# Patient Record
Sex: MALE | Race: WHITE | NOT HISPANIC OR LATINO | Employment: OTHER | URBAN - METROPOLITAN AREA
[De-identification: names, ages, dates, MRNs, and addresses within clinical notes are randomized per-mention and may not be internally consistent; named-entity substitution may affect disease eponyms.]

---

## 2017-03-28 ENCOUNTER — LAB VISIT (OUTPATIENT)
Dept: LAB | Facility: HOSPITAL | Age: 67
End: 2017-03-28
Attending: INTERNAL MEDICINE
Payer: MEDICARE

## 2017-03-28 DIAGNOSIS — Z94.4 LIVER TRANSPLANTED: ICD-10-CM

## 2017-03-28 DIAGNOSIS — E87.5 HYPERKALEMIA: Primary | ICD-10-CM

## 2017-03-28 LAB
ALBUMIN SERPL BCP-MCNC: 3.7 G/DL
ALP SERPL-CCNC: 86 U/L
ALT SERPL W/O P-5'-P-CCNC: 29 U/L
ANION GAP SERPL CALC-SCNC: 8 MMOL/L
AST SERPL-CCNC: 27 U/L
BASOPHILS # BLD AUTO: 0.03 K/UL
BASOPHILS NFR BLD: 0.5 %
BILIRUB SERPL-MCNC: 1 MG/DL
BUN SERPL-MCNC: 20 MG/DL
CALCIUM SERPL-MCNC: 9.4 MG/DL
CHLORIDE SERPL-SCNC: 106 MMOL/L
CO2 SERPL-SCNC: 27 MMOL/L
CREAT SERPL-MCNC: 1 MG/DL
DIFFERENTIAL METHOD: ABNORMAL
EOSINOPHIL # BLD AUTO: 0.1 K/UL
EOSINOPHIL NFR BLD: 2 %
ERYTHROCYTE [DISTWIDTH] IN BLOOD BY AUTOMATED COUNT: 13 %
EST. GFR  (AFRICAN AMERICAN): >60 ML/MIN/1.73 M^2
EST. GFR  (NON AFRICAN AMERICAN): >60 ML/MIN/1.73 M^2
GLUCOSE SERPL-MCNC: 113 MG/DL
HCT VFR BLD AUTO: 45.9 %
HGB BLD-MCNC: 15.9 G/DL
LYMPHOCYTES # BLD AUTO: 2.5 K/UL
LYMPHOCYTES NFR BLD: 44.4 %
MCH RBC QN AUTO: 33.3 PG
MCHC RBC AUTO-ENTMCNC: 34.6 %
MCV RBC AUTO: 96 FL
MONOCYTES # BLD AUTO: 0.6 K/UL
MONOCYTES NFR BLD: 11.2 %
NEUTROPHILS # BLD AUTO: 2.3 K/UL
NEUTROPHILS NFR BLD: 41.5 %
PLATELET # BLD AUTO: 168 K/UL
PMV BLD AUTO: 9.4 FL
POTASSIUM SERPL-SCNC: 5.7 MMOL/L
PROT SERPL-MCNC: 6.9 G/DL
RBC # BLD AUTO: 4.77 M/UL
SODIUM SERPL-SCNC: 141 MMOL/L
TACROLIMUS BLD-MCNC: 4.3 NG/ML
WBC # BLD AUTO: 5.61 K/UL

## 2017-03-28 PROCEDURE — 80053 COMPREHEN METABOLIC PANEL: CPT

## 2017-03-28 PROCEDURE — 80197 ASSAY OF TACROLIMUS: CPT

## 2017-03-28 PROCEDURE — 36415 COLL VENOUS BLD VENIPUNCTURE: CPT

## 2017-03-28 PROCEDURE — 85025 COMPLETE CBC W/AUTO DIFF WBC: CPT

## 2017-03-28 NOTE — TELEPHONE ENCOUNTER
Received an elevated K level of 5.6 dated 3/28. Per protocol Kayexalate 30gm ordered to [facility] on [address] in [facility] per pt req. Pt will repeat labs tomorrow 3/29.  Reviewed instructions for medication and foods to avoid that are high in k. She states she understands.

## 2017-03-29 ENCOUNTER — LAB VISIT (OUTPATIENT)
Dept: LAB | Facility: HOSPITAL | Age: 67
End: 2017-03-29
Attending: INTERNAL MEDICINE
Payer: MEDICARE

## 2017-03-29 DIAGNOSIS — Z94.4 LIVER TRANSPLANTED: ICD-10-CM

## 2017-03-29 DIAGNOSIS — E87.5 HYPERKALEMIA: ICD-10-CM

## 2017-03-29 LAB — POTASSIUM SERPL-SCNC: 5.1 MMOL/L

## 2017-03-29 PROCEDURE — 36415 COLL VENOUS BLD VENIPUNCTURE: CPT

## 2017-03-29 PROCEDURE — 84132 ASSAY OF SERUM POTASSIUM: CPT

## 2017-03-30 ENCOUNTER — TELEPHONE (OUTPATIENT)
Dept: TRANSPLANT | Facility: CLINIC | Age: 67
End: 2017-03-30

## 2017-03-30 NOTE — LETTER
March 30, 2017    Zander Kaur  [address]  [place] [address]          Dear Zander Kaur:  MRN: [id number]    Your lab results were stable.  There are no medicine changes.  Please have your labs drawn again on 6/26/17.      Please try to follow a low potassium diet.  Information enclosed.    If you cannot have your labs drawn on the scheduled date, it is your responsibility to call the transplant department to reschedule.  To reschedule or make an appointment, please as to speak to or leave a message for my assistant, Susana Llamas, at [phone].  When leaving a message for Muriel Meza, or myself, we ask that you leave a brief message regarding your request.    Sincerely,    Oksana Torres, RN, BSN  Liver Transplant Coordinator  [facility]  [address] [place] [address] [id number] [phone]

## 2017-03-30 NOTE — TELEPHONE ENCOUNTER
K protocol followed and repeat K 5.1.    Letter sent, labs stable and no medication changes are needed. Repeat labs due 6/26/17. Pt also advised of low K diet.

## 2017-03-30 NOTE — TELEPHONE ENCOUNTER
----- Message from Carson Oswald MD sent at 3/30/2017  2:45 PM CDT -----  Results reviewed  K protocol please

## 2017-06-26 ENCOUNTER — TELEPHONE (OUTPATIENT)
Dept: TRANSPLANT | Facility: CLINIC | Age: 67
End: 2017-06-26

## 2017-06-26 ENCOUNTER — OFFICE VISIT (OUTPATIENT)
Dept: TRANSPLANT | Facility: CLINIC | Age: 67
End: 2017-06-26
Payer: MEDICARE

## 2017-06-26 ENCOUNTER — PATIENT MESSAGE (OUTPATIENT)
Dept: INTERNAL MEDICINE | Facility: CLINIC | Age: 67
End: 2017-06-26

## 2017-06-26 VITALS
RESPIRATION RATE: 20 BRPM | HEART RATE: 66 BPM | HEIGHT: 68 IN | TEMPERATURE: 98 F | DIASTOLIC BLOOD PRESSURE: 95 MMHG | BODY MASS INDEX: 31.41 KG/M2 | SYSTOLIC BLOOD PRESSURE: 151 MMHG | OXYGEN SATURATION: 95 % | WEIGHT: 207.25 LBS

## 2017-06-26 DIAGNOSIS — Z79.899 H/O LONG-TERM TREATMENT WITH HIGH-RISK MEDICATION: Primary | ICD-10-CM

## 2017-06-26 DIAGNOSIS — Z79.52 LONG TERM CURRENT USE OF SYSTEMIC STEROIDS: ICD-10-CM

## 2017-06-26 DIAGNOSIS — M17.9 OSTEOARTHRITIS OF KNEE, UNSPECIFIED LATERALITY, UNSPECIFIED OSTEOARTHRITIS TYPE: Primary | ICD-10-CM

## 2017-06-26 DIAGNOSIS — E87.5 HYPERKALEMIA: Primary | ICD-10-CM

## 2017-06-26 DIAGNOSIS — Z94.4 S/P LIVER TRANSPLANT: Primary | ICD-10-CM

## 2017-06-26 PROCEDURE — 99213 OFFICE O/P EST LOW 20 MIN: CPT | Mod: PBBFAC | Performed by: INTERNAL MEDICINE

## 2017-06-26 PROCEDURE — 99999 PR PBB SHADOW E&M-EST. PATIENT-LVL III: CPT | Mod: PBBFAC,,, | Performed by: INTERNAL MEDICINE

## 2017-06-26 PROCEDURE — 99214 OFFICE O/P EST MOD 30 MIN: CPT | Mod: S$PBB,,, | Performed by: INTERNAL MEDICINE

## 2017-06-26 RX ORDER — CHOLECALCIFEROL (VITAMIN D3) 25 MCG
1000 TABLET ORAL DAILY
COMMUNITY

## 2017-06-26 NOTE — TELEPHONE ENCOUNTER
Spoke with pt, advised that he needs to take 30g/120ml of kayexalate today and repeat K tomorrow. Medication called in to [facility] per pt's request.

## 2017-06-26 NOTE — TELEPHONE ENCOUNTER
----- Message from No Villagran sent at 6/26/2017 12:58 PM CDT -----  Contact: Marbella/wife  Please call  about meds is not been sent yet and has some questions

## 2017-06-26 NOTE — PROGRESS NOTES
Transplant Hepatology  Liver Transplant Recipient Follow-up    Transplant Date: 2010  UNOS Native Liver Dx: Alcoholic Cirrhosis    Zander is here for follow up of his liver transplant. He had a liver transplant in  (7 years ago) for alcohol-related cirrhosis.    ORGAN: LIVER  Whole or Partial: whole liver  Donor Type:  - brain death  [facility] High Risk: no  Donor CMV Status: positive  Donor HCV Status: negative  Donor HBcAb: negative  Biliary Anastomosis:   Arterial Anatomy:   IVC reconstruction:   Portal vein status:     He has had the following complications since transplant: biliary stricture. The noted complications are well controlled.      Subjective:     Interval History: .  At this point today he does not have any complaints.   He has normal liver and kidney function and is on tac monotherapy.      His only liver issue post-OLT was an anastomotic biliary stricture treated with ERCP and stenting but his biliary stents were removed in Dec 2011 and his LFTs have since remained normal.    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 Sees dermatology routinely. He works and enjoys fixing cars and racing. He has been followed by orthopedics for multiple joint and bone fractures.     He has considered a left knee replacement but is reluctant to do it because he is worried about the success rates.    Normal liver and kidney function in 2017.    Review of Systems   Constitutional: Negative for fatigue, fever and unexpected weight change.   Eyes: Negative.    Respiratory: Negative.  Negative for chest tightness and shortness of breath.    Cardiovascular: Negative for chest pain and leg swelling.   Gastrointestinal: Negative for abdominal pain, blood in stool, constipation, nausea and vomiting.   Genitourinary: Negative.    Musculoskeletal: Positive for arthralgias (from previous injuries) and joint swelling.   Skin: Negative.  Negative for color change and rash.    Neurological: Negative.  Negative for dizziness and light-headedness.   Psychiatric/Behavioral: Negative.  Negative for confusion and suicidal ideas. The patient is not nervous/anxious.        Objective:     Physical Exam   Constitutional: He is oriented to person, place, and time. He appears well-developed and well-nourished.   HENT:   Head: Normocephalic and atraumatic.   Eyes: Conjunctivae are normal. Pupils are equal, round, and reactive to light.   Neck: Normal range of motion. Neck supple. No thyromegaly present.   Cardiovascular: Normal rate, regular rhythm and normal heart sounds.    Pulmonary/Chest: Effort normal and breath sounds normal.   Abdominal: Soft. Bowel sounds are normal. He exhibits no distension. There is no tenderness.       Musculoskeletal: He exhibits edema (joints) and tenderness (over the knees and shoulders).   Neurological: He is alert and oriented to person, place, and time.   Skin: Skin is warm and dry.   Psychiatric: He has a normal mood and affect. His behavior is normal. Judgment and thought content normal.   Nursing note and vitals reviewed.    Lab Results   Component Value Date    BILITOT 1.0 03/28/2017    AST 27 03/28/2017    ALT 29 03/28/2017    ALKPHOS 86 03/28/2017    CREATININE 1.0 03/28/2017    ALBUMIN 3.7 03/28/2017     Lab Results   Component Value Date    WBC 5.61 03/28/2017    HGB 15.9 03/28/2017    HCT 45.9 03/28/2017     03/28/2017     Lab Results   Component Value Date    TACROLIMUS 4.3 (L) 03/28/2017       Assessment/Plan:     No diagnosis found.    1. S/p liver transplant 6/15/2010 for Alcoholic cirrhosis. Normal LFTs. Chronic immunosuppressive medications for rejection prophylaxis at target. On tacrolimus.  Plan: no adjustment needed.Continue monitoring symptoms, labs and drug levels for drug-related toxicity and side effects  2. Squamous cell skin cancer. Routine dermatology follow up.   3. Hypertension- controlled with medication. I reviewed with him that  the target goal for his hypertension management should be less than 125/75.  4. Osteoarthritis and multiple joint injuries. Continue follow up with orthopedics. On pain meds.     5. Post-transplant health maintenance: Refer to PCP: Bone density. The patient should undergo a bone density to rule out osteoporosis post transplant. Cancer surveillance.  The patient has been instructed to use sunscreen and wear a hat while in the sun. He should see a dermatologist annually post liver transplant. He should see his local gastroenterologist to stay up to date with colorectal cancer screening.     Clinic in 1 year.    MD PURVI Irving Patient Status  Functional Status: 100% - Normal, no complaints, no evidence of disease  Physical Capacity: No Limitations

## 2017-06-26 NOTE — TELEPHONE ENCOUNTER
Called pt/pt's wife to speak to them regarding pt's potassium level and changes to diet recently.  Pt's spouse states she has a list of potassium content in foods and is trying to follow this.   She has added home made bone broth, pt is taking in about 2 c per day.     Intervention:  Emailed pt's spouse a detailed list of potassium content in foods. Advised aim for less than 2,000mg per day.  Advised to reduce intake of bone broth to 1 c per day or less.     Provided RD contact info for follow up if needed.

## 2017-06-26 NOTE — TELEPHONE ENCOUNTER
Returned call, spoke with pt's wife. Wife concerned because pt drinking bone broth daily and she thinks it may be high in potassium.  Will check with dietician and call her back.

## 2017-06-26 NOTE — LETTER
June 26, 2017        Shai Davison  [address]  [facility] [address]  Phone: [phone]  Fax: [phone]             [facility] Trans[facility]  [address]  [facility] [address]  Phone: [phone]   Patient: Zander Kaur Sr.   MR Number: [id number]   YOB: 1950   Date of Visit: 6/26/2017       Dear Dr. Shai Davison    Thank you for referring Zander Kaur to me for evaluation. Attached you will find relevant portions of my assessment and plan of care.    If you have questions, please do not hesitate to call me. I look forward to following Zander Kaur along with you.    Sincerely,    Carson Oswald MD    Enclosure    If you would like to receive this communication electronically, please contact [email or web address] or [phone] to request [employer] access.    [employer] is a tool which provides read-only access to select patient information with whom you have a relationship. Its easy to use and provides real time access to review your patients record including encounter summaries, notes, results, and demographic information.    If you feel you have received this communication in error or would no longer like to receive these types of communications, please e-mail [email or web address]

## 2017-06-27 ENCOUNTER — TELEPHONE (OUTPATIENT)
Dept: TRANSPLANT | Facility: CLINIC | Age: 67
End: 2017-06-27

## 2017-06-27 ENCOUNTER — LAB VISIT (OUTPATIENT)
Dept: LAB | Facility: HOSPITAL | Age: 67
End: 2017-06-27
Attending: INTERNAL MEDICINE
Payer: MEDICARE

## 2017-06-27 DIAGNOSIS — E87.5 HYPERKALEMIA: ICD-10-CM

## 2017-06-27 LAB — POTASSIUM SERPL-SCNC: 4.4 MMOL/L

## 2017-06-27 PROCEDURE — 36415 COLL VENOUS BLD VENIPUNCTURE: CPT

## 2017-06-27 PROCEDURE — 84132 ASSAY OF SERUM POTASSIUM: CPT

## 2017-06-27 NOTE — TELEPHONE ENCOUNTER
Repeat K pending.  Low K diet reviewed with pt and wife per RONALD Fermin, transplant dietician.      Letter sent, labs stable and no medication changes are needed. Repeat labs due 9/25/17.

## 2017-06-27 NOTE — LETTER
June 27, 2017    Zander Kaur  [address] [place] [address]          Dear Zander Kaur:  MRN: [id number]    Your liver lab results were stable.  There are no medicine changes.  Please continue to follow a low potassium diet. Please have your labs drawn again on 9/25/17.      If you cannot have your labs drawn on the scheduled date, it is your responsibility to call the transplant department to reschedule.  To reschedule or make an appointment, please as to speak to or leave a message for my assistant, Susana Llamas, at [phone].  When leaving a message for Muriel Meza, or myself, we ask that you leave a brief message regarding your request.    Sincerely,    Oksana Torres, RN, BSN  Liver Transplant Coordinator  [facility]  [address] [place] [address] [phone]

## 2017-07-13 ENCOUNTER — HOSPITAL ENCOUNTER (OUTPATIENT)
Dept: RADIOLOGY | Facility: CLINIC | Age: 67
Discharge: HOME | End: 2017-07-13
Attending: INTERNAL MEDICINE
Payer: MEDICARE

## 2017-07-13 DIAGNOSIS — Z79.899 H/O LONG-TERM TREATMENT WITH HIGH-RISK MEDICATION: ICD-10-CM

## 2017-07-13 DIAGNOSIS — Z79.52 LONG TERM CURRENT USE OF SYSTEMIC STEROIDS: ICD-10-CM

## 2017-07-13 PROCEDURE — 77080 DXA BONE DENSITY AXIAL: CPT | Mod: TC

## 2017-07-13 PROCEDURE — 77080 DXA BONE DENSITY AXIAL: CPT | Mod: 26,,, | Performed by: INTERNAL MEDICINE

## 2017-07-17 ENCOUNTER — HOSPITAL ENCOUNTER (OUTPATIENT)
Dept: RADIOLOGY | Facility: HOSPITAL | Age: 67
Discharge: HOME | End: 2017-07-17
Attending: ORTHOPAEDIC SURGERY
Payer: MEDICARE

## 2017-07-17 ENCOUNTER — OFFICE VISIT (OUTPATIENT)
Dept: ORTHOPEDICS | Facility: CLINIC | Age: 67
End: 2017-07-17
Payer: MEDICARE

## 2017-07-17 VITALS — BODY MASS INDEX: 32.72 KG/M2 | HEIGHT: 67 IN | WEIGHT: 208.44 LBS

## 2017-07-17 DIAGNOSIS — M17.12 OSTEOARTHRITIS OF LEFT KNEE, UNSPECIFIED OSTEOARTHRITIS TYPE: Primary | ICD-10-CM

## 2017-07-17 DIAGNOSIS — M25.571 RIGHT ANKLE PAIN, UNSPECIFIED CHRONICITY: ICD-10-CM

## 2017-07-17 DIAGNOSIS — M19.071 OSTEOARTHRITIS OF RIGHT ANKLE, UNSPECIFIED OSTEOARTHRITIS TYPE: ICD-10-CM

## 2017-07-17 PROCEDURE — 73610 X-RAY EXAM OF ANKLE: CPT | Mod: 26,RT,, | Performed by: RADIOLOGY

## 2017-07-17 PROCEDURE — 99999 PR PBB SHADOW E&M-EST. PATIENT-LVL III: CPT | Mod: PBBFAC,,, | Performed by: PHYSICIAN ASSISTANT

## 2017-07-17 PROCEDURE — 99214 OFFICE O/P EST MOD 30 MIN: CPT | Mod: S$PBB,,, | Performed by: PHYSICIAN ASSISTANT

## 2017-07-17 PROCEDURE — 73610 X-RAY EXAM OF ANKLE: CPT | Mod: TC,RT

## 2017-07-17 NOTE — LETTER
July 18, 2017      Carson Oswald MD  [address]  [facility] [address]           [facility]  [address]  [facility] [address]  Phone: [phone]          Patient: Zander Kaur Sr.   MR Number: [id number]   YOB: 1950   Date of Visit: 7/17/2017       Dear Dr. Carson Oswald:    Thank you for referring Zander Kaur to me for evaluation. Attached you will find relevant portions of my assessment and plan of care.    If you have questions, please do not hesitate to call me. I look forward to following Zander Kaur along with you.    Sincerely,    Marbella Rea PA-C    Enclosure  CC:  No Recipients    If you would like to receive this communication electronically, please contact [email or web address] or [phone] to request more information on [employer] Link access.    For providers and/or their staff who would like to refer a patient to [facility], please contact us through our one-stop-shop provider referral line, [facility], at [phone].    If you feel you have received this communication in error or would no longer like to receive these types of communications, please e-mail [email or web address]

## 2017-07-18 NOTE — PROGRESS NOTES
Subjective:      Patient ID: Zander Kaur Sr. is a 66 y.o. male.    Chief Complaint: No chief complaint on file.    HPI  66 year old male presents with chief complaint of left knee pain x many years. He reports having about 2 surgeries on the left knee in the past. Pain is worse with use and at night. He takes soma and percocet for this. He is s/p liver transplant. He use to receive cortisone injections but he says they stopped working. He wears knee braces which help. He alternates cane, walker, and segway.   Patient reports right ankle pain x 20 years. He reports h/o multiple fractures and surgeries on the ankle. He also has h/o infection. Pain is worse at night. He has pain with walking and he limps. He reports limited ROM. He denies fever, chills, and sweats.   Review of Systems   Constitution: Negative for chills, fever and night sweats.   Cardiovascular: Negative for chest pain.   Respiratory: Negative for cough and shortness of breath.    Hematologic/Lymphatic: Does not bruise/bleed easily.   Skin: Negative for color change.   Gastrointestinal: Negative for heartburn.   Genitourinary: Negative for dysuria.   Neurological: Negative for numbness and paresthesias.   Psychiatric/Behavioral: Negative for altered mental status.   Allergic/Immunologic: Negative for persistent infections.         Objective:            General    Vitals reviewed.  Constitutional: He is oriented to person, place, and time. He appears well-developed and well-nourished.   Cardiovascular: Normal rate.    Neurological: He is alert and oriented to person, place, and time.         Right Ankle/Foot Exam     Inspection   Erythema: absent    Range of Motion   Ankle Joint   Dorsiflexion: abnormal   Plantar flexion: abnormal   Subtalar Joint   Inversion: abnormal   Eversion: abnormal     Other   Sensation: normal    Comments:  TTP lateral and medial aspect of ankle.         Vascular Exam     Right Pulses  Dorsalis Pedis:      2+            Left  Knee Exam:  ROM 0-125 degrees  +crepitus  No effusion  TTP medial joint line        X-ray ankle: ordered and reviewed by myself. Moderate OA present.  X-ray knee: reviewed by myself. There is severe medial compartment narrowing on the left.      Assessment:       Encounter Diagnoses   Name Primary?    Osteoarthritis of right ankle, unspecified osteoarthritis type     Osteoarthritis of left knee, unspecified osteoarthritis type Yes          Plan:       Discussed treatment options with patient. He is not interested in surgical intervention. If he changes his mind, we will set him up with a surgeon. Explained our dept pain medicine policy. Recommend he either f/u with his PCP or PMR regarding chronic pain mgmt. He voiced understanding.

## 2017-07-21 ENCOUNTER — TELEPHONE (OUTPATIENT)
Dept: TRANSPLANT | Facility: CLINIC | Age: 67
End: 2017-07-21

## 2017-07-21 NOTE — LETTER
July 21, 2017    Zander Kaur Sr.  [address] [place] [address]             [facility] - Liver Transplant  [address]  [place] [address]  Phone: [phone] Mr. Kaur,    Please see attached DEXA report.  Dr. Oswald would like you to follow the recommendations and follow-up with your PCP for continued monitoring.    Please let us know if you have any questions or concerns.    Sincerely,    Oksana Torres RN, BSN  Liver Transplant Coordinator

## 2017-08-03 ENCOUNTER — OFFICE VISIT (OUTPATIENT)
Dept: INTERNAL MEDICINE | Facility: CLINIC | Age: 67
End: 2017-08-03
Payer: MEDICARE

## 2017-08-03 VITALS
HEART RATE: 69 BPM | SYSTOLIC BLOOD PRESSURE: 124 MMHG | DIASTOLIC BLOOD PRESSURE: 76 MMHG | HEIGHT: 66 IN | BODY MASS INDEX: 32.92 KG/M2 | WEIGHT: 204.81 LBS

## 2017-08-03 DIAGNOSIS — I10 ESSENTIAL HYPERTENSION: Primary | ICD-10-CM

## 2017-08-03 DIAGNOSIS — M19.90 ARTHRITIS: ICD-10-CM

## 2017-08-03 DIAGNOSIS — E87.5 HYPERKALEMIA: ICD-10-CM

## 2017-08-03 DIAGNOSIS — Z12.5 SCREENING FOR PROSTATE CANCER: ICD-10-CM

## 2017-08-03 DIAGNOSIS — Z94.4 S/P LIVER TRANSPLANT: ICD-10-CM

## 2017-08-03 PROCEDURE — 99214 OFFICE O/P EST MOD 30 MIN: CPT | Mod: S$PBB,,, | Performed by: INTERNAL MEDICINE

## 2017-08-03 PROCEDURE — 99213 OFFICE O/P EST LOW 20 MIN: CPT | Mod: PBBFAC | Performed by: INTERNAL MEDICINE

## 2017-08-03 PROCEDURE — 99999 PR PBB SHADOW E&M-EST. PATIENT-LVL III: CPT | Mod: PBBFAC,,, | Performed by: INTERNAL MEDICINE

## 2017-08-03 PROCEDURE — 3008F BODY MASS INDEX DOCD: CPT | Mod: ,,, | Performed by: INTERNAL MEDICINE

## 2017-08-03 RX ORDER — DOXAZOSIN 8 MG/1
8 TABLET ORAL DAILY
Qty: 90 TABLET | Refills: 11 | Status: SHIPPED | OUTPATIENT
Start: 2017-08-03 | End: 2018-10-02 | Stop reason: SDUPTHER

## 2017-08-03 NOTE — PROGRESS NOTES
Subjective:       Patient ID: Zander Kaur Sr. is a 66 y.o. male.    Chief Complaint: Follow-up (yearly follow up)    History of present illness: Patient comes in for annual follow-up.  He is attended by his wife.  They state that the transplant department asked if we change his Norvasc to another medication as they are concerned it may be causing hyperkalemia.  Apparently Prograf can cause this but he is on the lowest dose possible and they are  looking for other medications to adjust.  He had to receive Kayexalate in the past. I didn't want to use a diuretic so we'll try Adoxa doxazosin monitoring his potassium and blood pressure response.  He sees an outside orthopedic for pain  medication for his knee.       Review of Systems   Constitutional: Negative for activity change and unexpected weight change.   HENT: Negative for hearing loss, rhinorrhea and trouble swallowing.    Eyes: Negative for discharge and visual disturbance.   Respiratory: Negative for chest tightness and wheezing.    Cardiovascular: Positive for leg swelling. Negative for chest pain and palpitations.   Gastrointestinal: Negative for blood in stool, constipation, diarrhea and vomiting.   Endocrine: Negative for polydipsia and polyuria.   Genitourinary: Negative for difficulty urinating, hematuria and urgency.   Musculoskeletal: Positive for arthralgias, gait problem and joint swelling. Negative for neck pain.   Neurological: Negative for weakness and headaches.   Psychiatric/Behavioral: Negative for confusion and dysphoric mood.       Objective:      Physical Exam   Constitutional: He is oriented to person, place, and time. He appears well-developed and well-nourished. No distress.   HENT:   Head: Normocephalic and atraumatic.   Right Ear: External ear normal.   Left Ear: External ear normal.   Mouth/Throat: Oropharynx is clear and moist. No oropharyngeal exudate.   TM's clear, pharynx clear   Eyes: Conjunctivae and EOM are normal. Pupils are  equal, round, and reactive to light. No scleral icterus.   Neck: Normal range of motion. Neck supple. No thyromegaly present.   No supraclavicular nodes palpated   Cardiovascular: Normal rate, regular rhythm and normal heart sounds.    No murmur heard.  Pulmonary/Chest: Effort normal and breath sounds normal. He has no wheezes.   Abdominal: Soft. Bowel sounds are normal. He exhibits no mass. There is no tenderness.   Musculoskeletal: He exhibits edema (pretibial and pedal) and tenderness (left knee).   Lymphadenopathy:     He has no cervical adenopathy.   Neurological: He is alert and oriented to person, place, and time.   Skin: No pallor.   Psychiatric: He has a normal mood and affect.       Assessment:       1. Essential hypertension    2. Screening for prostate cancer    3. S/P liver transplant    4. Arthritis    5. Hyperkalemia        Plan:       Zander was seen today for follow-up.    Diagnoses and all orders for this visit:    Essential hypertension  -     Lipid panel; Future    Screening for prostate cancer  -     PSA, Screening; Future    S/P liver transplant    Arthritis    Hyperkalemia    Other orders  -     doxazosin (CARDURA) 8 MG Tab; Take 1 tablet (8 mg total) by mouth once daily.        follow-up in a few weeks with pressure and lab

## 2017-08-15 ENCOUNTER — TELEPHONE (OUTPATIENT)
Dept: INTERNAL MEDICINE | Facility: CLINIC | Age: 67
End: 2017-08-15

## 2017-09-18 RX ORDER — AMLODIPINE BESYLATE 10 MG/1
TABLET ORAL
Qty: 30 TABLET | Refills: 11 | Status: ON HOLD | OUTPATIENT
Start: 2017-09-18 | End: 2023-12-18 | Stop reason: HOSPADM

## 2017-09-18 RX ORDER — METOPROLOL TARTRATE 100 MG/1
TABLET ORAL
Qty: 60 TABLET | Refills: 11 | Status: SHIPPED | OUTPATIENT
Start: 2017-09-18 | End: 2018-08-14 | Stop reason: SDUPTHER

## 2017-09-19 RX ORDER — TACROLIMUS 1 MG/1
CAPSULE, GELATIN COATED ORAL
Qty: 60 CAPSULE | Refills: 11 | Status: SHIPPED | OUTPATIENT
Start: 2017-09-19 | End: 2018-08-14 | Stop reason: SDUPTHER

## 2017-09-26 ENCOUNTER — LAB VISIT (OUTPATIENT)
Dept: LAB | Facility: HOSPITAL | Age: 67
End: 2017-09-26
Attending: INTERNAL MEDICINE
Payer: MEDICARE

## 2017-09-26 DIAGNOSIS — I10 ESSENTIAL HYPERTENSION: ICD-10-CM

## 2017-09-26 DIAGNOSIS — Z94.4 LIVER TRANSPLANTED: ICD-10-CM

## 2017-09-26 DIAGNOSIS — Z12.5 SCREENING FOR PROSTATE CANCER: ICD-10-CM

## 2017-09-26 LAB
ALBUMIN SERPL BCP-MCNC: 3.5 G/DL
ALP SERPL-CCNC: 90 U/L
ALT SERPL W/O P-5'-P-CCNC: 33 U/L
ANION GAP SERPL CALC-SCNC: 6 MMOL/L
AST SERPL-CCNC: 32 U/L
BASOPHILS # BLD AUTO: 0.05 K/UL
BASOPHILS NFR BLD: 1 %
BILIRUB SERPL-MCNC: 0.6 MG/DL
BUN SERPL-MCNC: 20 MG/DL
CALCIUM SERPL-MCNC: 9.7 MG/DL
CHLORIDE SERPL-SCNC: 109 MMOL/L
CHOLEST SERPL-MCNC: 190 MG/DL
CHOLEST/HDLC SERPL: 3.3 {RATIO}
CO2 SERPL-SCNC: 30 MMOL/L
COMPLEXED PSA SERPL-MCNC: 3.1 NG/ML
CREAT SERPL-MCNC: 0.9 MG/DL
DIFFERENTIAL METHOD: ABNORMAL
EOSINOPHIL # BLD AUTO: 0.2 K/UL
EOSINOPHIL NFR BLD: 2.9 %
ERYTHROCYTE [DISTWIDTH] IN BLOOD BY AUTOMATED COUNT: 13.5 %
EST. GFR  (AFRICAN AMERICAN): >60 ML/MIN/1.73 M^2
EST. GFR  (NON AFRICAN AMERICAN): >60 ML/MIN/1.73 M^2
GLUCOSE SERPL-MCNC: 103 MG/DL
HCT VFR BLD AUTO: 48.5 %
HDLC SERPL-MCNC: 58 MG/DL
HDLC SERPL: 30.5 %
HGB BLD-MCNC: 16.5 G/DL
LDLC SERPL CALC-MCNC: 113.4 MG/DL
LYMPHOCYTES # BLD AUTO: 2.3 K/UL
LYMPHOCYTES NFR BLD: 44.7 %
MCH RBC QN AUTO: 33.5 PG
MCHC RBC AUTO-ENTMCNC: 34 G/DL
MCV RBC AUTO: 98 FL
MONOCYTES # BLD AUTO: 0.5 K/UL
MONOCYTES NFR BLD: 9.6 %
NEUTROPHILS # BLD AUTO: 2.2 K/UL
NEUTROPHILS NFR BLD: 41.6 %
NONHDLC SERPL-MCNC: 132 MG/DL
PLATELET # BLD AUTO: 148 K/UL
PMV BLD AUTO: 9.8 FL
POTASSIUM SERPL-SCNC: 4.9 MMOL/L
PROT SERPL-MCNC: 7 G/DL
RBC # BLD AUTO: 4.93 M/UL
SODIUM SERPL-SCNC: 145 MMOL/L
TACROLIMUS BLD-MCNC: 2.4 NG/ML
TRIGL SERPL-MCNC: 93 MG/DL
WBC # BLD AUTO: 5.19 K/UL

## 2017-09-26 PROCEDURE — 84153 ASSAY OF PSA TOTAL: CPT

## 2017-09-26 PROCEDURE — 36415 COLL VENOUS BLD VENIPUNCTURE: CPT

## 2017-09-26 PROCEDURE — 80053 COMPREHEN METABOLIC PANEL: CPT

## 2017-09-26 PROCEDURE — 85025 COMPLETE CBC W/AUTO DIFF WBC: CPT

## 2017-09-26 PROCEDURE — 80061 LIPID PANEL: CPT

## 2017-09-26 PROCEDURE — 80197 ASSAY OF TACROLIMUS: CPT

## 2017-09-28 ENCOUNTER — TELEPHONE (OUTPATIENT)
Dept: TRANSPLANT | Facility: CLINIC | Age: 67
End: 2017-09-28

## 2017-09-28 NOTE — TELEPHONE ENCOUNTER
Letter sent, labs stable and no medication changes are needed. Repeat labs due 1/2/18 per protocol.

## 2017-09-28 NOTE — LETTER
September 28, 2017    Zander Kaur  [address]  [facility] [address]          Dear Zander Kaur:  MRN: [id number]    Your lab results were stable.  There are no medicine changes.  Please have your labs drawn again on 1/2/18.      If you cannot have your labs drawn on the scheduled date, it is your responsibility to call the transplant department to reschedule.  To reschedule or make an appointment, please as to speak to or leave a message for my assistant, Susana Llamas, at [phone].  When leaving a message for Muriel Meza, or myself, we ask that you leave a brief message regarding your request.    Sincerely,    Oksana Torres, RN, BSN  Liver Transplant Coordinator  [facility]  [address] [place] [address] [phone]

## 2018-01-02 ENCOUNTER — TELEPHONE (OUTPATIENT)
Dept: TRANSPLANT | Facility: CLINIC | Age: 68
End: 2018-01-02

## 2018-01-02 ENCOUNTER — LAB VISIT (OUTPATIENT)
Dept: LAB | Facility: HOSPITAL | Age: 68
End: 2018-01-02
Attending: INTERNAL MEDICINE
Payer: MEDICARE

## 2018-01-02 DIAGNOSIS — Z94.4 LIVER TRANSPLANTED: ICD-10-CM

## 2018-01-02 LAB
ALBUMIN SERPL BCP-MCNC: 3.6 G/DL
ALP SERPL-CCNC: 91 U/L
ALT SERPL W/O P-5'-P-CCNC: 33 U/L
ANION GAP SERPL CALC-SCNC: 11 MMOL/L
AST SERPL-CCNC: 31 U/L
BASOPHILS # BLD AUTO: 0.05 K/UL
BASOPHILS NFR BLD: 0.8 %
BILIRUB SERPL-MCNC: 1.2 MG/DL
BUN SERPL-MCNC: 13 MG/DL
CALCIUM SERPL-MCNC: 9.5 MG/DL
CHLORIDE SERPL-SCNC: 108 MMOL/L
CO2 SERPL-SCNC: 28 MMOL/L
CREAT SERPL-MCNC: 1 MG/DL
DIFFERENTIAL METHOD: ABNORMAL
EOSINOPHIL # BLD AUTO: 0.3 K/UL
EOSINOPHIL NFR BLD: 4.7 %
ERYTHROCYTE [DISTWIDTH] IN BLOOD BY AUTOMATED COUNT: 13.1 %
EST. GFR  (AFRICAN AMERICAN): >60 ML/MIN/1.73 M^2
EST. GFR  (NON AFRICAN AMERICAN): >60 ML/MIN/1.73 M^2
GLUCOSE SERPL-MCNC: 115 MG/DL
HCT VFR BLD AUTO: 48.9 %
HGB BLD-MCNC: 16.3 G/DL
IMM GRANULOCYTES # BLD AUTO: 0.03 K/UL
IMM GRANULOCYTES NFR BLD AUTO: 0.5 %
LYMPHOCYTES # BLD AUTO: 2.3 K/UL
LYMPHOCYTES NFR BLD: 38.3 %
MCH RBC QN AUTO: 33.5 PG
MCHC RBC AUTO-ENTMCNC: 33.3 G/DL
MCV RBC AUTO: 101 FL
MONOCYTES # BLD AUTO: 0.6 K/UL
MONOCYTES NFR BLD: 9.4 %
NEUTROPHILS # BLD AUTO: 2.8 K/UL
NEUTROPHILS NFR BLD: 46.3 %
NRBC BLD-RTO: 0 /100 WBC
PLATELET # BLD AUTO: 153 K/UL
PMV BLD AUTO: 9.8 FL
POTASSIUM SERPL-SCNC: 5 MMOL/L
PROT SERPL-MCNC: 7.2 G/DL
RBC # BLD AUTO: 4.86 M/UL
SODIUM SERPL-SCNC: 147 MMOL/L
TACROLIMUS BLD-MCNC: 4.3 NG/ML
WBC # BLD AUTO: 5.95 K/UL

## 2018-01-02 PROCEDURE — 80053 COMPREHEN METABOLIC PANEL: CPT

## 2018-01-02 PROCEDURE — 85025 COMPLETE CBC W/AUTO DIFF WBC: CPT

## 2018-01-02 PROCEDURE — 36415 COLL VENOUS BLD VENIPUNCTURE: CPT

## 2018-01-02 PROCEDURE — 80197 ASSAY OF TACROLIMUS: CPT

## 2018-01-02 NOTE — LETTER
January 2, 2018    Zander Kaur  [address]  [facility] [address]          Dear Zander Kaur:  MRN: [id number]    Your lab results were stable.  There are no medicine changes.  Please have your labs drawn again on 4/2/18.      If you cannot have your labs drawn on the scheduled date, it is your responsibility to call the transplant department to reschedule.  To reschedule or make an appointment, please as to speak to or leave a message for my assistant, Susana Llamas, at [phone].  When leaving a message for Muriel Meza, or myself, we ask that you leave a brief message regarding your request.    Sincerely,    Oksana Torres, RN, BSN  Liver Transplant Coordinator  [facility]  [address] [place] [address] [phone]

## 2018-01-02 NOTE — TELEPHONE ENCOUNTER
Letter sent, labs stable and no medication changes are needed. Repeat labs due 4/2/18 per protocol.

## 2018-02-01 ENCOUNTER — OFFICE VISIT (OUTPATIENT)
Dept: OPTOMETRY | Facility: CLINIC | Age: 68
End: 2018-02-01
Payer: MEDICARE

## 2018-02-01 DIAGNOSIS — H25.13 NUCLEAR SCLEROSIS OF BOTH EYES: Primary | ICD-10-CM

## 2018-02-01 DIAGNOSIS — H52.203 MYOPIA WITH ASTIGMATISM AND PRESBYOPIA, BILATERAL: ICD-10-CM

## 2018-02-01 DIAGNOSIS — H52.4 MYOPIA WITH ASTIGMATISM AND PRESBYOPIA, BILATERAL: ICD-10-CM

## 2018-02-01 DIAGNOSIS — H52.13 MYOPIA WITH ASTIGMATISM AND PRESBYOPIA, BILATERAL: ICD-10-CM

## 2018-02-01 PROCEDURE — 99212 OFFICE O/P EST SF 10 MIN: CPT | Mod: PBBFAC | Performed by: OPTOMETRIST

## 2018-02-01 PROCEDURE — 99999 PR PBB SHADOW E&M-EST. PATIENT-LVL II: CPT | Mod: PBBFAC,,, | Performed by: OPTOMETRIST

## 2018-02-01 PROCEDURE — 92015 DETERMINE REFRACTIVE STATE: CPT | Mod: ,,, | Performed by: OPTOMETRIST

## 2018-02-01 PROCEDURE — 92004 COMPRE OPH EXAM NEW PT 1/>: CPT | Mod: S$PBB,,, | Performed by: OPTOMETRIST

## 2018-02-01 NOTE — PROGRESS NOTES
HPI     Patient's last dilated exam was: 2010 W/Dr. Thomas    Pt states he feels his vision has decreased for distance, wears otc   reading glasses only. Would like a glasses rx for distance only.  Patient   denies flashes, floaters, pain and double vision. Not using any gtts.  No   other ocular complaints         Last edited by SUSI Pedro on 2/1/2018  8:33 AM.   (History)            Assessment /Plan     For exam results, see Encounter Report.    Nuclear sclerosis of both eyes    Myopia with astigmatism and presbyopia, bilateral            1.  Educated on cataracts and affects on vision.  Early-monitor.  Eye health normal OU.  2.  Distance rx given.  Continue with OTC readers.          RTC 1 year for routine exam.

## 2018-04-03 ENCOUNTER — TELEPHONE (OUTPATIENT)
Dept: TRANSPLANT | Facility: CLINIC | Age: 68
End: 2018-04-03

## 2018-04-03 ENCOUNTER — LAB VISIT (OUTPATIENT)
Dept: LAB | Facility: HOSPITAL | Age: 68
End: 2018-04-03
Attending: INTERNAL MEDICINE
Payer: MEDICARE

## 2018-04-03 DIAGNOSIS — Z94.4 LIVER TRANSPLANTED: ICD-10-CM

## 2018-04-03 LAB
ALBUMIN SERPL BCP-MCNC: 3.7 G/DL
ALP SERPL-CCNC: 82 U/L
ALT SERPL W/O P-5'-P-CCNC: 35 U/L
ANION GAP SERPL CALC-SCNC: 8 MMOL/L
AST SERPL-CCNC: 31 U/L
BASOPHILS # BLD AUTO: 0.05 K/UL
BASOPHILS NFR BLD: 0.8 %
BILIRUB SERPL-MCNC: 0.8 MG/DL
BUN SERPL-MCNC: 17 MG/DL
CALCIUM SERPL-MCNC: 9.3 MG/DL
CHLORIDE SERPL-SCNC: 107 MMOL/L
CO2 SERPL-SCNC: 28 MMOL/L
CREAT SERPL-MCNC: 1 MG/DL
DIFFERENTIAL METHOD: ABNORMAL
EOSINOPHIL # BLD AUTO: 0.4 K/UL
EOSINOPHIL NFR BLD: 6.2 %
ERYTHROCYTE [DISTWIDTH] IN BLOOD BY AUTOMATED COUNT: 13.3 %
EST. GFR  (AFRICAN AMERICAN): >60 ML/MIN/1.73 M^2
EST. GFR  (NON AFRICAN AMERICAN): >60 ML/MIN/1.73 M^2
GLUCOSE SERPL-MCNC: 119 MG/DL
HCT VFR BLD AUTO: 50.1 %
HGB BLD-MCNC: 16.6 G/DL
IMM GRANULOCYTES # BLD AUTO: 0.02 K/UL
IMM GRANULOCYTES NFR BLD AUTO: 0.3 %
LYMPHOCYTES # BLD AUTO: 2.8 K/UL
LYMPHOCYTES NFR BLD: 43.6 %
MCH RBC QN AUTO: 33.3 PG
MCHC RBC AUTO-ENTMCNC: 33.1 G/DL
MCV RBC AUTO: 101 FL
MONOCYTES # BLD AUTO: 0.7 K/UL
MONOCYTES NFR BLD: 10 %
NEUTROPHILS # BLD AUTO: 2.5 K/UL
NEUTROPHILS NFR BLD: 39.1 %
NRBC BLD-RTO: 0 /100 WBC
PLATELET # BLD AUTO: 165 K/UL
PMV BLD AUTO: 9.5 FL
POTASSIUM SERPL-SCNC: 5.1 MMOL/L
PROT SERPL-MCNC: 7.1 G/DL
RBC # BLD AUTO: 4.98 M/UL
SODIUM SERPL-SCNC: 143 MMOL/L
TACROLIMUS BLD-MCNC: 4.6 NG/ML
WBC # BLD AUTO: 6.47 K/UL

## 2018-04-03 PROCEDURE — 80053 COMPREHEN METABOLIC PANEL: CPT

## 2018-04-03 PROCEDURE — 36415 COLL VENOUS BLD VENIPUNCTURE: CPT

## 2018-04-03 PROCEDURE — 80197 ASSAY OF TACROLIMUS: CPT

## 2018-04-03 PROCEDURE — 85025 COMPLETE CBC W/AUTO DIFF WBC: CPT

## 2018-04-03 NOTE — LETTER
April 3, 2018    Zander Kaur  [address]  [facility] [address]          Dear Zander Kaur:  MRN: [id number]    Your lab results were stable.  There are no medicine changes.  Please have your labs drawn again on 7/9/18.      If you cannot have your labs drawn on the scheduled date, it is your responsibility to call the transplant department to reschedule.  To reschedule or make an appointment, please as to speak to or leave a message for my assistant, Susana Llamas, at [phone].  When leaving a message for Muriel Meza, or myself, we ask that you leave a brief message regarding your request.    Sincerely,    kOsana Torres, RN, BSN  Liver Transplant Coordinator  [facility]  [address] [place] [address] [phone]

## 2018-04-03 NOTE — TELEPHONE ENCOUNTER
Letter sent, labs stable and no medication changes are needed. Repeat labs due 7/9/18 per protocol.

## 2018-05-15 ENCOUNTER — PATIENT MESSAGE (OUTPATIENT)
Dept: INTERNAL MEDICINE | Facility: CLINIC | Age: 68
End: 2018-05-15

## 2018-06-30 ENCOUNTER — EXTERNAL CHRONIC CARE MANAGEMENT (OUTPATIENT)
Dept: PRIMARY CARE CLINIC | Facility: CLINIC | Age: 68
End: 2018-06-30
Payer: MEDICARE

## 2018-06-30 PROCEDURE — 99490 CHRNC CARE MGMT STAFF 1ST 20: CPT | Mod: PBBFAC | Performed by: INTERNAL MEDICINE

## 2018-06-30 PROCEDURE — 99490 CHRNC CARE MGMT STAFF 1ST 20: CPT | Mod: S$PBB,,, | Performed by: INTERNAL MEDICINE

## 2018-07-09 ENCOUNTER — OFFICE VISIT (OUTPATIENT)
Dept: TRANSPLANT | Facility: CLINIC | Age: 68
End: 2018-07-09
Payer: MEDICARE

## 2018-07-09 ENCOUNTER — TELEPHONE (OUTPATIENT)
Dept: TRANSPLANT | Facility: CLINIC | Age: 68
End: 2018-07-09

## 2018-07-09 ENCOUNTER — TELEPHONE (OUTPATIENT)
Dept: INTERNAL MEDICINE | Facility: CLINIC | Age: 68
End: 2018-07-09

## 2018-07-09 ENCOUNTER — LAB VISIT (OUTPATIENT)
Dept: LAB | Facility: HOSPITAL | Age: 68
End: 2018-07-09
Attending: INTERNAL MEDICINE
Payer: MEDICARE

## 2018-07-09 VITALS
DIASTOLIC BLOOD PRESSURE: 92 MMHG | TEMPERATURE: 99 F | HEART RATE: 66 BPM | SYSTOLIC BLOOD PRESSURE: 132 MMHG | OXYGEN SATURATION: 96 % | WEIGHT: 209 LBS | BODY MASS INDEX: 31.67 KG/M2 | RESPIRATION RATE: 16 BRPM | HEIGHT: 68 IN

## 2018-07-09 DIAGNOSIS — D84.9 IMMUNOSUPPRESSION: ICD-10-CM

## 2018-07-09 DIAGNOSIS — Z94.4 LIVER TRANSPLANTED: ICD-10-CM

## 2018-07-09 DIAGNOSIS — Z94.4 LIVER TRANSPLANTED: Primary | ICD-10-CM

## 2018-07-09 DIAGNOSIS — Z94.4 S/P LIVER TRANSPLANT: Primary | ICD-10-CM

## 2018-07-09 LAB
ALBUMIN SERPL BCP-MCNC: 3.8 G/DL
ALP SERPL-CCNC: 85 U/L
ALT SERPL W/O P-5'-P-CCNC: 38 U/L
ANION GAP SERPL CALC-SCNC: 8 MMOL/L
AST SERPL-CCNC: 36 U/L
BASOPHILS # BLD AUTO: 0.05 K/UL
BASOPHILS NFR BLD: 0.9 %
BILIRUB SERPL-MCNC: 0.8 MG/DL
BUN SERPL-MCNC: 14 MG/DL
CALCIUM SERPL-MCNC: 10.3 MG/DL
CHLORIDE SERPL-SCNC: 105 MMOL/L
CO2 SERPL-SCNC: 31 MMOL/L
CREAT SERPL-MCNC: 0.9 MG/DL
DIFFERENTIAL METHOD: ABNORMAL
EOSINOPHIL # BLD AUTO: 0.3 K/UL
EOSINOPHIL NFR BLD: 5 %
ERYTHROCYTE [DISTWIDTH] IN BLOOD BY AUTOMATED COUNT: 13.2 %
EST. GFR  (AFRICAN AMERICAN): >60 ML/MIN/1.73 M^2
EST. GFR  (NON AFRICAN AMERICAN): >60 ML/MIN/1.73 M^2
GLUCOSE SERPL-MCNC: 114 MG/DL
HCT VFR BLD AUTO: 48.3 %
HGB BLD-MCNC: 16 G/DL
IMM GRANULOCYTES # BLD AUTO: 0.02 K/UL
IMM GRANULOCYTES NFR BLD AUTO: 0.4 %
LYMPHOCYTES # BLD AUTO: 2.3 K/UL
LYMPHOCYTES NFR BLD: 41.1 %
MCH RBC QN AUTO: 33.4 PG
MCHC RBC AUTO-ENTMCNC: 33.1 G/DL
MCV RBC AUTO: 101 FL
MONOCYTES # BLD AUTO: 0.6 K/UL
MONOCYTES NFR BLD: 9.8 %
NEUTROPHILS # BLD AUTO: 2.4 K/UL
NEUTROPHILS NFR BLD: 42.8 %
NRBC BLD-RTO: 0 /100 WBC
PLATELET # BLD AUTO: 157 K/UL
PMV BLD AUTO: 9.5 FL
POTASSIUM SERPL-SCNC: 5.3 MMOL/L
PROT SERPL-MCNC: 7.2 G/DL
RBC # BLD AUTO: 4.79 M/UL
SODIUM SERPL-SCNC: 144 MMOL/L
TACROLIMUS BLD-MCNC: 2.8 NG/ML
WBC # BLD AUTO: 5.6 K/UL

## 2018-07-09 PROCEDURE — 80197 ASSAY OF TACROLIMUS: CPT

## 2018-07-09 PROCEDURE — 99214 OFFICE O/P EST MOD 30 MIN: CPT | Mod: S$PBB,,, | Performed by: INTERNAL MEDICINE

## 2018-07-09 PROCEDURE — 99213 OFFICE O/P EST LOW 20 MIN: CPT | Mod: PBBFAC | Performed by: INTERNAL MEDICINE

## 2018-07-09 PROCEDURE — 36415 COLL VENOUS BLD VENIPUNCTURE: CPT

## 2018-07-09 PROCEDURE — 99999 PR PBB SHADOW E&M-EST. PATIENT-LVL III: CPT | Mod: PBBFAC,,, | Performed by: INTERNAL MEDICINE

## 2018-07-09 PROCEDURE — 80053 COMPREHEN METABOLIC PANEL: CPT

## 2018-07-09 PROCEDURE — 85025 COMPLETE CBC W/AUTO DIFF WBC: CPT

## 2018-07-09 NOTE — PROGRESS NOTES
Transplant Hepatology  Liver Transplant Recipient Follow-up    Transplant Date: 2010  UNOS Native Liver Dx: Alcoholic Cirrhosis    Zander is here for follow up of his liver transplant. He had a liver transplant in  (8 years ago) for alcohol-related cirrhosis.  He remains well and his main issue is his left knee pain for which he may need a knee replacement although he is reluctant to consider it.      Body mass index is 31.78 kg/m².    ORGAN: LIVER  Whole or Partial: whole liver  Donor Type:  - brain death  CDC High Risk: no  Donor CMV Status: positive  Donor HCV Status: negative  Donor HBcAb: negative  Biliary Anastomosis:   Arterial Anatomy:   IVC reconstruction:   Portal vein status:     He has had the following complications since transplant: biliary stricture. The noted complications are well controlled.      Subjective:     Interval History: .  He has normal liver and kidney function and is on tac monotherapy.    His only liver issue post-OLT was an anastomotic biliary stricture treated with ERCP and stenting but his biliary stents were removed in Dec 2011 and his LFTs have since remained normal.    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 Sees dermatology routinely. He works and enjoys fixing cars and racing. He has been followed by orthopedics for multiple joint and bone fractures.     Normal liver and kidney function in 2018.    Review of Systems   Constitutional: Negative for fatigue, fever and unexpected weight change.   Eyes: Negative.    Respiratory: Negative.  Negative for chest tightness and shortness of breath.    Cardiovascular: Negative for chest pain and leg swelling.   Gastrointestinal: Negative for abdominal pain, blood in stool, constipation, nausea and vomiting.   Genitourinary: Negative.    Musculoskeletal: Positive for arthralgias (from previous injuries) and joint swelling.   Skin: Negative.  Negative for color change and rash.    Neurological: Negative.  Negative for dizziness and light-headedness.   Psychiatric/Behavioral: Negative.  Negative for confusion and suicidal ideas. The patient is not nervous/anxious.        Objective:     Physical Exam   Constitutional: He is oriented to person, place, and time. He appears well-developed and well-nourished.   HENT:   Head: Normocephalic and atraumatic.   Eyes: Conjunctivae are normal. Pupils are equal, round, and reactive to light.   Neck: Normal range of motion. Neck supple. No thyromegaly present.   Cardiovascular: Normal rate, regular rhythm and normal heart sounds.    Pulmonary/Chest: Effort normal and breath sounds normal.   Abdominal: Soft. Bowel sounds are normal. He exhibits no distension. There is no tenderness.       Musculoskeletal: He exhibits no edema or tenderness.   Neurological: He is alert and oriented to person, place, and time.   Skin: Skin is warm and dry.   Psychiatric: He has a normal mood and affect. His behavior is normal. Judgment and thought content normal.   Nursing note and vitals reviewed.    Lab Results   Component Value Date    BILITOT 0.8 04/03/2018    AST 31 04/03/2018    ALT 35 04/03/2018    ALKPHOS 82 04/03/2018    CREATININE 1.0 04/03/2018    ALBUMIN 3.7 04/03/2018     Lab Results   Component Value Date    WBC 6.47 04/03/2018    HGB 16.6 04/03/2018    HCT 50.1 04/03/2018     04/03/2018     Lab Results   Component Value Date    TACROLIMUS 4.6 (L) 04/03/2018       Assessment/Plan:     1. S/P liver transplant    2. Immunosuppression        1. S/p liver transplant 6/15/2010 for Alcoholic cirrhosis. Normal LFTs. Chronic immunosuppressive medications for rejection prophylaxis at target. On tacrolimus.  Plan: no adjustment needed.Continue monitoring symptoms, labs and drug levels for drug-related toxicity and side effects  2. Squamous cell skin cancer. Routine dermatology follow up.   3. Hypertension- controlled with medication.  4. Osteoarthritis and  multiple joint injuries. Continue follow up with orthopedics. On pain meds.     5. Post-transplant health maintenance: Refer to PCP: Bone density. The patient should undergo a bone density to rule out osteoporosis post transplant. Cancer surveillance.  The patient has been instructed to use sunscreen and wear a hat while in the sun. He should see a dermatologist annually post liver transplant. He should see his local gastroenterologist to stay up to date with colorectal cancer screening.     Clinic in 1 year.    MD PURVI Irving Patient Status  Functional Status: 100% - Normal, no complaints, no evidence of disease  Physical Capacity: No Limitations

## 2018-07-09 NOTE — LETTER
July 9, 2018    Zander Kaur  [address]  [facility] [address]          Dear Zander Kaur:  MRN: [id number]    Your lab results were stable.  There are no medicine changes.  Please have your labs drawn again on 10/9/18.      If you cannot have your labs drawn on the scheduled date, it is your responsibility to call the transplant department to reschedule.  To reschedule or make an appointment, please as to speak to or leave a message for my assistant, Susana Llamas, at [phone].  When leaving a message for Muriel Meza, or myself, we ask that you leave a brief message regarding your request.    Sincerely,    Oksana Torres, RN, BSN  Liver Transplant Coordinator    [facility] [place]  [address] [place] [address] [phone]

## 2018-07-09 NOTE — LETTER
July 9, 2018        Shai Davison  [address]  [facility] [address]  Phone: [phone]  Fax: [phone]             Demetri Ramsey - Liver Transplant  [address]  [facility] [address]  Phone: [phone]   Patient: Zander Kaur Sr.   MR Number: [id number]   YOB: 1950   Date of Visit: 7/9/2018       Dear Dr. Shai Davison    Thank you for referring Zander Kaur to me for evaluation. Attached you will find relevant portions of my assessment and plan of care.    If you have questions, please do not hesitate to call me. I look forward to following Zander Kaur along with you.    Sincerely,    Carson Oswald MD    Enclosure    If you would like to receive this communication electronically, please contact [email or web address] or [phone] to request [employer] Link access.    [employer] Link is a tool which provides read-only access to select patient information with whom you have a relationship. Its easy to use and provides real time access to review your patients record including encounter summaries, notes, results, and demographic information.    If you feel you have received this communication in error or would no longer like to receive these types of communications, please e-mail [email or web address]

## 2018-07-31 ENCOUNTER — EXTERNAL CHRONIC CARE MANAGEMENT (OUTPATIENT)
Dept: PRIMARY CARE CLINIC | Facility: CLINIC | Age: 68
End: 2018-07-31
Payer: MEDICARE

## 2018-07-31 PROCEDURE — 99490 CHRNC CARE MGMT STAFF 1ST 20: CPT | Mod: S$PBB,,, | Performed by: INTERNAL MEDICINE

## 2018-07-31 PROCEDURE — 99490 CHRNC CARE MGMT STAFF 1ST 20: CPT | Mod: PBBFAC | Performed by: INTERNAL MEDICINE

## 2018-08-14 RX ORDER — METOPROLOL TARTRATE 100 MG/1
TABLET ORAL
Qty: 60 TABLET | Refills: 11 | Status: SHIPPED | OUTPATIENT
Start: 2018-08-14 | End: 2019-04-10 | Stop reason: SDUPTHER

## 2018-08-16 RX ORDER — TACROLIMUS 1 MG/1
CAPSULE, GELATIN COATED ORAL
Qty: 60 CAPSULE | Refills: 11 | Status: SHIPPED | OUTPATIENT
Start: 2018-08-16 | End: 2019-07-18 | Stop reason: SDUPTHER

## 2018-08-31 ENCOUNTER — EXTERNAL CHRONIC CARE MANAGEMENT (OUTPATIENT)
Dept: PRIMARY CARE CLINIC | Facility: CLINIC | Age: 68
End: 2018-08-31
Payer: MEDICARE

## 2018-08-31 ENCOUNTER — TELEPHONE (OUTPATIENT)
Dept: TRANSPLANT | Facility: CLINIC | Age: 68
End: 2018-08-31

## 2018-08-31 PROCEDURE — 99490 CHRNC CARE MGMT STAFF 1ST 20: CPT | Mod: S$PBB,,, | Performed by: INTERNAL MEDICINE

## 2018-08-31 PROCEDURE — 99490 CHRNC CARE MGMT STAFF 1ST 20: CPT | Mod: PBBFAC | Performed by: INTERNAL MEDICINE

## 2018-08-31 NOTE — TELEPHONE ENCOUNTER
----- Message from Anna Thurston, Jordyn sent at 8/31/2018  1:42 PM CDT -----  Yes that is okay to take.  ----- Message -----  From: Oksana Torres RN  Sent: 8/31/2018   9:30 AM  To: Abdominal Transplant Pharmacists    His PCP wants to switch him from Soma to Robaxcin. Are we ok with Robaxcin?    Thanks,    Oksana Torres RN, BSN  Liver Transplant Coordinator

## 2018-09-30 ENCOUNTER — EXTERNAL CHRONIC CARE MANAGEMENT (OUTPATIENT)
Dept: PRIMARY CARE CLINIC | Facility: CLINIC | Age: 68
End: 2018-09-30
Payer: MEDICARE

## 2018-09-30 PROCEDURE — 99490 CHRNC CARE MGMT STAFF 1ST 20: CPT | Mod: PBBFAC | Performed by: INTERNAL MEDICINE

## 2018-09-30 PROCEDURE — 99490 CHRNC CARE MGMT STAFF 1ST 20: CPT | Mod: S$PBB,,, | Performed by: INTERNAL MEDICINE

## 2018-10-02 RX ORDER — DOXAZOSIN 8 MG/1
TABLET ORAL
Qty: 90 TABLET | Refills: 3 | Status: SHIPPED | OUTPATIENT
Start: 2018-10-02 | End: 2019-04-10 | Stop reason: SDUPTHER

## 2018-10-04 ENCOUNTER — TELEPHONE (OUTPATIENT)
Dept: INTERNAL MEDICINE | Facility: CLINIC | Age: 68
End: 2018-10-04

## 2018-10-09 ENCOUNTER — LAB VISIT (OUTPATIENT)
Dept: LAB | Facility: HOSPITAL | Age: 68
End: 2018-10-09
Attending: INTERNAL MEDICINE
Payer: MEDICARE

## 2018-10-09 DIAGNOSIS — Z94.4 LIVER TRANSPLANTED: ICD-10-CM

## 2018-10-09 LAB
ALBUMIN SERPL BCP-MCNC: 3.9 G/DL
ALP SERPL-CCNC: 81 U/L
ALT SERPL W/O P-5'-P-CCNC: 41 U/L
ANION GAP SERPL CALC-SCNC: 9 MMOL/L
AST SERPL-CCNC: 35 U/L
BASOPHILS # BLD AUTO: 0.05 K/UL
BASOPHILS NFR BLD: 0.7 %
BILIRUB SERPL-MCNC: 1.2 MG/DL
BUN SERPL-MCNC: 20 MG/DL
CALCIUM SERPL-MCNC: 10.1 MG/DL
CHLORIDE SERPL-SCNC: 105 MMOL/L
CO2 SERPL-SCNC: 27 MMOL/L
CREAT SERPL-MCNC: 0.9 MG/DL
DIFFERENTIAL METHOD: ABNORMAL
EOSINOPHIL # BLD AUTO: 0.3 K/UL
EOSINOPHIL NFR BLD: 4.6 %
ERYTHROCYTE [DISTWIDTH] IN BLOOD BY AUTOMATED COUNT: 12.6 %
EST. GFR  (AFRICAN AMERICAN): >60 ML/MIN/1.73 M^2
EST. GFR  (NON AFRICAN AMERICAN): >60 ML/MIN/1.73 M^2
GLUCOSE SERPL-MCNC: 119 MG/DL
HCT VFR BLD AUTO: 52.2 %
HGB BLD-MCNC: 17.3 G/DL
IMM GRANULOCYTES # BLD AUTO: 0.03 K/UL
IMM GRANULOCYTES NFR BLD AUTO: 0.4 %
LYMPHOCYTES # BLD AUTO: 2.5 K/UL
LYMPHOCYTES NFR BLD: 37.4 %
MCH RBC QN AUTO: 33.4 PG
MCHC RBC AUTO-ENTMCNC: 33.1 G/DL
MCV RBC AUTO: 101 FL
MONOCYTES # BLD AUTO: 0.6 K/UL
MONOCYTES NFR BLD: 9.1 %
NEUTROPHILS # BLD AUTO: 3.2 K/UL
NEUTROPHILS NFR BLD: 47.8 %
NRBC BLD-RTO: 0 /100 WBC
PLATELET # BLD AUTO: 166 K/UL
PMV BLD AUTO: 9.6 FL
POTASSIUM SERPL-SCNC: 4.8 MMOL/L
PROT SERPL-MCNC: 7.4 G/DL
RBC # BLD AUTO: 5.18 M/UL
SODIUM SERPL-SCNC: 141 MMOL/L
TACROLIMUS BLD-MCNC: 4.9 NG/ML
WBC # BLD AUTO: 6.68 K/UL

## 2018-10-09 PROCEDURE — 80197 ASSAY OF TACROLIMUS: CPT

## 2018-10-09 PROCEDURE — 85025 COMPLETE CBC W/AUTO DIFF WBC: CPT

## 2018-10-09 PROCEDURE — 36415 COLL VENOUS BLD VENIPUNCTURE: CPT

## 2018-10-09 PROCEDURE — 80053 COMPREHEN METABOLIC PANEL: CPT

## 2018-10-10 ENCOUNTER — TELEPHONE (OUTPATIENT)
Dept: TRANSPLANT | Facility: CLINIC | Age: 68
End: 2018-10-10

## 2018-10-10 NOTE — TELEPHONE ENCOUNTER
Letter sent, labs stable and no medication changes are needed. Repeat labs due 1/8/19 per protocol.

## 2018-10-10 NOTE — LETTER
October 10, 2018    Zander Kaur  [address]  [facility] [address]          Dear Zander Kaur:  MRN: [id number]    Your lab results were stable.  There are no medicine changes.  Please have your labs drawn again on 1/8/19.      If you cannot have your labs drawn on the scheduled date, it is your responsibility to call the transplant department to reschedule.  To reschedule or make an appointment, please as to speak to or leave a message for my assistant, Susana Llamas, at [phone].  When leaving a message for Muriel Meza, or myself, we ask that you leave a brief message regarding your request.    Sincerely,    Oksana Torres, RN, BSN  Liver Transplant Coordinator  [facility] [place]  [address] [place] [address] [phone]

## 2018-10-17 ENCOUNTER — OFFICE VISIT (OUTPATIENT)
Dept: INTERNAL MEDICINE | Facility: CLINIC | Age: 68
End: 2018-10-17
Payer: MEDICARE

## 2018-10-17 VITALS
HEIGHT: 68 IN | SYSTOLIC BLOOD PRESSURE: 130 MMHG | WEIGHT: 211.63 LBS | HEART RATE: 72 BPM | BODY MASS INDEX: 32.07 KG/M2 | DIASTOLIC BLOOD PRESSURE: 82 MMHG

## 2018-10-17 DIAGNOSIS — D84.9 IMMUNOSUPPRESSION: ICD-10-CM

## 2018-10-17 DIAGNOSIS — I10 ESSENTIAL HYPERTENSION: Primary | ICD-10-CM

## 2018-10-17 DIAGNOSIS — M19.90 ARTHRITIS: ICD-10-CM

## 2018-10-17 DIAGNOSIS — R26.9 ABNORMALITY OF GAIT AND MOBILITY: ICD-10-CM

## 2018-10-17 DIAGNOSIS — L57.0 AK (ACTINIC KERATOSIS): ICD-10-CM

## 2018-10-17 DIAGNOSIS — Z94.4 S/P LIVER TRANSPLANT: ICD-10-CM

## 2018-10-17 DIAGNOSIS — C44.90 SKIN CANCER: ICD-10-CM

## 2018-10-17 DIAGNOSIS — Z12.5 SCREENING FOR PROSTATE CANCER: ICD-10-CM

## 2018-10-17 DIAGNOSIS — K74.60 HEPATIC CIRRHOSIS, UNSPECIFIED HEPATIC CIRRHOSIS TYPE, UNSPECIFIED WHETHER ASCITES PRESENT: ICD-10-CM

## 2018-10-17 PROCEDURE — 99214 OFFICE O/P EST MOD 30 MIN: CPT | Mod: S$PBB,,, | Performed by: INTERNAL MEDICINE

## 2018-10-17 PROCEDURE — 99999 PR PBB SHADOW E&M-EST. PATIENT-LVL III: CPT | Mod: PBBFAC,,, | Performed by: INTERNAL MEDICINE

## 2018-10-17 PROCEDURE — 99213 OFFICE O/P EST LOW 20 MIN: CPT | Mod: PBBFAC | Performed by: INTERNAL MEDICINE

## 2018-10-17 NOTE — PROGRESS NOTES
Subjective:       Patient ID: Zander Kaur Sr. is a 67 y.o. male.    Chief Complaint: Follow-up    Patient here with his wife for yearly follow-up of hypertension.  He sees an outside orthopedist who wants to do right ankle surgery and left knee surgery but the patient is hesitant.  He is using a Segway type scooter but an exchange she is not as physically active and believes he is gaining weight.  Blood pressure was fair but he has a lot of pain and difficulty sleeping.  He is also status post liver transplant and follows with them regularly including labs.      Review of Systems   Constitutional: Negative for activity change, chills, fatigue, fever and unexpected weight change.   HENT: Negative for hearing loss, nosebleeds, rhinorrhea and trouble swallowing.    Eyes: Negative for pain, discharge and visual disturbance.   Respiratory: Negative for cough, chest tightness, shortness of breath and wheezing.    Cardiovascular: Negative for chest pain and palpitations.   Gastrointestinal: Negative for abdominal pain, blood in stool, constipation, diarrhea, nausea and vomiting.   Endocrine: Negative for polydipsia and polyuria.   Genitourinary: Negative for difficulty urinating, hematuria and urgency.   Musculoskeletal: Positive for arthralgias, back pain, gait problem and myalgias. Negative for joint swelling and neck pain.   Skin: Negative for rash.   Neurological: Negative for dizziness, weakness and headaches.   Hematological: Does not bruise/bleed easily.   Psychiatric/Behavioral: Positive for sleep disturbance. Negative for confusion, dysphoric mood and suicidal ideas. The patient is nervous/anxious.        Objective:      Physical Exam   Constitutional: He is oriented to person, place, and time. He appears well-developed and well-nourished. No distress.   HENT:   Head: Normocephalic and atraumatic.   Right Ear: External ear normal.   Left Ear: External ear normal.   Mouth/Throat: Oropharynx is clear and moist.  No oropharyngeal exudate.   TM's clear, pharynx clear   Eyes: Conjunctivae and EOM are normal. Pupils are equal, round, and reactive to light. No scleral icterus.   Neck: Normal range of motion. Neck supple. No thyromegaly present.   No supraclavicular nodes palpated   Cardiovascular: Normal rate, regular rhythm and normal heart sounds.   No murmur heard.  Pulmonary/Chest: Effort normal and breath sounds normal. He has no wheezes.   Abdominal: Soft. Bowel sounds are normal. He exhibits no mass. There is no tenderness.   Musculoskeletal: He exhibits tenderness and deformity (Knees ankles and right shoulder). He exhibits no edema.   Lymphadenopathy:     He has no cervical adenopathy.   Neurological: He is alert and oriented to person, place, and time.   Skin: No rash noted. No erythema. No pallor.   Psychiatric: He has a normal mood and affect. His behavior is normal.       Assessment:       1. Essential hypertension    2. Hepatic cirrhosis, unspecified hepatic cirrhosis type, unspecified whether ascites present    3. S/P liver transplant    4. Arthritis    5. AK (actinic keratosis)    6. Immunosuppression    7. Skin cancer    8. Screening for prostate cancer    9. Abnormality of gait and mobility         Plan:       Zander was seen today for follow-up.    Diagnoses and all orders for this visit:    Essential hypertension  -     Folate; Future  -     TSH; Future  -     Vitamin B12; Future  -     Lipid panel; Future  -     PSA, Screening; Future    Hepatic cirrhosis, unspecified hepatic cirrhosis type, unspecified whether ascites present  -     Folate; Future  -     TSH; Future  -     Vitamin B12; Future  -     Lipid panel; Future  -     PSA, Screening; Future    S/P liver transplant  -     Folate; Future  -     TSH; Future  -     Vitamin B12; Future  -     Lipid panel; Future  -     PSA, Screening; Future    Arthritis  -     Folate; Future  -     TSH; Future  -     Vitamin B12; Future  -     Lipid panel; Future  -      PSA, Screening; Future    AK (actinic keratosis)  -     Folate; Future  -     TSH; Future  -     Vitamin B12; Future  -     Lipid panel; Future  -     PSA, Screening; Future    Immunosuppression  -     Folate; Future  -     TSH; Future  -     Vitamin B12; Future  -     Lipid panel; Future  -     PSA, Screening; Future    Skin cancer  -     Folate; Future  -     TSH; Future  -     Vitamin B12; Future  -     Lipid panel; Future  -     PSA, Screening; Future    Screening for prostate cancer  -     Folate; Future  -     TSH; Future  -     Vitamin B12; Future  -     Lipid panel; Future  -     PSA, Screening; Future    Abnormality of gait and mobility   -     Folate; Future  -     Vitamin B12; Future          Patient declines flu shot at this time

## 2018-10-18 ENCOUNTER — LAB VISIT (OUTPATIENT)
Dept: LAB | Facility: HOSPITAL | Age: 68
End: 2018-10-18
Attending: INTERNAL MEDICINE
Payer: MEDICARE

## 2018-10-18 DIAGNOSIS — Z94.4 S/P LIVER TRANSPLANT: ICD-10-CM

## 2018-10-18 DIAGNOSIS — C44.90 SKIN CANCER: ICD-10-CM

## 2018-10-18 DIAGNOSIS — R26.9 ABNORMALITY OF GAIT AND MOBILITY: ICD-10-CM

## 2018-10-18 DIAGNOSIS — K74.60 HEPATIC CIRRHOSIS, UNSPECIFIED HEPATIC CIRRHOSIS TYPE, UNSPECIFIED WHETHER ASCITES PRESENT: ICD-10-CM

## 2018-10-18 DIAGNOSIS — L57.0 AK (ACTINIC KERATOSIS): ICD-10-CM

## 2018-10-18 DIAGNOSIS — I10 ESSENTIAL HYPERTENSION: ICD-10-CM

## 2018-10-18 DIAGNOSIS — Z12.5 SCREENING FOR PROSTATE CANCER: ICD-10-CM

## 2018-10-18 DIAGNOSIS — M19.90 ARTHRITIS: ICD-10-CM

## 2018-10-18 DIAGNOSIS — D84.9 IMMUNOSUPPRESSION: ICD-10-CM

## 2018-10-18 LAB
CHOLEST SERPL-MCNC: 192 MG/DL
CHOLEST/HDLC SERPL: 3.3 {RATIO}
COMPLEXED PSA SERPL-MCNC: 3.3 NG/ML
FOLATE SERPL-MCNC: 14.2 NG/ML
HDLC SERPL-MCNC: 58 MG/DL
HDLC SERPL: 30.2 %
LDLC SERPL CALC-MCNC: 115.6 MG/DL
NONHDLC SERPL-MCNC: 134 MG/DL
TRIGL SERPL-MCNC: 92 MG/DL
TSH SERPL DL<=0.005 MIU/L-ACNC: 2.96 UIU/ML
VIT B12 SERPL-MCNC: 1129 PG/ML

## 2018-10-18 PROCEDURE — 36415 COLL VENOUS BLD VENIPUNCTURE: CPT

## 2018-10-18 PROCEDURE — 82607 VITAMIN B-12: CPT

## 2018-10-18 PROCEDURE — 82746 ASSAY OF FOLIC ACID SERUM: CPT

## 2018-10-18 PROCEDURE — 84153 ASSAY OF PSA TOTAL: CPT

## 2018-10-18 PROCEDURE — 80061 LIPID PANEL: CPT

## 2018-10-18 PROCEDURE — 84443 ASSAY THYROID STIM HORMONE: CPT

## 2018-10-31 ENCOUNTER — EXTERNAL CHRONIC CARE MANAGEMENT (OUTPATIENT)
Dept: PRIMARY CARE CLINIC | Facility: CLINIC | Age: 68
End: 2018-10-31
Payer: MEDICARE

## 2018-10-31 PROCEDURE — 99490 CHRNC CARE MGMT STAFF 1ST 20: CPT | Mod: S$PBB,,, | Performed by: INTERNAL MEDICINE

## 2018-10-31 PROCEDURE — 99490 CHRNC CARE MGMT STAFF 1ST 20: CPT | Mod: PBBFAC | Performed by: INTERNAL MEDICINE

## 2018-11-05 ENCOUNTER — TELEPHONE (OUTPATIENT)
Dept: INTERNAL MEDICINE | Facility: CLINIC | Age: 68
End: 2018-11-05

## 2018-11-30 ENCOUNTER — EXTERNAL CHRONIC CARE MANAGEMENT (OUTPATIENT)
Dept: PRIMARY CARE CLINIC | Facility: CLINIC | Age: 68
End: 2018-11-30
Payer: MEDICARE

## 2018-11-30 PROCEDURE — 99490 CHRNC CARE MGMT STAFF 1ST 20: CPT | Mod: PBBFAC | Performed by: INTERNAL MEDICINE

## 2018-11-30 PROCEDURE — 99490 CHRNC CARE MGMT STAFF 1ST 20: CPT | Mod: S$PBB,,, | Performed by: INTERNAL MEDICINE

## 2018-12-27 ENCOUNTER — PES CALL (OUTPATIENT)
Dept: ADMINISTRATIVE | Facility: CLINIC | Age: 68
End: 2018-12-27

## 2018-12-31 ENCOUNTER — EXTERNAL CHRONIC CARE MANAGEMENT (OUTPATIENT)
Dept: PRIMARY CARE CLINIC | Facility: CLINIC | Age: 68
End: 2018-12-31
Payer: MEDICARE

## 2018-12-31 PROCEDURE — 99490 PR CHRONIC CARE MGMT, 1ST 20 MIN: ICD-10-PCS | Mod: S$PBB,,, | Performed by: INTERNAL MEDICINE

## 2018-12-31 PROCEDURE — 99490 CHRNC CARE MGMT STAFF 1ST 20: CPT | Mod: PBBFAC | Performed by: INTERNAL MEDICINE

## 2018-12-31 PROCEDURE — 99490 CHRNC CARE MGMT STAFF 1ST 20: CPT | Mod: S$PBB,,, | Performed by: INTERNAL MEDICINE

## 2019-01-03 ENCOUNTER — TELEPHONE (OUTPATIENT)
Dept: INTERNAL MEDICINE | Facility: CLINIC | Age: 69
End: 2019-01-03

## 2019-01-08 ENCOUNTER — LAB VISIT (OUTPATIENT)
Dept: LAB | Facility: HOSPITAL | Age: 69
End: 2019-01-08
Attending: INTERNAL MEDICINE
Payer: MEDICARE

## 2019-01-08 ENCOUNTER — TELEPHONE (OUTPATIENT)
Dept: TRANSPLANT | Facility: CLINIC | Age: 69
End: 2019-01-08

## 2019-01-08 DIAGNOSIS — Z94.4 LIVER TRANSPLANTED: ICD-10-CM

## 2019-01-08 LAB
ALBUMIN SERPL BCP-MCNC: 3.6 G/DL
ALP SERPL-CCNC: 73 U/L
ALT SERPL W/O P-5'-P-CCNC: 36 U/L
ANION GAP SERPL CALC-SCNC: 6 MMOL/L
AST SERPL-CCNC: 34 U/L
BASOPHILS # BLD AUTO: 0.06 K/UL
BASOPHILS NFR BLD: 1.1 %
BILIRUB SERPL-MCNC: 1.4 MG/DL
BUN SERPL-MCNC: 18 MG/DL
CALCIUM SERPL-MCNC: 9 MG/DL
CHLORIDE SERPL-SCNC: 106 MMOL/L
CO2 SERPL-SCNC: 28 MMOL/L
CREAT SERPL-MCNC: 0.9 MG/DL
DIFFERENTIAL METHOD: ABNORMAL
EOSINOPHIL # BLD AUTO: 0.3 K/UL
EOSINOPHIL NFR BLD: 5.6 %
ERYTHROCYTE [DISTWIDTH] IN BLOOD BY AUTOMATED COUNT: 13.1 %
EST. GFR  (AFRICAN AMERICAN): >60 ML/MIN/1.73 M^2
EST. GFR  (NON AFRICAN AMERICAN): >60 ML/MIN/1.73 M^2
GLUCOSE SERPL-MCNC: 118 MG/DL
HCT VFR BLD AUTO: 50.6 %
HGB BLD-MCNC: 16.6 G/DL
IMM GRANULOCYTES # BLD AUTO: 0.03 K/UL
IMM GRANULOCYTES NFR BLD AUTO: 0.5 %
LYMPHOCYTES # BLD AUTO: 1.5 K/UL
LYMPHOCYTES NFR BLD: 26.6 %
MCH RBC QN AUTO: 33.3 PG
MCHC RBC AUTO-ENTMCNC: 32.8 G/DL
MCV RBC AUTO: 101 FL
MONOCYTES # BLD AUTO: 0.5 K/UL
MONOCYTES NFR BLD: 8.5 %
NEUTROPHILS # BLD AUTO: 3.3 K/UL
NEUTROPHILS NFR BLD: 57.7 %
NRBC BLD-RTO: 0 /100 WBC
PLATELET # BLD AUTO: 155 K/UL
PMV BLD AUTO: 9.6 FL
POTASSIUM SERPL-SCNC: 4.8 MMOL/L
PROT SERPL-MCNC: 6.8 G/DL
RBC # BLD AUTO: 4.99 M/UL
SODIUM SERPL-SCNC: 140 MMOL/L
TACROLIMUS BLD-MCNC: 3.6 NG/ML
WBC # BLD AUTO: 5.68 K/UL

## 2019-01-08 PROCEDURE — 80197 ASSAY OF TACROLIMUS: CPT

## 2019-01-08 PROCEDURE — 80053 COMPREHEN METABOLIC PANEL: CPT

## 2019-01-08 PROCEDURE — 36415 COLL VENOUS BLD VENIPUNCTURE: CPT

## 2019-01-08 PROCEDURE — 85025 COMPLETE CBC W/AUTO DIFF WBC: CPT

## 2019-01-08 NOTE — TELEPHONE ENCOUNTER
Letter sent, labs stable and no medication changes are needed. Repeat labs due 3/12/19 per protocol.

## 2019-01-08 NOTE — LETTER
January 8, 2019    Zander Kaur  [address] [place] [place] [address]          Dear Zander Kaur:  MRN: [id number]    This is a follow up to your recent labs, your lab results were stable.  There are no medicine changes.  Please have your labs drawn again on 3/12/19.      If you cannot have your labs drawn on the scheduled date, it is your responsibility to call the transplant department to reschedule.  To reschedule or make an appointment, please as to speak to or leave a message for my assistant, Susana Llamas, at [phone].  When leaving a message for Muriel Meza, or myself, we ask that you leave a brief message regarding your request.    Sincerely,    Oksana Torres, RN, BSN, [facility]  Liver Transplant Coordinator  [facility] [place]  [address] [place] [phone]

## 2019-01-31 ENCOUNTER — EXTERNAL CHRONIC CARE MANAGEMENT (OUTPATIENT)
Dept: PRIMARY CARE CLINIC | Facility: CLINIC | Age: 69
End: 2019-01-31
Payer: MEDICARE

## 2019-01-31 PROCEDURE — 99490 CHRNC CARE MGMT STAFF 1ST 20: CPT | Mod: PBBFAC | Performed by: INTERNAL MEDICINE

## 2019-01-31 PROCEDURE — 99490 CHRNC CARE MGMT STAFF 1ST 20: CPT | Mod: S$PBB,,, | Performed by: INTERNAL MEDICINE

## 2019-01-31 PROCEDURE — 99490 PR CHRONIC CARE MGMT, 1ST 20 MIN: ICD-10-PCS | Mod: S$PBB,,, | Performed by: INTERNAL MEDICINE

## 2019-02-28 ENCOUNTER — EXTERNAL CHRONIC CARE MANAGEMENT (OUTPATIENT)
Dept: PRIMARY CARE CLINIC | Facility: CLINIC | Age: 69
End: 2019-02-28
Payer: MEDICARE

## 2019-02-28 PROCEDURE — 99490 CHRNC CARE MGMT STAFF 1ST 20: CPT | Mod: PBBFAC | Performed by: INTERNAL MEDICINE

## 2019-02-28 PROCEDURE — 99490 PR CHRONIC CARE MGMT, 1ST 20 MIN: ICD-10-PCS | Mod: S$PBB,,, | Performed by: INTERNAL MEDICINE

## 2019-02-28 PROCEDURE — 99490 CHRNC CARE MGMT STAFF 1ST 20: CPT | Mod: S$PBB,,, | Performed by: INTERNAL MEDICINE

## 2019-03-12 ENCOUNTER — TELEPHONE (OUTPATIENT)
Dept: TRANSPLANT | Facility: CLINIC | Age: 69
End: 2019-03-12

## 2019-03-12 ENCOUNTER — LAB VISIT (OUTPATIENT)
Dept: LAB | Facility: HOSPITAL | Age: 69
End: 2019-03-12
Attending: INTERNAL MEDICINE
Payer: MEDICARE

## 2019-03-12 DIAGNOSIS — Z94.4 LIVER TRANSPLANTED: ICD-10-CM

## 2019-03-12 LAB
ALBUMIN SERPL BCP-MCNC: 3.7 G/DL
ALP SERPL-CCNC: 88 U/L
ALT SERPL W/O P-5'-P-CCNC: 38 U/L
ANION GAP SERPL CALC-SCNC: 7 MMOL/L
AST SERPL-CCNC: 35 U/L
BASOPHILS # BLD AUTO: 0.07 K/UL
BASOPHILS NFR BLD: 1.2 %
BILIRUB SERPL-MCNC: 0.4 MG/DL
BUN SERPL-MCNC: 19 MG/DL
CALCIUM SERPL-MCNC: 10 MG/DL
CHLORIDE SERPL-SCNC: 110 MMOL/L
CO2 SERPL-SCNC: 29 MMOL/L
CREAT SERPL-MCNC: 0.9 MG/DL
DIFFERENTIAL METHOD: ABNORMAL
EOSINOPHIL # BLD AUTO: 0.4 K/UL
EOSINOPHIL NFR BLD: 7.3 %
ERYTHROCYTE [DISTWIDTH] IN BLOOD BY AUTOMATED COUNT: 13.2 %
EST. GFR  (AFRICAN AMERICAN): >60 ML/MIN/1.73 M^2
EST. GFR  (NON AFRICAN AMERICAN): >60 ML/MIN/1.73 M^2
GLUCOSE SERPL-MCNC: 107 MG/DL
HCT VFR BLD AUTO: 50.1 %
HGB BLD-MCNC: 16.1 G/DL
IMM GRANULOCYTES # BLD AUTO: 0.01 K/UL
IMM GRANULOCYTES NFR BLD AUTO: 0.2 %
LYMPHOCYTES # BLD AUTO: 2.3 K/UL
LYMPHOCYTES NFR BLD: 41.5 %
MCH RBC QN AUTO: 32.6 PG
MCHC RBC AUTO-ENTMCNC: 32.1 G/DL
MCV RBC AUTO: 101 FL
MONOCYTES # BLD AUTO: 0.6 K/UL
MONOCYTES NFR BLD: 10.9 %
NEUTROPHILS # BLD AUTO: 2.2 K/UL
NEUTROPHILS NFR BLD: 38.9 %
NRBC BLD-RTO: 0 /100 WBC
PLATELET # BLD AUTO: 162 K/UL
PMV BLD AUTO: 9.5 FL
POTASSIUM SERPL-SCNC: 5.1 MMOL/L
PROT SERPL-MCNC: 7.1 G/DL
RBC # BLD AUTO: 4.94 M/UL
SODIUM SERPL-SCNC: 146 MMOL/L
TACROLIMUS BLD-MCNC: 3.2 NG/ML
WBC # BLD AUTO: 5.61 K/UL

## 2019-03-12 PROCEDURE — 36415 COLL VENOUS BLD VENIPUNCTURE: CPT

## 2019-03-12 PROCEDURE — 85025 COMPLETE CBC W/AUTO DIFF WBC: CPT

## 2019-03-12 PROCEDURE — 80053 COMPREHEN METABOLIC PANEL: CPT

## 2019-03-12 PROCEDURE — 80197 ASSAY OF TACROLIMUS: CPT

## 2019-03-12 NOTE — TELEPHONE ENCOUNTER
----- Message from Carson Oswald MD sent at 3/12/2019  4:35 PM CDT -----  Results reviewed    
Letter sent, labs stable and no medication changes are needed. Repeat labs due 6/18/19 per protocol.    
Discharged

## 2019-03-12 NOTE — LETTER
March 12, 2019    Zander Kaur  [address] [address]          Dear Zander Kaur:  MRN: [id number]    This is a follow up to your recent labs, your lab results were stable.  There are no medicine changes.  Please have your labs drawn again on Tuesday, 6/18/19.      If you cannot have your labs drawn on the scheduled date, it is your responsibility to call the transplant department to reschedule.  To reschedule or make an appointment, please as to speak to or leave a message for my assistant, Katt Meza or Marissa, at [phone].  When leaving a message for Katt Meza Angela or myself, we ask that you leave a brief message regarding your request.    Sincerely,    Oksana Torres, RN, BSN, [facility]  Liver Transplant Coordinator  [facility] [place]  [address] [place] [address], [place] [phone]

## 2019-03-31 ENCOUNTER — EXTERNAL CHRONIC CARE MANAGEMENT (OUTPATIENT)
Dept: PRIMARY CARE CLINIC | Facility: CLINIC | Age: 69
End: 2019-03-31
Payer: MEDICARE

## 2019-03-31 PROCEDURE — 99490 CHRNC CARE MGMT STAFF 1ST 20: CPT | Mod: S$PBB,,, | Performed by: INTERNAL MEDICINE

## 2019-03-31 PROCEDURE — 99490 PR CHRONIC CARE MGMT, 1ST 20 MIN: ICD-10-PCS | Mod: S$PBB,,, | Performed by: INTERNAL MEDICINE

## 2019-03-31 PROCEDURE — 99490 CHRNC CARE MGMT STAFF 1ST 20: CPT | Mod: PBBFAC | Performed by: INTERNAL MEDICINE

## 2019-04-01 ENCOUNTER — TELEPHONE (OUTPATIENT)
Dept: INTERNAL MEDICINE | Facility: CLINIC | Age: 69
End: 2019-04-01

## 2019-04-10 RX ORDER — METOPROLOL TARTRATE 100 MG/1
100 TABLET ORAL 2 TIMES DAILY
Qty: 180 TABLET | Refills: 3 | Status: SHIPPED | OUTPATIENT
Start: 2019-04-10 | End: 2020-03-15 | Stop reason: SDUPTHER

## 2019-04-10 RX ORDER — DOXAZOSIN 8 MG/1
8 TABLET ORAL DAILY
Qty: 90 TABLET | Refills: 3 | Status: SHIPPED | OUTPATIENT
Start: 2019-04-10 | End: 2020-03-15 | Stop reason: SDUPTHER

## 2019-04-30 ENCOUNTER — EXTERNAL CHRONIC CARE MANAGEMENT (OUTPATIENT)
Dept: PRIMARY CARE CLINIC | Facility: CLINIC | Age: 69
End: 2019-04-30
Payer: MEDICARE

## 2019-04-30 PROCEDURE — 99490 CHRNC CARE MGMT STAFF 1ST 20: CPT | Mod: PBBFAC | Performed by: INTERNAL MEDICINE

## 2019-04-30 PROCEDURE — 99490 PR CHRONIC CARE MGMT, 1ST 20 MIN: ICD-10-PCS | Mod: S$PBB,,, | Performed by: INTERNAL MEDICINE

## 2019-04-30 PROCEDURE — 99490 CHRNC CARE MGMT STAFF 1ST 20: CPT | Mod: S$PBB,,, | Performed by: INTERNAL MEDICINE

## 2019-05-31 ENCOUNTER — EXTERNAL CHRONIC CARE MANAGEMENT (OUTPATIENT)
Dept: PRIMARY CARE CLINIC | Facility: CLINIC | Age: 69
End: 2019-05-31
Payer: MEDICARE

## 2019-05-31 PROCEDURE — 99490 CHRNC CARE MGMT STAFF 1ST 20: CPT | Mod: PBBFAC | Performed by: INTERNAL MEDICINE

## 2019-05-31 PROCEDURE — 99490 PR CHRONIC CARE MGMT, 1ST 20 MIN: ICD-10-PCS | Mod: S$PBB,,, | Performed by: INTERNAL MEDICINE

## 2019-05-31 PROCEDURE — 99490 CHRNC CARE MGMT STAFF 1ST 20: CPT | Mod: S$PBB,,, | Performed by: INTERNAL MEDICINE

## 2019-06-18 ENCOUNTER — TELEPHONE (OUTPATIENT)
Dept: TRANSPLANT | Facility: CLINIC | Age: 69
End: 2019-06-18

## 2019-06-18 ENCOUNTER — LAB VISIT (OUTPATIENT)
Dept: LAB | Facility: HOSPITAL | Age: 69
End: 2019-06-18
Attending: INTERNAL MEDICINE
Payer: MEDICARE

## 2019-06-18 DIAGNOSIS — Z94.4 LIVER TRANSPLANTED: ICD-10-CM

## 2019-06-18 LAB
ALBUMIN SERPL BCP-MCNC: 3.7 G/DL (ref 3.5–5.2)
ALP SERPL-CCNC: 81 U/L (ref 55–135)
ALT SERPL W/O P-5'-P-CCNC: 42 U/L (ref 10–44)
ANION GAP SERPL CALC-SCNC: 10 MMOL/L (ref 8–16)
AST SERPL-CCNC: 39 U/L (ref 10–40)
BASOPHILS # BLD AUTO: 0.07 K/UL (ref 0–0.2)
BASOPHILS NFR BLD: 1.3 % (ref 0–1.9)
BILIRUB SERPL-MCNC: 1.1 MG/DL (ref 0.1–1)
BUN SERPL-MCNC: 19 MG/DL (ref 8–23)
CALCIUM SERPL-MCNC: 9.6 MG/DL (ref 8.7–10.5)
CHLORIDE SERPL-SCNC: 107 MMOL/L (ref 95–110)
CO2 SERPL-SCNC: 25 MMOL/L (ref 23–29)
CREAT SERPL-MCNC: 0.9 MG/DL (ref 0.5–1.4)
DIFFERENTIAL METHOD: ABNORMAL
EOSINOPHIL # BLD AUTO: 0.3 K/UL (ref 0–0.5)
EOSINOPHIL NFR BLD: 5.7 % (ref 0–8)
ERYTHROCYTE [DISTWIDTH] IN BLOOD BY AUTOMATED COUNT: 13.5 % (ref 11.5–14.5)
EST. GFR  (AFRICAN AMERICAN): >60 ML/MIN/1.73 M^2
EST. GFR  (NON AFRICAN AMERICAN): >60 ML/MIN/1.73 M^2
GLUCOSE SERPL-MCNC: 136 MG/DL (ref 70–110)
HCT VFR BLD AUTO: 50.8 % (ref 40–54)
HGB BLD-MCNC: 16.6 G/DL (ref 14–18)
IMM GRANULOCYTES # BLD AUTO: 0.02 K/UL (ref 0–0.04)
IMM GRANULOCYTES NFR BLD AUTO: 0.4 % (ref 0–0.5)
LYMPHOCYTES # BLD AUTO: 2.1 K/UL (ref 1–4.8)
LYMPHOCYTES NFR BLD: 36.9 % (ref 18–48)
MCH RBC QN AUTO: 33.2 PG (ref 27–31)
MCHC RBC AUTO-ENTMCNC: 32.7 G/DL (ref 32–36)
MCV RBC AUTO: 102 FL (ref 82–98)
MONOCYTES # BLD AUTO: 0.5 K/UL (ref 0.3–1)
MONOCYTES NFR BLD: 9.7 % (ref 4–15)
NEUTROPHILS # BLD AUTO: 2.6 K/UL (ref 1.8–7.7)
NEUTROPHILS NFR BLD: 46 % (ref 38–73)
NRBC BLD-RTO: 0 /100 WBC
PLATELET # BLD AUTO: 162 K/UL (ref 150–350)
PMV BLD AUTO: 9.5 FL (ref 9.2–12.9)
POTASSIUM SERPL-SCNC: 4.8 MMOL/L (ref 3.5–5.1)
PROT SERPL-MCNC: 7.2 G/DL (ref 6–8.4)
RBC # BLD AUTO: 5 M/UL (ref 4.6–6.2)
SODIUM SERPL-SCNC: 142 MMOL/L (ref 136–145)
TACROLIMUS BLD-MCNC: 9 NG/ML (ref 5–15)
WBC # BLD AUTO: 5.58 K/UL (ref 3.9–12.7)

## 2019-06-18 PROCEDURE — 80197 ASSAY OF TACROLIMUS: CPT

## 2019-06-18 PROCEDURE — 80053 COMPREHEN METABOLIC PANEL: CPT

## 2019-06-18 PROCEDURE — 36415 COLL VENOUS BLD VENIPUNCTURE: CPT

## 2019-06-18 PROCEDURE — 85025 COMPLETE CBC W/AUTO DIFF WBC: CPT

## 2019-06-18 NOTE — LETTER
June 18, 2019    Zander Kaur  [address] [address]          Dear Zander Kaur:  MRN: [id number]    This is a follow up to your recent labs, your lab results were stable.  There are no medicine changes.  Please have your labs drawn again on 9/17/19.      If you cannot have your labs drawn on the scheduled date, it is your responsibility to call the transplant department to reschedule.  To reschedule or make an appointment, please as to speak to or leave a message for my assistant, Katt Meza or Marissa, at [phone].  When leaving a message for Katt Meza Angela or myself, we ask that you leave a brief message regarding your request.    Sincerely,      Oksana Torres, RN, BSN, [facility]  Liver Transplant Coordinator  [facility] [place]  [address] [place] [address]  [phone]

## 2019-06-18 NOTE — TELEPHONE ENCOUNTER
Letter sent, labs stable and no medication changes are needed. Repeat labs due 9/17/19 per protocol.

## 2019-06-30 ENCOUNTER — EXTERNAL CHRONIC CARE MANAGEMENT (OUTPATIENT)
Dept: PRIMARY CARE CLINIC | Facility: CLINIC | Age: 69
End: 2019-06-30
Payer: MEDICARE

## 2019-06-30 PROCEDURE — 99490 CHRNC CARE MGMT STAFF 1ST 20: CPT | Mod: PBBFAC | Performed by: INTERNAL MEDICINE

## 2019-06-30 PROCEDURE — 99490 PR CHRONIC CARE MGMT, 1ST 20 MIN: ICD-10-PCS | Mod: S$PBB,,, | Performed by: INTERNAL MEDICINE

## 2019-06-30 PROCEDURE — 99490 CHRNC CARE MGMT STAFF 1ST 20: CPT | Mod: S$PBB,,, | Performed by: INTERNAL MEDICINE

## 2019-07-05 ENCOUNTER — TELEPHONE (OUTPATIENT)
Dept: INTERNAL MEDICINE | Facility: CLINIC | Age: 69
End: 2019-07-05

## 2019-07-08 ENCOUNTER — OFFICE VISIT (OUTPATIENT)
Dept: TRANSPLANT | Facility: CLINIC | Age: 69
End: 2019-07-08
Payer: MEDICARE

## 2019-07-08 VITALS
HEART RATE: 55 BPM | SYSTOLIC BLOOD PRESSURE: 154 MMHG | WEIGHT: 213.19 LBS | RESPIRATION RATE: 18 BRPM | TEMPERATURE: 98 F | OXYGEN SATURATION: 98 % | DIASTOLIC BLOOD PRESSURE: 95 MMHG | BODY MASS INDEX: 32.31 KG/M2 | HEIGHT: 68 IN

## 2019-07-08 DIAGNOSIS — I10 ESSENTIAL HYPERTENSION: ICD-10-CM

## 2019-07-08 DIAGNOSIS — Z94.9 HYPERTENSION ASSOCIATED WITH TRANSPLANTATION: Primary | ICD-10-CM

## 2019-07-08 DIAGNOSIS — Z94.4 STATUS POST LIVER TRANSPLANTATION: ICD-10-CM

## 2019-07-08 DIAGNOSIS — I15.8 HYPERTENSION ASSOCIATED WITH TRANSPLANTATION: Primary | ICD-10-CM

## 2019-07-08 PROCEDURE — 99999 PR PBB SHADOW E&M-EST. PATIENT-LVL IV: ICD-10-PCS | Mod: PBBFAC,,, | Performed by: INTERNAL MEDICINE

## 2019-07-08 PROCEDURE — 99214 OFFICE O/P EST MOD 30 MIN: CPT | Mod: PBBFAC | Performed by: INTERNAL MEDICINE

## 2019-07-08 PROCEDURE — 99214 OFFICE O/P EST MOD 30 MIN: CPT | Mod: S$PBB,,, | Performed by: INTERNAL MEDICINE

## 2019-07-08 PROCEDURE — 99999 PR PBB SHADOW E&M-EST. PATIENT-LVL IV: CPT | Mod: PBBFAC,,, | Performed by: INTERNAL MEDICINE

## 2019-07-08 PROCEDURE — 99214 PR OFFICE/OUTPT VISIT, EST, LEVL IV, 30-39 MIN: ICD-10-PCS | Mod: S$PBB,,, | Performed by: INTERNAL MEDICINE

## 2019-07-08 RX ORDER — GABAPENTIN 300 MG/1
300 CAPSULE ORAL 3 TIMES DAILY
COMMUNITY

## 2019-07-08 RX ORDER — CELECOXIB 200 MG/1
200 CAPSULE ORAL 2 TIMES DAILY
COMMUNITY

## 2019-07-08 NOTE — LETTER
July 8, 2019        Shai Davison  [address]  [facility] [address]  Phone: [phone]  Fax: [phone]             Demetri Ramsey - Liver Transplant  [address]  [facility] [address]  Phone: [phone]   Patient: Zander Kaur Sr.   MR Number: [id number]   YOB: 1950   Date of Visit: 7/8/2019       Dear Dr. Shai Davison    Thank you for referring Zander Kaur to me for evaluation. Attached you will find relevant portions of my assessment and plan of care.    If you have questions, please do not hesitate to call me. I look forward to following Zander Kaur along with you.    Sincerely,    Carson Oswald MD    Enclosure    If you would like to receive this communication electronically, please contact [email or web address] or [phone] to request [employer] Link access.    [employer] Link is a tool which provides read-only access to select patient information with whom you have a relationship. Its easy to use and provides real time access to review your patients record including encounter summaries, notes, results, and demographic information.    If you feel you have received this communication in error or would no longer like to receive these types of communications, please e-mail [email or web address]

## 2019-07-08 NOTE — PROGRESS NOTES
Transplant Hepatology  Liver Transplant Recipient Follow-up    Transplant Date: 2010  UNOS Native Liver Dx: Alcoholic Cirrhosis    Zander is here for follow up of his liver transplant. He had a liver transplant in 2010 (9 years ago) for alcohol-related cirrhosis.  He remains well and his main issue is his left knee pain for which he may need a knee replacement although he is reluctant to consider it.    Body mass index is 32.31 kg/m².    ORGAN: LIVER  Whole or Partial: whole liver  Donor Type:  - brain death  CDC High Risk: no  Donor CMV Status: positive  Donor HCV Status: negative  Donor HBcAb: negative  Biliary Anastomosis:   Arterial Anatomy:   IVC reconstruction:   Portal vein status:     He has had the following complications since transplant: biliary stricture. The noted complications are well controlled.      Subjective:     Interval History: .  He has normal liver and kidney function and is on tac monotherapy.    His only liver issue post-OLT was an anastomotic biliary stricture treated with ERCP and stenting but his biliary stents were removed in Dec 2011 and his LFTs have since remained normal.    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 Sees dermatology routinely. He works and enjoys fixing cars and racing. He has been followed by orthopedics for multiple joint and bone fractures.     Normal liver and kidney function in 2019.  His last Tac level was high because it is not a trough level.    Review of Systems   Constitutional: Negative for fatigue, fever and unexpected weight change.   Eyes: Negative.    Respiratory: Negative.  Negative for chest tightness and shortness of breath.    Cardiovascular: Negative for chest pain and leg swelling.   Gastrointestinal: Negative for abdominal pain, blood in stool, constipation, nausea and vomiting.   Genitourinary: Negative.    Musculoskeletal: Positive for arthralgias (from previous injuries) and joint  swelling.   Skin: Negative.  Negative for color change and rash.   Neurological: Negative.  Negative for dizziness and light-headedness.   Psychiatric/Behavioral: Negative.  Negative for confusion and suicidal ideas. The patient is not nervous/anxious.        Objective:     Physical Exam   Constitutional: He is oriented to person, place, and time. He appears well-developed and well-nourished.   HENT:   Head: Normocephalic and atraumatic.   Eyes: Pupils are equal, round, and reactive to light. Conjunctivae are normal.   Neck: Normal range of motion. Neck supple. No thyromegaly present.   Cardiovascular: Normal rate, regular rhythm and normal heart sounds.   Pulmonary/Chest: Effort normal and breath sounds normal.   Abdominal: Soft. Bowel sounds are normal. He exhibits no distension. There is no tenderness.       Musculoskeletal: He exhibits no edema or tenderness.   Neurological: He is alert and oriented to person, place, and time.   Skin: Skin is warm and dry.   Psychiatric: He has a normal mood and affect. His behavior is normal. Judgment and thought content normal.   Nursing note and vitals reviewed.    Lab Results   Component Value Date    BILITOT 1.1 (H) 06/18/2019    AST 39 06/18/2019    ALT 42 06/18/2019    ALKPHOS 81 06/18/2019    CREATININE 0.9 06/18/2019    ALBUMIN 3.7 06/18/2019     Lab Results   Component Value Date    WBC 5.58 06/18/2019    HGB 16.6 06/18/2019    HCT 50.8 06/18/2019     06/18/2019     Lab Results   Component Value Date    TACROLIMUS 9.0 06/18/2019       Assessment/Plan:     No diagnosis found.    1. S/p liver transplant 6/15/2010 for Alcoholic cirrhosis. Normal LFTs. Chronic immunosuppressive medications for rejection prophylaxis at target. On tacrolimus (lasty level not a trough level so no adjustment made).  Plan: no adjustment needed.Continue monitoring symptoms, labs and drug levels for drug-related toxicity and side effects  2. Squamous cell skin cancer. Routine dermatology  follow up.   3. Hypertension- controlled with medication- agrees to enrol in digital medicine program here  4. Osteoarthritis and multiple joint injuries. Continue follow up with orthopedics. On pain meds.     Clinic in 1 year.    MD PURVI Irving Patient Status  Functional Status: 100% - Normal, no complaints, no evidence of disease  Physical Capacity: No Limitations

## 2019-07-18 RX ORDER — TACROLIMUS 1 MG/1
CAPSULE, GELATIN COATED ORAL
Qty: 60 CAPSULE | Refills: 2 | Status: SHIPPED | OUTPATIENT
Start: 2019-07-18 | End: 2019-10-18 | Stop reason: SDUPTHER

## 2019-07-31 ENCOUNTER — EXTERNAL CHRONIC CARE MANAGEMENT (OUTPATIENT)
Dept: PRIMARY CARE CLINIC | Facility: CLINIC | Age: 69
End: 2019-07-31
Payer: MEDICARE

## 2019-07-31 PROCEDURE — 99490 CHRNC CARE MGMT STAFF 1ST 20: CPT | Mod: PBBFAC | Performed by: INTERNAL MEDICINE

## 2019-07-31 PROCEDURE — 99490 CHRNC CARE MGMT STAFF 1ST 20: CPT | Mod: S$PBB,,, | Performed by: INTERNAL MEDICINE

## 2019-07-31 PROCEDURE — 99490 PR CHRONIC CARE MGMT, 1ST 20 MIN: ICD-10-PCS | Mod: S$PBB,,, | Performed by: INTERNAL MEDICINE

## 2019-08-31 ENCOUNTER — EXTERNAL CHRONIC CARE MANAGEMENT (OUTPATIENT)
Dept: PRIMARY CARE CLINIC | Facility: CLINIC | Age: 69
End: 2019-08-31
Payer: MEDICARE

## 2019-08-31 PROCEDURE — 99490 CHRNC CARE MGMT STAFF 1ST 20: CPT | Mod: PBBFAC | Performed by: INTERNAL MEDICINE

## 2019-08-31 PROCEDURE — 99490 CHRNC CARE MGMT STAFF 1ST 20: CPT | Mod: S$PBB,,, | Performed by: INTERNAL MEDICINE

## 2019-08-31 PROCEDURE — 99490 PR CHRONIC CARE MGMT, 1ST 20 MIN: ICD-10-PCS | Mod: S$PBB,,, | Performed by: INTERNAL MEDICINE

## 2019-09-17 ENCOUNTER — LAB VISIT (OUTPATIENT)
Dept: LAB | Facility: HOSPITAL | Age: 69
End: 2019-09-17
Attending: INTERNAL MEDICINE
Payer: MEDICARE

## 2019-09-17 DIAGNOSIS — Z94.4 LIVER TRANSPLANTED: ICD-10-CM

## 2019-09-17 LAB
ALBUMIN SERPL BCP-MCNC: 4.1 G/DL (ref 3.5–5.2)
ALP SERPL-CCNC: 78 U/L (ref 55–135)
ALT SERPL W/O P-5'-P-CCNC: 58 U/L (ref 10–44)
ANION GAP SERPL CALC-SCNC: 9 MMOL/L (ref 8–16)
AST SERPL-CCNC: 58 U/L (ref 10–40)
BASOPHILS # BLD AUTO: 0.06 K/UL (ref 0–0.2)
BASOPHILS NFR BLD: 1.2 % (ref 0–1.9)
BILIRUB SERPL-MCNC: 1 MG/DL (ref 0.1–1)
BUN SERPL-MCNC: 21 MG/DL (ref 8–23)
CALCIUM SERPL-MCNC: 9.5 MG/DL (ref 8.7–10.5)
CHLORIDE SERPL-SCNC: 107 MMOL/L (ref 95–110)
CO2 SERPL-SCNC: 27 MMOL/L (ref 23–29)
CREAT SERPL-MCNC: 0.8 MG/DL (ref 0.5–1.4)
DIFFERENTIAL METHOD: ABNORMAL
EOSINOPHIL # BLD AUTO: 0.2 K/UL (ref 0–0.5)
EOSINOPHIL NFR BLD: 4.2 % (ref 0–8)
ERYTHROCYTE [DISTWIDTH] IN BLOOD BY AUTOMATED COUNT: 13.9 % (ref 11.5–14.5)
EST. GFR  (AFRICAN AMERICAN): >60 ML/MIN/1.73 M^2
EST. GFR  (NON AFRICAN AMERICAN): >60 ML/MIN/1.73 M^2
GLUCOSE SERPL-MCNC: 108 MG/DL (ref 70–110)
HCT VFR BLD AUTO: 49.8 % (ref 40–54)
HGB BLD-MCNC: 15.9 G/DL (ref 14–18)
IMM GRANULOCYTES # BLD AUTO: 0.02 K/UL (ref 0–0.04)
IMM GRANULOCYTES NFR BLD AUTO: 0.4 % (ref 0–0.5)
LYMPHOCYTES # BLD AUTO: 2.4 K/UL (ref 1–4.8)
LYMPHOCYTES NFR BLD: 46.8 % (ref 18–48)
MCH RBC QN AUTO: 33.1 PG (ref 27–31)
MCHC RBC AUTO-ENTMCNC: 31.9 G/DL (ref 32–36)
MCV RBC AUTO: 104 FL (ref 82–98)
MONOCYTES # BLD AUTO: 0.5 K/UL (ref 0.3–1)
MONOCYTES NFR BLD: 10.4 % (ref 4–15)
NEUTROPHILS # BLD AUTO: 1.9 K/UL (ref 1.8–7.7)
NEUTROPHILS NFR BLD: 37 % (ref 38–73)
NRBC BLD-RTO: 0 /100 WBC
PLATELET # BLD AUTO: 152 K/UL (ref 150–350)
PMV BLD AUTO: 9.7 FL (ref 9.2–12.9)
POTASSIUM SERPL-SCNC: 4.9 MMOL/L (ref 3.5–5.1)
PROT SERPL-MCNC: 7.1 G/DL (ref 6–8.4)
RBC # BLD AUTO: 4.8 M/UL (ref 4.6–6.2)
SODIUM SERPL-SCNC: 143 MMOL/L (ref 136–145)
TACROLIMUS BLD-MCNC: 3.2 NG/ML (ref 5–15)
WBC # BLD AUTO: 5.21 K/UL (ref 3.9–12.7)

## 2019-09-17 PROCEDURE — 80197 ASSAY OF TACROLIMUS: CPT

## 2019-09-17 PROCEDURE — 80053 COMPREHEN METABOLIC PANEL: CPT

## 2019-09-17 PROCEDURE — 36415 COLL VENOUS BLD VENIPUNCTURE: CPT

## 2019-09-17 PROCEDURE — 85025 COMPLETE CBC W/AUTO DIFF WBC: CPT

## 2019-09-18 ENCOUNTER — PES CALL (OUTPATIENT)
Dept: ADMINISTRATIVE | Facility: CLINIC | Age: 69
End: 2019-09-18

## 2019-09-20 ENCOUNTER — TELEPHONE (OUTPATIENT)
Dept: TRANSPLANT | Facility: CLINIC | Age: 69
End: 2019-09-20

## 2019-09-20 NOTE — TELEPHONE ENCOUNTER
----- Message from Carson Oswald MD sent at 9/17/2019 10:56 AM CDT -----  Results reviewed  Repeat in 2 weeks

## 2019-09-24 ENCOUNTER — PATIENT MESSAGE (OUTPATIENT)
Dept: ADMINISTRATIVE | Facility: OTHER | Age: 69
End: 2019-09-24

## 2019-09-30 ENCOUNTER — EXTERNAL CHRONIC CARE MANAGEMENT (OUTPATIENT)
Dept: PRIMARY CARE CLINIC | Facility: CLINIC | Age: 69
End: 2019-09-30
Payer: MEDICARE

## 2019-09-30 PROCEDURE — 99490 CHRNC CARE MGMT STAFF 1ST 20: CPT | Mod: PBBFAC | Performed by: INTERNAL MEDICINE

## 2019-09-30 PROCEDURE — 99490 PR CHRONIC CARE MGMT, 1ST 20 MIN: ICD-10-PCS | Mod: S$PBB,,, | Performed by: INTERNAL MEDICINE

## 2019-09-30 PROCEDURE — 99490 CHRNC CARE MGMT STAFF 1ST 20: CPT | Mod: S$PBB,,, | Performed by: INTERNAL MEDICINE

## 2019-10-01 ENCOUNTER — LAB VISIT (OUTPATIENT)
Dept: LAB | Facility: HOSPITAL | Age: 69
End: 2019-10-01
Attending: INTERNAL MEDICINE
Payer: MEDICARE

## 2019-10-01 DIAGNOSIS — Z94.4 LIVER TRANSPLANTED: ICD-10-CM

## 2019-10-01 LAB
ALBUMIN SERPL BCP-MCNC: 4 G/DL (ref 3.5–5.2)
ALP SERPL-CCNC: 76 U/L (ref 55–135)
ALT SERPL W/O P-5'-P-CCNC: 55 U/L (ref 10–44)
ANION GAP SERPL CALC-SCNC: 9 MMOL/L (ref 8–16)
AST SERPL-CCNC: 53 U/L (ref 10–40)
BASOPHILS # BLD AUTO: 0.06 K/UL (ref 0–0.2)
BASOPHILS NFR BLD: 1.1 % (ref 0–1.9)
BILIRUB SERPL-MCNC: 0.7 MG/DL (ref 0.1–1)
BUN SERPL-MCNC: 17 MG/DL (ref 8–23)
CALCIUM SERPL-MCNC: 9.4 MG/DL (ref 8.7–10.5)
CHLORIDE SERPL-SCNC: 109 MMOL/L (ref 95–110)
CO2 SERPL-SCNC: 27 MMOL/L (ref 23–29)
CREAT SERPL-MCNC: 0.8 MG/DL (ref 0.5–1.4)
DIFFERENTIAL METHOD: ABNORMAL
EOSINOPHIL # BLD AUTO: 0.2 K/UL (ref 0–0.5)
EOSINOPHIL NFR BLD: 4.4 % (ref 0–8)
ERYTHROCYTE [DISTWIDTH] IN BLOOD BY AUTOMATED COUNT: 13.6 % (ref 11.5–14.5)
EST. GFR  (AFRICAN AMERICAN): >60 ML/MIN/1.73 M^2
EST. GFR  (NON AFRICAN AMERICAN): >60 ML/MIN/1.73 M^2
GLUCOSE SERPL-MCNC: 112 MG/DL (ref 70–110)
HCT VFR BLD AUTO: 50 % (ref 40–54)
HGB BLD-MCNC: 15.9 G/DL (ref 14–18)
IMM GRANULOCYTES # BLD AUTO: 0.02 K/UL (ref 0–0.04)
IMM GRANULOCYTES NFR BLD AUTO: 0.4 % (ref 0–0.5)
LYMPHOCYTES # BLD AUTO: 2.3 K/UL (ref 1–4.8)
LYMPHOCYTES NFR BLD: 42.4 % (ref 18–48)
MCH RBC QN AUTO: 33.3 PG (ref 27–31)
MCHC RBC AUTO-ENTMCNC: 31.8 G/DL (ref 32–36)
MCV RBC AUTO: 105 FL (ref 82–98)
MONOCYTES # BLD AUTO: 0.5 K/UL (ref 0.3–1)
MONOCYTES NFR BLD: 9.3 % (ref 4–15)
NEUTROPHILS # BLD AUTO: 2.3 K/UL (ref 1.8–7.7)
NEUTROPHILS NFR BLD: 42.4 % (ref 38–73)
NRBC BLD-RTO: 0 /100 WBC
PLATELET # BLD AUTO: 164 K/UL (ref 150–350)
PMV BLD AUTO: 9.6 FL (ref 9.2–12.9)
POTASSIUM SERPL-SCNC: 5.3 MMOL/L (ref 3.5–5.1)
PROT SERPL-MCNC: 7.2 G/DL (ref 6–8.4)
RBC # BLD AUTO: 4.78 M/UL (ref 4.6–6.2)
SODIUM SERPL-SCNC: 145 MMOL/L (ref 136–145)
TACROLIMUS BLD-MCNC: 3.7 NG/ML (ref 5–15)
WBC # BLD AUTO: 5.49 K/UL (ref 3.9–12.7)

## 2019-10-01 PROCEDURE — 36415 COLL VENOUS BLD VENIPUNCTURE: CPT

## 2019-10-01 PROCEDURE — 85025 COMPLETE CBC W/AUTO DIFF WBC: CPT

## 2019-10-01 PROCEDURE — 80053 COMPREHEN METABOLIC PANEL: CPT

## 2019-10-01 PROCEDURE — 80197 ASSAY OF TACROLIMUS: CPT

## 2019-10-08 ENCOUNTER — TELEPHONE (OUTPATIENT)
Dept: TRANSPLANT | Facility: CLINIC | Age: 69
End: 2019-10-08

## 2019-10-08 DIAGNOSIS — Z94.4 LIVER TRANSPLANTED: Primary | ICD-10-CM

## 2019-10-17 RX ORDER — TACROLIMUS 1 MG/1
CAPSULE, GELATIN COATED ORAL
Qty: 60 CAPSULE | Refills: 1 | OUTPATIENT
Start: 2019-10-17

## 2019-10-18 DIAGNOSIS — Z94.4 LIVER REPLACED BY TRANSPLANT: Primary | ICD-10-CM

## 2019-10-18 RX ORDER — TACROLIMUS 1 MG/1
1 CAPSULE, GELATIN COATED ORAL EVERY 12 HOURS
Qty: 60 CAPSULE | Refills: 2 | OUTPATIENT
Start: 2019-10-18

## 2019-10-18 RX ORDER — TACROLIMUS 1 MG/1
1 CAPSULE, GELATIN COATED ORAL EVERY 12 HOURS
Qty: 60 CAPSULE | Refills: 2 | Status: SHIPPED | OUTPATIENT
Start: 2019-10-18 | End: 2020-01-15

## 2019-10-22 ENCOUNTER — PES CALL (OUTPATIENT)
Dept: ADMINISTRATIVE | Facility: CLINIC | Age: 69
End: 2019-10-22

## 2019-10-31 ENCOUNTER — EXTERNAL CHRONIC CARE MANAGEMENT (OUTPATIENT)
Dept: PRIMARY CARE CLINIC | Facility: CLINIC | Age: 69
End: 2019-10-31
Payer: MEDICARE

## 2019-10-31 PROCEDURE — 99490 CHRNC CARE MGMT STAFF 1ST 20: CPT | Mod: PBBFAC | Performed by: INTERNAL MEDICINE

## 2019-10-31 PROCEDURE — 99490 CHRNC CARE MGMT STAFF 1ST 20: CPT | Mod: S$PBB,,, | Performed by: INTERNAL MEDICINE

## 2019-10-31 PROCEDURE — 99490 PR CHRONIC CARE MGMT, 1ST 20 MIN: ICD-10-PCS | Mod: S$PBB,,, | Performed by: INTERNAL MEDICINE

## 2019-11-30 ENCOUNTER — EXTERNAL CHRONIC CARE MANAGEMENT (OUTPATIENT)
Dept: PRIMARY CARE CLINIC | Facility: CLINIC | Age: 69
End: 2019-11-30

## 2019-12-31 ENCOUNTER — EXTERNAL CHRONIC CARE MANAGEMENT (OUTPATIENT)
Dept: PRIMARY CARE CLINIC | Facility: CLINIC | Age: 69
End: 2019-12-31
Payer: MEDICARE

## 2019-12-31 PROCEDURE — 99490 CHRNC CARE MGMT STAFF 1ST 20: CPT | Mod: S$PBB,,, | Performed by: INTERNAL MEDICINE

## 2019-12-31 PROCEDURE — 99490 CHRNC CARE MGMT STAFF 1ST 20: CPT | Mod: PBBFAC | Performed by: INTERNAL MEDICINE

## 2019-12-31 PROCEDURE — 99490 PR CHRONIC CARE MGMT, 1ST 20 MIN: ICD-10-PCS | Mod: S$PBB,,, | Performed by: INTERNAL MEDICINE

## 2020-01-07 ENCOUNTER — LAB VISIT (OUTPATIENT)
Dept: LAB | Facility: HOSPITAL | Age: 70
End: 2020-01-07
Attending: INTERNAL MEDICINE
Payer: MEDICARE

## 2020-01-07 DIAGNOSIS — Z94.4 LIVER TRANSPLANTED: ICD-10-CM

## 2020-01-07 LAB
ALBUMIN SERPL BCP-MCNC: 4.2 G/DL (ref 3.5–5.2)
ALP SERPL-CCNC: 69 U/L (ref 55–135)
ALT SERPL W/O P-5'-P-CCNC: 55 U/L (ref 10–44)
ANION GAP SERPL CALC-SCNC: 11 MMOL/L (ref 8–16)
AST SERPL-CCNC: 50 U/L (ref 10–40)
BASOPHILS # BLD AUTO: 0.08 K/UL (ref 0–0.2)
BASOPHILS NFR BLD: 1.3 % (ref 0–1.9)
BILIRUB SERPL-MCNC: 1 MG/DL (ref 0.1–1)
BUN SERPL-MCNC: 21 MG/DL (ref 8–23)
CALCIUM SERPL-MCNC: 10.1 MG/DL (ref 8.7–10.5)
CHLORIDE SERPL-SCNC: 108 MMOL/L (ref 95–110)
CO2 SERPL-SCNC: 28 MMOL/L (ref 23–29)
CREAT SERPL-MCNC: 1 MG/DL (ref 0.5–1.4)
DIFFERENTIAL METHOD: ABNORMAL
EOSINOPHIL # BLD AUTO: 0.3 K/UL (ref 0–0.5)
EOSINOPHIL NFR BLD: 4.6 % (ref 0–8)
ERYTHROCYTE [DISTWIDTH] IN BLOOD BY AUTOMATED COUNT: 13.2 % (ref 11.5–14.5)
EST. GFR  (AFRICAN AMERICAN): >60 ML/MIN/1.73 M^2
EST. GFR  (NON AFRICAN AMERICAN): >60 ML/MIN/1.73 M^2
GLUCOSE SERPL-MCNC: 123 MG/DL (ref 70–110)
HCT VFR BLD AUTO: 51.7 % (ref 40–54)
HGB BLD-MCNC: 16.5 G/DL (ref 14–18)
IMM GRANULOCYTES # BLD AUTO: 0.02 K/UL (ref 0–0.04)
IMM GRANULOCYTES NFR BLD AUTO: 0.3 % (ref 0–0.5)
LYMPHOCYTES # BLD AUTO: 2.5 K/UL (ref 1–4.8)
LYMPHOCYTES NFR BLD: 38.9 % (ref 18–48)
MCH RBC QN AUTO: 33.4 PG (ref 27–31)
MCHC RBC AUTO-ENTMCNC: 31.9 G/DL (ref 32–36)
MCV RBC AUTO: 105 FL (ref 82–98)
MONOCYTES # BLD AUTO: 0.6 K/UL (ref 0.3–1)
MONOCYTES NFR BLD: 10 % (ref 4–15)
NEUTROPHILS # BLD AUTO: 2.9 K/UL (ref 1.8–7.7)
NEUTROPHILS NFR BLD: 44.9 % (ref 38–73)
NRBC BLD-RTO: 0 /100 WBC
PLATELET # BLD AUTO: 168 K/UL (ref 150–350)
PMV BLD AUTO: 9.6 FL (ref 9.2–12.9)
POTASSIUM SERPL-SCNC: 5.7 MMOL/L (ref 3.5–5.1)
PROT SERPL-MCNC: 7.7 G/DL (ref 6–8.4)
RBC # BLD AUTO: 4.94 M/UL (ref 4.6–6.2)
SODIUM SERPL-SCNC: 147 MMOL/L (ref 136–145)
TACROLIMUS BLD-MCNC: 3.7 NG/ML (ref 5–15)
WBC # BLD AUTO: 6.37 K/UL (ref 3.9–12.7)

## 2020-01-07 PROCEDURE — 85025 COMPLETE CBC W/AUTO DIFF WBC: CPT

## 2020-01-07 PROCEDURE — 80197 ASSAY OF TACROLIMUS: CPT

## 2020-01-07 PROCEDURE — 80053 COMPREHEN METABOLIC PANEL: CPT

## 2020-01-07 PROCEDURE — 36415 COLL VENOUS BLD VENIPUNCTURE: CPT

## 2020-01-08 DIAGNOSIS — E87.5 HYPERKALEMIA: Primary | ICD-10-CM

## 2020-01-08 RX ORDER — SODIUM POLYSTYRENE SULFONATE 4.1 MEQ/G
15 POWDER, FOR SUSPENSION ORAL; RECTAL ONCE
Qty: 15 G | Refills: 0 | Status: SHIPPED | OUTPATIENT
Start: 2020-01-08 | End: 2020-01-09

## 2020-01-08 NOTE — PROGRESS NOTES
Hpi Title: Evaluation of Skin Lesions
Result(s) reviewed and within acceptable range. Portal message sent to patient.
How Severe Are Your Spot(S)?: mild
Have Your Spot(S) Been Treated In The Past?: has not been treated

## 2020-01-08 NOTE — TELEPHONE ENCOUNTER
----- Message from Carson Oswald MD sent at 1/7/2020  4:37 PM CST -----  Results reviewed  K protocol please

## 2020-01-09 ENCOUNTER — TELEPHONE (OUTPATIENT)
Dept: TRANSPLANT | Facility: CLINIC | Age: 70
End: 2020-01-09

## 2020-01-09 ENCOUNTER — LAB VISIT (OUTPATIENT)
Dept: LAB | Facility: HOSPITAL | Age: 70
End: 2020-01-09
Attending: INTERNAL MEDICINE
Payer: MEDICARE

## 2020-01-09 DIAGNOSIS — E87.5 HYPERKALEMIA: ICD-10-CM

## 2020-01-09 LAB — POTASSIUM SERPL-SCNC: 4.8 MMOL/L (ref 3.5–5.1)

## 2020-01-09 PROCEDURE — 36415 COLL VENOUS BLD VENIPUNCTURE: CPT

## 2020-01-09 PROCEDURE — 84132 ASSAY OF SERUM POTASSIUM: CPT

## 2020-01-15 DIAGNOSIS — Z94.4 LIVER REPLACED BY TRANSPLANT: ICD-10-CM

## 2020-01-15 RX ORDER — TACROLIMUS 1 MG/1
1 CAPSULE, GELATIN COATED ORAL EVERY 12 HOURS
Qty: 60 CAPSULE | Refills: 1 | Status: SHIPPED | OUTPATIENT
Start: 2020-01-15 | End: 2020-03-16

## 2020-01-31 ENCOUNTER — EXTERNAL CHRONIC CARE MANAGEMENT (OUTPATIENT)
Dept: PRIMARY CARE CLINIC | Facility: CLINIC | Age: 70
End: 2020-01-31
Payer: MEDICARE

## 2020-01-31 PROCEDURE — 99490 CHRNC CARE MGMT STAFF 1ST 20: CPT | Mod: PBBFAC | Performed by: INTERNAL MEDICINE

## 2020-01-31 PROCEDURE — 99490 PR CHRONIC CARE MGMT, 1ST 20 MIN: ICD-10-PCS | Mod: S$PBB,,, | Performed by: INTERNAL MEDICINE

## 2020-01-31 PROCEDURE — 99490 CHRNC CARE MGMT STAFF 1ST 20: CPT | Mod: S$PBB,,, | Performed by: INTERNAL MEDICINE

## 2020-02-29 ENCOUNTER — EXTERNAL CHRONIC CARE MANAGEMENT (OUTPATIENT)
Dept: PRIMARY CARE CLINIC | Facility: CLINIC | Age: 70
End: 2020-02-29
Payer: MEDICARE

## 2020-02-29 PROCEDURE — 99490 CHRNC CARE MGMT STAFF 1ST 20: CPT | Mod: S$PBB,,, | Performed by: INTERNAL MEDICINE

## 2020-02-29 PROCEDURE — 99490 CHRNC CARE MGMT STAFF 1ST 20: CPT | Mod: PBBFAC | Performed by: INTERNAL MEDICINE

## 2020-02-29 PROCEDURE — 99490 PR CHRONIC CARE MGMT, 1ST 20 MIN: ICD-10-PCS | Mod: S$PBB,,, | Performed by: INTERNAL MEDICINE

## 2020-03-04 ENCOUNTER — TELEPHONE (OUTPATIENT)
Dept: TRANSPLANT | Facility: CLINIC | Age: 70
End: 2020-03-04

## 2020-03-04 NOTE — TELEPHONE ENCOUNTER
Received letter from [employer] that pt's Prograf is not covered by insurance.    Called and spoke with pt and spouse. Both unwilling for pt to switch to generic tacrolimus, which would be covered by insurance, and they decided they will pay out of pocket for medication for now.  Discussed with both that the majority of transplant pts take the generic tacrolimus and do very well. They stated they would discuss this and call me back if pt decides he wants Dr. Oswald to send the tacrolimus prescription.

## 2020-03-15 DIAGNOSIS — Z94.4 LIVER REPLACED BY TRANSPLANT: ICD-10-CM

## 2020-03-15 RX ORDER — DOXAZOSIN 8 MG/1
8 TABLET ORAL DAILY
Qty: 30 TABLET | Refills: 2 | Status: SHIPPED | OUTPATIENT
Start: 2020-03-15 | End: 2020-06-17 | Stop reason: SDUPTHER

## 2020-03-15 RX ORDER — METOPROLOL TARTRATE 100 MG/1
100 TABLET ORAL 2 TIMES DAILY
Qty: 180 TABLET | Refills: 2 | Status: SHIPPED | OUTPATIENT
Start: 2020-03-15 | End: 2020-12-10

## 2020-03-16 RX ORDER — TACROLIMUS 1 MG/1
1 CAPSULE, GELATIN COATED ORAL EVERY 12 HOURS
Qty: 60 CAPSULE | Refills: 0 | Status: SHIPPED | OUTPATIENT
Start: 2020-03-16 | End: 2020-03-18 | Stop reason: SDUPTHER

## 2020-03-18 DIAGNOSIS — Z94.4 LIVER REPLACED BY TRANSPLANT: ICD-10-CM

## 2020-03-18 RX ORDER — TACROLIMUS 1 MG/1
1 CAPSULE, GELATIN COATED ORAL EVERY 12 HOURS
Qty: 60 CAPSULE | Refills: 11 | Status: SHIPPED | OUTPATIENT
Start: 2020-03-18 | End: 2021-02-08

## 2020-03-31 ENCOUNTER — TELEPHONE (OUTPATIENT)
Dept: TRANSPLANT | Facility: CLINIC | Age: 70
End: 2020-03-31

## 2020-03-31 ENCOUNTER — EXTERNAL CHRONIC CARE MANAGEMENT (OUTPATIENT)
Dept: PRIMARY CARE CLINIC | Facility: CLINIC | Age: 70
End: 2020-03-31
Payer: MEDICARE

## 2020-03-31 PROCEDURE — 99490 CHRNC CARE MGMT STAFF 1ST 20: CPT | Mod: PBBFAC | Performed by: INTERNAL MEDICINE

## 2020-03-31 PROCEDURE — 99490 CHRNC CARE MGMT STAFF 1ST 20: CPT | Mod: S$PBB,,, | Performed by: INTERNAL MEDICINE

## 2020-03-31 PROCEDURE — 99490 PR CHRONIC CARE MGMT, 1ST 20 MIN: ICD-10-PCS | Mod: S$PBB,,, | Performed by: INTERNAL MEDICINE

## 2020-03-31 NOTE — TELEPHONE ENCOUNTER
----- Message from Rose Andrade sent at 3/31/2020  2:22 PM CDT -----  Contact: PT  PT wants to speak with coordinator Evelin Gandhi - regarding blood work - pt wants to make sure it's ok to cancel and come later once virus clears?    Callback: [phone]

## 2020-04-30 ENCOUNTER — EXTERNAL CHRONIC CARE MANAGEMENT (OUTPATIENT)
Dept: PRIMARY CARE CLINIC | Facility: CLINIC | Age: 70
End: 2020-04-30
Payer: MEDICARE

## 2020-04-30 PROCEDURE — 99490 PR CHRONIC CARE MGMT, 1ST 20 MIN: ICD-10-PCS | Mod: S$PBB,,, | Performed by: INTERNAL MEDICINE

## 2020-04-30 PROCEDURE — 99490 CHRNC CARE MGMT STAFF 1ST 20: CPT | Mod: S$PBB,,, | Performed by: INTERNAL MEDICINE

## 2020-04-30 PROCEDURE — 99490 CHRNC CARE MGMT STAFF 1ST 20: CPT | Mod: PBBFAC | Performed by: INTERNAL MEDICINE

## 2020-05-12 ENCOUNTER — TELEPHONE (OUTPATIENT)
Dept: TRANSPLANT | Facility: CLINIC | Age: 70
End: 2020-05-12

## 2020-05-14 ENCOUNTER — TELEPHONE (OUTPATIENT)
Dept: TRANSPLANT | Facility: CLINIC | Age: 70
End: 2020-05-14

## 2020-05-14 ENCOUNTER — LAB VISIT (OUTPATIENT)
Dept: LAB | Facility: HOSPITAL | Age: 70
End: 2020-05-14
Attending: INTERNAL MEDICINE
Payer: MEDICARE

## 2020-05-14 DIAGNOSIS — Z94.4 LIVER TRANSPLANTED: ICD-10-CM

## 2020-05-14 LAB
ALBUMIN SERPL BCP-MCNC: 3.7 G/DL (ref 3.5–5.2)
ALP SERPL-CCNC: 81 U/L (ref 55–135)
ALT SERPL W/O P-5'-P-CCNC: 54 U/L (ref 10–44)
ANION GAP SERPL CALC-SCNC: 10 MMOL/L (ref 8–16)
AST SERPL-CCNC: 56 U/L (ref 10–40)
BASOPHILS # BLD AUTO: 0.05 K/UL (ref 0–0.2)
BASOPHILS NFR BLD: 1 % (ref 0–1.9)
BILIRUB SERPL-MCNC: 0.8 MG/DL (ref 0.1–1)
BUN SERPL-MCNC: 23 MG/DL (ref 8–23)
CALCIUM SERPL-MCNC: 9.3 MG/DL (ref 8.7–10.5)
CHLORIDE SERPL-SCNC: 105 MMOL/L (ref 95–110)
CO2 SERPL-SCNC: 25 MMOL/L (ref 23–29)
CREAT SERPL-MCNC: 0.9 MG/DL (ref 0.5–1.4)
DIFFERENTIAL METHOD: ABNORMAL
EOSINOPHIL # BLD AUTO: 0.3 K/UL (ref 0–0.5)
EOSINOPHIL NFR BLD: 5.1 % (ref 0–8)
ERYTHROCYTE [DISTWIDTH] IN BLOOD BY AUTOMATED COUNT: 13.1 % (ref 11.5–14.5)
EST. GFR  (AFRICAN AMERICAN): >60 ML/MIN/1.73 M^2
EST. GFR  (NON AFRICAN AMERICAN): >60 ML/MIN/1.73 M^2
GLUCOSE SERPL-MCNC: 110 MG/DL (ref 70–110)
HCT VFR BLD AUTO: 50 % (ref 40–54)
HGB BLD-MCNC: 16 G/DL (ref 14–18)
IMM GRANULOCYTES # BLD AUTO: 0.01 K/UL (ref 0–0.04)
IMM GRANULOCYTES NFR BLD AUTO: 0.2 % (ref 0–0.5)
LYMPHOCYTES # BLD AUTO: 2 K/UL (ref 1–4.8)
LYMPHOCYTES NFR BLD: 40.1 % (ref 18–48)
MCH RBC QN AUTO: 33.1 PG (ref 27–31)
MCHC RBC AUTO-ENTMCNC: 32 G/DL (ref 32–36)
MCV RBC AUTO: 104 FL (ref 82–98)
MONOCYTES # BLD AUTO: 0.7 K/UL (ref 0.3–1)
MONOCYTES NFR BLD: 14.2 % (ref 4–15)
NEUTROPHILS # BLD AUTO: 2 K/UL (ref 1.8–7.7)
NEUTROPHILS NFR BLD: 39.4 % (ref 38–73)
NRBC BLD-RTO: 0 /100 WBC
PLATELET # BLD AUTO: 159 K/UL (ref 150–350)
PMV BLD AUTO: 9.8 FL (ref 9.2–12.9)
POTASSIUM SERPL-SCNC: 4.7 MMOL/L (ref 3.5–5.1)
PROT SERPL-MCNC: 6.9 G/DL (ref 6–8.4)
RBC # BLD AUTO: 4.83 M/UL (ref 4.6–6.2)
SODIUM SERPL-SCNC: 140 MMOL/L (ref 136–145)
TACROLIMUS BLD-MCNC: 4.5 NG/ML (ref 5–15)
WBC # BLD AUTO: 5.06 K/UL (ref 3.9–12.7)

## 2020-05-14 PROCEDURE — 36415 COLL VENOUS BLD VENIPUNCTURE: CPT

## 2020-05-14 PROCEDURE — 80197 ASSAY OF TACROLIMUS: CPT

## 2020-05-14 PROCEDURE — 85025 COMPLETE CBC W/AUTO DIFF WBC: CPT

## 2020-05-14 PROCEDURE — 80053 COMPREHEN METABOLIC PANEL: CPT

## 2020-05-14 NOTE — TELEPHONE ENCOUNTER
----- Message from Divina Walsh RN sent at 5/14/2020  1:13 PM CDT -----  Contact: Lyn Zapata      ----- Message -----  From: Amor Mendoza  Sent: 5/14/2020  12:08 PM CDT  To: Darek Byrd Staff    Lyn Zapata is requesting pt to be called at [phone] concerning labs scheduled for 06/08 he wanted to know if he has have labs drawn monthly

## 2020-05-14 NOTE — TELEPHONE ENCOUNTER
Called pt to discuss.  Pt reports he had labs drawn today and is not in need of future appointment.  He spoke to Camri earlier today, but does not have a question about monthly labs.  Pt denies needs or questions at this time.

## 2020-05-15 ENCOUNTER — TELEPHONE (OUTPATIENT)
Dept: TRANSPLANT | Facility: CLINIC | Age: 70
End: 2020-05-15

## 2020-05-15 NOTE — LETTER
May 15, 2020    Zander Kaur  [place] [address]          Dear Zander Kaur:  MRN: [id number]    This is a follow up to your recent labs, your lab results were stable.  There are no medicine changes.  Please have your labs drawn again on 8/11/20.      If you cannot have your labs drawn on the scheduled date, it is your responsibility to call the transplant department to reschedule.  To reschedule or make an appointment, please as to speak to or leave a message for my assistant, Katt Meza or Marissa, at [phone].  When leaving a message for Katt Meza Angela or myself, we ask that you leave a brief message regarding your request.    Sincerely,    Oksana Torres, RN, BSN, [facility]  Liver Transplant Coordinator  [facility]  [address] [place] [id number]  [phone]

## 2020-05-15 NOTE — TELEPHONE ENCOUNTER
Letter sent, labs stable and no medication changes are needed. Repeat labs due 8/11/20 per protocol.

## 2020-05-31 ENCOUNTER — EXTERNAL CHRONIC CARE MANAGEMENT (OUTPATIENT)
Dept: PRIMARY CARE CLINIC | Facility: CLINIC | Age: 70
End: 2020-05-31
Payer: MEDICARE

## 2020-05-31 PROCEDURE — 99490 CHRNC CARE MGMT STAFF 1ST 20: CPT | Mod: S$PBB,,, | Performed by: INTERNAL MEDICINE

## 2020-05-31 PROCEDURE — 99490 CHRNC CARE MGMT STAFF 1ST 20: CPT | Mod: PBBFAC | Performed by: INTERNAL MEDICINE

## 2020-05-31 PROCEDURE — 99490 PR CHRONIC CARE MGMT, 1ST 20 MIN: ICD-10-PCS | Mod: S$PBB,,, | Performed by: INTERNAL MEDICINE

## 2020-06-17 RX ORDER — DOXAZOSIN 8 MG/1
8 TABLET ORAL DAILY
Qty: 30 TABLET | Refills: 2 | Status: SHIPPED | OUTPATIENT
Start: 2020-06-17 | End: 2020-09-07

## 2020-06-30 ENCOUNTER — EXTERNAL CHRONIC CARE MANAGEMENT (OUTPATIENT)
Dept: PRIMARY CARE CLINIC | Facility: CLINIC | Age: 70
End: 2020-06-30
Payer: MEDICARE

## 2020-06-30 PROCEDURE — 99490 CHRNC CARE MGMT STAFF 1ST 20: CPT | Mod: PBBFAC | Performed by: INTERNAL MEDICINE

## 2020-06-30 PROCEDURE — 99490 CHRNC CARE MGMT STAFF 1ST 20: CPT | Mod: S$PBB,,, | Performed by: INTERNAL MEDICINE

## 2020-06-30 PROCEDURE — 99490 PR CHRONIC CARE MGMT, 1ST 20 MIN: ICD-10-PCS | Mod: S$PBB,,, | Performed by: INTERNAL MEDICINE

## 2020-07-15 DIAGNOSIS — Z71.89 COMPLEX CARE COORDINATION: ICD-10-CM

## 2020-07-31 ENCOUNTER — EXTERNAL CHRONIC CARE MANAGEMENT (OUTPATIENT)
Dept: PRIMARY CARE CLINIC | Facility: CLINIC | Age: 70
End: 2020-07-31
Payer: MEDICARE

## 2020-07-31 PROCEDURE — 99490 CHRNC CARE MGMT STAFF 1ST 20: CPT | Mod: PBBFAC | Performed by: INTERNAL MEDICINE

## 2020-07-31 PROCEDURE — 99490 PR CHRONIC CARE MGMT, 1ST 20 MIN: ICD-10-PCS | Mod: S$PBB,,, | Performed by: INTERNAL MEDICINE

## 2020-07-31 PROCEDURE — 99490 CHRNC CARE MGMT STAFF 1ST 20: CPT | Mod: S$PBB,,, | Performed by: INTERNAL MEDICINE

## 2020-08-11 ENCOUNTER — LAB VISIT (OUTPATIENT)
Dept: LAB | Facility: HOSPITAL | Age: 70
End: 2020-08-11
Attending: INTERNAL MEDICINE
Payer: MEDICARE

## 2020-08-11 DIAGNOSIS — Z94.4 LIVER TRANSPLANTED: ICD-10-CM

## 2020-08-11 LAB
ALBUMIN SERPL BCP-MCNC: 3.9 G/DL (ref 3.5–5.2)
ALP SERPL-CCNC: 66 U/L (ref 55–135)
ALT SERPL W/O P-5'-P-CCNC: 41 U/L (ref 10–44)
ANION GAP SERPL CALC-SCNC: 7 MMOL/L (ref 8–16)
AST SERPL-CCNC: 34 U/L (ref 10–40)
BASOPHILS # BLD AUTO: 0.06 K/UL (ref 0–0.2)
BASOPHILS NFR BLD: 1 % (ref 0–1.9)
BILIRUB SERPL-MCNC: 1.1 MG/DL (ref 0.1–1)
BUN SERPL-MCNC: 22 MG/DL (ref 8–23)
CALCIUM SERPL-MCNC: 9.3 MG/DL (ref 8.7–10.5)
CHLORIDE SERPL-SCNC: 105 MMOL/L (ref 95–110)
CO2 SERPL-SCNC: 29 MMOL/L (ref 23–29)
CREAT SERPL-MCNC: 0.9 MG/DL (ref 0.5–1.4)
DIFFERENTIAL METHOD: ABNORMAL
EOSINOPHIL # BLD AUTO: 0.3 K/UL (ref 0–0.5)
EOSINOPHIL NFR BLD: 4.6 % (ref 0–8)
ERYTHROCYTE [DISTWIDTH] IN BLOOD BY AUTOMATED COUNT: 13.1 % (ref 11.5–14.5)
EST. GFR  (AFRICAN AMERICAN): >60 ML/MIN/1.73 M^2
EST. GFR  (NON AFRICAN AMERICAN): >60 ML/MIN/1.73 M^2
GLUCOSE SERPL-MCNC: 135 MG/DL (ref 70–110)
HCT VFR BLD AUTO: 49.6 % (ref 40–54)
HGB BLD-MCNC: 16.5 G/DL (ref 14–18)
IMM GRANULOCYTES # BLD AUTO: 0.01 K/UL (ref 0–0.04)
IMM GRANULOCYTES NFR BLD AUTO: 0.2 % (ref 0–0.5)
LYMPHOCYTES # BLD AUTO: 2.4 K/UL (ref 1–4.8)
LYMPHOCYTES NFR BLD: 40.9 % (ref 18–48)
MCH RBC QN AUTO: 33.5 PG (ref 27–31)
MCHC RBC AUTO-ENTMCNC: 33.3 G/DL (ref 32–36)
MCV RBC AUTO: 101 FL (ref 82–98)
MONOCYTES # BLD AUTO: 0.6 K/UL (ref 0.3–1)
MONOCYTES NFR BLD: 10.1 % (ref 4–15)
NEUTROPHILS # BLD AUTO: 2.5 K/UL (ref 1.8–7.7)
NEUTROPHILS NFR BLD: 43.2 % (ref 38–73)
NRBC BLD-RTO: 0 /100 WBC
PLATELET # BLD AUTO: 157 K/UL (ref 150–350)
PMV BLD AUTO: 9.2 FL (ref 9.2–12.9)
POTASSIUM SERPL-SCNC: 4.8 MMOL/L (ref 3.5–5.1)
PROT SERPL-MCNC: 7.2 G/DL (ref 6–8.4)
RBC # BLD AUTO: 4.93 M/UL (ref 4.6–6.2)
SODIUM SERPL-SCNC: 141 MMOL/L (ref 136–145)
TACROLIMUS BLD-MCNC: 4.1 NG/ML (ref 5–15)
WBC # BLD AUTO: 5.85 K/UL (ref 3.9–12.7)

## 2020-08-11 PROCEDURE — 85025 COMPLETE CBC W/AUTO DIFF WBC: CPT

## 2020-08-11 PROCEDURE — 36415 COLL VENOUS BLD VENIPUNCTURE: CPT

## 2020-08-11 PROCEDURE — 80197 ASSAY OF TACROLIMUS: CPT

## 2020-08-11 PROCEDURE — 80053 COMPREHEN METABOLIC PANEL: CPT

## 2020-08-13 ENCOUNTER — TELEPHONE (OUTPATIENT)
Dept: TRANSPLANT | Facility: CLINIC | Age: 70
End: 2020-08-13

## 2020-08-13 NOTE — TELEPHONE ENCOUNTER
Letter sent, labs stable and no medication changes are needed. Repeat labs due 11/17/20 per protocol.    ----- Message from Carson Oswald MD sent at 8/13/2020  2:10 PM CDT -----  Results reviewed

## 2020-08-13 NOTE — LETTER
August 13, 2020    Zander Kaur  [place] [address]          Dear Zander Kaur:  MRN: [id number]    This is a follow up to your recent labs, your lab results were stable.  There are no medicine changes.  Please have your labs drawn again on 11/17/20.      If you cannot have your labs drawn on the scheduled date, it is your responsibility to call the transplant department to reschedule.  Please call [phone] and ask to speak to [facility] -  for all scheduling requests.     Sincerely,    Oksana Torres, RN, BSN, [facility]  Sr Liver Transplant Coordinator  [facility]  [address] [place] [id number] [phone]

## 2020-08-19 ENCOUNTER — OFFICE VISIT (OUTPATIENT)
Dept: TRANSPLANT | Facility: CLINIC | Age: 70
End: 2020-08-19
Payer: MEDICARE

## 2020-08-19 VITALS
HEIGHT: 68 IN | HEART RATE: 64 BPM | RESPIRATION RATE: 16 BRPM | SYSTOLIC BLOOD PRESSURE: 157 MMHG | TEMPERATURE: 98 F | DIASTOLIC BLOOD PRESSURE: 108 MMHG | WEIGHT: 213.19 LBS | OXYGEN SATURATION: 98 % | BODY MASS INDEX: 32.31 KG/M2

## 2020-08-19 DIAGNOSIS — Z94.4 S/P LIVER TRANSPLANT: Primary | ICD-10-CM

## 2020-08-19 DIAGNOSIS — Z94.9 HYPERTENSION ASSOCIATED WITH TRANSPLANTATION: ICD-10-CM

## 2020-08-19 DIAGNOSIS — I15.8 HYPERTENSION ASSOCIATED WITH TRANSPLANTATION: ICD-10-CM

## 2020-08-19 DIAGNOSIS — C44.90 SKIN CANCER: ICD-10-CM

## 2020-08-19 PROCEDURE — 99213 OFFICE O/P EST LOW 20 MIN: CPT | Mod: PBBFAC | Performed by: INTERNAL MEDICINE

## 2020-08-19 PROCEDURE — 99999 PR PBB SHADOW E&M-EST. PATIENT-LVL III: CPT | Mod: PBBFAC,,, | Performed by: INTERNAL MEDICINE

## 2020-08-19 PROCEDURE — 99214 OFFICE O/P EST MOD 30 MIN: CPT | Mod: S$PBB,,, | Performed by: INTERNAL MEDICINE

## 2020-08-19 PROCEDURE — 99999 PR PBB SHADOW E&M-EST. PATIENT-LVL III: ICD-10-PCS | Mod: PBBFAC,,, | Performed by: INTERNAL MEDICINE

## 2020-08-19 PROCEDURE — 99214 PR OFFICE/OUTPT VISIT, EST, LEVL IV, 30-39 MIN: ICD-10-PCS | Mod: S$PBB,,, | Performed by: INTERNAL MEDICINE

## 2020-08-19 NOTE — LETTER
August 19, 2020        Shai Davison  [address]  [facility] [address]  Phone: [phone]  Fax: [phone]             [facility]  [address]  [facility] [address]  Phone: [phone]   Patient: Zander Kaur Sr.   MR Number: [id number]   YOB: 1950   Date of Visit: 8/19/2020       Dear Dr. Shai Davison    Thank you for referring Zander Kaur to me for evaluation. Attached you will find relevant portions of my assessment and plan of care.    If you have questions, please do not hesitate to call me. I look forward to following Zander Kaur along with you.    Sincerely,    Carson Oswald MD    Enclosure    If you would like to receive this communication electronically, please contact [email or web address] or [phone] to request [employer] access.    [employer] Link is a tool which provides read-only access to select patient information with whom you have a relationship. Its easy to use and provides real time access to review your patients record including encounter summaries, notes, results, and demographic information.    If you feel you have received this communication in error or would no longer like to receive these types of communications, please e-mail [email or web address]

## 2020-08-19 NOTE — PROGRESS NOTES
Transplant Hepatology  Liver Transplant Recipient Follow-up    Transplant Date: 2010  UNOS Native Liver Dx: Alcoholic Cirrhosis    Zander is here for follow up of his liver transplant. 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  He remains well and his main issue is his left knee pain for which he may need a knee replacement although he is reluctant to consider it.    Body mass index is 32.41 kg/m².    ORGAN: LIVER  Whole or Partial: whole liver  Donor Type:  - brain death  CDC High Risk: no  Donor CMV Status: positive  Donor HCV Status: negative  Donor HBcAb: negative  Biliary Anastomosis:   Arterial Anatomy:   IVC reconstruction:   Portal vein status:     He has had the following complications since transplant: biliary stricture. The noted complications are well controlled.      Subjective:     Interval History: .  He has normal liver and kidney function and is on tac monotherapy.    His only liver issue post-OLT was an anastomotic biliary stricture treated with ERCP and stenting but his biliary stents were removed in Dec 2011 and his LFTs have since remained normal.    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 Sees dermatology routinely. He works and enjoys fixing cars and racing. He has been followed by orthopedics for multiple joint and bone fractures.     Normal liver and kidney function in 2020.  On Tac monotherapy at 1 mg BID.    Review of Systems   Constitutional: Negative for fatigue, fever and unexpected weight change.   Eyes: Negative.    Respiratory: Negative.  Negative for chest tightness and shortness of breath.    Cardiovascular: Negative for chest pain and leg swelling.   Gastrointestinal: Negative for abdominal pain, blood in stool, constipation, nausea and vomiting.   Genitourinary: Negative.    Musculoskeletal: Positive for arthralgias (from previous injuries) and joint swelling.   Skin: Negative.   Negative for color change and rash.   Neurological: Negative.  Negative for dizziness and light-headedness.   Psychiatric/Behavioral: Negative.  Negative for confusion and suicidal ideas. The patient is not nervous/anxious.        Objective:     Physical Exam   Constitutional: He is oriented to person, place, and time. He appears well-developed and well-nourished.   HENT:   Head: Normocephalic and atraumatic.   Eyes: Pupils are equal, round, and reactive to light. Conjunctivae are normal.   Neck: Normal range of motion. Neck supple. No thyromegaly present.   Cardiovascular: Normal rate, regular rhythm and normal heart sounds.   Pulmonary/Chest: Effort normal and breath sounds normal.   Abdominal: Soft. Bowel sounds are normal. He exhibits no distension. There is no abdominal tenderness.       Musculoskeletal:         General: No tenderness or edema.   Neurological: He is alert and oriented to person, place, and time.   Skin: Skin is warm and dry.   Psychiatric: He has a normal mood and affect. His behavior is normal. Judgment and thought content normal.   Nursing note and vitals reviewed.    Lab Results   Component Value Date    BILITOT 1.1 (H) 08/11/2020    AST 34 08/11/2020    ALT 41 08/11/2020    ALKPHOS 66 08/11/2020    CREATININE 0.9 08/11/2020    ALBUMIN 3.9 08/11/2020     Lab Results   Component Value Date    WBC 5.85 08/11/2020    HGB 16.5 08/11/2020    HCT 49.6 08/11/2020     08/11/2020     Lab Results   Component Value Date    TACROLIMUS 4.1 (L) 08/11/2020       Assessment/Plan:     No diagnosis found.    1. S/p liver transplant 6/15/2010 for Alcoholic cirrhosis. Normal LFTs. Chronic immunosuppressive medications for rejection prophylaxis at target. On tacrolimus (lasty level not a trough level so no adjustment made).  Plan: no adjustment needed.Continue monitoring symptoms, labs and drug levels for drug-related toxicity and side effects  2. Squamous cell skin cancer. Routine dermatology follow up.    3. Hypertension- controlled with medication- has previously enrolled in digital medicine program here  4. Osteoarthritis and multiple joint injuries. Continue follow up with orthopedics. On pain meds.   5. Uses alcohol infrequently- asked to minimize this if not able to stop completely.  6. Weight loss recommended.    Clinic in 1 year.    MD PURVI Irving Patient Status  Functional Status: 100% - Normal, no complaints, no evidence of disease  Physical Capacity: No Limitations

## 2020-08-31 ENCOUNTER — EXTERNAL CHRONIC CARE MANAGEMENT (OUTPATIENT)
Dept: PRIMARY CARE CLINIC | Facility: CLINIC | Age: 70
End: 2020-08-31
Payer: MEDICARE

## 2020-08-31 PROCEDURE — 99490 CHRNC CARE MGMT STAFF 1ST 20: CPT | Mod: PBBFAC | Performed by: INTERNAL MEDICINE

## 2020-08-31 PROCEDURE — 99490 PR CHRONIC CARE MGMT, 1ST 20 MIN: ICD-10-PCS | Mod: S$PBB,,, | Performed by: INTERNAL MEDICINE

## 2020-08-31 PROCEDURE — 99490 CHRNC CARE MGMT STAFF 1ST 20: CPT | Mod: S$PBB,,, | Performed by: INTERNAL MEDICINE

## 2020-09-29 ENCOUNTER — PATIENT MESSAGE (OUTPATIENT)
Dept: OTHER | Facility: OTHER | Age: 70
End: 2020-09-29

## 2020-09-30 ENCOUNTER — EXTERNAL CHRONIC CARE MANAGEMENT (OUTPATIENT)
Dept: PRIMARY CARE CLINIC | Facility: CLINIC | Age: 70
End: 2020-09-30
Payer: MEDICARE

## 2020-09-30 PROCEDURE — 99490 PR CHRONIC CARE MGMT, 1ST 20 MIN: ICD-10-PCS | Mod: S$PBB,,, | Performed by: INTERNAL MEDICINE

## 2020-09-30 PROCEDURE — 99490 CHRNC CARE MGMT STAFF 1ST 20: CPT | Mod: PBBFAC | Performed by: INTERNAL MEDICINE

## 2020-09-30 PROCEDURE — 99490 CHRNC CARE MGMT STAFF 1ST 20: CPT | Mod: S$PBB,,, | Performed by: INTERNAL MEDICINE

## 2020-10-05 ENCOUNTER — PATIENT MESSAGE (OUTPATIENT)
Dept: ADMINISTRATIVE | Facility: HOSPITAL | Age: 70
End: 2020-10-05

## 2020-10-31 ENCOUNTER — EXTERNAL CHRONIC CARE MANAGEMENT (OUTPATIENT)
Dept: PRIMARY CARE CLINIC | Facility: CLINIC | Age: 70
End: 2020-10-31
Payer: MEDICARE

## 2020-10-31 PROCEDURE — 99490 CHRNC CARE MGMT STAFF 1ST 20: CPT | Mod: PBBFAC | Performed by: INTERNAL MEDICINE

## 2020-10-31 PROCEDURE — 99490 PR CHRONIC CARE MGMT, 1ST 20 MIN: ICD-10-PCS | Mod: S$PBB,,, | Performed by: INTERNAL MEDICINE

## 2020-10-31 PROCEDURE — 99490 CHRNC CARE MGMT STAFF 1ST 20: CPT | Mod: S$PBB,,, | Performed by: INTERNAL MEDICINE

## 2020-11-06 ENCOUNTER — PES CALL (OUTPATIENT)
Dept: ADMINISTRATIVE | Facility: CLINIC | Age: 70
End: 2020-11-06

## 2020-11-11 ENCOUNTER — PATIENT OUTREACH (OUTPATIENT)
Dept: ADMINISTRATIVE | Facility: HOSPITAL | Age: 70
End: 2020-11-11

## 2020-11-11 ENCOUNTER — OFFICE VISIT (OUTPATIENT)
Dept: INTERNAL MEDICINE | Facility: CLINIC | Age: 70
End: 2020-11-11
Payer: MEDICARE

## 2020-11-11 VITALS
HEART RATE: 72 BPM | BODY MASS INDEX: 32.34 KG/M2 | DIASTOLIC BLOOD PRESSURE: 88 MMHG | WEIGHT: 213.38 LBS | HEIGHT: 68 IN | SYSTOLIC BLOOD PRESSURE: 136 MMHG

## 2020-11-11 DIAGNOSIS — M19.90 ARTHRITIS: ICD-10-CM

## 2020-11-11 DIAGNOSIS — I15.8 HYPERTENSION ASSOCIATED WITH TRANSPLANTATION: ICD-10-CM

## 2020-11-11 DIAGNOSIS — E66.9 OBESITY (BMI 30.0-34.9): ICD-10-CM

## 2020-11-11 DIAGNOSIS — Z94.4 S/P LIVER TRANSPLANT: ICD-10-CM

## 2020-11-11 DIAGNOSIS — Z85.828 HISTORY OF SKIN CANCER: ICD-10-CM

## 2020-11-11 DIAGNOSIS — R26.9 ABNORMALITY OF GAIT AND MOBILITY: ICD-10-CM

## 2020-11-11 DIAGNOSIS — K74.60 HEPATIC CIRRHOSIS, UNSPECIFIED HEPATIC CIRRHOSIS TYPE, UNSPECIFIED WHETHER ASCITES PRESENT: ICD-10-CM

## 2020-11-11 DIAGNOSIS — Z94.9 HYPERTENSION ASSOCIATED WITH TRANSPLANTATION: ICD-10-CM

## 2020-11-11 DIAGNOSIS — I10 ESSENTIAL HYPERTENSION: ICD-10-CM

## 2020-11-11 DIAGNOSIS — I70.0 AORTIC ATHEROSCLEROSIS: ICD-10-CM

## 2020-11-11 DIAGNOSIS — Z00.00 ENCOUNTER FOR PREVENTIVE HEALTH EXAMINATION: Primary | ICD-10-CM

## 2020-11-11 DIAGNOSIS — Z12.11 SCREENING FOR COLON CANCER: ICD-10-CM

## 2020-11-11 DIAGNOSIS — Z99.89 DEPENDENCE ON OTHER ENABLING MACHINES AND DEVICES: ICD-10-CM

## 2020-11-11 DIAGNOSIS — D84.9 IMMUNOSUPPRESSION: ICD-10-CM

## 2020-11-11 PROBLEM — E66.811 OBESITY (BMI 30.0-34.9): Status: ACTIVE | Noted: 2020-11-11

## 2020-11-11 PROCEDURE — G0439 PR MEDICARE ANNUAL WELLNESS SUBSEQUENT VISIT: ICD-10-PCS | Mod: S$GLB,,, | Performed by: NURSE PRACTITIONER

## 2020-11-11 PROCEDURE — 99999 PR PBB SHADOW E&M-EST. PATIENT-LVL IV: CPT | Mod: PBBFAC,,, | Performed by: NURSE PRACTITIONER

## 2020-11-11 PROCEDURE — G0439 PPPS, SUBSEQ VISIT: HCPCS | Mod: S$GLB,,, | Performed by: NURSE PRACTITIONER

## 2020-11-11 PROCEDURE — 99999 PR PBB SHADOW E&M-EST. PATIENT-LVL IV: ICD-10-PCS | Mod: PBBFAC,,, | Performed by: NURSE PRACTITIONER

## 2020-11-11 PROCEDURE — 99214 OFFICE O/P EST MOD 30 MIN: CPT | Mod: PBBFAC | Performed by: NURSE PRACTITIONER

## 2020-11-11 NOTE — PROGRESS NOTES
"  Zander Kaur presented for a  Medicare AWV and comprehensive Health Risk Assessment today. The following components were reviewed and updated:    · Medical history  · Family History  · Social history  · Allergies and Current Medications  · Health Risk Assessment  · Health Maintenance  · Care Team     ** See Completed Assessments for Annual Wellness Visit within the encounter summary.**         The following assessments were completed:  · Living Situation  · CAGE  · Depression Screening  · Timed Get Up and Go  · Whisper Test  · Cognitive Function Screening  · Nutrition Screening  · ADL Screening  · PAQ Screening        Vitals:    11/11/20 0745   BP: 136/88   BP Location: Left arm   Patient Position: Sitting   Pulse: 72   Weight: 96.8 kg (213 lb 6.5 oz)   Height: 5' 8" (1.727 m)     Body mass index is 32.45 kg/m².     Physical Exam  Constitutional:       General: He is not in acute distress.     Appearance: He is well-developed. He is obese. He is not ill-appearing or diaphoretic.      Comments: Ambulating with a cane   HENT:      Head: Normocephalic and atraumatic.   Eyes:      General: No scleral icterus.     Conjunctiva/sclera: Conjunctivae normal.   Neck:      Musculoskeletal: Normal range of motion and neck supple.   Cardiovascular:      Rate and Rhythm: Normal rate and regular rhythm.      Pulses: Normal pulses.      Heart sounds: Normal heart sounds.   Pulmonary:      Effort: Pulmonary effort is normal. No respiratory distress.      Breath sounds: Normal breath sounds.   Abdominal:      General: There is no distension.   Musculoskeletal: Normal range of motion.   Skin:     General: Skin is warm and dry.   Neurological:      Mental Status: He is alert and oriented to person, place, and time.   Psychiatric:         Behavior: Behavior normal.           Diagnoses and health risks identified today and associated recommendations/orders:    1. Encounter for preventive health examination  Assessment " completed  Preventive health recommendations reviewed    2. Hepatic cirrhosis, unspecified hepatic cirrhosis type, unspecified whether ascites present  Stable.   History of liver transplant in 2010  Followed by transplant team    3. S/P liver transplant  Stable.   Followed by transplant team      4. Immunosuppression  Stable.   Patient on Prograf  Followed by transplant team.     5. Essential hypertension  Stable.   Controlled with current medical therapy  Followed by PCP.     6. Hypertension associated with transplantation  Stable.   Controlled with current medical therapy  Followed by PCP and transplant team.     7. Aortic atherosclerosis  Stable.  Seen on CT a from 10/19/2010  Followed by PCP.     8. Obesity (BMI 30.0-34.9)  Stable.   Exercise/becoming more active encouraged  Followed by PCP.     9. History of skin cancer  Stable.   Controlled with current medical therapy  Followed by dermatology      10. Arthritis  Stable.   Controlled with current medical therapy  Followed by orthopedic.     11. Abnormality of gait and mobility  Stable.   Ambulating with a cane  Safety maintained  Followed by PCP.     12. Dependence on other enabling machines and devices  Stable.   Ambulating with a cane  Safety maintained  Followed by PCP.     13. Screening for colon cancer  Found 1 colon polyps on last colonoscopy.  Due for repeat    - Case request GI: COLONOSCOPY      Provided Zander with a 5-10 year written screening schedule and personal prevention plan. Recommendations were developed using the USPSTF age appropriate recommendations. Education, counseling, and referrals were provided as needed. After Visit Summary printed and given to patient which includes a list of additional screenings\tests needed.    Follow up in 8 days (on 11/19/2020) for a routine visit with your primary care provider or sooner if problems arise.    Maribell Rahman NP

## 2020-11-11 NOTE — PATIENT INSTRUCTIONS
Counseling and Referral of Other Preventative  (Italic type indicates deductible and co-insurance are waived)    Patient Name: Zander Kaur  Today's Date: 11/11/2020    Health Maintenance       Date Due Completion Date    Shingles Vaccine (1 of 2) 12/07/2001 ---    Pneumococcal Vaccine (65+ High/Highest Risk) (1 of 2 - PCV13) 12/07/2016 5/12/2010    Influenza Vaccine (1) 08/01/2020 1/7/2019    Colorectal Cancer Screening 08/05/2020 8/5/2015    Override on 8/5/2015: Done    Lipid Panel 10/18/2023 10/18/2018    TETANUS VACCINE 03/19/2025 3/19/2015        No orders of the defined types were placed in this encounter.    The following information is provided to all patients.  This information is to help you find resources for any of the problems found today that may be affecting your health:                Living healthy guide: www.Community Health.[place].gov      Understanding Diabetes: www.diabetes.org      Eating healthy: www.cdc.gov/healthyweight      [facility] home safety checklist: www.cdc.gov/steadi/patient.html      Agency on Aging: www.goea.[place].[facility]      Alcoholics anonymous (AA): www.aa.org      Physical Activity: www.[place].nih.gov/sc5bwir      Tobacco use: www.quitwithusla.org

## 2020-11-17 ENCOUNTER — TELEPHONE (OUTPATIENT)
Dept: TRANSPLANT | Facility: CLINIC | Age: 70
End: 2020-11-17

## 2020-11-17 ENCOUNTER — LAB VISIT (OUTPATIENT)
Dept: LAB | Facility: HOSPITAL | Age: 70
End: 2020-11-17
Attending: INTERNAL MEDICINE
Payer: MEDICARE

## 2020-11-17 DIAGNOSIS — Z94.4 LIVER TRANSPLANTED: ICD-10-CM

## 2020-11-17 LAB
ALBUMIN SERPL BCP-MCNC: 4.2 G/DL (ref 3.5–5.2)
ALP SERPL-CCNC: 75 U/L (ref 55–135)
ALT SERPL W/O P-5'-P-CCNC: 38 U/L (ref 10–44)
ANION GAP SERPL CALC-SCNC: 11 MMOL/L (ref 8–16)
AST SERPL-CCNC: 36 U/L (ref 10–40)
BASOPHILS # BLD AUTO: 0.05 K/UL (ref 0–0.2)
BASOPHILS NFR BLD: 0.9 % (ref 0–1.9)
BILIRUB SERPL-MCNC: 1.4 MG/DL (ref 0.1–1)
BUN SERPL-MCNC: 18 MG/DL (ref 8–23)
CALCIUM SERPL-MCNC: 9.6 MG/DL (ref 8.7–10.5)
CHLORIDE SERPL-SCNC: 103 MMOL/L (ref 95–110)
CO2 SERPL-SCNC: 25 MMOL/L (ref 23–29)
CREAT SERPL-MCNC: 1 MG/DL (ref 0.5–1.4)
DIFFERENTIAL METHOD: ABNORMAL
EOSINOPHIL # BLD AUTO: 0.2 K/UL (ref 0–0.5)
EOSINOPHIL NFR BLD: 3.5 % (ref 0–8)
ERYTHROCYTE [DISTWIDTH] IN BLOOD BY AUTOMATED COUNT: 13.4 % (ref 11.5–14.5)
EST. GFR  (AFRICAN AMERICAN): >60 ML/MIN/1.73 M^2
EST. GFR  (NON AFRICAN AMERICAN): >60 ML/MIN/1.73 M^2
GLUCOSE SERPL-MCNC: 125 MG/DL (ref 70–110)
HCT VFR BLD AUTO: 51.7 % (ref 40–54)
HGB BLD-MCNC: 16.8 G/DL (ref 14–18)
IMM GRANULOCYTES # BLD AUTO: 0.01 K/UL (ref 0–0.04)
IMM GRANULOCYTES NFR BLD AUTO: 0.2 % (ref 0–0.5)
LYMPHOCYTES # BLD AUTO: 1.9 K/UL (ref 1–4.8)
LYMPHOCYTES NFR BLD: 32.5 % (ref 18–48)
MCH RBC QN AUTO: 33.2 PG (ref 27–31)
MCHC RBC AUTO-ENTMCNC: 32.5 G/DL (ref 32–36)
MCV RBC AUTO: 102 FL (ref 82–98)
MONOCYTES # BLD AUTO: 0.6 K/UL (ref 0.3–1)
MONOCYTES NFR BLD: 10.4 % (ref 4–15)
NEUTROPHILS # BLD AUTO: 3 K/UL (ref 1.8–7.7)
NEUTROPHILS NFR BLD: 52.5 % (ref 38–73)
NRBC BLD-RTO: 0 /100 WBC
PLATELET # BLD AUTO: 159 K/UL (ref 150–350)
PMV BLD AUTO: 9.2 FL (ref 9.2–12.9)
POTASSIUM SERPL-SCNC: 4.9 MMOL/L (ref 3.5–5.1)
PROT SERPL-MCNC: 7.5 G/DL (ref 6–8.4)
RBC # BLD AUTO: 5.06 M/UL (ref 4.6–6.2)
SODIUM SERPL-SCNC: 139 MMOL/L (ref 136–145)
TACROLIMUS BLD-MCNC: 3.6 NG/ML (ref 5–15)
WBC # BLD AUTO: 5.69 K/UL (ref 3.9–12.7)

## 2020-11-17 PROCEDURE — 80053 COMPREHEN METABOLIC PANEL: CPT

## 2020-11-17 PROCEDURE — 85025 COMPLETE CBC W/AUTO DIFF WBC: CPT

## 2020-11-17 PROCEDURE — 36415 COLL VENOUS BLD VENIPUNCTURE: CPT

## 2020-11-17 PROCEDURE — 80197 ASSAY OF TACROLIMUS: CPT

## 2020-11-17 NOTE — LETTER
November 17, 2020    Zander Kaur  [place] [address]          Dear Zander Kaur:  MRN: [id number]    This is a follow up to your recent labs, your lab results were stable.  There are no medicine changes.  Please have your labs drawn again on 2/23/21.      If you cannot have your labs drawn on the scheduled date, it is your responsibility to call the transplant department to reschedule.  Please call [phone] and ask to speak to [facility] -  for all scheduling requests.     Sincerely,    Oksana Torres, RN, BSN, [facility]  Sr Liver Transplant Coordinator  [facility]  [address] [place] [address] [phone]

## 2020-11-17 NOTE — TELEPHONE ENCOUNTER
Letter sent, labs stable and no medication changes are needed. Repeat labs due 2/23/21 per protocol.    ----- Message from Carson Oswald MD sent at 11/17/2020  3:48 PM CST -----  Results reviewed

## 2020-11-30 ENCOUNTER — EXTERNAL CHRONIC CARE MANAGEMENT (OUTPATIENT)
Dept: PRIMARY CARE CLINIC | Facility: CLINIC | Age: 70
End: 2020-11-30
Payer: MEDICARE

## 2020-11-30 PROCEDURE — 99490 CHRNC CARE MGMT STAFF 1ST 20: CPT | Mod: PBBFAC | Performed by: INTERNAL MEDICINE

## 2020-11-30 PROCEDURE — 99490 CHRNC CARE MGMT STAFF 1ST 20: CPT | Mod: S$PBB,,, | Performed by: INTERNAL MEDICINE

## 2020-11-30 PROCEDURE — 99490 PR CHRONIC CARE MGMT, 1ST 20 MIN: ICD-10-PCS | Mod: S$PBB,,, | Performed by: INTERNAL MEDICINE

## 2020-12-01 ENCOUNTER — PATIENT MESSAGE (OUTPATIENT)
Dept: ADMINISTRATIVE | Facility: HOSPITAL | Age: 70
End: 2020-12-01

## 2020-12-01 ENCOUNTER — PATIENT OUTREACH (OUTPATIENT)
Dept: ADMINISTRATIVE | Facility: HOSPITAL | Age: 70
End: 2020-12-01

## 2020-12-10 RX ORDER — METOPROLOL TARTRATE 100 MG/1
TABLET ORAL
Qty: 180 TABLET | Refills: 0 | Status: SHIPPED | OUTPATIENT
Start: 2020-12-10 | End: 2021-03-08

## 2020-12-10 RX ORDER — DOXAZOSIN 8 MG/1
TABLET ORAL
Qty: 90 TABLET | Refills: 0 | Status: SHIPPED | OUTPATIENT
Start: 2020-12-10 | End: 2021-03-08

## 2020-12-10 NOTE — TELEPHONE ENCOUNTER
No new care gaps identified.  Powered by [employer]. Reference number: [id number]. 12/10/2020 4:39:33 AM   CST

## 2020-12-10 NOTE — PROGRESS NOTES
Refill Authorization Note   Zander Kaur  is requesting a refill authorization.  Brief Assessment and Rationale for Refill:  Approve     Medication Therapy Plan:  [facility]    Medication Reconciliation Completed: No   Comments:   Orders Placed This Encounter    metoprolol tartrate (LOPRESSOR) 100 MG tablet    doxazosin (CARDURA) 8 MG Tab      Requested Prescriptions   Signed Prescriptions Disp Refills    metoprolol tartrate (LOPRESSOR) 100 MG tablet 180 tablet 0     Sig: Take 1 tablet by mouth twice daily.       Cardiovascular:  Beta Blockers Passed - 12/10/2020  4:40 AM        Passed - Patient is at least 18 years old        Passed - Last BP in normal range within 360 days.     BP Readings from Last 3 Encounters:   11/11/20 136/88   08/19/20 (!) 157/108   07/08/19 (!) 154/95              Passed - Last Heart Rate in normal range within 360 days.     Pulse Readings from Last 3 Encounters:   11/11/20 72   08/19/20 64   07/08/19 55             Passed - Office visit in past 12 months or future 90 days     Recent Outpatient Visits            4 weeks ago Encounter for preventive health examination    [facility] Maribell Rahman NP    3 months ago S/P liver transplant    [facility] 1st Fl Carson Oswald MD    1 year ago Hypertension associated with transplantation    [facility] 1st Fl Carson Oswald MD    2 years ago Essential hypertension    [facility] Shai Davison MD    2 years ago S/P liver transplant    [facility] Trans[facility] 1st Fl Carson Oswald MD          Future Appointments              In 4 days MD [facility]ri Oliver [facility], Demetri janet PCW    In 2 months LAB, APPOINTMENT [place] [facility]                  doxazosin (CARDURA) 8 MG Tab 90 tablet 0     Sig: Take 1 tablet by mouth daily.       Cardiovascular:  Alpha Blockers Passed - 12/10/2020  4:40 AM        Passed -  Patient is at least 18 years old        Passed - Last BP in normal range within 360 days.     BP Readings from Last 3 Encounters:   11/11/20 136/88   08/19/20 (!) 157/108   07/08/19 (!) 154/95              Passed - Office visit in past 12 months or future 90 days     Recent Outpatient Visits            4 weeks ago Encounter for preventive health examination    [facility] Maribell Rahman NP    3 months ago S/P liver transplant    [facility] Trans[facility] 1st Fl Carson Oswald MD    1 year ago Hypertension associated with transplantation    [facility] 1st Fl Carson Oswald MD    2 years ago Essential hypertension    [facility] Shai Davison MD    2 years ago S/P liver transplant    [facility] 1st Fl Carson Oswald MD          Future Appointments              In 4 days MD Demetri Oliver [facility], Demetri Ramsey PCW    In 2 months LAB, APPOINTMENT [place] [facility]                    Appointments  past 12m or future 3m with PCP    Date Provider   Last Visit   10/17/2018 Shai Davison MD   Next Visit   12/14/2020 Shai Davison MD   ED visits in past 90 days: 0     Note composed:4:31 PM 12/10/2020

## 2020-12-11 ENCOUNTER — PATIENT MESSAGE (OUTPATIENT)
Dept: OTHER | Facility: OTHER | Age: 70
End: 2020-12-11

## 2020-12-14 ENCOUNTER — OFFICE VISIT (OUTPATIENT)
Dept: INTERNAL MEDICINE | Facility: CLINIC | Age: 70
End: 2020-12-14
Payer: MEDICARE

## 2020-12-14 VITALS
HEART RATE: 80 BPM | SYSTOLIC BLOOD PRESSURE: 134 MMHG | DIASTOLIC BLOOD PRESSURE: 82 MMHG | BODY MASS INDEX: 32.58 KG/M2 | WEIGHT: 215 LBS | HEIGHT: 68 IN

## 2020-12-14 DIAGNOSIS — R26.9 ABNORMALITY OF GAIT AND MOBILITY: ICD-10-CM

## 2020-12-14 DIAGNOSIS — Z12.11 COLON CANCER SCREENING: ICD-10-CM

## 2020-12-14 DIAGNOSIS — Z20.822 EXPOSURE TO COVID-19 VIRUS: ICD-10-CM

## 2020-12-14 DIAGNOSIS — L98.9 SKIN LESION: ICD-10-CM

## 2020-12-14 DIAGNOSIS — D84.9 IMMUNOSUPPRESSION: ICD-10-CM

## 2020-12-14 DIAGNOSIS — I15.8 HYPERTENSION ASSOCIATED WITH TRANSPLANTATION: ICD-10-CM

## 2020-12-14 DIAGNOSIS — I10 ESSENTIAL HYPERTENSION: Primary | ICD-10-CM

## 2020-12-14 DIAGNOSIS — Z94.9 HYPERTENSION ASSOCIATED WITH TRANSPLANTATION: ICD-10-CM

## 2020-12-14 DIAGNOSIS — Z94.4 S/P LIVER TRANSPLANT: ICD-10-CM

## 2020-12-14 DIAGNOSIS — M19.90 ARTHRITIS: ICD-10-CM

## 2020-12-14 DIAGNOSIS — Z12.5 SCREENING FOR PROSTATE CANCER: ICD-10-CM

## 2020-12-14 PROCEDURE — 99213 OFFICE O/P EST LOW 20 MIN: CPT | Mod: PBBFAC | Performed by: INTERNAL MEDICINE

## 2020-12-14 PROCEDURE — 99999 PR PBB SHADOW E&M-EST. PATIENT-LVL III: ICD-10-PCS | Mod: PBBFAC,,, | Performed by: INTERNAL MEDICINE

## 2020-12-14 PROCEDURE — 99214 OFFICE O/P EST MOD 30 MIN: CPT | Mod: S$PBB,,, | Performed by: INTERNAL MEDICINE

## 2020-12-14 PROCEDURE — 99214 PR OFFICE/OUTPT VISIT, EST, LEVL IV, 30-39 MIN: ICD-10-PCS | Mod: S$PBB,,, | Performed by: INTERNAL MEDICINE

## 2020-12-14 PROCEDURE — 99999 PR PBB SHADOW E&M-EST. PATIENT-LVL III: CPT | Mod: PBBFAC,,, | Performed by: INTERNAL MEDICINE

## 2020-12-14 NOTE — PROGRESS NOTES
Subjective:       Patient ID: Zander Kaur Sr. is a 70 y.o. male.    Chief Complaint: Follow-up    Patient has not seen me in over 2 years comes in for follow-up of lower extremity arthritis, follow-up of prostate screening, lipids.  He has a history of liver transplant,  severe lower extremity arthritis.  He was offered surgery but wants to postpone.  He bruises easily  He does have a history of some skin changes to the scalp.  He sees an outside dermatologist and we will assist with a new referral.     He is seen with his wife in attendance    Incidentally they state they were informed in March that a customer they have been exposed to was hospitalized for coronavirus.  They were sick for a week or 2 afterwards and wonder if they had the virus.  They would like antibody testing.    Review of Systems   Constitutional: Negative for chills, fatigue and fever.   HENT: Negative for nosebleeds and trouble swallowing.    Eyes: Negative for pain and visual disturbance.   Respiratory: Negative for cough, shortness of breath and wheezing.    Cardiovascular: Negative for chest pain and palpitations.   Gastrointestinal: Negative for abdominal pain, constipation, diarrhea, nausea and vomiting.   Genitourinary: Negative for difficulty urinating and hematuria.   Musculoskeletal: Positive for arthralgias, gait problem and joint deformity. Negative for back pain and neck pain.   Integumentary:  Negative for rash.   Neurological: Negative for dizziness and headaches.   Hematological: Does not bruise/bleed easily.   Psychiatric/Behavioral: Negative for dysphoric mood and sleep disturbance.         Objective:      Physical Exam  Constitutional:       General: He is not in acute distress.     Appearance: He is well-developed.   HENT:      Head: Normocephalic and atraumatic.      Right Ear: Tympanic membrane, ear canal and external ear normal.      Left Ear: Tympanic membrane, ear canal and external ear normal.      Mouth/Throat:       Pharynx: No oropharyngeal exudate or posterior oropharyngeal erythema.   Eyes:      General: No scleral icterus.     Conjunctiva/sclera: Conjunctivae normal.      Pupils: Pupils are equal, round, and reactive to light.   Neck:      Musculoskeletal: Normal range of motion and neck supple.      Thyroid: No thyromegaly.      Comments: No supraclavicular nodes palpated  Cardiovascular:      Rate and Rhythm: Normal rate and regular rhythm.      Pulses: Normal pulses.      Heart sounds: Normal heart sounds. No murmur.   Pulmonary:      Effort: Pulmonary effort is normal.      Breath sounds: Normal breath sounds. No wheezing.   Abdominal:      General: Bowel sounds are normal.      Palpations: Abdomen is soft. There is no mass.      Tenderness: There is no abdominal tenderness.   Musculoskeletal:         General: Deformity (lower extrem knees, ) present. No tenderness.      Right lower leg: No edema.      Left lower leg: No edema.   Lymphadenopathy:      Cervical: No cervical adenopathy.   Skin:     Coloration: Skin is not jaundiced or pale.   Neurological:      General: No focal deficit present.      Mental Status: He is alert and oriented to person, place, and time.   Psychiatric:         Mood and Affect: Mood normal.         Behavior: Behavior normal.         Thought Content: Thought content normal.         Assessment:       1. Screening for prostate cancer    2. S/P liver transplant    3. Essential hypertension    4. Immunosuppression    5. Exposure to COVID-19 virus    6. Hypertension associated with transplantation    7. Abnormality of gait and mobility    8. Arthritis    9. Colon cancer screening    10. Skin lesion        Plan:       Zander was seen today for follow-up.    Diagnoses and all orders for this visit:    Essential hypertension  -     Lipid Panel; Future    Screening for prostate cancer  -     PSA, Screening; Future    S/P liver transplant    Immunosuppression    Exposure to COVID-19 virus  -      COVID-19 (SARS CoV-2) IgG Antibody; Future    Hypertension associated with transplantation  -     Lipid Panel; Future    Abnormality of gait and mobility    Arthritis    Colon cancer screening  -     Case Request Endoscopy: COLONOSCOPY    Skin lesion  -     Ambulatory referral/consult to Dermatology; Future

## 2020-12-31 ENCOUNTER — EXTERNAL CHRONIC CARE MANAGEMENT (OUTPATIENT)
Dept: PRIMARY CARE CLINIC | Facility: CLINIC | Age: 70
End: 2020-12-31
Payer: MEDICARE

## 2020-12-31 PROCEDURE — 99490 CHRNC CARE MGMT STAFF 1ST 20: CPT | Mod: PBBFAC | Performed by: INTERNAL MEDICINE

## 2020-12-31 PROCEDURE — 99490 CHRNC CARE MGMT STAFF 1ST 20: CPT | Mod: S$PBB,,, | Performed by: INTERNAL MEDICINE

## 2020-12-31 PROCEDURE — 99490 PR CHRONIC CARE MGMT, 1ST 20 MIN: ICD-10-PCS | Mod: S$PBB,,, | Performed by: INTERNAL MEDICINE

## 2021-01-08 ENCOUNTER — PATIENT MESSAGE (OUTPATIENT)
Dept: TRANSPLANT | Facility: CLINIC | Age: 71
End: 2021-01-08

## 2021-01-31 ENCOUNTER — EXTERNAL CHRONIC CARE MANAGEMENT (OUTPATIENT)
Dept: PRIMARY CARE CLINIC | Facility: CLINIC | Age: 71
End: 2021-01-31
Payer: MEDICARE

## 2021-01-31 PROCEDURE — 99490 CHRNC CARE MGMT STAFF 1ST 20: CPT | Mod: S$PBB,,, | Performed by: INTERNAL MEDICINE

## 2021-01-31 PROCEDURE — 99490 CHRNC CARE MGMT STAFF 1ST 20: CPT | Mod: PBBFAC | Performed by: INTERNAL MEDICINE

## 2021-01-31 PROCEDURE — 99490 PR CHRONIC CARE MGMT, 1ST 20 MIN: ICD-10-PCS | Mod: S$PBB,,, | Performed by: INTERNAL MEDICINE

## 2021-02-23 ENCOUNTER — TELEPHONE (OUTPATIENT)
Dept: TRANSPLANT | Facility: CLINIC | Age: 71
End: 2021-02-23

## 2021-02-23 ENCOUNTER — LAB VISIT (OUTPATIENT)
Dept: LAB | Facility: HOSPITAL | Age: 71
End: 2021-02-23
Attending: INTERNAL MEDICINE
Payer: MEDICARE

## 2021-02-23 DIAGNOSIS — Z94.4 LIVER TRANSPLANTED: ICD-10-CM

## 2021-02-23 LAB
BASOPHILS # BLD AUTO: 0.06 K/UL (ref 0–0.2)
BASOPHILS NFR BLD: 1.1 % (ref 0–1.9)
DIFFERENTIAL METHOD: ABNORMAL
EOSINOPHIL # BLD AUTO: 0.1 K/UL (ref 0–0.5)
EOSINOPHIL NFR BLD: 2 % (ref 0–8)
ERYTHROCYTE [DISTWIDTH] IN BLOOD BY AUTOMATED COUNT: 13 % (ref 11.5–14.5)
HCT VFR BLD AUTO: 50.8 % (ref 40–54)
HGB BLD-MCNC: 16.6 G/DL (ref 14–18)
IMM GRANULOCYTES # BLD AUTO: 0.02 K/UL (ref 0–0.04)
IMM GRANULOCYTES NFR BLD AUTO: 0.4 % (ref 0–0.5)
LYMPHOCYTES # BLD AUTO: 2.4 K/UL (ref 1–4.8)
LYMPHOCYTES NFR BLD: 44.2 % (ref 18–48)
MCH RBC QN AUTO: 33.1 PG (ref 27–31)
MCHC RBC AUTO-ENTMCNC: 32.7 G/DL (ref 32–36)
MCV RBC AUTO: 101 FL (ref 82–98)
MONOCYTES # BLD AUTO: 0.5 K/UL (ref 0.3–1)
MONOCYTES NFR BLD: 9.6 % (ref 4–15)
NEUTROPHILS # BLD AUTO: 2.3 K/UL (ref 1.8–7.7)
NEUTROPHILS NFR BLD: 42.7 % (ref 38–73)
NRBC BLD-RTO: 0 /100 WBC
PLATELET # BLD AUTO: 166 K/UL (ref 150–350)
PMV BLD AUTO: 9.9 FL (ref 9.2–12.9)
RBC # BLD AUTO: 5.01 M/UL (ref 4.6–6.2)
TACROLIMUS BLD-MCNC: 4.8 NG/ML (ref 5–15)
WBC # BLD AUTO: 5.39 K/UL (ref 3.9–12.7)

## 2021-02-23 PROCEDURE — 85025 COMPLETE CBC W/AUTO DIFF WBC: CPT

## 2021-02-23 PROCEDURE — 80053 COMPREHEN METABOLIC PANEL: CPT

## 2021-02-23 PROCEDURE — 36415 COLL VENOUS BLD VENIPUNCTURE: CPT

## 2021-02-23 PROCEDURE — 80197 ASSAY OF TACROLIMUS: CPT

## 2021-02-24 LAB
ALBUMIN SERPL BCP-MCNC: 4.1 G/DL (ref 3.5–5.2)
ALP SERPL-CCNC: 73 U/L (ref 55–135)
ALT SERPL W/O P-5'-P-CCNC: 40 U/L (ref 10–44)
ANION GAP SERPL CALC-SCNC: 8 MMOL/L (ref 8–16)
AST SERPL-CCNC: 32 U/L (ref 10–40)
BILIRUB SERPL-MCNC: 1.5 MG/DL (ref 0.1–1)
BUN SERPL-MCNC: 24 MG/DL (ref 8–23)
CALCIUM SERPL-MCNC: 9.6 MG/DL (ref 8.7–10.5)
CHLORIDE SERPL-SCNC: 103 MMOL/L (ref 95–110)
CO2 SERPL-SCNC: 28 MMOL/L (ref 23–29)
CREAT SERPL-MCNC: 0.9 MG/DL (ref 0.5–1.4)
EST. GFR  (AFRICAN AMERICAN): >60 ML/MIN/1.73 M^2
EST. GFR  (NON AFRICAN AMERICAN): >60 ML/MIN/1.73 M^2
GLUCOSE SERPL-MCNC: 129 MG/DL (ref 70–110)
POTASSIUM SERPL-SCNC: 4.9 MMOL/L (ref 3.5–5.1)
PROT SERPL-MCNC: 7.3 G/DL (ref 6–8.4)
SODIUM SERPL-SCNC: 139 MMOL/L (ref 136–145)

## 2021-02-28 ENCOUNTER — EXTERNAL CHRONIC CARE MANAGEMENT (OUTPATIENT)
Dept: PRIMARY CARE CLINIC | Facility: CLINIC | Age: 71
End: 2021-02-28
Payer: MEDICARE

## 2021-02-28 PROCEDURE — 99490 CHRNC CARE MGMT STAFF 1ST 20: CPT | Mod: S$PBB,,, | Performed by: INTERNAL MEDICINE

## 2021-02-28 PROCEDURE — 99490 PR CHRONIC CARE MGMT, 1ST 20 MIN: ICD-10-PCS | Mod: S$PBB,,, | Performed by: INTERNAL MEDICINE

## 2021-02-28 PROCEDURE — 99490 CHRNC CARE MGMT STAFF 1ST 20: CPT | Mod: PBBFAC | Performed by: INTERNAL MEDICINE

## 2021-03-01 ENCOUNTER — TELEPHONE (OUTPATIENT)
Dept: TRANSPLANT | Facility: CLINIC | Age: 71
End: 2021-03-01

## 2021-03-04 ENCOUNTER — OFFICE VISIT (OUTPATIENT)
Dept: INTERNAL MEDICINE | Facility: CLINIC | Age: 71
End: 2021-03-04
Payer: MEDICARE

## 2021-03-04 ENCOUNTER — TELEPHONE (OUTPATIENT)
Dept: INTERNAL MEDICINE | Facility: CLINIC | Age: 71
End: 2021-03-04

## 2021-03-04 ENCOUNTER — HOSPITAL ENCOUNTER (OUTPATIENT)
Dept: RADIOLOGY | Facility: HOSPITAL | Age: 71
Discharge: HOME | End: 2021-03-04
Attending: NURSE PRACTITIONER
Payer: MEDICARE

## 2021-03-04 VITALS
DIASTOLIC BLOOD PRESSURE: 80 MMHG | SYSTOLIC BLOOD PRESSURE: 130 MMHG | BODY MASS INDEX: 32.94 KG/M2 | OXYGEN SATURATION: 98 % | WEIGHT: 217.38 LBS | HEIGHT: 68 IN | HEART RATE: 65 BPM

## 2021-03-04 DIAGNOSIS — Z94.4 S/P LIVER TRANSPLANT: ICD-10-CM

## 2021-03-04 DIAGNOSIS — R04.2 HEMOPTYSIS: Primary | ICD-10-CM

## 2021-03-04 DIAGNOSIS — I10 ESSENTIAL HYPERTENSION: ICD-10-CM

## 2021-03-04 DIAGNOSIS — L98.9 SKIN LESION: ICD-10-CM

## 2021-03-04 DIAGNOSIS — R04.2 HEMOPTYSIS: ICD-10-CM

## 2021-03-04 DIAGNOSIS — K74.60 HEPATIC CIRRHOSIS, UNSPECIFIED HEPATIC CIRRHOSIS TYPE, UNSPECIFIED WHETHER ASCITES PRESENT: ICD-10-CM

## 2021-03-04 DIAGNOSIS — R05.9 COUGH: ICD-10-CM

## 2021-03-04 PROCEDURE — 99999 PR PBB SHADOW E&M-EST. PATIENT-LVL IV: CPT | Mod: PBBFAC,,, | Performed by: NURSE PRACTITIONER

## 2021-03-04 PROCEDURE — 99499 UNLISTED E&M SERVICE: CPT | Mod: 95,,, | Performed by: NURSE PRACTITIONER

## 2021-03-04 PROCEDURE — 71046 X-RAY EXAM CHEST 2 VIEWS: CPT | Mod: 26,,, | Performed by: RADIOLOGY

## 2021-03-04 PROCEDURE — 99214 PR OFFICE/OUTPT VISIT, EST, LEVL IV, 30-39 MIN: ICD-10-PCS | Mod: S$PBB,,, | Performed by: NURSE PRACTITIONER

## 2021-03-04 PROCEDURE — 71046 XR CHEST PA AND LATERAL: ICD-10-PCS | Mod: 26,,, | Performed by: RADIOLOGY

## 2021-03-04 PROCEDURE — 99499 NO LOS: ICD-10-PCS | Mod: 95,,, | Performed by: NURSE PRACTITIONER

## 2021-03-04 PROCEDURE — 99999 PR PBB SHADOW E&M-EST. PATIENT-LVL IV: ICD-10-PCS | Mod: PBBFAC,,, | Performed by: NURSE PRACTITIONER

## 2021-03-04 PROCEDURE — 99214 OFFICE O/P EST MOD 30 MIN: CPT | Mod: PBBFAC,25 | Performed by: NURSE PRACTITIONER

## 2021-03-04 PROCEDURE — 71046 X-RAY EXAM CHEST 2 VIEWS: CPT | Mod: TC

## 2021-03-04 PROCEDURE — 99214 OFFICE O/P EST MOD 30 MIN: CPT | Mod: S$PBB,,, | Performed by: NURSE PRACTITIONER

## 2021-03-04 RX ORDER — CARISOPRODOL 350 MG/1
TABLET ORAL
COMMUNITY
End: 2021-03-04 | Stop reason: SDUPTHER

## 2021-03-04 RX ORDER — ZOSTER VACCINE RECOMBINANT, ADJUVANTED 50 MCG/0.5
KIT INTRAMUSCULAR
COMMUNITY
End: 2021-12-13 | Stop reason: ALTCHOICE

## 2021-03-08 RX ORDER — DOXAZOSIN 8 MG/1
TABLET ORAL
Qty: 90 TABLET | Refills: 3 | Status: SHIPPED | OUTPATIENT
Start: 2021-03-08 | End: 2022-01-30 | Stop reason: SDUPTHER

## 2021-03-08 RX ORDER — METOPROLOL TARTRATE 100 MG/1
TABLET ORAL
Qty: 180 TABLET | Refills: 3 | Status: SHIPPED | OUTPATIENT
Start: 2021-03-08 | End: 2022-01-30 | Stop reason: SDUPTHER

## 2021-03-31 ENCOUNTER — EXTERNAL CHRONIC CARE MANAGEMENT (OUTPATIENT)
Dept: PRIMARY CARE CLINIC | Facility: CLINIC | Age: 71
End: 2021-03-31
Payer: MEDICARE

## 2021-03-31 PROCEDURE — 99490 CHRNC CARE MGMT STAFF 1ST 20: CPT | Mod: S$PBB,,, | Performed by: INTERNAL MEDICINE

## 2021-03-31 PROCEDURE — 99490 CHRNC CARE MGMT STAFF 1ST 20: CPT | Mod: PBBFAC | Performed by: INTERNAL MEDICINE

## 2021-03-31 PROCEDURE — 99490 PR CHRONIC CARE MGMT, 1ST 20 MIN: ICD-10-PCS | Mod: S$PBB,,, | Performed by: INTERNAL MEDICINE

## 2021-04-30 ENCOUNTER — EXTERNAL CHRONIC CARE MANAGEMENT (OUTPATIENT)
Dept: PRIMARY CARE CLINIC | Facility: CLINIC | Age: 71
End: 2021-04-30
Payer: MEDICARE

## 2021-04-30 PROCEDURE — 99490 CHRNC CARE MGMT STAFF 1ST 20: CPT | Mod: PBBFAC | Performed by: INTERNAL MEDICINE

## 2021-04-30 PROCEDURE — 99490 PR CHRONIC CARE MGMT, 1ST 20 MIN: ICD-10-PCS | Mod: S$PBB,,, | Performed by: INTERNAL MEDICINE

## 2021-04-30 PROCEDURE — 99490 CHRNC CARE MGMT STAFF 1ST 20: CPT | Mod: S$PBB,,, | Performed by: INTERNAL MEDICINE

## 2021-05-14 ENCOUNTER — TELEPHONE (OUTPATIENT)
Dept: INTERNAL MEDICINE | Facility: CLINIC | Age: 71
End: 2021-05-14

## 2021-05-14 ENCOUNTER — DOCUMENTATION ONLY (OUTPATIENT)
Dept: INTERNAL MEDICINE | Facility: CLINIC | Age: 71
End: 2021-05-14

## 2021-05-14 DIAGNOSIS — I10 ESSENTIAL HYPERTENSION: ICD-10-CM

## 2021-05-14 DIAGNOSIS — Z94.4 S/P LIVER TRANSPLANT: Primary | ICD-10-CM

## 2021-05-25 ENCOUNTER — LAB VISIT (OUTPATIENT)
Dept: LAB | Facility: HOSPITAL | Age: 71
End: 2021-05-25
Attending: INTERNAL MEDICINE
Payer: MEDICARE

## 2021-05-25 DIAGNOSIS — Z94.4 S/P LIVER TRANSPLANT: ICD-10-CM

## 2021-05-25 DIAGNOSIS — I10 ESSENTIAL HYPERTENSION: ICD-10-CM

## 2021-05-25 DIAGNOSIS — Z94.4 LIVER TRANSPLANTED: ICD-10-CM

## 2021-05-25 LAB
ALBUMIN SERPL BCP-MCNC: 4.1 G/DL (ref 3.5–5.2)
ALP SERPL-CCNC: 75 U/L (ref 55–135)
ALT SERPL W/O P-5'-P-CCNC: 30 U/L (ref 10–44)
ANION GAP SERPL CALC-SCNC: 10 MMOL/L (ref 8–16)
AST SERPL-CCNC: 30 U/L (ref 10–40)
BASOPHILS # BLD AUTO: 0.05 K/UL (ref 0–0.2)
BASOPHILS NFR BLD: 0.9 % (ref 0–1.9)
BILIRUB SERPL-MCNC: 1.7 MG/DL (ref 0.1–1)
BUN SERPL-MCNC: 21 MG/DL (ref 8–23)
CALCIUM SERPL-MCNC: 10.2 MG/DL (ref 8.7–10.5)
CHLORIDE SERPL-SCNC: 103 MMOL/L (ref 95–110)
CHOLEST SERPL-MCNC: 184 MG/DL (ref 120–199)
CHOLEST/HDLC SERPL: 2.8 {RATIO} (ref 2–5)
CO2 SERPL-SCNC: 27 MMOL/L (ref 23–29)
CREAT SERPL-MCNC: 1 MG/DL (ref 0.5–1.4)
DIFFERENTIAL METHOD: ABNORMAL
EOSINOPHIL # BLD AUTO: 0.2 K/UL (ref 0–0.5)
EOSINOPHIL NFR BLD: 2.9 % (ref 0–8)
ERYTHROCYTE [DISTWIDTH] IN BLOOD BY AUTOMATED COUNT: 13.2 % (ref 11.5–14.5)
EST. GFR  (AFRICAN AMERICAN): >60 ML/MIN/1.73 M^2
EST. GFR  (NON AFRICAN AMERICAN): >60 ML/MIN/1.73 M^2
GLUCOSE SERPL-MCNC: 125 MG/DL (ref 70–110)
HCT VFR BLD AUTO: 49.2 % (ref 40–54)
HDLC SERPL-MCNC: 66 MG/DL (ref 40–75)
HDLC SERPL: 35.9 % (ref 20–50)
HGB BLD-MCNC: 16.3 G/DL (ref 14–18)
IMM GRANULOCYTES # BLD AUTO: 0.02 K/UL (ref 0–0.04)
IMM GRANULOCYTES NFR BLD AUTO: 0.3 % (ref 0–0.5)
LDLC SERPL CALC-MCNC: 101.8 MG/DL (ref 63–159)
LYMPHOCYTES # BLD AUTO: 1.9 K/UL (ref 1–4.8)
LYMPHOCYTES NFR BLD: 33.4 % (ref 18–48)
MCH RBC QN AUTO: 33.3 PG (ref 27–31)
MCHC RBC AUTO-ENTMCNC: 33.1 G/DL (ref 32–36)
MCV RBC AUTO: 101 FL (ref 82–98)
MONOCYTES # BLD AUTO: 0.6 K/UL (ref 0.3–1)
MONOCYTES NFR BLD: 10.2 % (ref 4–15)
NEUTROPHILS # BLD AUTO: 3 K/UL (ref 1.8–7.7)
NEUTROPHILS NFR BLD: 52.3 % (ref 38–73)
NONHDLC SERPL-MCNC: 118 MG/DL
NRBC BLD-RTO: 0 /100 WBC
PLATELET # BLD AUTO: 169 K/UL (ref 150–450)
PMV BLD AUTO: 9.8 FL (ref 9.2–12.9)
POTASSIUM SERPL-SCNC: 4.5 MMOL/L (ref 3.5–5.1)
PROT SERPL-MCNC: 7.5 G/DL (ref 6–8.4)
RBC # BLD AUTO: 4.89 M/UL (ref 4.6–6.2)
SODIUM SERPL-SCNC: 140 MMOL/L (ref 136–145)
TACROLIMUS BLD-MCNC: 5.4 NG/ML (ref 5–15)
TACROLIMUS, NORMALIZED: 4.9 NG/ML (ref 5–15)
TRIGL SERPL-MCNC: 81 MG/DL (ref 30–150)
WBC # BLD AUTO: 5.77 K/UL (ref 3.9–12.7)

## 2021-05-25 PROCEDURE — 80053 COMPREHEN METABOLIC PANEL: CPT | Performed by: INTERNAL MEDICINE

## 2021-05-25 PROCEDURE — 85025 COMPLETE CBC W/AUTO DIFF WBC: CPT | Performed by: INTERNAL MEDICINE

## 2021-05-25 PROCEDURE — 80061 LIPID PANEL: CPT | Performed by: INTERNAL MEDICINE

## 2021-05-25 PROCEDURE — 80197 ASSAY OF TACROLIMUS: CPT | Performed by: INTERNAL MEDICINE

## 2021-05-25 PROCEDURE — 36415 COLL VENOUS BLD VENIPUNCTURE: CPT | Performed by: INTERNAL MEDICINE

## 2021-05-28 ENCOUNTER — TELEPHONE (OUTPATIENT)
Dept: TRANSPLANT | Facility: CLINIC | Age: 71
End: 2021-05-28

## 2021-05-31 ENCOUNTER — EXTERNAL CHRONIC CARE MANAGEMENT (OUTPATIENT)
Dept: PRIMARY CARE CLINIC | Facility: CLINIC | Age: 71
End: 2021-05-31
Payer: MEDICARE

## 2021-05-31 PROCEDURE — 99439 PR CHRONIC CARE MGMT, EA ADDTL 20 MIN: ICD-10-PCS | Mod: S$PBB,,, | Performed by: INTERNAL MEDICINE

## 2021-05-31 PROCEDURE — 99490 CHRNC CARE MGMT STAFF 1ST 20: CPT | Mod: S$PBB,,, | Performed by: INTERNAL MEDICINE

## 2021-05-31 PROCEDURE — 99439 CHRNC CARE MGMT STAF EA ADDL: CPT | Mod: S$PBB,,, | Performed by: INTERNAL MEDICINE

## 2021-05-31 PROCEDURE — 99490 CHRNC CARE MGMT STAFF 1ST 20: CPT | Mod: PBBFAC | Performed by: INTERNAL MEDICINE

## 2021-05-31 PROCEDURE — 99490 PR CHRONIC CARE MGMT, 1ST 20 MIN: ICD-10-PCS | Mod: S$PBB,,, | Performed by: INTERNAL MEDICINE

## 2021-05-31 PROCEDURE — 99439 CHRNC CARE MGMT STAF EA ADDL: CPT | Mod: PBBFAC | Performed by: INTERNAL MEDICINE

## 2021-06-30 ENCOUNTER — EXTERNAL CHRONIC CARE MANAGEMENT (OUTPATIENT)
Dept: PRIMARY CARE CLINIC | Facility: CLINIC | Age: 71
End: 2021-06-30
Payer: MEDICARE

## 2021-06-30 PROCEDURE — 99490 CHRNC CARE MGMT STAFF 1ST 20: CPT | Mod: PBBFAC | Performed by: INTERNAL MEDICINE

## 2021-06-30 PROCEDURE — 99490 CHRNC CARE MGMT STAFF 1ST 20: CPT | Mod: S$PBB,,, | Performed by: INTERNAL MEDICINE

## 2021-06-30 PROCEDURE — 99490 PR CHRONIC CARE MGMT, 1ST 20 MIN: ICD-10-PCS | Mod: S$PBB,,, | Performed by: INTERNAL MEDICINE

## 2021-07-31 ENCOUNTER — EXTERNAL CHRONIC CARE MANAGEMENT (OUTPATIENT)
Dept: PRIMARY CARE CLINIC | Facility: CLINIC | Age: 71
End: 2021-07-31
Payer: MEDICARE

## 2021-07-31 PROCEDURE — 99490 CHRNC CARE MGMT STAFF 1ST 20: CPT | Mod: PBBFAC | Performed by: INTERNAL MEDICINE

## 2021-07-31 PROCEDURE — 99490 CHRNC CARE MGMT STAFF 1ST 20: CPT | Mod: S$PBB,,, | Performed by: INTERNAL MEDICINE

## 2021-07-31 PROCEDURE — 99490 PR CHRONIC CARE MGMT, 1ST 20 MIN: ICD-10-PCS | Mod: S$PBB,,, | Performed by: INTERNAL MEDICINE

## 2021-08-25 ENCOUNTER — TELEPHONE (OUTPATIENT)
Dept: TRANSPLANT | Facility: CLINIC | Age: 71
End: 2021-08-25

## 2021-08-31 ENCOUNTER — EXTERNAL CHRONIC CARE MANAGEMENT (OUTPATIENT)
Dept: PRIMARY CARE CLINIC | Facility: CLINIC | Age: 71
End: 2021-08-31
Payer: MEDICARE

## 2021-08-31 PROCEDURE — 99490 CHRNC CARE MGMT STAFF 1ST 20: CPT | Mod: S$PBB,,, | Performed by: INTERNAL MEDICINE

## 2021-08-31 PROCEDURE — 99490 CHRNC CARE MGMT STAFF 1ST 20: CPT | Mod: PBBFAC | Performed by: INTERNAL MEDICINE

## 2021-08-31 PROCEDURE — 99490 PR CHRONIC CARE MGMT, 1ST 20 MIN: ICD-10-PCS | Mod: S$PBB,,, | Performed by: INTERNAL MEDICINE

## 2021-09-22 ENCOUNTER — PES CALL (OUTPATIENT)
Dept: ADMINISTRATIVE | Facility: CLINIC | Age: 71
End: 2021-09-22

## 2021-09-30 ENCOUNTER — PES CALL (OUTPATIENT)
Dept: ADMINISTRATIVE | Facility: CLINIC | Age: 71
End: 2021-09-30

## 2021-09-30 ENCOUNTER — EXTERNAL CHRONIC CARE MANAGEMENT (OUTPATIENT)
Dept: PRIMARY CARE CLINIC | Facility: CLINIC | Age: 71
End: 2021-09-30
Payer: MEDICARE

## 2021-09-30 PROCEDURE — 99490 PR CHRONIC CARE MGMT, 1ST 20 MIN: ICD-10-PCS | Mod: S$PBB,,, | Performed by: INTERNAL MEDICINE

## 2021-09-30 PROCEDURE — 99490 CHRNC CARE MGMT STAFF 1ST 20: CPT | Mod: S$PBB,,, | Performed by: INTERNAL MEDICINE

## 2021-09-30 PROCEDURE — 99490 CHRNC CARE MGMT STAFF 1ST 20: CPT | Mod: PBBFAC | Performed by: INTERNAL MEDICINE

## 2021-10-31 ENCOUNTER — EXTERNAL CHRONIC CARE MANAGEMENT (OUTPATIENT)
Dept: PRIMARY CARE CLINIC | Facility: CLINIC | Age: 71
End: 2021-10-31
Payer: MEDICARE

## 2021-10-31 PROCEDURE — 99490 CHRNC CARE MGMT STAFF 1ST 20: CPT | Mod: S$PBB,,, | Performed by: INTERNAL MEDICINE

## 2021-10-31 PROCEDURE — 99490 CHRNC CARE MGMT STAFF 1ST 20: CPT | Mod: PBBFAC | Performed by: INTERNAL MEDICINE

## 2021-10-31 PROCEDURE — 99490 PR CHRONIC CARE MGMT, 1ST 20 MIN: ICD-10-PCS | Mod: S$PBB,,, | Performed by: INTERNAL MEDICINE

## 2021-11-12 DIAGNOSIS — Z94.4 LIVER REPLACED BY TRANSPLANT: Primary | ICD-10-CM

## 2021-11-15 ENCOUNTER — LAB VISIT (OUTPATIENT)
Dept: LAB | Facility: HOSPITAL | Age: 71
End: 2021-11-15
Attending: INTERNAL MEDICINE
Payer: MEDICARE

## 2021-11-15 ENCOUNTER — TELEPHONE (OUTPATIENT)
Dept: TRANSPLANT | Facility: CLINIC | Age: 71
End: 2021-11-15
Payer: MEDICARE

## 2021-11-15 DIAGNOSIS — Z94.4 LIVER REPLACED BY TRANSPLANT: ICD-10-CM

## 2021-11-15 LAB
ALBUMIN SERPL BCP-MCNC: 3.7 G/DL (ref 3.5–5.2)
ALP SERPL-CCNC: 58 U/L (ref 55–135)
ALT SERPL W/O P-5'-P-CCNC: 24 U/L (ref 10–44)
ANION GAP SERPL CALC-SCNC: 9 MMOL/L (ref 8–16)
AST SERPL-CCNC: 29 U/L (ref 10–40)
BASOPHILS # BLD AUTO: 0.05 K/UL (ref 0–0.2)
BASOPHILS NFR BLD: 0.9 % (ref 0–1.9)
BILIRUB SERPL-MCNC: 1 MG/DL (ref 0.1–1)
BUN SERPL-MCNC: 18 MG/DL (ref 8–23)
CALCIUM SERPL-MCNC: 9.4 MG/DL (ref 8.7–10.5)
CHLORIDE SERPL-SCNC: 106 MMOL/L (ref 95–110)
CO2 SERPL-SCNC: 26 MMOL/L (ref 23–29)
CREAT SERPL-MCNC: 0.8 MG/DL (ref 0.5–1.4)
DIFFERENTIAL METHOD: ABNORMAL
EOSINOPHIL # BLD AUTO: 0.2 K/UL (ref 0–0.5)
EOSINOPHIL NFR BLD: 2.8 % (ref 0–8)
ERYTHROCYTE [DISTWIDTH] IN BLOOD BY AUTOMATED COUNT: 12.9 % (ref 11.5–14.5)
EST. GFR  (AFRICAN AMERICAN): >60 ML/MIN/1.73 M^2
EST. GFR  (NON AFRICAN AMERICAN): >60 ML/MIN/1.73 M^2
GLUCOSE SERPL-MCNC: 125 MG/DL (ref 70–110)
HCT VFR BLD AUTO: 50.3 % (ref 40–54)
HGB BLD-MCNC: 16.5 G/DL (ref 14–18)
IMM GRANULOCYTES # BLD AUTO: 0.02 K/UL (ref 0–0.04)
IMM GRANULOCYTES NFR BLD AUTO: 0.4 % (ref 0–0.5)
LYMPHOCYTES # BLD AUTO: 2 K/UL (ref 1–4.8)
LYMPHOCYTES NFR BLD: 38.2 % (ref 18–48)
MCH RBC QN AUTO: 33.9 PG (ref 27–31)
MCHC RBC AUTO-ENTMCNC: 32.8 G/DL (ref 32–36)
MCV RBC AUTO: 103 FL (ref 82–98)
MONOCYTES # BLD AUTO: 0.6 K/UL (ref 0.3–1)
MONOCYTES NFR BLD: 10.3 % (ref 4–15)
NEUTROPHILS # BLD AUTO: 2.5 K/UL (ref 1.8–7.7)
NEUTROPHILS NFR BLD: 47.4 % (ref 38–73)
NRBC BLD-RTO: 0 /100 WBC
PLATELET # BLD AUTO: 137 K/UL (ref 150–450)
PMV BLD AUTO: 9.6 FL (ref 9.2–12.9)
POTASSIUM SERPL-SCNC: 4.9 MMOL/L (ref 3.5–5.1)
PROT SERPL-MCNC: 6.6 G/DL (ref 6–8.4)
RBC # BLD AUTO: 4.87 M/UL (ref 4.6–6.2)
SODIUM SERPL-SCNC: 141 MMOL/L (ref 136–145)
TACROLIMUS BLD-MCNC: 5.3 NG/ML (ref 5–15)
WBC # BLD AUTO: 5.34 K/UL (ref 3.9–12.7)

## 2021-11-15 PROCEDURE — 85025 COMPLETE CBC W/AUTO DIFF WBC: CPT | Performed by: INTERNAL MEDICINE

## 2021-11-15 PROCEDURE — 80053 COMPREHEN METABOLIC PANEL: CPT | Performed by: INTERNAL MEDICINE

## 2021-11-15 PROCEDURE — 80197 ASSAY OF TACROLIMUS: CPT | Performed by: INTERNAL MEDICINE

## 2021-11-15 PROCEDURE — 36415 COLL VENOUS BLD VENIPUNCTURE: CPT | Performed by: INTERNAL MEDICINE

## 2021-11-30 ENCOUNTER — EXTERNAL CHRONIC CARE MANAGEMENT (OUTPATIENT)
Dept: PRIMARY CARE CLINIC | Facility: CLINIC | Age: 71
End: 2021-11-30
Payer: MEDICARE

## 2021-11-30 PROCEDURE — 99490 CHRNC CARE MGMT STAFF 1ST 20: CPT | Mod: PBBFAC | Performed by: INTERNAL MEDICINE

## 2021-11-30 PROCEDURE — 99490 CHRNC CARE MGMT STAFF 1ST 20: CPT | Mod: S$PBB,,, | Performed by: INTERNAL MEDICINE

## 2021-11-30 PROCEDURE — 99490 PR CHRONIC CARE MGMT, 1ST 20 MIN: ICD-10-PCS | Mod: S$PBB,,, | Performed by: INTERNAL MEDICINE

## 2021-12-10 ENCOUNTER — TELEPHONE (OUTPATIENT)
Dept: INTERNAL MEDICINE | Facility: CLINIC | Age: 71
End: 2021-12-10
Payer: MEDICARE

## 2021-12-13 ENCOUNTER — IMMUNIZATION (OUTPATIENT)
Dept: INTERNAL MEDICINE | Facility: CLINIC | Age: 71
End: 2021-12-13
Payer: MEDICARE

## 2021-12-13 ENCOUNTER — OFFICE VISIT (OUTPATIENT)
Dept: INTERNAL MEDICINE | Facility: CLINIC | Age: 71
End: 2021-12-13
Payer: MEDICARE

## 2021-12-13 VITALS
DIASTOLIC BLOOD PRESSURE: 84 MMHG | HEIGHT: 68 IN | BODY MASS INDEX: 32.01 KG/M2 | SYSTOLIC BLOOD PRESSURE: 126 MMHG | WEIGHT: 211.19 LBS | OXYGEN SATURATION: 97 % | HEART RATE: 71 BPM

## 2021-12-13 DIAGNOSIS — Z13.820 SPECIAL SCREENING FOR OSTEOPOROSIS: ICD-10-CM

## 2021-12-13 DIAGNOSIS — Z87.891 HISTORY OF SMOKING: ICD-10-CM

## 2021-12-13 DIAGNOSIS — R26.9 ABNORMALITY OF GAIT AND MOBILITY: ICD-10-CM

## 2021-12-13 DIAGNOSIS — M19.90 ARTHRITIS: ICD-10-CM

## 2021-12-13 DIAGNOSIS — D69.6 THROMBOCYTOPENIA: ICD-10-CM

## 2021-12-13 DIAGNOSIS — Z00.00 ENCOUNTER FOR PREVENTIVE HEALTH EXAMINATION: ICD-10-CM

## 2021-12-13 DIAGNOSIS — Z12.11 SCREEN FOR COLON CANCER: ICD-10-CM

## 2021-12-13 DIAGNOSIS — Z94.9 HYPERTENSION ASSOCIATED WITH TRANSPLANTATION: ICD-10-CM

## 2021-12-13 DIAGNOSIS — M85.89 OTHER SPECIFIED DISORDERS OF BONE DENSITY AND STRUCTURE, MULTIPLE SITES: ICD-10-CM

## 2021-12-13 DIAGNOSIS — D84.9 IMMUNOSUPPRESSION: ICD-10-CM

## 2021-12-13 DIAGNOSIS — Z94.4 S/P LIVER TRANSPLANT: Primary | ICD-10-CM

## 2021-12-13 DIAGNOSIS — I70.0 AORTIC ATHEROSCLEROSIS: ICD-10-CM

## 2021-12-13 DIAGNOSIS — Z85.828 HISTORY OF SKIN CANCER: ICD-10-CM

## 2021-12-13 DIAGNOSIS — E66.9 OBESITY (BMI 30.0-34.9): ICD-10-CM

## 2021-12-13 DIAGNOSIS — I15.8 HYPERTENSION ASSOCIATED WITH TRANSPLANTATION: ICD-10-CM

## 2021-12-13 DIAGNOSIS — K74.60 HEPATIC CIRRHOSIS, UNSPECIFIED HEPATIC CIRRHOSIS TYPE, UNSPECIFIED WHETHER ASCITES PRESENT: ICD-10-CM

## 2021-12-13 PROCEDURE — G0439 PPPS, SUBSEQ VISIT: HCPCS | Mod: ,,, | Performed by: NURSE PRACTITIONER

## 2021-12-13 PROCEDURE — G0008 ADMIN INFLUENZA VIRUS VAC: HCPCS | Mod: PBBFAC

## 2021-12-13 PROCEDURE — G0439 PR MEDICARE ANNUAL WELLNESS SUBSEQUENT VISIT: ICD-10-PCS | Mod: ,,, | Performed by: NURSE PRACTITIONER

## 2021-12-13 PROCEDURE — 99215 OFFICE O/P EST HI 40 MIN: CPT | Mod: PBBFAC,25 | Performed by: NURSE PRACTITIONER

## 2021-12-13 PROCEDURE — 99999 PR PBB SHADOW E&M-EST. PATIENT-LVL V: ICD-10-PCS | Mod: PBBFAC,,, | Performed by: NURSE PRACTITIONER

## 2021-12-13 PROCEDURE — 90694 VACC AIIV4 NO PRSRV 0.5ML IM: CPT | Mod: PBBFAC

## 2021-12-13 PROCEDURE — 99999 PR PBB SHADOW E&M-EST. PATIENT-LVL V: CPT | Mod: PBBFAC,,, | Performed by: NURSE PRACTITIONER

## 2021-12-13 RX ORDER — METHOCARBAMOL 750 MG/1
TABLET, FILM COATED ORAL
COMMUNITY
Start: 2021-10-30

## 2021-12-20 ENCOUNTER — OFFICE VISIT (OUTPATIENT)
Dept: TRANSPLANT | Facility: CLINIC | Age: 71
End: 2021-12-20
Payer: MEDICARE

## 2021-12-20 VITALS
BODY MASS INDEX: 32.43 KG/M2 | DIASTOLIC BLOOD PRESSURE: 91 MMHG | OXYGEN SATURATION: 95 % | HEART RATE: 56 BPM | WEIGHT: 218.94 LBS | TEMPERATURE: 97 F | RESPIRATION RATE: 16 BRPM | SYSTOLIC BLOOD PRESSURE: 140 MMHG | HEIGHT: 69 IN

## 2021-12-20 DIAGNOSIS — D84.9 IMMUNOSUPPRESSION: ICD-10-CM

## 2021-12-20 DIAGNOSIS — Z94.4 S/P LIVER TRANSPLANT: Primary | ICD-10-CM

## 2021-12-20 PROCEDURE — 99214 OFFICE O/P EST MOD 30 MIN: CPT | Mod: PBBFAC | Performed by: INTERNAL MEDICINE

## 2021-12-20 PROCEDURE — 99214 OFFICE O/P EST MOD 30 MIN: CPT | Mod: S$PBB,,, | Performed by: INTERNAL MEDICINE

## 2021-12-20 PROCEDURE — 99999 PR PBB SHADOW E&M-EST. PATIENT-LVL IV: ICD-10-PCS | Mod: PBBFAC,,, | Performed by: INTERNAL MEDICINE

## 2021-12-20 PROCEDURE — 99214 PR OFFICE/OUTPT VISIT, EST, LEVL IV, 30-39 MIN: ICD-10-PCS | Mod: S$PBB,,, | Performed by: INTERNAL MEDICINE

## 2021-12-20 PROCEDURE — 99999 PR PBB SHADOW E&M-EST. PATIENT-LVL IV: CPT | Mod: PBBFAC,,, | Performed by: INTERNAL MEDICINE

## 2021-12-31 ENCOUNTER — EXTERNAL CHRONIC CARE MANAGEMENT (OUTPATIENT)
Dept: PRIMARY CARE CLINIC | Facility: CLINIC | Age: 71
End: 2021-12-31
Payer: MEDICARE

## 2021-12-31 PROCEDURE — 99490 PR CHRONIC CARE MGMT, 1ST 20 MIN: ICD-10-PCS | Mod: S$PBB,,, | Performed by: INTERNAL MEDICINE

## 2021-12-31 PROCEDURE — 99490 CHRNC CARE MGMT STAFF 1ST 20: CPT | Mod: PBBFAC | Performed by: INTERNAL MEDICINE

## 2021-12-31 PROCEDURE — 99490 CHRNC CARE MGMT STAFF 1ST 20: CPT | Mod: S$PBB,,, | Performed by: INTERNAL MEDICINE

## 2022-01-29 NOTE — TELEPHONE ENCOUNTER
Care Due:                  Date            Visit Type   Department     Provider  --------------------------------------------------------------------------------                                             [facility]  Last Visit: 12-      None         TRISTAN Davison  Next Visit: None Scheduled  None         None Found                                                            Last  Test          Frequency    Reason                     Performed    Due Date  --------------------------------------------------------------------------------    Office Visit  12 months..  doxazosin................  12- 12-    Powered by [employer]. Reference number: [id number].   1/29/2022 7:16:47 AM CST

## 2022-01-30 RX ORDER — METOPROLOL TARTRATE 100 MG/1
TABLET ORAL
Qty: 180 TABLET | Refills: 2 | Status: SHIPPED | OUTPATIENT
Start: 2022-01-30 | End: 2022-02-14 | Stop reason: SDUPTHER

## 2022-01-30 RX ORDER — DOXAZOSIN 8 MG/1
TABLET ORAL
Qty: 90 TABLET | Refills: 2 | Status: SHIPPED | OUTPATIENT
Start: 2022-01-30 | End: 2022-02-14 | Stop reason: SDUPTHER

## 2022-01-31 ENCOUNTER — TELEPHONE (OUTPATIENT)
Dept: INTERNAL MEDICINE | Facility: CLINIC | Age: 72
End: 2022-01-31
Payer: MEDICARE

## 2022-01-31 NOTE — TELEPHONE ENCOUNTER
----- Message from Shai Davison MD sent at 1/30/2022  4:34 PM CST -----  Pt is due for a visit with me or Inez for physical, BP check. I will refill meds. It has been over 1 year.

## 2022-02-14 ENCOUNTER — OFFICE VISIT (OUTPATIENT)
Dept: INTERNAL MEDICINE | Facility: CLINIC | Age: 72
End: 2022-02-14
Payer: MEDICARE

## 2022-02-14 VITALS
HEIGHT: 69 IN | SYSTOLIC BLOOD PRESSURE: 120 MMHG | WEIGHT: 211.88 LBS | DIASTOLIC BLOOD PRESSURE: 82 MMHG | HEART RATE: 61 BPM | BODY MASS INDEX: 31.38 KG/M2 | OXYGEN SATURATION: 97 %

## 2022-02-14 DIAGNOSIS — I10 ESSENTIAL HYPERTENSION: Primary | ICD-10-CM

## 2022-02-14 DIAGNOSIS — Z12.5 SCREENING FOR PROSTATE CANCER: ICD-10-CM

## 2022-02-14 DIAGNOSIS — D84.9 IMMUNOSUPPRESSION: ICD-10-CM

## 2022-02-14 DIAGNOSIS — D69.6 THROMBOCYTOPENIA: ICD-10-CM

## 2022-02-14 DIAGNOSIS — Z94.4 S/P LIVER TRANSPLANT: ICD-10-CM

## 2022-02-14 DIAGNOSIS — B35.1 ONYCHOMYCOSIS: ICD-10-CM

## 2022-02-14 DIAGNOSIS — K74.60 HEPATIC CIRRHOSIS, UNSPECIFIED HEPATIC CIRRHOSIS TYPE, UNSPECIFIED WHETHER ASCITES PRESENT: ICD-10-CM

## 2022-02-14 DIAGNOSIS — I70.0 AORTIC ATHEROSCLEROSIS: ICD-10-CM

## 2022-02-14 DIAGNOSIS — M19.90 ARTHRITIS: ICD-10-CM

## 2022-02-14 PROCEDURE — 99214 OFFICE O/P EST MOD 30 MIN: CPT | Mod: S$PBB,,, | Performed by: INTERNAL MEDICINE

## 2022-02-14 PROCEDURE — 99214 PR OFFICE/OUTPT VISIT, EST, LEVL IV, 30-39 MIN: ICD-10-PCS | Mod: S$PBB,,, | Performed by: INTERNAL MEDICINE

## 2022-02-14 PROCEDURE — 99999 PR PBB SHADOW E&M-EST. PATIENT-LVL IV: CPT | Mod: PBBFAC,,, | Performed by: INTERNAL MEDICINE

## 2022-02-14 PROCEDURE — 99999 PR PBB SHADOW E&M-EST. PATIENT-LVL IV: ICD-10-PCS | Mod: PBBFAC,,, | Performed by: INTERNAL MEDICINE

## 2022-02-14 PROCEDURE — 99214 OFFICE O/P EST MOD 30 MIN: CPT | Mod: PBBFAC | Performed by: INTERNAL MEDICINE

## 2022-02-14 RX ORDER — METOPROLOL TARTRATE 100 MG/1
100 TABLET ORAL 2 TIMES DAILY
Qty: 180 TABLET | Refills: 3 | Status: SHIPPED | OUTPATIENT
Start: 2022-02-14 | End: 2023-01-26

## 2022-02-14 RX ORDER — CICLOPIROX 80 MG/ML
SOLUTION TOPICAL NIGHTLY
Qty: 6.6 ML | Refills: 2 | Status: SHIPPED | OUTPATIENT
Start: 2022-02-14

## 2022-02-14 RX ORDER — DOXAZOSIN 8 MG/1
8 TABLET ORAL DAILY
Qty: 90 TABLET | Refills: 3 | Status: SHIPPED | OUTPATIENT
Start: 2022-02-14 | End: 2023-01-26

## 2022-02-14 NOTE — PROGRESS NOTES
Subjective:       Patient ID: Zander Kaur Sr. is a 71 y.o. male.    Chief Complaint: Annual Exam     Patient in for annual follow-up of medical problems.  He has a history of liver transplant, hypertension, hyperlipidemia, severe arthralgias from numerous injuries over the years.  His knees and ankles hurt primarily and he gets injections and fluid withdrawn regularly.    He sees Dermatology regularly  He is due for a colon screening  He is due for updated lipids and PSA which I will assist with scheduling.    He has fungal nail changes on the right great toe. Tried OTC treatment. Would like to try Rx. I explained I would not suggest oral given liver transplant.     Review of Systems   Constitutional: Negative for chills, fatigue and fever.   HENT: Negative for nosebleeds and trouble swallowing.    Eyes: Negative for pain and visual disturbance.   Respiratory: Negative for cough, shortness of breath and wheezing.    Cardiovascular: Negative for chest pain and palpitations.   Gastrointestinal: Negative for abdominal pain, constipation, diarrhea, nausea and vomiting.   Genitourinary: Negative for difficulty urinating and hematuria.   Musculoskeletal: Positive for arthralgias and gait problem. Negative for back pain and neck pain.   Integumentary:  Negative for rash.        Onychomycosis   Neurological: Negative for dizziness and headaches.   Hematological: Does not bruise/bleed easily.   Psychiatric/Behavioral: Negative for dysphoric mood and sleep disturbance.           Past Medical History:   Diagnosis Date    Actinic keratosis     Allergy     seasonal    Arthritis     Hypertension     Joint pain     Organ transplant     liver 2010    Squamous Cell Carcinoma 8/28/13    right temple    Squamous Cell Carcinoma 9/4/13    left forehead    Stroke      Past Surgical History:   Procedure Laterality Date    ANKLE SURGERY      multipole surgeries    FACIAL RECONSTRUCTION SURGERY      HERNIA REPAIR      knee  scope      bob, multiple    LIVER TRANSPLANT      2010    SHOULDER OPEN ROTATOR CUFF REPAIR      left      Patient Active Problem List   Diagnosis    AK (actinic keratosis)    Essential hypertension    S/P liver transplant    Hepatic cirrhosis    History of skin cancer    Joint injury    Screen for colon cancer    Arthritis    Immunosuppression    Hypertension associated with transplantation    Aortic atherosclerosis    Obesity (BMI 30.0-34.9)        Objective:      Physical Exam  Constitutional:       General: He is not in acute distress.     Appearance: He is well-developed.   HENT:      Head: Normocephalic and atraumatic.      Right Ear: Tympanic membrane, ear canal and external ear normal.      Left Ear: Tympanic membrane, ear canal and external ear normal.      Mouth/Throat:      Pharynx: No oropharyngeal exudate or posterior oropharyngeal erythema.   Eyes:      General: No scleral icterus.     Conjunctiva/sclera: Conjunctivae normal.      Pupils: Pupils are equal, round, and reactive to light.   Neck:      Thyroid: No thyromegaly.      Comments: No supraclavicular nodes palpated  Cardiovascular:      Rate and Rhythm: Normal rate and regular rhythm.      Pulses: Normal pulses.      Heart sounds: Normal heart sounds. No murmur heard.      Pulmonary:      Effort: Pulmonary effort is normal.      Breath sounds: Normal breath sounds. No wheezing.   Abdominal:      General: Bowel sounds are normal.      Palpations: Abdomen is soft. There is no mass.      Tenderness: There is no abdominal tenderness.      Comments: S/P Liver transplant   Musculoskeletal:         General: Tenderness present.      Cervical back: Normal range of motion and neck supple.      Right lower leg: No edema.      Left lower leg: No edema.   Lymphadenopathy:      Cervical: No cervical adenopathy.   Skin:     Coloration: Skin is not jaundiced or pale.   Neurological:      General: No focal deficit present.      Mental Status: He is  alert and oriented to person, place, and time.      Gait: Gait abnormal.   Psychiatric:         Mood and Affect: Mood normal.         Behavior: Behavior normal.         Assessment:       Problem List Items Addressed This Visit        Cardiac/Vascular    Essential hypertension - Primary    Relevant Orders    Lipid Panel    Aortic atherosclerosis       Immunology/Multi System    Immunosuppression       GI    S/P liver transplant    Hepatic cirrhosis       Orthopedic    Arthritis      Other Visit Diagnoses     Screening for prostate cancer        Relevant Orders    PSA, Screening    Onychomycosis              Plan:         Zander was seen today for annual exam.    Diagnoses and all orders for this visit:    Essential hypertension  Comments:  Continue to monitor and evaluate BP. Continue meds.   Orders:  -     Lipid Panel; Future    S/P liver transplant    Hepatic cirrhosis, unspecified hepatic cirrhosis type, unspecified whether ascites present  Comments:  Had transplant. Continue to see Hepatology.     Aortic atherosclerosis    Arthritis    Immunosuppression  Comments:  Continue transplant meds.       Screening for prostate cancer  -     PSA, Screening; Future    Onychomycosis    Other orders  -     ciclopirox (PENLAC) 8 % Soln; Apply topically nightly. Paint on and under nail nightly and remove every 7-10 days and start over with application. May use for 3 months.  -     doxazosin (CARDURA) 8 MG Tab; Take 1 tablet (8 mg total) by mouth once daily.  -     metoprolol tartrate (LOPRESSOR) 100 MG tablet; Take 1 tablet (100 mg total) by mouth 2 (two) times daily.           Review studies. F/u in 6 months, sooner prn The current medical regimen is effective;  continue present plan and medications. To see Hepatology and Ortho .

## 2022-02-28 ENCOUNTER — EXTERNAL CHRONIC CARE MANAGEMENT (OUTPATIENT)
Dept: PRIMARY CARE CLINIC | Facility: CLINIC | Age: 72
End: 2022-02-28
Payer: MEDICARE

## 2022-02-28 PROCEDURE — 99490 CHRNC CARE MGMT STAFF 1ST 20: CPT | Mod: S$PBB,,, | Performed by: INTERNAL MEDICINE

## 2022-02-28 PROCEDURE — 99490 CHRNC CARE MGMT STAFF 1ST 20: CPT | Mod: PBBFAC | Performed by: INTERNAL MEDICINE

## 2022-02-28 PROCEDURE — 99490 PR CHRONIC CARE MGMT, 1ST 20 MIN: ICD-10-PCS | Mod: S$PBB,,, | Performed by: INTERNAL MEDICINE

## 2022-03-16 ENCOUNTER — TELEPHONE (OUTPATIENT)
Dept: TRANSPLANT | Facility: CLINIC | Age: 72
End: 2022-03-16
Payer: MEDICARE

## 2022-03-16 NOTE — LETTER
March 16, 2022    Zander Kaur  [place] [address]          Dear Zander aKur:  MRN: [id number]    Your lab work was due to be drawn on 3/15/22. Our records indicate you missed this appointment.    Please call us at [phone] as soon as possible to let us know when you plan to have labs drawn.    Sincerely,    [facility] [place]  [facility] [place] [address]  [phone]

## 2022-03-17 ENCOUNTER — PATIENT MESSAGE (OUTPATIENT)
Dept: INTERNAL MEDICINE | Facility: CLINIC | Age: 72
End: 2022-03-17
Payer: MEDICARE

## 2022-03-17 ENCOUNTER — LAB VISIT (OUTPATIENT)
Dept: LAB | Facility: HOSPITAL | Age: 72
End: 2022-03-17
Payer: MEDICARE

## 2022-03-17 DIAGNOSIS — Z94.4 LIVER REPLACED BY TRANSPLANT: ICD-10-CM

## 2022-03-17 DIAGNOSIS — R97.20 ELEVATED PSA: Primary | ICD-10-CM

## 2022-03-17 DIAGNOSIS — I10 ESSENTIAL HYPERTENSION: ICD-10-CM

## 2022-03-17 DIAGNOSIS — Z12.5 SCREENING FOR PROSTATE CANCER: ICD-10-CM

## 2022-03-17 LAB
ALBUMIN SERPL BCP-MCNC: 4.1 G/DL (ref 3.5–5.2)
ALP SERPL-CCNC: 67 U/L (ref 55–135)
ALT SERPL W/O P-5'-P-CCNC: 26 U/L (ref 10–44)
ANION GAP SERPL CALC-SCNC: 13 MMOL/L (ref 8–16)
AST SERPL-CCNC: 26 U/L (ref 10–40)
BASOPHILS # BLD AUTO: 0.04 K/UL (ref 0–0.2)
BASOPHILS NFR BLD: 0.8 % (ref 0–1.9)
BILIRUB SERPL-MCNC: 1.4 MG/DL (ref 0.1–1)
BUN SERPL-MCNC: 19 MG/DL (ref 8–23)
CALCIUM SERPL-MCNC: 10.1 MG/DL (ref 8.7–10.5)
CHLORIDE SERPL-SCNC: 102 MMOL/L (ref 95–110)
CHOLEST SERPL-MCNC: 191 MG/DL (ref 120–199)
CHOLEST/HDLC SERPL: 3.5 {RATIO} (ref 2–5)
CO2 SERPL-SCNC: 24 MMOL/L (ref 23–29)
COMPLEXED PSA SERPL-MCNC: 5.1 NG/ML (ref 0–4)
CREAT SERPL-MCNC: 0.8 MG/DL (ref 0.5–1.4)
DIFFERENTIAL METHOD: ABNORMAL
EOSINOPHIL # BLD AUTO: 0.1 K/UL (ref 0–0.5)
EOSINOPHIL NFR BLD: 2.1 % (ref 0–8)
ERYTHROCYTE [DISTWIDTH] IN BLOOD BY AUTOMATED COUNT: 12.5 % (ref 11.5–14.5)
EST. GFR  (AFRICAN AMERICAN): >60 ML/MIN/1.73 M^2
EST. GFR  (NON AFRICAN AMERICAN): >60 ML/MIN/1.73 M^2
GLUCOSE SERPL-MCNC: 118 MG/DL (ref 70–110)
HCT VFR BLD AUTO: 48.5 % (ref 40–54)
HDLC SERPL-MCNC: 55 MG/DL (ref 40–75)
HDLC SERPL: 28.8 % (ref 20–50)
HGB BLD-MCNC: 16 G/DL (ref 14–18)
IMM GRANULOCYTES # BLD AUTO: 0.01 K/UL (ref 0–0.04)
IMM GRANULOCYTES NFR BLD AUTO: 0.2 % (ref 0–0.5)
LDLC SERPL CALC-MCNC: 119.6 MG/DL (ref 63–159)
LYMPHOCYTES # BLD AUTO: 1.7 K/UL (ref 1–4.8)
LYMPHOCYTES NFR BLD: 34.7 % (ref 18–48)
MCH RBC QN AUTO: 33.3 PG (ref 27–31)
MCHC RBC AUTO-ENTMCNC: 33 G/DL (ref 32–36)
MCV RBC AUTO: 101 FL (ref 82–98)
MONOCYTES # BLD AUTO: 0.5 K/UL (ref 0.3–1)
MONOCYTES NFR BLD: 9.7 % (ref 4–15)
NEUTROPHILS # BLD AUTO: 2.5 K/UL (ref 1.8–7.7)
NEUTROPHILS NFR BLD: 52.5 % (ref 38–73)
NONHDLC SERPL-MCNC: 136 MG/DL
NRBC BLD-RTO: 0 /100 WBC
PLATELET # BLD AUTO: 188 K/UL (ref 150–450)
PMV BLD AUTO: 9.7 FL (ref 9.2–12.9)
POTASSIUM SERPL-SCNC: 3.7 MMOL/L (ref 3.5–5.1)
PROT SERPL-MCNC: 7.7 G/DL (ref 6–8.4)
RBC # BLD AUTO: 4.8 M/UL (ref 4.6–6.2)
SODIUM SERPL-SCNC: 139 MMOL/L (ref 136–145)
TACROLIMUS BLD-MCNC: 4.8 NG/ML (ref 5–15)
TRIGL SERPL-MCNC: 82 MG/DL (ref 30–150)
WBC # BLD AUTO: 4.84 K/UL (ref 3.9–12.7)

## 2022-03-17 PROCEDURE — 80061 LIPID PANEL: CPT | Performed by: INTERNAL MEDICINE

## 2022-03-17 PROCEDURE — 80197 ASSAY OF TACROLIMUS: CPT | Performed by: INTERNAL MEDICINE

## 2022-03-17 PROCEDURE — 84153 ASSAY OF PSA TOTAL: CPT | Performed by: INTERNAL MEDICINE

## 2022-03-17 PROCEDURE — 85025 COMPLETE CBC W/AUTO DIFF WBC: CPT | Performed by: INTERNAL MEDICINE

## 2022-03-17 PROCEDURE — 80053 COMPREHEN METABOLIC PANEL: CPT | Performed by: INTERNAL MEDICINE

## 2022-03-17 PROCEDURE — 36415 COLL VENOUS BLD VENIPUNCTURE: CPT | Performed by: INTERNAL MEDICINE

## 2022-03-17 NOTE — TELEPHONE ENCOUNTER
Pt or wife may have seen my Portal Message. I asked them to touch base for an elevated PSA prostate test. Normal is below 4 and this is above 5. While this could be just growth with aging, I think it is best to get a consult with Urology. I placed to consult order and please assist them with making an appointment. Let me know of any questions.

## 2022-03-18 ENCOUNTER — TELEPHONE (OUTPATIENT)
Dept: TRANSPLANT | Facility: CLINIC | Age: 72
End: 2022-03-18
Payer: MEDICARE

## 2022-03-18 NOTE — LETTER
March 18, 2022    Zander Kaur  [place] [address]          Dear Zander Kaur:  MRN: [id number]    This is a follow up to your recent labs, your lab results were stable.  There are no medicine changes.  Please have your labs drawn again in 6 months - 9/13/22.      If you cannot have your labs drawn on the scheduled date, it is your responsibility to call the transplant department to reschedule.  Please call [phone] and ask to speak to [facility] -  for all scheduling requests.     Sincerely,    [facility] [place]  [facility] [place], [place] [address] [phone]

## 2022-03-18 NOTE — TELEPHONE ENCOUNTER
Letter sent, labs stable and no medication changes are needed. Repeat labs due 9/13/22 per protocol.  ----- Message from Carson Oswald MD sent at 3/17/2022  2:55 PM CDT -----  Results reviewed

## 2022-03-20 ENCOUNTER — PATIENT MESSAGE (OUTPATIENT)
Dept: INTERNAL MEDICINE | Facility: CLINIC | Age: 72
End: 2022-03-20
Payer: MEDICARE

## 2022-03-24 ENCOUNTER — OFFICE VISIT (OUTPATIENT)
Dept: UROLOGY | Facility: CLINIC | Age: 72
End: 2022-03-24
Payer: MEDICARE

## 2022-03-24 VITALS
HEIGHT: 68 IN | HEART RATE: 64 BPM | DIASTOLIC BLOOD PRESSURE: 101 MMHG | SYSTOLIC BLOOD PRESSURE: 161 MMHG | BODY MASS INDEX: 25.09 KG/M2 | WEIGHT: 165.56 LBS

## 2022-03-24 DIAGNOSIS — R97.20 ELEVATED PSA: ICD-10-CM

## 2022-03-24 PROCEDURE — 99999 PR PBB SHADOW E&M-EST. PATIENT-LVL IV: CPT | Mod: PBBFAC,,, | Performed by: UROLOGY

## 2022-03-24 PROCEDURE — 99999 PR PBB SHADOW E&M-EST. PATIENT-LVL IV: ICD-10-PCS | Mod: PBBFAC,,, | Performed by: UROLOGY

## 2022-03-24 PROCEDURE — 99203 PR OFFICE/OUTPT VISIT, NEW, LEVL III, 30-44 MIN: ICD-10-PCS | Mod: S$PBB,,, | Performed by: UROLOGY

## 2022-03-24 PROCEDURE — 99214 OFFICE O/P EST MOD 30 MIN: CPT | Mod: PBBFAC | Performed by: UROLOGY

## 2022-03-24 PROCEDURE — 99203 OFFICE O/P NEW LOW 30 MIN: CPT | Mod: S$PBB,,, | Performed by: UROLOGY

## 2022-03-24 NOTE — PROGRESS NOTES
Subjective:       Patient ID: Zander Kaur Sr. is a 71 y.o. male.    Chief Complaint: Elevated PSA    HPI patient is no lower tract irritative symptoms here with a PSA that went from 3.3-5.1 over a 3 year..  He is voiding fairly well.  We discussed the pros and cons of PSAs in men in their 70s and 80s.  He probably has a normal age adjusted PSA.    Past Medical History:   Diagnosis Date    Actinic keratosis     Allergy     seasonal    Arthritis     Hypertension     Joint pain     Organ transplant     liver 2010    Squamous Cell Carcinoma 8/28/13    right temple    Squamous Cell Carcinoma 9/4/13    left forehead    Stroke        Past Surgical History:   Procedure Laterality Date    ANKLE SURGERY      multipole surgeries    FACIAL RECONSTRUCTION SURGERY      HERNIA REPAIR      knee scope      bob, multiple    LIVER TRANSPLANT      2010    SHOULDER OPEN ROTATOR CUFF REPAIR      left       Family History   Problem Relation Age of Onset    No Known Problems Mother     Thyroid disease Sister     No Known Problems Son     No Known Problems Son     Melanoma Neg Hx     Psoriasis Neg Hx     Eczema Neg Hx     Lupus Neg Hx        Social History     Socioeconomic History    Marital status:    Occupational History    Occupation: Retired   Tobacco Use    Smoking status: Former Smoker     Types: Cigarettes    Smokeless tobacco: Never Used   Substance and Sexual Activity    Alcohol use: Yes     Alcohol/week: 2.0 standard drinks     Types: 2 Glasses of wine per week     Comment: per week    Drug use: Never    Sexual activity: Not Currently     Partners: Female     Social Determinants of Health     Financial Resource Strain: Low Risk     Difficulty of Paying Living Expenses: Not hard at all   Food Insecurity: No Food Insecurity    Worried About Running Out of Food in the Last Year: Never true    Ran Out of Food in the Last Year: Never true   Transportation Needs: No Transportation Needs     Lack of Transportation (Medical): No    Lack of Transportation (Non-Medical): No   Physical Activity: Inactive    Days of Exercise per Week: 0 days    Minutes of Exercise per Session: 0 min   Stress: No Stress Concern Present    Feeling of Stress : Not at all   Social Connections: Moderately Isolated    Frequency of Communication with Friends and Family: More than three times a week    Frequency of Social Gatherings with Friends and Family: More than three times a week    Attends Spiritism Services: Never    Active Member of Clubs or Organizations: No    Attends Club or Organization Meetings: Never    Marital Status:    Housing Stability: Low Risk     Unable to Pay for Housing in the Last Year: No    Number of Places Lived in the Last Year: 1    Unstable Housing in the Last Year: No       Allergies:  Diphenhydramine hcl, Penicillins, and Povidone-iodine    Medications:    Current Outpatient Medications:     amlodipine (NORVASC) 10 MG tablet, TAKE 1 TABLET BY MOUTH DAILY (Patient not taking: No sig reported), Disp: 30 tablet, Rfl: 11    carisoprodoL (SOMA) 350 MG tablet, Take 1 tablet by mouth 2 (two) times daily. , Disp: , Rfl:     celecoxib (CELEBREX) 200 MG capsule, Take 200 mg by mouth 2 (two) times daily., Disp: , Rfl:     ciclopirox (PENLAC) 8 % Soln, Apply topically nightly. Paint on and under nail nightly and remove every 7-10 days and start over with application. May use for 3 months., Disp: 6.6 mL, Rfl: 2    doxazosin (CARDURA) 8 MG Tab, Take 1 tablet (8 mg total) by mouth once daily., Disp: 90 tablet, Rfl: 3    gabapentin (NEURONTIN) 300 MG capsule, Take 300 mg by mouth 3 (three) times daily., Disp: , Rfl:     methocarbamoL (ROBAXIN) 750 MG Tab, , Disp: , Rfl:     metoprolol tartrate (LOPRESSOR) 100 MG tablet, Take 1 tablet (100 mg total) by mouth 2 (two) times daily., Disp: 180 tablet, Rfl: 3    multivitamin (THERAGRAN) per tablet, Take 1 tablet by mouth., Disp: , Rfl:      oxyCODONE-acetaminophen (PERCOCET) 7.5-325 mg per tablet, , Disp: , Rfl:     PROGRAF 1 mg Cap, Take 1 capsule by mouth every 12 hours, Disp: 60 capsule, Rfl: 3    ropinirole (REQUIP) 0.5 MG tablet, Take 0.5 mg by mouth as needed. , Disp: , Rfl:     vitamin D 1000 units Tab, Take 1,000 Units by mouth once daily., Disp: , Rfl:     Review of Systems   Constitutional: Negative for activity change, appetite change, chills, diaphoresis, fatigue, fever and unexpected weight change.   HENT: Negative for congestion, dental problem, hearing loss, mouth sores, postnasal drip, rhinorrhea, sinus pressure and trouble swallowing.    Eyes: Negative for pain, discharge and itching.   Respiratory: Negative for apnea, cough, choking, chest tightness, shortness of breath and wheezing.    Cardiovascular: Negative for chest pain, palpitations and leg swelling.   Gastrointestinal: Negative for abdominal distention, abdominal pain, anal bleeding, blood in stool, constipation, diarrhea, nausea, rectal pain and vomiting.   Endocrine: Negative for polydipsia and polyuria.   Genitourinary: Negative for decreased urine volume, difficulty urinating, dysuria, enuresis, flank pain, frequency, genital sores, hematuria, penile discharge, penile pain, penile swelling, scrotal swelling, testicular pain and urgency.   Musculoskeletal: Negative for arthralgias, back pain and myalgias.   Skin: Negative for color change, rash and wound.   Neurological: Negative for dizziness, syncope, speech difficulty, light-headedness and headaches.   Hematological: Negative for adenopathy. Does not bruise/bleed easily.   Psychiatric/Behavioral: Negative for behavioral problems, confusion, hallucinations and sleep disturbance.       Objective:      Physical Exam  Constitutional:       Appearance: He is well-developed.   HENT:      Head: Normocephalic.   Cardiovascular:      Rate and Rhythm: Normal rate.   Pulmonary:      Effort: Pulmonary effort is normal.    Abdominal:      Palpations: Abdomen is soft.   Genitourinary:     Prostate: Normal.      Comments: Prostate is smooth 25 g. Patient is status post liver transplant in 2000  Skin:     General: Skin is warm.   Neurological:      Mental Status: He is alert.         Assessment:       1. Elevated PSA        Plan:       Zander was seen today for elevated psa.    Diagnoses and all orders for this visit:    Elevated PSA  -     Ambulatory referral/consult to Urology  -     Prostate Specific Antigen, Diagnostic; Future    Of truss with biopsy.  This is not without its significant complications.  He is comfortable following this.  I will see him back in 4 months with a PSA    we discussed the pros and cons

## 2022-03-31 ENCOUNTER — EXTERNAL CHRONIC CARE MANAGEMENT (OUTPATIENT)
Dept: PRIMARY CARE CLINIC | Facility: CLINIC | Age: 72
End: 2022-03-31
Payer: MEDICARE

## 2022-03-31 PROCEDURE — 99490 CHRNC CARE MGMT STAFF 1ST 20: CPT | Mod: PBBFAC | Performed by: INTERNAL MEDICINE

## 2022-03-31 PROCEDURE — 99490 PR CHRONIC CARE MGMT, 1ST 20 MIN: ICD-10-PCS | Mod: S$PBB,,, | Performed by: INTERNAL MEDICINE

## 2022-03-31 PROCEDURE — 99490 CHRNC CARE MGMT STAFF 1ST 20: CPT | Mod: S$PBB,,, | Performed by: INTERNAL MEDICINE

## 2022-04-30 ENCOUNTER — EXTERNAL CHRONIC CARE MANAGEMENT (OUTPATIENT)
Dept: PRIMARY CARE CLINIC | Facility: CLINIC | Age: 72
End: 2022-04-30
Payer: MEDICARE

## 2022-04-30 PROCEDURE — 99490 CHRNC CARE MGMT STAFF 1ST 20: CPT | Mod: PBBFAC | Performed by: INTERNAL MEDICINE

## 2022-04-30 PROCEDURE — 99490 PR CHRONIC CARE MGMT, 1ST 20 MIN: ICD-10-PCS | Mod: S$PBB,,, | Performed by: INTERNAL MEDICINE

## 2022-04-30 PROCEDURE — 99490 CHRNC CARE MGMT STAFF 1ST 20: CPT | Mod: S$PBB,,, | Performed by: INTERNAL MEDICINE

## 2022-05-09 ENCOUNTER — OFFICE VISIT (OUTPATIENT)
Dept: DERMATOLOGY | Facility: CLINIC | Age: 72
End: 2022-05-09
Payer: MEDICARE

## 2022-05-09 DIAGNOSIS — Z85.828 HISTORY OF SKIN CANCER: ICD-10-CM

## 2022-05-09 DIAGNOSIS — L57.0 AK (ACTINIC KERATOSIS): ICD-10-CM

## 2022-05-09 PROCEDURE — 99204 OFFICE O/P NEW MOD 45 MIN: CPT | Mod: 25,S$PBB,, | Performed by: DERMATOLOGY

## 2022-05-09 PROCEDURE — 17003 DESTRUCT PREMALG LES 2-14: CPT | Mod: PBBFAC | Performed by: DERMATOLOGY

## 2022-05-09 PROCEDURE — 17003 DESTRUCTION, PREMALIGNANT LESIONS; SECOND THROUGH 14 LESIONS: ICD-10-PCS | Mod: S$PBB,,, | Performed by: DERMATOLOGY

## 2022-05-09 PROCEDURE — 99213 OFFICE O/P EST LOW 20 MIN: CPT | Mod: PBBFAC | Performed by: DERMATOLOGY

## 2022-05-09 PROCEDURE — 99999 PR PBB SHADOW E&M-EST. PATIENT-LVL III: CPT | Mod: PBBFAC,,, | Performed by: DERMATOLOGY

## 2022-05-09 PROCEDURE — 99999 PR PBB SHADOW E&M-EST. PATIENT-LVL III: ICD-10-PCS | Mod: PBBFAC,,, | Performed by: DERMATOLOGY

## 2022-05-09 PROCEDURE — 17000 PR DESTRUCTION(LASER SURGERY,CRYOSURGERY,CHEMOSURGERY),PREMALIGNANT LESIONS,FIRST LESION: ICD-10-PCS | Mod: S$PBB,,, | Performed by: DERMATOLOGY

## 2022-05-09 PROCEDURE — 99204 PR OFFICE/OUTPT VISIT, NEW, LEVL IV, 45-59 MIN: ICD-10-PCS | Mod: 25,S$PBB,, | Performed by: DERMATOLOGY

## 2022-05-09 PROCEDURE — 17000 DESTRUCT PREMALG LESION: CPT | Mod: S$PBB,,, | Performed by: DERMATOLOGY

## 2022-05-09 PROCEDURE — 17003 DESTRUCT PREMALG LES 2-14: CPT | Mod: S$PBB,,, | Performed by: DERMATOLOGY

## 2022-05-09 PROCEDURE — 17000 DESTRUCT PREMALG LESION: CPT | Mod: PBBFAC | Performed by: DERMATOLOGY

## 2022-05-09 RX ORDER — FLUOROURACIL 50 MG/G
CREAM TOPICAL
Qty: 60 G | Refills: 1 | Status: SHIPPED | OUTPATIENT
Start: 2022-05-09 | End: 2023-11-13

## 2022-05-09 NOTE — PATIENT INSTRUCTIONS
Field Treatment for Actinic Keratoses (precancerous lesions)    5-Fluoruracil + Dovonex (calcipotriene) - This is a topical chemotherapy for your skin. This cream should never be applied without discussing with you dermatologist.    This treatment gets your immune system involved in fighting precancers (actinic keratoses), even those we can't yet see.     HOW TO APPLY: Apply the combination cream to the affected area face and scalp 2x/day x 5 - 7 days. Apply a fingertip length amount to the entire area Wash your hands carefully after applying the creams and wipe glasses, BIPAP/CPAP machines, or anything else that comes in frequent contact with the creams.    WHAT TO EXPECT: Your skin will likely become red, crusted, sore, and tender during the treatment usually starting around day 3 and continuing for about 1 week after last application.     HOW TO HEAL FAST: To speed healing, wash with a gentle wash and apply Vaseline jelly especially to crusted open areas.    WE CAN HELP: Please contact us for any questions or problem shooting the application of these creams: [phone] or [email or web address]    GREAT NEWS: Newer studies suggest that the combination of these creams not only decrease the sun damage spots and precancers (actinic keratoses) but also decrease your risk of getting skin cancer the year following application.     IT WILL BE WORTH IT!!!       CRYOSURGERY      Your doctor has used a method called cryosurgery to treat your skin condition. Cryosurgery refers to the use of very cold substances to treat a variety of skin conditions such as warts, pre-skin cancers, molluscum contagiosum, sun spots, and several benign growths. The substance we use in cryosurgery is liquid nitrogen and is so cold (-195 degrees Celsius) that is burns when administered.     Following treatment in the office, the skin may immediately burn and become red. You may find the area around the lesion is affected as well. It is sometimes  necessary to treat not only the lesion, but a small area of the surrounding normal skin to achieve a good response.     A blister, and even a blood filled blister, may form after treatment.   This is a normal response. If the blister is painful, it is acceptable to sterilize a needle and with rubbing alcohol and gently pop the blister. It is important that you gently wash the area with soap and warm water as the blister fluid may contain wart virus if a wart was treated. Do no remove the roof of the blister.     The area treated can take anywhere from 1-3 weeks to heal. Healing time depends on the kind skin lesion treated, the location, and how aggressively the lesion was treated. It is recommended that the areas treated are covered with Vaseline or bacitracin ointment and a band-aid. If a band-aid is not practical, just ointment applied several times per day will do. Keeping these areas moist will speed the healing time.    Treatment with liquid nitrogen can leave a scar. In dark skin, it may be a light or dark scar, in light skin it may be a white or pink scar. These will generally fade with time, but may never go away completely.     If you have any concerns after your treatment, please feel free to call the office.       [address] [facility] [place] [address]/ [phone] FAX/ [email or web address]

## 2022-05-09 NOTE — PROGRESS NOTES
Subjective:       Patient ID:  Zander Kaur Sr. is a 71 y.o. male who presents for   Chief Complaint   Patient presents with    Skin Check     tbse     History of Present Illness: The patient presents for follow up of skin check.    The patient was last seen on: 10/17/2013 for cryosurgery to actinic keratoses which have resolved also PDT to scalp 90mins was not completed.  This is a high risk patient here to check for the development of new lesions. H/o liver t/p on prograf  Had pdt to face and scalp early 2013 - last seen by Bulmaro a few years ago  left forehead and right temple - SCC's - mohs by dr. Loza.  H/o NMSC and AK's    Other skin complaints: possibly same due to not doing PDT:  Patient with new complaint of lesion(s)  Location: scalp  Duration: long time-unsure   Symptoms: none  Relieving factors/Previous treatments: none          Review of Systems   Skin: Positive for activity-related sunscreen use and wears hat (90%). Negative for daily sunscreen use and recent sunburn.   Hematologic/Lymphatic: Bruises/bleeds easily.        Objective:    Physical Exam   Constitutional: He appears well-developed and well-nourished. No distress.   Neurological: He is alert and oriented to person, place, and time. He is not disoriented.   Psychiatric: He has a normal mood and affect.   Skin:   Areas Examined (abnormalities noted in diagram):   Scalp / Hair Palpated and Inspected  Head / Face Inspection Performed  Neck Inspection Performed  Chest / Axilla Inspection Performed  Back Inspection Performed  RUE Inspected  LUE Inspection Performed  Nails and Digits Inspection Performed                       Diagram Legend     Erythematous scaling macule/papule c/w actinic keratosis       Vascular papule c/w angioma      Pigmented verrucoid papule/plaque c/w seborrheic keratosis      Yellow umbilicated papule c/w sebaceous hyperplasia      Irregularly shaped tan macule c/w lentigo     1-2 mm smooth white papules  consistent with Milia      Movable subcutaneous cyst with punctum c/w epidermal inclusion cyst      Subcutaneous movable cyst c/w pilar cyst      Firm pink to brown papule c/w dermatofibroma      Pedunculated fleshy papule(s) c/w skin tag(s)      Evenly pigmented macule c/w junctional nevus     Mildly variegated pigmented, slightly irregular-bordered macule c/w mildly atypical nevus      Flesh colored to evenly pigmented papule c/w intradermal nevus       Pink pearly papule/plaque c/w basal cell carcinoma      Erythematous hyperkeratotic cursted plaque c/w SCC      Surgical scar with no sign of skin cancer recurrence      Open and closed comedones      Inflammatory papules and pustules      Verrucoid papule consistent consistent with wart     Erythematous eczematous patches and plaques     Dystrophic onycholytic nail with subungual debris c/w onychomycosis     Umbilicated papule    Erythematous-base heme-crusted tan verrucoid plaque consistent with inflamed seborrheic keratosis     Erythematous Silvery Scaling Plaque c/w Psoriasis     See annotation      Assessment / Plan:        History of skin cancer  -     Ambulatory referral/consult to Dermatology  Area(s) of previous NMSC evaluated with no signs of recurrence.    Upper body skin examination performed today including at least 6 points as noted in physical examination. No lesions suspicious for malignancy noted.    Recommend daily sun protection/avoidance and use of at least SPF 30, broad spectrum sunscreen (OTC drug).       AK (actinic keratosis)  -     fluorouraciL (EFUDEX) 5 % cream; Compound fluorouracil 5% + calcipotriene 0.005% cream. Apply a pea-sized amount to entire face and scalp BID x 5 - 7 days.  Dispense: 60 g; Refill: 1      AK (actinic keratosis)  Today's Plan:      Sent Rx to [facility] for efudex/dovonex bid to AA face and scalp (systematically) x 5 - 7 days (60g $75)    And    Cryosurgery Procedure Note    Verbal consent from the patient is  obtained including, but not limited to, risk of hypopigmentation/hyperpigmentation, scar, recurrence of lesion. The patient is aware of the precancerous quality and need for treatment of these lesions. Liquid nitrogen cryosurgery is applied to the 3 actinic keratoses, as detailed in the physical exam, to produce a freeze injury. The patient is aware that blisters may form and is instructed on wound care with gentle cleansing and use of vaseline ointment to keep moist until healed. The patient is supplied a handout on cryosurgery and is instructed to call if lesions do not completely resolve.    Wear hat always          Follow up in about 3 months (around 8/9/2022).

## 2022-05-09 NOTE — ASSESSMENT & PLAN NOTE
Today's Plan:      Sent Rx to [facility] for efudex/dovonex bid to AA face and scalp (systematically) x 5 - 7 days (60g $75)    And    Cryosurgery Procedure Note    Verbal consent from the patient is obtained including, but not limited to, risk of hypopigmentation/hyperpigmentation, scar, recurrence of lesion. The patient is aware of the precancerous quality and need for treatment of these lesions. Liquid nitrogen cryosurgery is applied to the 3 actinic keratoses, as detailed in the physical exam, to produce a freeze injury. The patient is aware that blisters may form and is instructed on wound care with gentle cleansing and use of vaseline ointment to keep moist until healed. The patient is supplied a handout on cryosurgery and is instructed to call if lesions do not completely resolve.    Wear hat always

## 2022-05-11 ENCOUNTER — PATIENT MESSAGE (OUTPATIENT)
Dept: RESEARCH | Facility: CLINIC | Age: 72
End: 2022-05-11
Payer: MEDICARE

## 2022-05-31 ENCOUNTER — EXTERNAL CHRONIC CARE MANAGEMENT (OUTPATIENT)
Dept: PRIMARY CARE CLINIC | Facility: CLINIC | Age: 72
End: 2022-05-31
Payer: MEDICARE

## 2022-05-31 PROCEDURE — 99490 CHRNC CARE MGMT STAFF 1ST 20: CPT | Mod: PBBFAC | Performed by: INTERNAL MEDICINE

## 2022-05-31 PROCEDURE — 99490 CHRNC CARE MGMT STAFF 1ST 20: CPT | Mod: S$PBB,,, | Performed by: INTERNAL MEDICINE

## 2022-05-31 PROCEDURE — 99490 PR CHRONIC CARE MGMT, 1ST 20 MIN: ICD-10-PCS | Mod: S$PBB,,, | Performed by: INTERNAL MEDICINE

## 2022-06-30 ENCOUNTER — EXTERNAL CHRONIC CARE MANAGEMENT (OUTPATIENT)
Dept: PRIMARY CARE CLINIC | Facility: CLINIC | Age: 72
End: 2022-06-30
Payer: MEDICARE

## 2022-06-30 PROCEDURE — 99490 PR CHRONIC CARE MGMT, 1ST 20 MIN: ICD-10-PCS | Mod: S$PBB,,, | Performed by: INTERNAL MEDICINE

## 2022-06-30 PROCEDURE — 99490 CHRNC CARE MGMT STAFF 1ST 20: CPT | Mod: PBBFAC | Performed by: INTERNAL MEDICINE

## 2022-06-30 PROCEDURE — 99490 CHRNC CARE MGMT STAFF 1ST 20: CPT | Mod: S$PBB,,, | Performed by: INTERNAL MEDICINE

## 2022-07-31 ENCOUNTER — EXTERNAL CHRONIC CARE MANAGEMENT (OUTPATIENT)
Dept: PRIMARY CARE CLINIC | Facility: CLINIC | Age: 72
End: 2022-07-31
Payer: MEDICARE

## 2022-07-31 PROCEDURE — 99490 PR CHRONIC CARE MGMT, 1ST 20 MIN: ICD-10-PCS | Mod: S$PBB,,, | Performed by: INTERNAL MEDICINE

## 2022-07-31 PROCEDURE — 99490 CHRNC CARE MGMT STAFF 1ST 20: CPT | Mod: PBBFAC | Performed by: INTERNAL MEDICINE

## 2022-07-31 PROCEDURE — 99490 CHRNC CARE MGMT STAFF 1ST 20: CPT | Mod: S$PBB,,, | Performed by: INTERNAL MEDICINE

## 2022-08-15 ENCOUNTER — OFFICE VISIT (OUTPATIENT)
Dept: DERMATOLOGY | Facility: CLINIC | Age: 72
End: 2022-08-15
Payer: MEDICARE

## 2022-08-15 DIAGNOSIS — D48.5 NEOPLASM OF UNCERTAIN BEHAVIOR OF SKIN: Primary | ICD-10-CM

## 2022-08-15 DIAGNOSIS — L57.0 AK (ACTINIC KERATOSIS): ICD-10-CM

## 2022-08-15 PROCEDURE — 88305 TISSUE EXAM BY PATHOLOGIST: CPT | Mod: 26,,, | Performed by: PATHOLOGY

## 2022-08-15 PROCEDURE — 88342 IMHCHEM/IMCYTCHM 1ST ANTB: CPT | Mod: 26,,, | Performed by: PATHOLOGY

## 2022-08-15 PROCEDURE — 17003 DESTRUCTION, PREMALIGNANT LESIONS; SECOND THROUGH 14 LESIONS: ICD-10-PCS | Mod: 59,S$PBB,, | Performed by: DERMATOLOGY

## 2022-08-15 PROCEDURE — 17000 DESTRUCT PREMALG LESION: CPT | Mod: 59,PBBFAC | Performed by: DERMATOLOGY

## 2022-08-15 PROCEDURE — 17000 DESTRUCT PREMALG LESION: CPT | Mod: 59,S$PBB,, | Performed by: DERMATOLOGY

## 2022-08-15 PROCEDURE — 99999 PR PBB SHADOW E&M-EST. PATIENT-LVL III: ICD-10-PCS | Mod: PBBFAC,,, | Performed by: DERMATOLOGY

## 2022-08-15 PROCEDURE — 99999 PR PBB SHADOW E&M-EST. PATIENT-LVL III: CPT | Mod: PBBFAC,,, | Performed by: DERMATOLOGY

## 2022-08-15 PROCEDURE — 88341 PR IHC OR ICC EACH ADD'L SINGLE ANTIBODY  STAINPR: ICD-10-PCS | Mod: 26,,, | Performed by: PATHOLOGY

## 2022-08-15 PROCEDURE — 88305 TISSUE EXAM BY PATHOLOGIST: ICD-10-PCS | Mod: 26,,, | Performed by: PATHOLOGY

## 2022-08-15 PROCEDURE — 11102 TANGNTL BX SKIN SINGLE LES: CPT | Mod: S$PBB,,, | Performed by: DERMATOLOGY

## 2022-08-15 PROCEDURE — 11102 TANGNTL BX SKIN SINGLE LES: CPT | Mod: PBBFAC | Performed by: DERMATOLOGY

## 2022-08-15 PROCEDURE — 99214 PR OFFICE/OUTPT VISIT, EST, LEVL IV, 30-39 MIN: ICD-10-PCS | Mod: 25,S$PBB,, | Performed by: DERMATOLOGY

## 2022-08-15 PROCEDURE — 17000 PR DESTRUCTION(LASER SURGERY,CRYOSURGERY,CHEMOSURGERY),PREMALIGNANT LESIONS,FIRST LESION: ICD-10-PCS | Mod: 59,S$PBB,, | Performed by: DERMATOLOGY

## 2022-08-15 PROCEDURE — 17003 DESTRUCT PREMALG LES 2-14: CPT | Mod: 59,PBBFAC | Performed by: DERMATOLOGY

## 2022-08-15 PROCEDURE — 99214 OFFICE O/P EST MOD 30 MIN: CPT | Mod: 25,S$PBB,, | Performed by: DERMATOLOGY

## 2022-08-15 PROCEDURE — 88341 IMHCHEM/IMCYTCHM EA ADD ANTB: CPT | Mod: 26,,, | Performed by: PATHOLOGY

## 2022-08-15 PROCEDURE — 11102 PR TANGENTIAL BIOPSY, SKIN, SINGLE LESION: ICD-10-PCS | Mod: S$PBB,,, | Performed by: DERMATOLOGY

## 2022-08-15 PROCEDURE — 88342 CHG IMMUNOCYTOCHEMISTRY: ICD-10-PCS | Mod: 26,,, | Performed by: PATHOLOGY

## 2022-08-15 PROCEDURE — 88341 IMHCHEM/IMCYTCHM EA ADD ANTB: CPT | Performed by: PATHOLOGY

## 2022-08-15 PROCEDURE — 88305 TISSUE EXAM BY PATHOLOGIST: CPT | Performed by: PATHOLOGY

## 2022-08-15 PROCEDURE — 99213 OFFICE O/P EST LOW 20 MIN: CPT | Mod: PBBFAC | Performed by: DERMATOLOGY

## 2022-08-15 PROCEDURE — 17003 DESTRUCT PREMALG LES 2-14: CPT | Mod: 59,S$PBB,, | Performed by: DERMATOLOGY

## 2022-08-15 PROCEDURE — 88342 IMHCHEM/IMCYTCHM 1ST ANTB: CPT | Performed by: PATHOLOGY

## 2022-08-15 NOTE — PROGRESS NOTES
Subjective:       Patient ID:  Zander Kaur Sr. is a 71 y.o. male who presents for   Chief Complaint   Patient presents with    Follow-up     History of Present Illness: The patient presents for follow up of skin check.    The patient was last seen on: 5/9/2022 for cryosurgery to actinic keratoses which have resolved. Pt used efudex/dovonex qday  for 1-2 weeks.    This is a high risk patient here to check for the development of new lesions. H/o liver t/p on prograf  Had pdt to face and scalp early 2013 - last seen by Bulmaro a few years ago  left forehead and right temple - SCC's - mohs by dr. Loza.  H/o NMSC and AK's    No specific concerns today.            Review of Systems   Skin: Positive for activity-related sunscreen use and wears hat (90%). Negative for daily sunscreen use and recent sunburn.   Hematologic/Lymphatic: Bruises/bleeds easily.        Objective:    Physical Exam   Constitutional: He appears well-developed and well-nourished. No distress.   Neurological: He is alert and oriented to person, place, and time. He is not disoriented.   Psychiatric: He has a normal mood and affect.   Skin:   Areas Examined (abnormalities noted in diagram):   Scalp / Hair Palpated and Inspected  Head / Face Inspection Performed              Diagram Legend     Erythematous scaling macule/papule c/w actinic keratosis       Vascular papule c/w angioma      Pigmented verrucoid papule/plaque c/w seborrheic keratosis      Yellow umbilicated papule c/w sebaceous hyperplasia      Irregularly shaped tan macule c/w lentigo     1-2 mm smooth white papules consistent with Milia      Movable subcutaneous cyst with punctum c/w epidermal inclusion cyst      Subcutaneous movable cyst c/w pilar cyst      Firm pink to brown papule c/w dermatofibroma      Pedunculated fleshy papule(s) c/w skin tag(s)      Evenly pigmented macule c/w junctional nevus     Mildly variegated pigmented, slightly irregular-bordered macule c/w mildly  atypical nevus      Flesh colored to evenly pigmented papule c/w intradermal nevus       Pink pearly papule/plaque c/w basal cell carcinoma      Erythematous hyperkeratotic cursted plaque c/w SCC      Surgical scar with no sign of skin cancer recurrence      Open and closed comedones      Inflammatory papules and pustules      Verrucoid papule consistent consistent with wart     Erythematous eczematous patches and plaques     Dystrophic onycholytic nail with subungual debris c/w onychomycosis     Umbilicated papule    Erythematous-base heme-crusted tan verrucoid plaque consistent with inflamed seborrheic keratosis     Erythematous Silvery Scaling Plaque c/w Psoriasis     See annotation              Assessment / Plan:      Pathology Orders:     Normal Orders This Visit    Specimen to Pathology, Dermatology     Questions:    Procedure Type: Dermatology and skin neoplasms    Number of Specimens: 1    ------------------------: -------------------------    Spec 1 Procedure: Biopsy    Spec 1 Clinical Impression: r/o scc vs ak vs other    Spec 1 Source: right cheek    Release to patient: Immediate        Neoplasm of uncertain behavior of skin  -     Specimen to Pathology, Dermatology  Shave biopsy procedure note:    Shave biopsy performed after verbal consent including risk of infection, scar, recurrence, need for additional treatment of site. Area prepped with alcohol, anesthetized with approximately 1.0cc of 1% lidocaine with epinephrine. Lesional tissue shaved with razor blade. Hemostasis achieved with application of aluminum chloride followed by hyfrecation. No complications. Dressing applied. Wound care explained.    If biopsy positive for malignancy, refer to Dr. Gil for Mohs surgery consultation.        AK (actinic keratosis)  Today's Plan:      Cryosurgery Procedure Note    Verbal consent from the patient is obtained including, but not limited to, risk of hypopigmentation/hyperpigmentation, scar, recurrence of  lesion. The patient is aware of the precancerous quality and need for treatment of these lesions. Liquid nitrogen cryosurgery is applied to the 2 actinic keratoses, as detailed in the physical exam, to produce a freeze injury. The patient is aware that blisters may form and is instructed on wound care with gentle cleansing and use of vaseline ointment to keep moist until healed. The patient is supplied a handout on cryosurgery and is instructed to call if lesions do not completely resolve.    And    Efudex/dovonex bid x 1 week        Follow up in about 3 months (around 11/15/2022).

## 2022-08-15 NOTE — PATIENT INSTRUCTIONS
Shave Biopsy Wound Care    Your doctor has performed a shave biopsy today.  A band aid and vaseline ointment has been placed over the site.  This should remain in place for NO LONGER THAN 48 hours.  It is fine to remove the bandaid after 24 hours, if the area is no longer bleeding. It is recommended that you keep the area dry (do not wet)) for the first 24 hours.  After 24 hours, wash the area with warm soap and water and apply Vaseline jelly.  Many patients prefer to use Neosporin or Bacitracin ointment.  This is acceptable; however, know that you can develop an allergy to this medication even if you have used it safely for years.  It is important to keep the area moist.  Letting it dry out and get air slows healing time, and will worsen the scar.        If you notice increasing redness, tenderness, pain, or yellow drainage at the biopsy site, please notify your doctor.  These are signs of an infection.    If your biopsy site is bleeding, apply firm pressure for 15 minutes straight.  Repeat for another 15 minutes, if it is still bleeding.   If the surgical site continues to bleed, then please contact your doctor.      For [facility] users:   You will receive your biopsy results in [facility] as soon as they are available. Please be assured that your physician/provider will review your results and will then determine what further treatment, evaluation, or planning is required. You should be contacted by your physician's/provider's office within 5 business days of receiving your results; If not, please reach out to directly. This is one more way [facility] [facility] is putting you first.     [facility] [place] [address]/ [phone] FAX/ [email or web address]     CRYOSURGERY      Your doctor has used a method called cryosurgery to treat your skin condition. Cryosurgery refers to the use of very cold substances to treat a variety of skin conditions such as warts, pre-skin cancers, molluscum contagiosum,  sun spots, and several benign growths. The substance we use in cryosurgery is liquid nitrogen and is so cold (-195 degrees Celsius) that is burns when administered.     Following treatment in the office, the skin may immediately burn and become red. You may find the area around the lesion is affected as well. It is sometimes necessary to treat not only the lesion, but a small area of the surrounding normal skin to achieve a good response.     A blister, and even a blood filled blister, may form after treatment.   This is a normal response. If the blister is painful, it is acceptable to sterilize a needle and with rubbing alcohol and gently pop the blister. It is important that you gently wash the area with soap and warm water as the blister fluid may contain wart virus if a wart was treated. Do no remove the roof of the blister.     The area treated can take anywhere from 1-3 weeks to heal. Healing time depends on the kind skin lesion treated, the location, and how aggressively the lesion was treated. It is recommended that the areas treated are covered with Vaseline or bacitracin ointment and a band-aid. If a band-aid is not practical, just ointment applied several times per day will do. Keeping these areas moist will speed the healing time.    Treatment with liquid nitrogen can leave a scar. In dark skin, it may be a light or dark scar, in light skin it may be a white or pink scar. These will generally fade with time, but may never go away completely.     If you have any concerns after your treatment, please feel free to call the office.       [facility] [place] [address]/ [phone] FAX/ [email or web address]         Field Treatment for Actinic Keratoses (precancerous lesions)    5-Fluoruracil + Dovonex (calcipotriene) - This is a topical chemotherapy for your skin. This cream should never be applied without discussing with you dermatologist.    This treatment gets your immune system  involved in fighting precancers (actinic keratoses), even those we can't yet see.     HOW TO APPLY: Apply the combination cream to the affected area left forehead 2x/day x 7 days. Apply a fingertip length amount to the entire area. Wash your hands carefully after applying the creams and wipe glasses, BIPAP/CPAP machines, or anything else that comes in frequent contact with the creams.    WHAT TO EXPECT: Your skin will likely become red, crusted, sore, and tender during the treatment usually starting around day 3 and continuing for about 1 week after last application.     HOW TO HEAL FAST: To speed healing, wash with a gentle wash and apply Vaseline jelly especially to crusted open areas.    WE CAN HELP: Please contact us for any questions or problem shooting the application of these creams: [phone] or [email or web address]    GREAT NEWS: Newer studies suggest that the combination of these creams not only decrease the sun damage spots and precancers (actinic keratoses) but also decrease your risk of getting skin cancer the year following application.     IT WILL BE WORTH IT!!!

## 2022-08-15 NOTE — ASSESSMENT & PLAN NOTE
Today's Plan:      Cryosurgery Procedure Note    Verbal consent from the patient is obtained including, but not limited to, risk of hypopigmentation/hyperpigmentation, scar, recurrence of lesion. The patient is aware of the precancerous quality and need for treatment of these lesions. Liquid nitrogen cryosurgery is applied to the 2 actinic keratoses, as detailed in the physical exam, to produce a freeze injury. The patient is aware that blisters may form and is instructed on wound care with gentle cleansing and use of vaseline ointment to keep moist until healed. The patient is supplied a handout on cryosurgery and is instructed to call if lesions do not completely resolve.    And    Efudex/dovonex bid x 1 week

## 2022-08-24 LAB
FINAL PATHOLOGIC DIAGNOSIS: NORMAL
GROSS: NORMAL
Lab: NORMAL
MICROSCOPIC EXAM: NORMAL

## 2022-08-31 ENCOUNTER — EXTERNAL CHRONIC CARE MANAGEMENT (OUTPATIENT)
Dept: PRIMARY CARE CLINIC | Facility: CLINIC | Age: 72
End: 2022-08-31
Payer: MEDICARE

## 2022-08-31 PROCEDURE — 99439 CHRNC CARE MGMT STAF EA ADDL: CPT | Mod: PBBFAC | Performed by: INTERNAL MEDICINE

## 2022-08-31 PROCEDURE — 99490 PR CHRONIC CARE MGMT, 1ST 20 MIN: ICD-10-PCS | Mod: S$PBB,,, | Performed by: INTERNAL MEDICINE

## 2022-08-31 PROCEDURE — 99439 PR CHRONIC CARE MGMT, EA ADDTL 20 MIN: ICD-10-PCS | Mod: S$PBB,,, | Performed by: INTERNAL MEDICINE

## 2022-08-31 PROCEDURE — 99439 CHRNC CARE MGMT STAF EA ADDL: CPT | Mod: S$PBB,,, | Performed by: INTERNAL MEDICINE

## 2022-08-31 PROCEDURE — 99490 CHRNC CARE MGMT STAFF 1ST 20: CPT | Mod: S$PBB,,, | Performed by: INTERNAL MEDICINE

## 2022-08-31 PROCEDURE — 99490 CHRNC CARE MGMT STAFF 1ST 20: CPT | Mod: PBBFAC | Performed by: INTERNAL MEDICINE

## 2022-09-20 ENCOUNTER — LAB VISIT (OUTPATIENT)
Dept: LAB | Facility: HOSPITAL | Age: 72
End: 2022-09-20
Attending: INTERNAL MEDICINE
Payer: MEDICARE

## 2022-09-20 ENCOUNTER — TELEPHONE (OUTPATIENT)
Dept: TRANSPLANT | Facility: CLINIC | Age: 72
End: 2022-09-20
Payer: MEDICARE

## 2022-09-20 DIAGNOSIS — Z94.4 LIVER REPLACED BY TRANSPLANT: ICD-10-CM

## 2022-09-20 LAB
ALBUMIN SERPL BCP-MCNC: 3.7 G/DL (ref 3.5–5.2)
ALP SERPL-CCNC: 62 U/L (ref 55–135)
ALT SERPL W/O P-5'-P-CCNC: 32 U/L (ref 10–44)
ANION GAP SERPL CALC-SCNC: 9 MMOL/L (ref 8–16)
AST SERPL-CCNC: 29 U/L (ref 10–40)
BASOPHILS # BLD AUTO: 0.05 K/UL (ref 0–0.2)
BASOPHILS NFR BLD: 1 % (ref 0–1.9)
BILIRUB SERPL-MCNC: 1.2 MG/DL (ref 0.1–1)
BUN SERPL-MCNC: 20 MG/DL (ref 8–23)
CALCIUM SERPL-MCNC: 9.3 MG/DL (ref 8.7–10.5)
CHLORIDE SERPL-SCNC: 105 MMOL/L (ref 95–110)
CO2 SERPL-SCNC: 27 MMOL/L (ref 23–29)
CREAT SERPL-MCNC: 0.8 MG/DL (ref 0.5–1.4)
DIFFERENTIAL METHOD: ABNORMAL
EOSINOPHIL # BLD AUTO: 0.2 K/UL (ref 0–0.5)
EOSINOPHIL NFR BLD: 3.1 % (ref 0–8)
ERYTHROCYTE [DISTWIDTH] IN BLOOD BY AUTOMATED COUNT: 12.9 % (ref 11.5–14.5)
EST. GFR  (NO RACE VARIABLE): >60 ML/MIN/1.73 M^2
GLUCOSE SERPL-MCNC: 129 MG/DL (ref 70–110)
HCT VFR BLD AUTO: 47.4 % (ref 40–54)
HGB BLD-MCNC: 15.8 G/DL (ref 14–18)
IMM GRANULOCYTES # BLD AUTO: 0.01 K/UL (ref 0–0.04)
IMM GRANULOCYTES NFR BLD AUTO: 0.2 % (ref 0–0.5)
LYMPHOCYTES # BLD AUTO: 2.3 K/UL (ref 1–4.8)
LYMPHOCYTES NFR BLD: 43.4 % (ref 18–48)
MCH RBC QN AUTO: 33.3 PG (ref 27–31)
MCHC RBC AUTO-ENTMCNC: 33.3 G/DL (ref 32–36)
MCV RBC AUTO: 100 FL (ref 82–98)
MONOCYTES # BLD AUTO: 0.4 K/UL (ref 0.3–1)
MONOCYTES NFR BLD: 8.3 % (ref 4–15)
NEUTROPHILS # BLD AUTO: 2.3 K/UL (ref 1.8–7.7)
NEUTROPHILS NFR BLD: 44 % (ref 38–73)
NRBC BLD-RTO: 0 /100 WBC
PLATELET # BLD AUTO: 163 K/UL (ref 150–450)
PMV BLD AUTO: 10.1 FL (ref 9.2–12.9)
POTASSIUM SERPL-SCNC: 3.8 MMOL/L (ref 3.5–5.1)
PROT SERPL-MCNC: 6.5 G/DL (ref 6–8.4)
RBC # BLD AUTO: 4.75 M/UL (ref 4.6–6.2)
SODIUM SERPL-SCNC: 141 MMOL/L (ref 136–145)
TACROLIMUS BLD-MCNC: 4.5 NG/ML (ref 5–15)
WBC # BLD AUTO: 5.21 K/UL (ref 3.9–12.7)

## 2022-09-20 PROCEDURE — 85025 COMPLETE CBC W/AUTO DIFF WBC: CPT | Performed by: INTERNAL MEDICINE

## 2022-09-20 PROCEDURE — 80197 ASSAY OF TACROLIMUS: CPT | Performed by: INTERNAL MEDICINE

## 2022-09-20 PROCEDURE — 80053 COMPREHEN METABOLIC PANEL: CPT | Performed by: INTERNAL MEDICINE

## 2022-09-20 PROCEDURE — 36415 COLL VENOUS BLD VENIPUNCTURE: CPT | Performed by: INTERNAL MEDICINE

## 2022-09-20 NOTE — TELEPHONE ENCOUNTER
Letter sent, labs stable and no medication changes are needed. Repeat labs due 3/14/23 per protocol.  ----- Message from Carson Oswald MD sent at 9/20/2022  2:37 PM CDT -----  Results reviewed

## 2022-09-20 NOTE — LETTER
September 20, 2022    Zander Kaur  [place] [address]          Dear Zander Kaur:  MRN: [id number]    This is a follow up to your recent labs, your lab results were stable.  There are no medicine changes.  Please have your labs drawn again on 3/14/23.      If you cannot have your labs drawn on the scheduled date, it is your responsibility to call the transplant department to reschedule.  Please call [phone] and ask to speak to Tenzin Roman Medical Assistant for all scheduling requests.     Sincerely,    [facility]  [facility] [place], [place] [id number] [phone]

## 2022-09-30 ENCOUNTER — EXTERNAL CHRONIC CARE MANAGEMENT (OUTPATIENT)
Dept: PRIMARY CARE CLINIC | Facility: CLINIC | Age: 72
End: 2022-09-30
Payer: MEDICARE

## 2022-09-30 PROCEDURE — 99490 PR CHRONIC CARE MGMT, 1ST 20 MIN: ICD-10-PCS | Mod: S$PBB,,, | Performed by: INTERNAL MEDICINE

## 2022-09-30 PROCEDURE — 99439 CHRNC CARE MGMT STAF EA ADDL: CPT | Mod: S$PBB,,, | Performed by: INTERNAL MEDICINE

## 2022-09-30 PROCEDURE — 99490 CHRNC CARE MGMT STAFF 1ST 20: CPT | Mod: S$PBB,,, | Performed by: INTERNAL MEDICINE

## 2022-09-30 PROCEDURE — 99439 CHRNC CARE MGMT STAF EA ADDL: CPT | Mod: PBBFAC | Performed by: INTERNAL MEDICINE

## 2022-09-30 PROCEDURE — 99490 CHRNC CARE MGMT STAFF 1ST 20: CPT | Mod: PBBFAC | Performed by: INTERNAL MEDICINE

## 2022-09-30 PROCEDURE — 99439 PR CHRONIC CARE MGMT, EA ADDTL 20 MIN: ICD-10-PCS | Mod: S$PBB,,, | Performed by: INTERNAL MEDICINE

## 2022-10-17 ENCOUNTER — PROCEDURE VISIT (OUTPATIENT)
Dept: DERMATOLOGY | Facility: CLINIC | Age: 72
End: 2022-10-17
Payer: MEDICARE

## 2022-10-17 VITALS
SYSTOLIC BLOOD PRESSURE: 148 MMHG | WEIGHT: 165 LBS | DIASTOLIC BLOOD PRESSURE: 87 MMHG | BODY MASS INDEX: 25.01 KG/M2 | HEART RATE: 62 BPM | HEIGHT: 68 IN

## 2022-10-17 DIAGNOSIS — C44.329 SQUAMOUS CELL CARCINOMA OF SKIN OF RIGHT CHEEK: Primary | ICD-10-CM

## 2022-10-17 PROCEDURE — 13132 PR RECMPL WND HEAD,FAC,HAND 2.6-7.5 CM: ICD-10-PCS | Mod: S$PBB,51,, | Performed by: DERMATOLOGY

## 2022-10-17 PROCEDURE — 17312: ICD-10-PCS | Mod: S$PBB,,, | Performed by: DERMATOLOGY

## 2022-10-17 PROCEDURE — 99499 NO LOS: ICD-10-PCS | Mod: S$PBB,,, | Performed by: DERMATOLOGY

## 2022-10-17 PROCEDURE — 17312 MOHS ADDL STAGE: CPT | Mod: S$PBB,,, | Performed by: DERMATOLOGY

## 2022-10-17 PROCEDURE — 17311: ICD-10-PCS | Mod: S$PBB,,, | Performed by: DERMATOLOGY

## 2022-10-17 PROCEDURE — 13132 CMPLX RPR F/C/C/M/N/AX/G/H/F: CPT | Mod: PBBFAC,51 | Performed by: DERMATOLOGY

## 2022-10-17 PROCEDURE — 99499 UNLISTED E&M SERVICE: CPT | Mod: S$PBB,,, | Performed by: DERMATOLOGY

## 2022-10-17 PROCEDURE — 17312 MOHS ADDL STAGE: CPT | Mod: PBBFAC | Performed by: DERMATOLOGY

## 2022-10-17 PROCEDURE — 17311 MOHS 1 STAGE H/N/HF/G: CPT | Mod: S$PBB,,, | Performed by: DERMATOLOGY

## 2022-10-17 PROCEDURE — 13132 CMPLX RPR F/C/C/M/N/AX/G/H/F: CPT | Mod: S$PBB,51,, | Performed by: DERMATOLOGY

## 2022-10-17 PROCEDURE — 17311 MOHS 1 STAGE H/N/HF/G: CPT | Mod: PBBFAC | Performed by: DERMATOLOGY

## 2022-10-17 NOTE — PROGRESS NOTES
PROCEDURE: Mohs' Micrographic Surgery    INDICATION: Location in non-mask areas of face where maximum conservation of tumor-free tissue is needed. Size > 1.0 cm on face. Biopsy-proven skin cancer of cosmetically and functionally important areas, including head, neck, genital, hand, foot, or areas known for having difficulty in healing, such as the lower anterior legs. Tumors with aggressive clinical behavior (rapidly growing, greater than 1 cm in diameter). Tumor with ill-defined borders. Tumors in immunosuppressed patients.    REFERRING PROVIDER: Lima Campos M.D.    CASE NUMBER:     ANESTHETIC: 6 cc 0.5% Lidocaine with Epi 1:200,000 mixed 1:1 with 0.5% Bupivacaine    SURGICAL PREP: Hibiclens    SURGEON: Jc Gil MD    ASSISTANTS: Bernadette Cao PA-C, Yesenia Ortega MA, and Allie Loya, Surg Tech    PREOPERATIVE DIAGNOSIS: squamous cell carcinoma    POSTOPERATIVE DIAGNOSIS: squamous cell carcinoma- well differentiated    PATHOLOGIC DIAGNOSIS: squamous cell carcinoma- well differentiated    HISTOLOGY OF SPECIMENS IN FIRST STAGE:   Debulking tumor confirms well differentiated squamous cell carcinoma.  Tumor Type: Tumor seen. Well-differentiated squamous cell carcinoma: Proliferation of squamous cells exhibiting atypia and infiltrating within the dermis.   Depth of Invasion: epidermis and dermis  Perineural Invasion: No    HISTOLOGY OF SPECIMENS IN SUBSEQUENT STAGES:  Tumor Type:  No tumor seen.    STAGES OF MOHS' SURGERY PERFORMED: 2    TUMOR-FREE PLANE ACHIEVED: Yes    HEMOSTASIS: electrocoagulation     SPECIMENS: 4 (3 in stage A and 1 in stage B)    LOCATION: right cheek. Location verified with Dr. Campos's clinical photograph. Patient also verified location with hand held mirror.    INITIAL LESION SIZE: 0.8 x 1.3 cm    FINAL DEFECT SIZE: 1.7 x 2.2 cm    WOUND REPAIR/DISPOSITION: The patient tolerated Mohs' Micrographic Surgery for a squamous cell carcinoma very well. When the tumor was  "completely removed, a repair of the surgical defect was undertaken.    PROCEDURE: Complex Linear Repair    INDICATION: Status post Mohs' Micrographic Surgery for squamous cell carcinoma.    CASE NUMBER:     SURGEON: Jc Gil MD    ASSISTANTS: Ralph Schofield    ANESTHETIC: 3 cc 2% Lidocaine with Epinephrine 1:100,000    SURGICAL PREP: Hibiclens, prepped by Ralph Schofield    LOCATION: right cheek    DEFECT SIZE: 1.7 x 2.2 cm    WOUND REPAIR/DISPOSITION:  After the patient's carcinoma had been completely removed with Mohs' Micrographic Surgery, a repair of the surgical defect was undertaken. The patient was returned to the operating suite where the area of right cheek was prepped, draped, and anesthetized in the usual sterile fashion. The wound was widely undermined in all directions. The wound was undermined to a distance at least the maximum width of the defect as measured perpendicular to the closure line along at least one entire edge of the defect, in this case 2 cm. Then, electrocoagulation was used to obtain meticulous hemostasis. 5-0 Vicryl buried vertical mattress sutures were placed into the subcutaneous and dermal plane to close the wound and giuseppe the cutaneous wound edge. Bilateral dog ears were identified and were removed by a standard Burow's triangle technique. The cutaneous wound edges were closed using running 5-0 Prolene suture.    The patient tolerated the procedure well.    The area was cleaned and dressed appropriately and the patient was given wound care instructions, as well as appointment for follow-up evaluation and suture removal in 7 days.    LENGTH OF REPAIR: 3.5 cm    Vitals:    10/17/22 1130 10/17/22 1407   BP: (!) 155/99 (!) 148/87   BP Location: Left arm Right arm   Patient Position: Sitting Lying   BP Method: Small (Automatic) Small (Automatic)   Pulse: 67 62   Weight: 74.8 kg (165 lb)    Height: 5' 8" (1.727 m)          "

## 2022-10-24 ENCOUNTER — OFFICE VISIT (OUTPATIENT)
Dept: DERMATOLOGY | Facility: CLINIC | Age: 72
End: 2022-10-24
Payer: MEDICARE

## 2022-10-24 DIAGNOSIS — Z09 POSTOP CHECK: Primary | ICD-10-CM

## 2022-10-24 PROCEDURE — 99999 PR PBB SHADOW E&M-EST. PATIENT-LVL III: ICD-10-PCS | Mod: PBBFAC,,, | Performed by: DERMATOLOGY

## 2022-10-24 PROCEDURE — 99999 PR PBB SHADOW E&M-EST. PATIENT-LVL III: CPT | Mod: PBBFAC,,, | Performed by: DERMATOLOGY

## 2022-10-24 PROCEDURE — 99024 PR POST-OP FOLLOW-UP VISIT: ICD-10-PCS | Mod: POP,,, | Performed by: DERMATOLOGY

## 2022-10-24 PROCEDURE — 99213 OFFICE O/P EST LOW 20 MIN: CPT | Mod: PBBFAC | Performed by: DERMATOLOGY

## 2022-10-24 PROCEDURE — 99024 POSTOP FOLLOW-UP VISIT: CPT | Mod: POP,,, | Performed by: DERMATOLOGY

## 2022-10-24 NOTE — PROGRESS NOTES
71 y.o. male patient is here for suture removal following Mohs' surgery.    Patient reports no problems.    WOUND PE:  The right cheek sutures intact. Wound healing well. Good skin edges. No signs or symptoms of infection.      IMPRESSION:  Healing operative site from Mohs' surgery SCC, right cheek s/p Mohs with CLC, post op day #7.    PLAN:  Sutures removed today by  Supriya Fabian CST . Steri-strips applied.  Continue wound care.  Keep moist with Aquaphor.    RTC:  In 3-6 months with Lima Campos M.D. for skin check or sooner if new concern arises.

## 2022-10-31 ENCOUNTER — EXTERNAL CHRONIC CARE MANAGEMENT (OUTPATIENT)
Dept: PRIMARY CARE CLINIC | Facility: CLINIC | Age: 72
End: 2022-10-31
Payer: MEDICARE

## 2022-10-31 PROCEDURE — 99490 CHRNC CARE MGMT STAFF 1ST 20: CPT | Mod: S$PBB,,, | Performed by: INTERNAL MEDICINE

## 2022-10-31 PROCEDURE — 99490 CHRNC CARE MGMT STAFF 1ST 20: CPT | Mod: PBBFAC | Performed by: INTERNAL MEDICINE

## 2022-10-31 PROCEDURE — 99490 PR CHRONIC CARE MGMT, 1ST 20 MIN: ICD-10-PCS | Mod: S$PBB,,, | Performed by: INTERNAL MEDICINE

## 2022-11-14 ENCOUNTER — OFFICE VISIT (OUTPATIENT)
Dept: DERMATOLOGY | Facility: CLINIC | Age: 72
End: 2022-11-14
Payer: MEDICARE

## 2022-11-14 DIAGNOSIS — D48.5 NEOPLASM OF UNCERTAIN BEHAVIOR OF SKIN: Primary | ICD-10-CM

## 2022-11-14 DIAGNOSIS — Z85.828 HISTORY OF SKIN CANCER: ICD-10-CM

## 2022-11-14 DIAGNOSIS — L57.0 AK (ACTINIC KERATOSIS): ICD-10-CM

## 2022-11-14 PROCEDURE — 88342 CHG IMMUNOCYTOCHEMISTRY: ICD-10-PCS | Mod: 26,,, | Performed by: PATHOLOGY

## 2022-11-14 PROCEDURE — 88342 IMHCHEM/IMCYTCHM 1ST ANTB: CPT | Performed by: PATHOLOGY

## 2022-11-14 PROCEDURE — 88342 IMHCHEM/IMCYTCHM 1ST ANTB: CPT | Mod: 26,,, | Performed by: PATHOLOGY

## 2022-11-14 PROCEDURE — 88305 TISSUE EXAM BY PATHOLOGIST: CPT | Performed by: PATHOLOGY

## 2022-11-14 PROCEDURE — 11102 PR TANGENTIAL BIOPSY, SKIN, SINGLE LESION: ICD-10-PCS | Mod: S$PBB,,, | Performed by: DERMATOLOGY

## 2022-11-14 PROCEDURE — 99213 OFFICE O/P EST LOW 20 MIN: CPT | Mod: PBBFAC | Performed by: DERMATOLOGY

## 2022-11-14 PROCEDURE — 17000 DESTRUCT PREMALG LESION: CPT | Mod: 59,S$PBB,, | Performed by: DERMATOLOGY

## 2022-11-14 PROCEDURE — 99214 OFFICE O/P EST MOD 30 MIN: CPT | Mod: 25,S$PBB,, | Performed by: DERMATOLOGY

## 2022-11-14 PROCEDURE — 99214 PR OFFICE/OUTPT VISIT, EST, LEVL IV, 30-39 MIN: ICD-10-PCS | Mod: 25,S$PBB,, | Performed by: DERMATOLOGY

## 2022-11-14 PROCEDURE — 99999 PR PBB SHADOW E&M-EST. PATIENT-LVL III: CPT | Mod: PBBFAC,,, | Performed by: DERMATOLOGY

## 2022-11-14 PROCEDURE — 99999 PR PBB SHADOW E&M-EST. PATIENT-LVL III: ICD-10-PCS | Mod: PBBFAC,,, | Performed by: DERMATOLOGY

## 2022-11-14 PROCEDURE — 88341 PR IHC OR ICC EACH ADD'L SINGLE ANTIBODY  STAINPR: ICD-10-PCS | Mod: 26,,, | Performed by: PATHOLOGY

## 2022-11-14 PROCEDURE — 88341 IMHCHEM/IMCYTCHM EA ADD ANTB: CPT | Mod: 59 | Performed by: PATHOLOGY

## 2022-11-14 PROCEDURE — 11102 TANGNTL BX SKIN SINGLE LES: CPT | Mod: S$PBB,,, | Performed by: DERMATOLOGY

## 2022-11-14 PROCEDURE — 88305 TISSUE EXAM BY PATHOLOGIST: CPT | Mod: 26,,, | Performed by: PATHOLOGY

## 2022-11-14 PROCEDURE — 88305 TISSUE EXAM BY PATHOLOGIST: ICD-10-PCS | Mod: 26,,, | Performed by: PATHOLOGY

## 2022-11-14 PROCEDURE — 11102 TANGNTL BX SKIN SINGLE LES: CPT | Mod: PBBFAC | Performed by: DERMATOLOGY

## 2022-11-14 PROCEDURE — 17000 DESTRUCT PREMALG LESION: CPT | Mod: 59,PBBFAC,GZ | Performed by: DERMATOLOGY

## 2022-11-14 PROCEDURE — 88341 IMHCHEM/IMCYTCHM EA ADD ANTB: CPT | Mod: 26,,, | Performed by: PATHOLOGY

## 2022-11-14 PROCEDURE — 17000 PR DESTRUCTION(LASER SURGERY,CRYOSURGERY,CHEMOSURGERY),PREMALIGNANT LESIONS,FIRST LESION: ICD-10-PCS | Mod: 59,S$PBB,, | Performed by: DERMATOLOGY

## 2022-11-14 NOTE — PATIENT INSTRUCTIONS
Field Treatment for Actinic Keratoses (precancerous lesions)    5-Fluoruracil + Dovonex (calcipotriene) - This is a topical chemotherapy for your skin. This cream should never be applied without discussing with you dermatologist.    This treatment gets your immune system involved in fighting precancers (actinic keratoses), even those we can't yet see.     HOW TO APPLY: Apply the combination cream to the affected area left forehead 2x/day x 7 days. Apply a fingertip length amount to the entire area. Wash your hands carefully after applying the creams and wipe glasses, BIPAP/CPAP machines, or anything else that comes in frequent contact with the creams.    WHAT TO EXPECT: Your skin will likely become red, crusted, sore, and tender during the treatment usually starting around day 3 and continuing for about 1 week after last application.     HOW TO HEAL FAST: To speed healing, wash with a gentle wash and apply Vaseline jelly especially to crusted open areas.    WE CAN HELP: Please contact us for any questions or problem shooting the application of these creams: [phone] or [email or web address]    GREAT NEWS: Newer studies suggest that the combination of these creams not only decrease the sun damage spots and precancers (actinic keratoses) but also decrease your risk of getting skin cancer the year following application.     IT WILL BE WORTH IT!!!      Shave Biopsy Wound Care    Your doctor has performed a shave biopsy today.  A band aid and vaseline ointment has been placed over the site.  This should remain in place for NO LONGER THAN 48 hours.  It is fine to remove the bandaid after 24 hours, if the area is no longer bleeding. It is recommended that you keep the area dry (do not wet)) for the first 24 hours.  After 24 hours, wash the area with warm soap and water and apply Vaseline jelly.  Many patients prefer to use Neosporin or Bacitracin ointment.  This is acceptable; however, know that you can develop an allergy  to this medication even if you have used it safely for years.  It is important to keep the area moist.  Letting it dry out and get air slows healing time, and will worsen the scar.        If you notice increasing redness, tenderness, pain, or yellow drainage at the biopsy site, please notify your doctor.  These are signs of an infection.    If your biopsy site is bleeding, apply firm pressure for 15 minutes straight.  Repeat for another 15 minutes, if it is still bleeding.   If the surgical site continues to bleed, then please contact your doctor.      For [facility] users:   You will receive your biopsy results in [facility] as soon as they are available. Please be assured that your physician/provider will review your results and will then determine what further treatment, evaluation, or planning is required. You should be contacted by your physician's/provider's office within 5 business days of receiving your results; If not, please reach out to directly. This is one more way [facility] is putting you first.     [address] [place] [id number]/ [phone] FAX/ [email or web address]       CRYOSURGERY      Your doctor has used a method called cryosurgery to treat your skin condition. Cryosurgery refers to the use of very cold substances to treat a variety of skin conditions such as warts, pre-skin cancers, molluscum contagiosum, sun spots, and several benign growths. The substance we use in cryosurgery is liquid nitrogen and is so cold (-195 degrees Celsius) that is burns when administered.     Following treatment in the office, the skin may immediately burn and become red. You may find the area around the lesion is affected as well. It is sometimes necessary to treat not only the lesion, but a small area of the surrounding normal skin to achieve a good response.     A blister, and even a blood filled blister, may form after treatment.   This is a normal response. If the blister is painful, it  is acceptable to sterilize a needle and with rubbing alcohol and gently pop the blister. It is important that you gently wash the area with soap and warm water as the blister fluid may contain wart virus if a wart was treated. Do no remove the roof of the blister.     The area treated can take anywhere from 1-3 weeks to heal. Healing time depends on the kind skin lesion treated, the location, and how aggressively the lesion was treated. It is recommended that the areas treated are covered with Vaseline or bacitracin ointment and a band-aid. If a band-aid is not practical, just ointment applied several times per day will do. Keeping these areas moist will speed the healing time.    Treatment with liquid nitrogen can leave a scar. In dark skin, it may be a light or dark scar, in light skin it may be a white or pink scar. These will generally fade with time, but may never go away completely.     If you have any concerns after your treatment, please feel free to call the office.       [facility] [place] [address]/ [phone] FAX/ [email or web address]

## 2022-11-14 NOTE — PROGRESS NOTES
Subjective:       Patient ID:  Zander Kaur Sr. is a 71 y.o. male who presents for   Chief Complaint   Patient presents with    Follow-up     Lesion - R cheek     History of Present Illness: The patient presents for follow up for skin lesion to right cheek.    The patient was last seen on: 8/15/2022 for cryosurgery to actinic keratoses which have resolved. Pt used efudex/dovonex bid to left forehead for 1-2 weeks.    And Bx - SCC on right cheek excised SSW on 10/17/2022    This is a high risk patient here to check for the development of new lesions.     H/o liver t/p on prograf    Had pdt to face and scalp early 2013 - last seen by Bulmaro a few years ago  left forehead and right temple - SCC's - mohs by dr. Loza.  H/o NMSC and AK's              Review of Systems   Skin:  Positive for activity-related sunscreen use and wears hat (always). Negative for daily sunscreen use and recent sunburn.   Hematologic/Lymphatic: Does not bruise/bleed easily.      Objective:    Physical Exam   Constitutional: He appears well-developed and well-nourished. No distress.   Neurological: He is alert and oriented to person, place, and time. He is not disoriented.   Psychiatric: He has a normal mood and affect.   Skin:   Areas Examined (abnormalities noted in diagram):   Scalp / Hair Palpated and Inspected  Head / Face Inspection Performed            Diagram Legend     Erythematous scaling macule/papule c/w actinic keratosis       Vascular papule c/w angioma      Pigmented verrucoid papule/plaque c/w seborrheic keratosis      Yellow umbilicated papule c/w sebaceous hyperplasia      Irregularly shaped tan macule c/w lentigo     1-2 mm smooth white papules consistent with Milia      Movable subcutaneous cyst with punctum c/w epidermal inclusion cyst      Subcutaneous movable cyst c/w pilar cyst      Firm pink to brown papule c/w dermatofibroma      Pedunculated fleshy papule(s) c/w skin tag(s)      Evenly pigmented macule c/w  junctional nevus     Mildly variegated pigmented, slightly irregular-bordered macule c/w mildly atypical nevus      Flesh colored to evenly pigmented papule c/w intradermal nevus       Pink pearly papule/plaque c/w basal cell carcinoma      Erythematous hyperkeratotic cursted plaque c/w SCC      Surgical scar with no sign of skin cancer recurrence      Open and closed comedones      Inflammatory papules and pustules      Verrucoid papule consistent consistent with wart     Erythematous eczematous patches and plaques     Dystrophic onycholytic nail with subungual debris c/w onychomycosis     Umbilicated papule    Erythematous-base heme-crusted tan verrucoid plaque consistent with inflamed seborrheic keratosis     Erythematous Silvery Scaling Plaque c/w Psoriasis     See annotation            Assessment / Plan:      Pathology Orders:       Normal Orders This Visit    Specimen to Pathology, Dermatology     Questions:    Procedure Type: Dermatology and skin neoplasms    Number of Specimens: 1    ------------------------: -------------------------    Spec 1 Procedure: Biopsy    Spec 1 Clinical Impression: r/o bcc    Spec 1 Source: right nasal ala    Release to patient: Immediate          Neoplasm of uncertain behavior of skin  -     Specimen to Pathology, Dermatology    Shave biopsy procedure note:    Shave biopsy performed after verbal consent including risk of infection, scar, recurrence, need for additional treatment of site. Area prepped with alcohol, anesthetized with approximately 1.0cc of 1% lidocaine with epinephrine. Lesional tissue shaved with razor blade. Hemostasis achieved with application of aluminum chloride followed by hyfrecation. No complications. Dressing applied. Wound care explained.    If biopsy positive for malignancy, refer to Dr. Gil for Mohs surgery consultation.      AK (actinic keratosis)  Cryosurgery Procedure Note    Verbal consent from the patient is obtained including, but not limited  to, risk of hypopigmentation/hyperpigmentation, scar, recurrence of lesion. The patient is aware of the precancerous quality and need for treatment of these lesions. Liquid nitrogen cryosurgery is applied to the 1 actinic keratoses, as detailed in the physical exam, to produce a freeze injury. The patient is aware that blisters may form and is instructed on wound care with gentle cleansing and use of vaseline ointment to keep moist until healed. The patient is supplied a handout on cryosurgery and is instructed to call if lesions do not completely resolve.    And    Efudex/dovonex bid to AA left forehead x 7 days - rx at home. Reviewed with daughter    History of skin cancer  Area(s) of previous NMSC evaluated with no signs of recurrence.    Suspicious lesions noted.    Recommend daily sun protection/avoidance and use of at least SPF 30, broad spectrum sunscreen (OTC drug).              Follow up in about 3 months (around 2/14/2023).

## 2022-11-30 ENCOUNTER — EXTERNAL CHRONIC CARE MANAGEMENT (OUTPATIENT)
Dept: PRIMARY CARE CLINIC | Facility: CLINIC | Age: 72
End: 2022-11-30
Payer: MEDICARE

## 2022-11-30 PROCEDURE — 99490 CHRNC CARE MGMT STAFF 1ST 20: CPT | Mod: S$PBB,,, | Performed by: INTERNAL MEDICINE

## 2022-11-30 PROCEDURE — 99490 PR CHRONIC CARE MGMT, 1ST 20 MIN: ICD-10-PCS | Mod: S$PBB,,, | Performed by: INTERNAL MEDICINE

## 2022-11-30 PROCEDURE — 99490 CHRNC CARE MGMT STAFF 1ST 20: CPT | Mod: PBBFAC | Performed by: INTERNAL MEDICINE

## 2022-12-02 LAB
COMMENT: NORMAL
FINAL PATHOLOGIC DIAGNOSIS: NORMAL
GROSS: NORMAL
Lab: NORMAL
MICROSCOPIC EXAM: NORMAL

## 2022-12-05 NOTE — PROGRESS NOTES
Skin, right nasal ala, shave biopsy:   -BASAL CELL CARCINOMA (NODULAR AND INFILTRATIVE) WITH FOCAL NEUROENDOCRINE   DIFFERENTIATION   -TUMOR EXTENDS TO THE DEEP BIOPSY EDGE    Recommend patient to Dr. Gil for Mohs surgery consultation. Picture in chart.     General derm team: Sent patient for Mohs surgery. F/u with me in 3 months.

## 2022-12-31 ENCOUNTER — EXTERNAL CHRONIC CARE MANAGEMENT (OUTPATIENT)
Dept: PRIMARY CARE CLINIC | Facility: CLINIC | Age: 72
End: 2022-12-31
Payer: MEDICARE

## 2022-12-31 PROCEDURE — 99439 PR CHRONIC CARE MGMT, EA ADDTL 20 MIN: ICD-10-PCS | Mod: S$PBB,,, | Performed by: INTERNAL MEDICINE

## 2022-12-31 PROCEDURE — 99490 CHRNC CARE MGMT STAFF 1ST 20: CPT | Mod: S$PBB,,, | Performed by: INTERNAL MEDICINE

## 2022-12-31 PROCEDURE — 99490 PR CHRONIC CARE MGMT, 1ST 20 MIN: ICD-10-PCS | Mod: S$PBB,,, | Performed by: INTERNAL MEDICINE

## 2022-12-31 PROCEDURE — 99439 CHRNC CARE MGMT STAF EA ADDL: CPT | Mod: S$PBB,,, | Performed by: INTERNAL MEDICINE

## 2022-12-31 PROCEDURE — 99490 CHRNC CARE MGMT STAFF 1ST 20: CPT | Mod: PBBFAC | Performed by: INTERNAL MEDICINE

## 2022-12-31 PROCEDURE — 99439 CHRNC CARE MGMT STAF EA ADDL: CPT | Mod: PBBFAC,27 | Performed by: INTERNAL MEDICINE

## 2023-01-31 ENCOUNTER — EXTERNAL CHRONIC CARE MANAGEMENT (OUTPATIENT)
Dept: PRIMARY CARE CLINIC | Facility: CLINIC | Age: 73
End: 2023-01-31
Payer: MEDICARE

## 2023-01-31 PROCEDURE — 99490 PR CHRONIC CARE MGMT, 1ST 20 MIN: ICD-10-PCS | Mod: S$PBB,,, | Performed by: INTERNAL MEDICINE

## 2023-01-31 PROCEDURE — 99490 CHRNC CARE MGMT STAFF 1ST 20: CPT | Mod: PBBFAC | Performed by: INTERNAL MEDICINE

## 2023-01-31 PROCEDURE — 99490 CHRNC CARE MGMT STAFF 1ST 20: CPT | Mod: S$PBB,,, | Performed by: INTERNAL MEDICINE

## 2023-02-01 NOTE — TELEPHONE ENCOUNTER
----- Message from Carson Oswald MD sent at 6/18/2019  2:19 PM CDT -----  Results reviewed     Art/Entertainment/Family support/Friend support/Music/Reading/Treatment team provider support

## 2023-02-06 ENCOUNTER — PROCEDURE VISIT (OUTPATIENT)
Dept: DERMATOLOGY | Facility: CLINIC | Age: 73
End: 2023-02-06
Payer: MEDICARE

## 2023-02-06 ENCOUNTER — OFFICE VISIT (OUTPATIENT)
Dept: OTOLARYNGOLOGY | Facility: CLINIC | Age: 73
End: 2023-02-06
Payer: MEDICARE

## 2023-02-06 VITALS
HEART RATE: 72 BPM | DIASTOLIC BLOOD PRESSURE: 101 MMHG | DIASTOLIC BLOOD PRESSURE: 117 MMHG | SYSTOLIC BLOOD PRESSURE: 173 MMHG | BODY MASS INDEX: 25.01 KG/M2 | HEART RATE: 63 BPM | WEIGHT: 165 LBS | SYSTOLIC BLOOD PRESSURE: 185 MMHG | HEIGHT: 68 IN

## 2023-02-06 DIAGNOSIS — C44.311 BASAL CELL CARCINOMA OF RIGHT ALA NASI: Primary | ICD-10-CM

## 2023-02-06 DIAGNOSIS — M95.0 MOHS DEFECT OF RIGHT NOSE: Primary | ICD-10-CM

## 2023-02-06 PROCEDURE — 17312 MOHS ADDL STAGE: CPT | Mod: PBBFAC | Performed by: DERMATOLOGY

## 2023-02-06 PROCEDURE — 17311 MOHS 1 STAGE H/N/HF/G: CPT | Mod: S$PBB,,, | Performed by: DERMATOLOGY

## 2023-02-06 PROCEDURE — 99214 OFFICE O/P EST MOD 30 MIN: CPT | Mod: PBBFAC,25 | Performed by: OTOLARYNGOLOGY

## 2023-02-06 PROCEDURE — 99204 OFFICE O/P NEW MOD 45 MIN: CPT | Mod: S$PBB,,, | Performed by: OTOLARYNGOLOGY

## 2023-02-06 PROCEDURE — 17312 MOHS ADDL STAGE: CPT | Mod: S$PBB,,, | Performed by: DERMATOLOGY

## 2023-02-06 PROCEDURE — 99999 PR PBB SHADOW E&M-EST. PATIENT-LVL IV: ICD-10-PCS | Mod: PBBFAC,,, | Performed by: OTOLARYNGOLOGY

## 2023-02-06 PROCEDURE — 17311: ICD-10-PCS | Mod: S$PBB,,, | Performed by: DERMATOLOGY

## 2023-02-06 PROCEDURE — 99204 PR OFFICE/OUTPT VISIT, NEW, LEVL IV, 45-59 MIN: ICD-10-PCS | Mod: S$PBB,,, | Performed by: OTOLARYNGOLOGY

## 2023-02-06 PROCEDURE — 99499 UNLISTED E&M SERVICE: CPT | Mod: S$PBB,,, | Performed by: DERMATOLOGY

## 2023-02-06 PROCEDURE — 17312: ICD-10-PCS | Mod: S$PBB,,, | Performed by: DERMATOLOGY

## 2023-02-06 PROCEDURE — 17311 MOHS 1 STAGE H/N/HF/G: CPT | Mod: PBBFAC | Performed by: DERMATOLOGY

## 2023-02-06 PROCEDURE — 99999 PR PBB SHADOW E&M-EST. PATIENT-LVL IV: CPT | Mod: PBBFAC,,, | Performed by: OTOLARYNGOLOGY

## 2023-02-06 PROCEDURE — 99499 NO LOS: ICD-10-PCS | Mod: S$PBB,,, | Performed by: DERMATOLOGY

## 2023-02-06 NOTE — PROGRESS NOTES
PROCEDURE: Mohs' Micrographic Surgery    INDICATION: Location in mask areas of face including central face, nose, eyelids, eyebrows, lips, chin, preauricular, temple, and ear. Biopsy-proven skin cancer of cosmetically and functionally important areas, including head, neck, genital, hand, foot, or areas known for having difficulty in healing, such as the lower anterior legs. Tumor with ill-defined borders. Tumor with aggressive histopathology. Tumors in immunosuppressed patients. Aggressive histopathology including sclerosing, morpheaform/infiltrating, micronodular, superficial multicentric, poorly differentiated, basosquamous, or perineural invasion. Deeply infiltrating lesion. Difficulty estimating depth of lesion.    REFERRING PROVIDER: Lima Campos M.D.    CASE NUMBER:     ANESTHETIC: 4.5 cc 0.5% Bupivacaine with Epi 1:200,000 mixed 1:1 with plain 1% Lidocaine and 1.5 cc 1% Lidocaine with Epinephrine 1:100,000    SURGICAL PREP: Hibiclens    SURGEON: Jc Gil MD    ASSISTANTS: Bernadette Cao PA-C, Yesenia Ortega MA, and Supriya Fabian, [facility]    PREOPERATIVE DIAGNOSIS: basal cell carcinoma- nodular, infiltrating    POSTOPERATIVE DIAGNOSIS: basal cell carcinoma- nodular, infiltrating    PATHOLOGIC DIAGNOSIS: basal cell carcinoma- nodular, infiltrating    HISTOLOGY OF SPECIMENS IN FIRST STAGE:   Debulking tumor confirms nodular and infiltrating basal cell carcinoma.  Tumor Type: Tumor seen. Nodular basal cell carcinoma: Nodular tumor in dermis composed of basaloid cells exhibiting peripheral palisading and retraction artifact.  Infiltrative basal cell carcinoma: Basaloid tumor in dermis characterized by thin elongated strands of basaloid cells infiltrating between dermal collagen bundles.   Depth of Invasion: epidermis, dermis, subcutaneous tissue, muscle, and cartilage  Perineural Invasion: Yes    HISTOLOGY OF SPECIMENS IN SUBSEQUENT STAGES:  Tumor Type:  No tumor seen.    STAGES OF MOHS' SURGERY  "PERFORMED: 2    TUMOR-FREE PLANE ACHIEVED: Yes- with full thickness defect. Perineural invasion noted on piece 1 of stage A and then cleared with deeper tissue removal in stage B that resulted in full thickness defect.     HEMOSTASIS: electrocoagulation     SPECIMENS: 6 (2 in stage A and 4 in stage B)    LOCATION: right nasal ala. Location verified with Dr. Campos's clinical photograph. Patient also verified location with hand held mirror.    INITIAL LESION SIZE: 0.6 x 0.9 cm    FINAL DEFECT SIZE: 1.5 x 2.0 cm    WOUND REPAIR/DISPOSITION: The patient tolerated Mohs' Micrographic Surgery for a basal cell carcinoma very well. When the tumor was completely removed, the patient was referred to Dr. Brandon in ENT for repair of the surgical defect.    Vitals:    02/06/23 0746 02/06/23 1025   BP: (!) 151/98 (!) 173/101   BP Location: Left arm    Patient Position: Sitting    BP Method: Medium (Automatic)    Pulse: 71 63   Weight: 74.8 kg (165 lb)    Height: 5' 8" (1.727 m)          "

## 2023-02-07 ENCOUNTER — TELEPHONE (OUTPATIENT)
Dept: OTOLARYNGOLOGY | Facility: CLINIC | Age: 73
End: 2023-02-07
Payer: MEDICARE

## 2023-02-07 RX ORDER — SODIUM CHLORIDE 0.9 % (FLUSH) 0.9 %
10 SYRINGE (ML) INJECTION
Status: CANCELLED | OUTPATIENT
Start: 2023-02-07

## 2023-02-07 RX ORDER — LIDOCAINE HYDROCHLORIDE 10 MG/ML
1 INJECTION, SOLUTION EPIDURAL; INFILTRATION; INTRACAUDAL; PERINEURAL ONCE
Status: CANCELLED | OUTPATIENT
Start: 2023-02-07 | End: 2023-02-07

## 2023-02-07 NOTE — TELEPHONE ENCOUNTER
----- Message from Henri Aguayo sent at 2/7/2023 11:19 AM CST -----  Contact: [phone]  Marbella Kaur is calling in ref to pt surgery time for tomorrow -- [phone]

## 2023-02-08 PROBLEM — M95.0 MOHS DEFECT OF RIGHT NOSE: Status: ACTIVE | Noted: 2023-02-08

## 2023-02-08 NOTE — ASSESSMENT & PLAN NOTE
Through and through defect of the right nasal ala. I recommended that we proceed to the operating room for closure of this wound utilizing either a flap from the lateral nasal wall versus a composite graft from the left auricle with auricular cartilage and a bilobed versus paramedian forehead flap.  He is amenable to surgery.  Surgery is scheduled on February 8, 2023.

## 2023-02-08 NOTE — H&P (VIEW-ONLY)
Chief Complaint   Patient presents with    Consult     Surgery consult       HPI   72 y.o. male presents from Dr. Gil for evaluation for closure of a through and through defect with right nasal ala status post Mohs micrographic surgery for basal cell carcinoma.  No significant complaints.    Review of Systems   Constitutional: Negative for fatigue and unexpected weight change.   HENT: Per HPI.  Eyes: Negative for visual disturbance.   Respiratory: Negative for shortness of breath, hemoptysis   Cardiovascular: Negative for chest pain and palpitations.   Musculoskeletal: Negative for decreased ROM, back pain.   Skin: Negative for rash, sunburn, itching.   Neurological: Negative for dizziness and seizures.   Hematological: Negative for adenopathy. Does not bruise/bleed easily.   Endocrine: Negative for rapid weight loss/weight gain, heat/cold intolerance.     Past Medical History   Patient Active Problem List   Diagnosis    AK (actinic keratosis)    Essential hypertension    S/P liver transplant    Hepatic cirrhosis    History of skin cancer    Joint injury    Screen for colon cancer    Arthritis    Immunosuppression    Hypertension associated with transplantation    Aortic atherosclerosis    Obesity (BMI 30.0-34.9)    Thrombocytopenia           Past Surgical History   Past Surgical History:   Procedure Laterality Date    ANKLE SURGERY      multipole surgeries    FACIAL RECONSTRUCTION SURGERY      HERNIA REPAIR      knee scope      bob, multiple    LIVER TRANSPLANT      2010    SHOULDER OPEN ROTATOR CUFF REPAIR      left         Family History   Family History   Problem Relation Age of Onset    No Known Problems Mother     Thyroid disease Sister     No Known Problems Son     No Known Problems Son     Melanoma Neg Hx     Psoriasis Neg Hx     Eczema Neg Hx     Lupus Neg Hx            Social History   .  Social History     Socioeconomic History    Marital status:    Occupational History    Occupation: Retired    Tobacco Use    Smoking status: Former     Types: Cigarettes    Smokeless tobacco: Never   Substance and Sexual Activity    Alcohol use: Yes     Alcohol/week: 2.0 standard drinks     Types: 2 Glasses of wine per week     Comment: per week    Drug use: Never    Sexual activity: Not Currently     Partners: Female         Allergies   Review of patient's allergies indicates:   Allergen Reactions    Diphenhydramine hcl     Penicillins      Other reaction(s): Rash    Povidone-iodine      Other reaction(s): Itching  Other reaction(s): Itching           Physical Exam     Vitals:    02/06/23 1032   BP: (!) 185/117   Pulse: 72         There is no height or weight on file to calculate BMI.      General: AOx3, NAD   Respiratory:  Symmetric chest rise, normal effort  Nose:  Dressing in place.  See photo below.  Face: House Brackmann I bilaterally.           Assessment/Plan  Problem List Items Addressed This Visit          ENT    Mohs defect of right nose - Primary     Through and through defect of the right nasal ala. I recommended that we proceed to the operating room for closure of this wound utilizing either a flap from the lateral nasal wall versus a composite graft from the left auricle with auricular cartilage and a bilobed versus paramedian forehead flap.  He is amenable to surgery.  Surgery is scheduled on February 8, 2023.         Relevant Orders    Case Request Operating Room: CLOSURE, MOHS PROCEDURE DEFECT (Completed)

## 2023-02-09 ENCOUNTER — ANESTHESIA EVENT (OUTPATIENT)
Dept: SURGERY | Facility: HOSPITAL | Age: 73
End: 2023-02-09
Payer: MEDICARE

## 2023-02-09 ENCOUNTER — HOSPITAL ENCOUNTER (OUTPATIENT)
Facility: HOSPITAL | Age: 73
Discharge: HOME | End: 2023-02-09
Attending: OTOLARYNGOLOGY | Admitting: OTOLARYNGOLOGY
Payer: MEDICARE

## 2023-02-09 ENCOUNTER — ANESTHESIA (OUTPATIENT)
Dept: SURGERY | Facility: HOSPITAL | Age: 73
End: 2023-02-09
Payer: MEDICARE

## 2023-02-09 VITALS
TEMPERATURE: 98 F | BODY MASS INDEX: 33.27 KG/M2 | RESPIRATION RATE: 18 BRPM | HEIGHT: 67 IN | WEIGHT: 212 LBS | HEART RATE: 60 BPM | OXYGEN SATURATION: 96 % | SYSTOLIC BLOOD PRESSURE: 159 MMHG | DIASTOLIC BLOOD PRESSURE: 91 MMHG

## 2023-02-09 DIAGNOSIS — M95.0 MOHS DEFECT OF RIGHT NOSE: Primary | ICD-10-CM

## 2023-02-09 PROCEDURE — 25000003 PHARM REV CODE 250: Performed by: OTOLARYNGOLOGY

## 2023-02-09 PROCEDURE — 15004 WOUND PREP F/N/HF/G: CPT | Mod: ,,, | Performed by: OTOLARYNGOLOGY

## 2023-02-09 PROCEDURE — 63600175 PHARM REV CODE 636 W HCPCS: Performed by: STUDENT IN AN ORGANIZED HEALTH CARE EDUCATION/TRAINING PROGRAM

## 2023-02-09 PROCEDURE — 25000003 PHARM REV CODE 250: Performed by: ANESTHESIOLOGY

## 2023-02-09 PROCEDURE — 15576 PR FORM SKIN PEDICLE FLAP LID,EAR,NOSE: ICD-10-PCS | Mod: 51,GC,, | Performed by: OTOLARYNGOLOGY

## 2023-02-09 PROCEDURE — 71000015 HC POSTOP RECOV 1ST HR: Performed by: OTOLARYNGOLOGY

## 2023-02-09 PROCEDURE — 37000009 HC ANESTHESIA EA ADD 15 MINS: Performed by: OTOLARYNGOLOGY

## 2023-02-09 PROCEDURE — 71000045 HC DOSC ROUTINE RECOVERY EA ADD'L HR: Performed by: OTOLARYNGOLOGY

## 2023-02-09 PROCEDURE — 25000003 PHARM REV CODE 250: Performed by: STUDENT IN AN ORGANIZED HEALTH CARE EDUCATION/TRAINING PROGRAM

## 2023-02-09 PROCEDURE — 36000706: Performed by: OTOLARYNGOLOGY

## 2023-02-09 PROCEDURE — D9220A PRA ANESTHESIA: Mod: ANES,,, | Performed by: ANESTHESIOLOGY

## 2023-02-09 PROCEDURE — 15004 PR WND PREP PED, FACE/NCK/HND/FT/GEN 1ST 100 CM: ICD-10-PCS | Mod: ,,, | Performed by: OTOLARYNGOLOGY

## 2023-02-09 PROCEDURE — 15576 PEDICLE E/N/E/L/NTRORAL: CPT | Mod: 51,GC,, | Performed by: OTOLARYNGOLOGY

## 2023-02-09 PROCEDURE — D9220A PRA ANESTHESIA: ICD-10-PCS | Mod: CRNA,,, | Performed by: STUDENT IN AN ORGANIZED HEALTH CARE EDUCATION/TRAINING PROGRAM

## 2023-02-09 PROCEDURE — 36000707: Performed by: OTOLARYNGOLOGY

## 2023-02-09 PROCEDURE — 25000242 PHARM REV CODE 250 ALT 637 W/ HCPCS: Performed by: STUDENT IN AN ORGANIZED HEALTH CARE EDUCATION/TRAINING PROGRAM

## 2023-02-09 PROCEDURE — 63600175 PHARM REV CODE 636 W HCPCS: Performed by: NURSE ANESTHETIST, CERTIFIED REGISTERED

## 2023-02-09 PROCEDURE — D9220A PRA ANESTHESIA: ICD-10-PCS | Mod: ANES,,, | Performed by: ANESTHESIOLOGY

## 2023-02-09 PROCEDURE — 27201423 OPTIME MED/SURG SUP & DEVICES STERILE SUPPLY: Performed by: OTOLARYNGOLOGY

## 2023-02-09 PROCEDURE — 14060 PR ADJ TISS XFER LID,NOS,EAR <10 SQCM: ICD-10-PCS | Mod: GC,,, | Performed by: OTOLARYNGOLOGY

## 2023-02-09 PROCEDURE — 63600175 PHARM REV CODE 636 W HCPCS: Performed by: ANESTHESIOLOGY

## 2023-02-09 PROCEDURE — 14060 TIS TRNFR E/N/E/L 10 SQ CM/<: CPT | Mod: GC,,, | Performed by: OTOLARYNGOLOGY

## 2023-02-09 PROCEDURE — D9220A PRA ANESTHESIA: Mod: CRNA,,, | Performed by: STUDENT IN AN ORGANIZED HEALTH CARE EDUCATION/TRAINING PROGRAM

## 2023-02-09 PROCEDURE — 21235 EAR CARTILAGE GRAFT: CPT | Mod: 51,GC,, | Performed by: OTOLARYNGOLOGY

## 2023-02-09 PROCEDURE — 21235 PR GRAFT-EAR CARTILAGE TO NOSE OR EAR: ICD-10-PCS | Mod: 51,GC,, | Performed by: OTOLARYNGOLOGY

## 2023-02-09 PROCEDURE — 37000008 HC ANESTHESIA 1ST 15 MINUTES: Performed by: OTOLARYNGOLOGY

## 2023-02-09 PROCEDURE — 25000003 PHARM REV CODE 250: Performed by: NURSE ANESTHETIST, CERTIFIED REGISTERED

## 2023-02-09 PROCEDURE — 71000044 HC DOSC ROUTINE RECOVERY FIRST HOUR: Performed by: OTOLARYNGOLOGY

## 2023-02-09 RX ORDER — CLINDAMYCIN HYDROCHLORIDE 300 MG/1
300 CAPSULE ORAL 3 TIMES DAILY
Qty: 30 CAPSULE | Refills: 0 | Status: SHIPPED | OUTPATIENT
Start: 2023-02-09 | End: 2023-02-19

## 2023-02-09 RX ORDER — ONDANSETRON 4 MG/1
4 TABLET, ORALLY DISINTEGRATING ORAL EVERY 6 HOURS PRN
Qty: 20 TABLET | Refills: 0 | Status: ON HOLD | OUTPATIENT
Start: 2023-02-09 | End: 2023-03-03 | Stop reason: SDUPTHER

## 2023-02-09 RX ORDER — ALBUTEROL SULFATE 90 UG/1
AEROSOL, METERED RESPIRATORY (INHALATION)
Status: DISCONTINUED | OUTPATIENT
Start: 2023-02-09 | End: 2023-02-09

## 2023-02-09 RX ORDER — BACITRACIN ZINC 500 UNIT/G
OINTMENT (GRAM) TOPICAL
Status: DISCONTINUED | OUTPATIENT
Start: 2023-02-09 | End: 2023-02-09 | Stop reason: HOSPADM

## 2023-02-09 RX ORDER — ACETAMINOPHEN 10 MG/ML
INJECTION, SOLUTION INTRAVENOUS
Status: DISCONTINUED | OUTPATIENT
Start: 2023-02-09 | End: 2023-02-09

## 2023-02-09 RX ORDER — PHENYLEPHRINE HYDROCHLORIDE 10 MG/ML
INJECTION INTRAVENOUS
Status: DISCONTINUED | OUTPATIENT
Start: 2023-02-09 | End: 2023-02-09

## 2023-02-09 RX ORDER — DEXAMETHASONE SODIUM PHOSPHATE 4 MG/ML
INJECTION, SOLUTION INTRA-ARTICULAR; INTRALESIONAL; INTRAMUSCULAR; INTRAVENOUS; SOFT TISSUE
Status: DISCONTINUED | OUTPATIENT
Start: 2023-02-09 | End: 2023-02-09

## 2023-02-09 RX ORDER — HALOPERIDOL 5 MG/ML
0.5 INJECTION INTRAMUSCULAR EVERY 10 MIN PRN
Status: DISCONTINUED | OUTPATIENT
Start: 2023-02-09 | End: 2023-02-09 | Stop reason: HOSPADM

## 2023-02-09 RX ORDER — SODIUM CHLORIDE 9 MG/ML
INJECTION, SOLUTION INTRAVENOUS CONTINUOUS PRN
Status: DISCONTINUED | OUTPATIENT
Start: 2023-02-09 | End: 2023-02-09

## 2023-02-09 RX ORDER — EPHEDRINE SULFATE 50 MG/ML
INJECTION, SOLUTION INTRAVENOUS
Status: DISCONTINUED | OUTPATIENT
Start: 2023-02-09 | End: 2023-02-09

## 2023-02-09 RX ORDER — SODIUM CHLORIDE 0.9 % (FLUSH) 0.9 %
3 SYRINGE (ML) INJECTION
Status: DISCONTINUED | OUTPATIENT
Start: 2023-02-09 | End: 2023-02-09 | Stop reason: HOSPADM

## 2023-02-09 RX ORDER — ROCURONIUM BROMIDE 10 MG/ML
INJECTION, SOLUTION INTRAVENOUS
Status: DISCONTINUED | OUTPATIENT
Start: 2023-02-09 | End: 2023-02-09

## 2023-02-09 RX ORDER — CLINDAMYCIN PHOSPHATE 900 MG/50ML
900 INJECTION, SOLUTION INTRAVENOUS
Status: COMPLETED | OUTPATIENT
Start: 2023-02-09 | End: 2023-02-09

## 2023-02-09 RX ORDER — FENTANYL CITRATE 50 UG/ML
25 INJECTION, SOLUTION INTRAMUSCULAR; INTRAVENOUS EVERY 5 MIN PRN
Status: DISCONTINUED | OUTPATIENT
Start: 2023-02-09 | End: 2023-02-09 | Stop reason: HOSPADM

## 2023-02-09 RX ORDER — OXYCODONE AND ACETAMINOPHEN 5; 325 MG/1; MG/1
1 TABLET ORAL
Status: DISCONTINUED | OUTPATIENT
Start: 2023-02-09 | End: 2023-02-09 | Stop reason: HOSPADM

## 2023-02-09 RX ORDER — ONDANSETRON 2 MG/ML
4 INJECTION INTRAMUSCULAR; INTRAVENOUS DAILY PRN
Status: DISCONTINUED | OUTPATIENT
Start: 2023-02-09 | End: 2023-02-09 | Stop reason: HOSPADM

## 2023-02-09 RX ORDER — PROPOFOL 10 MG/ML
VIAL (ML) INTRAVENOUS
Status: DISCONTINUED | OUTPATIENT
Start: 2023-02-09 | End: 2023-02-09

## 2023-02-09 RX ORDER — LIDOCAINE HYDROCHLORIDE 10 MG/ML
1 INJECTION, SOLUTION EPIDURAL; INFILTRATION; INTRACAUDAL; PERINEURAL ONCE
Status: COMPLETED | OUTPATIENT
Start: 2023-02-09 | End: 2023-02-09

## 2023-02-09 RX ORDER — LIDOCAINE HYDROCHLORIDE 20 MG/ML
INJECTION INTRAVENOUS
Status: DISCONTINUED | OUTPATIENT
Start: 2023-02-09 | End: 2023-02-09

## 2023-02-09 RX ORDER — SODIUM CHLORIDE 0.9 % (FLUSH) 0.9 %
10 SYRINGE (ML) INJECTION
Status: DISCONTINUED | OUTPATIENT
Start: 2023-02-09 | End: 2023-02-09 | Stop reason: HOSPADM

## 2023-02-09 RX ORDER — HYDROCODONE BITARTRATE AND ACETAMINOPHEN 5; 325 MG/1; MG/1
1 TABLET ORAL EVERY 6 HOURS PRN
Qty: 20 TABLET | Refills: 0 | Status: ON HOLD | OUTPATIENT
Start: 2023-02-09 | End: 2023-03-03 | Stop reason: SDUPTHER

## 2023-02-09 RX ORDER — SUCCINYLCHOLINE CHLORIDE 20 MG/ML
INJECTION INTRAMUSCULAR; INTRAVENOUS
Status: DISCONTINUED | OUTPATIENT
Start: 2023-02-09 | End: 2023-02-09

## 2023-02-09 RX ORDER — HYDROMORPHONE HYDROCHLORIDE 1 MG/ML
0.2 INJECTION, SOLUTION INTRAMUSCULAR; INTRAVENOUS; SUBCUTANEOUS EVERY 5 MIN PRN
Status: DISCONTINUED | OUTPATIENT
Start: 2023-02-09 | End: 2023-02-09 | Stop reason: HOSPADM

## 2023-02-09 RX ORDER — LIDOCAINE HYDROCHLORIDE AND EPINEPHRINE 10; 10 MG/ML; UG/ML
INJECTION, SOLUTION INFILTRATION; PERINEURAL
Status: DISCONTINUED | OUTPATIENT
Start: 2023-02-09 | End: 2023-02-09 | Stop reason: HOSPADM

## 2023-02-09 RX ORDER — ONDANSETRON 2 MG/ML
INJECTION INTRAMUSCULAR; INTRAVENOUS
Status: DISCONTINUED | OUTPATIENT
Start: 2023-02-09 | End: 2023-02-09

## 2023-02-09 RX ORDER — FENTANYL CITRATE 50 UG/ML
INJECTION, SOLUTION INTRAMUSCULAR; INTRAVENOUS
Status: DISCONTINUED | OUTPATIENT
Start: 2023-02-09 | End: 2023-02-09

## 2023-02-09 RX ADMIN — HYDROMORPHONE HYDROCHLORIDE 0.2 MG: 1 INJECTION, SOLUTION INTRAMUSCULAR; INTRAVENOUS; SUBCUTANEOUS at 03:02

## 2023-02-09 RX ADMIN — HYDROMORPHONE HYDROCHLORIDE 0.2 MG: 1 INJECTION, SOLUTION INTRAMUSCULAR; INTRAVENOUS; SUBCUTANEOUS at 02:02

## 2023-02-09 RX ADMIN — EPHEDRINE SULFATE 10 MG: 50 INJECTION INTRAVENOUS at 12:02

## 2023-02-09 RX ADMIN — LIDOCAINE HYDROCHLORIDE 100 MG: 20 INJECTION INTRAVENOUS at 12:02

## 2023-02-09 RX ADMIN — PHENYLEPHRINE HYDROCHLORIDE 100 MCG: 10 INJECTION INTRAVENOUS at 12:02

## 2023-02-09 RX ADMIN — FENTANYL CITRATE 50 MCG: 50 INJECTION, SOLUTION INTRAMUSCULAR; INTRAVENOUS at 12:02

## 2023-02-09 RX ADMIN — EPHEDRINE SULFATE 10 MG: 50 INJECTION INTRAVENOUS at 01:02

## 2023-02-09 RX ADMIN — GLYCOPYRROLATE 0.2 MG: 0.2 INJECTION, SOLUTION INTRAMUSCULAR; INTRAVENOUS at 12:02

## 2023-02-09 RX ADMIN — ACETAMINOPHEN 1000 MG: 10 INJECTION INTRAVENOUS at 01:02

## 2023-02-09 RX ADMIN — ROCURONIUM BROMIDE 50 MG: 10 INJECTION INTRAVENOUS at 12:02

## 2023-02-09 RX ADMIN — FENTANYL CITRATE 25 MCG: 50 INJECTION, SOLUTION INTRAMUSCULAR; INTRAVENOUS at 03:02

## 2023-02-09 RX ADMIN — TACROLIMUS 900 MG: 0.5 CAPSULE ORAL at 12:02

## 2023-02-09 RX ADMIN — LIDOCAINE HYDROCHLORIDE 10 MG: 10 INJECTION, SOLUTION EPIDURAL; INFILTRATION; INTRACAUDAL; PERINEURAL at 10:02

## 2023-02-09 RX ADMIN — SUGAMMADEX 200 MG: 100 INJECTION, SOLUTION INTRAVENOUS at 01:02

## 2023-02-09 RX ADMIN — ONDANSETRON 4 MG: 2 INJECTION INTRAMUSCULAR; INTRAVENOUS at 01:02

## 2023-02-09 RX ADMIN — PROPOFOL 150 MG: 10 INJECTION, EMULSION INTRAVENOUS at 12:02

## 2023-02-09 RX ADMIN — OXYCODONE HYDROCHLORIDE AND ACETAMINOPHEN 1 TABLET: 5; 325 TABLET ORAL at 02:02

## 2023-02-09 RX ADMIN — HYDROMORPHONE HYDROCHLORIDE 0.2 MG: 1 INJECTION, SOLUTION INTRAMUSCULAR; INTRAVENOUS; SUBCUTANEOUS at 01:02

## 2023-02-09 RX ADMIN — SODIUM CHLORIDE: 0.9 INJECTION, SOLUTION INTRAVENOUS at 11:02

## 2023-02-09 RX ADMIN — ALBUTEROL SULFATE 4 PUFF: 108 INHALANT RESPIRATORY (INHALATION) at 12:02

## 2023-02-09 RX ADMIN — SUCCINYLCHOLINE CHLORIDE 120 MG: 20 INJECTION, SOLUTION INTRAMUSCULAR; INTRAVENOUS at 12:02

## 2023-02-09 RX ADMIN — DEXAMETHASONE SODIUM PHOSPHATE 8 MG: 4 INJECTION, SOLUTION INTRAMUSCULAR; INTRAVENOUS at 12:02

## 2023-02-09 NOTE — ANESTHESIA PROCEDURE NOTES
Intubation    Date/Time: 2/9/2023 12:10 PM  Performed by: Tiffany Costa CRNA  Authorized by: Jose M Fischer MD     Intubation:     Induction:  Intravenous    Intubated:  Postinduction    Mask Ventilation:  Moderately difficult with oral airway    Attempts:  1    Attempted By:  CRNA    Method of Intubation:  Direct    Blade:  Olivera 2    Laryngeal View Grade: Grade I - full view of cords      Difficult Airway Encountered?: No      Complications:  None    Airway Device:  Oral endotracheal tube    Airway Device Size:  7.5    Style/Cuff Inflation:  Cuffed (inflated to minimal occlusive pressure)    Tube secured:  22    Secured at:  The lips    Placement Verified By:  Capnometry    Complicating Factors:  None    Findings Post-Intubation:  BS equal bilateral and atraumatic/condition of teeth unchanged

## 2023-02-09 NOTE — INTERVAL H&P NOTE
Health Maintenance Due   Topic Date Due   • Shingles Vaccine (1 of 2) 11/06/2017       Patient is up to date, no discussion needed.         The patient has been examined and the H&P has been reviewed:    I concur with the findings and no changes have occurred since H&P was written.    Surgery risks, benefits and alternative options discussed and understood by patient/family.          There are no hospital problems to display for this patient.

## 2023-02-09 NOTE — ANESTHESIA PREPROCEDURE EVALUATION
02/09/2023  Zander Kaur Sr. is a 72 y.o., male.        Procedure: CLOSURE, MOHS PROCEDURE DEFECT (Right: Face)   Anesthesia type: General   Diagnosis: Mohs defect of right nose [M95.0]         Pre-operative evaluation for Procedure(s) (LRB):  CLOSURE, MOHS PROCEDURE DEFECT (Right)    @[id number]@@    Encounter Diagnosis   Name Primary?    Mohs defect of right nose        Review of patient's allergies indicates:   Allergen Reactions    Diphenhydramine hcl     Penicillins      Other reaction(s): Rash    Povidone-iodine      Other reaction(s): Itching  Other reaction(s): Itching       Medications Prior to Admission   Medication Sig Dispense Refill Last Dose    celecoxib (CELEBREX) 200 MG capsule Take 200 mg by mouth 2 (two) times daily.   2/9/2023 at 0815    doxazosin (CARDURA) 8 MG Tab Take 1 tablet (8 mg total) by mouth once daily. 90 tablet 0 2/9/2023 at 0815    gabapentin (NEURONTIN) 300 MG capsule Take 300 mg by mouth 3 (three) times daily.   2/9/2023 at 0815    metoprolol tartrate (LOPRESSOR) 100 MG tablet Take 1 tablet (100 mg) by mouth twice daily. 180 tablet 2 2/9/2023 at 0815    multivitamin (THERAGRAN) per tablet Take 1 tablet by mouth.   2/8/2023 at 2100    PROGRAF 1 mg Cap Take 1 capsule (1 mg total) by mouth every 12 (twelve) hours. 60 capsule 11 2/9/2023 at 0815    ropinirole (REQUIP) 0.5 MG tablet Take 0.5 mg by mouth as needed.    2/8/2023 at 2100    vitamin D 1000 units Tab Take 1,000 Units by mouth once daily.   2/8/2023 at 2100    amlodipine (NORVASC) 10 MG tablet TAKE 1 TABLET BY MOUTH DAILY 30 tablet 11     carisoprodoL (SOMA) 350 MG tablet Take 1 tablet by mouth 2 (two) times daily.        ciclopirox (PENLAC) 8 % Soln Apply topically nightly. Paint on and under nail nightly and remove every 7-10 days and start over with application. May use for 3 months. 6.6 mL 2  Unknown    fluorouraciL (EFUDEX) 5 % cream Compound fluorouracil 5% + calcipotriene 0.005% cream. Apply a pea-sized amount to entire face and scalp BID x 5 - 7 days. 60 g 1 Unknown    methocarbamoL (ROBAXIN) 750 MG Tab        oxyCODONE-acetaminophen (PERCOCET) 7.5-325 mg per tablet                No current facility-administered medications on file prior to encounter.     Current Outpatient Medications on File Prior to Encounter   Medication Sig Dispense Refill    celecoxib (CELEBREX) 200 MG capsule Take 200 mg by mouth 2 (two) times daily.      doxazosin (CARDURA) 8 MG Tab Take 1 tablet (8 mg total) by mouth once daily. 90 tablet 0    gabapentin (NEURONTIN) 300 MG capsule Take 300 mg by mouth 3 (three) times daily.      metoprolol tartrate (LOPRESSOR) 100 MG tablet Take 1 tablet (100 mg) by mouth twice daily. 180 tablet 2    multivitamin (THERAGRAN) per tablet Take 1 tablet by mouth.      PROGRAF 1 mg Cap Take 1 capsule (1 mg total) by mouth every 12 (twelve) hours. 60 capsule 11    ropinirole (REQUIP) 0.5 MG tablet Take 0.5 mg by mouth as needed.       vitamin D 1000 units Tab Take 1,000 Units by mouth once daily.      amlodipine (NORVASC) 10 MG tablet TAKE 1 TABLET BY MOUTH DAILY 30 tablet 11    carisoprodoL (SOMA) 350 MG tablet Take 1 tablet by mouth 2 (two) times daily.       ciclopirox (PENLAC) 8 % Soln Apply topically nightly. Paint on and under nail nightly and remove every 7-10 days and start over with application. May use for 3 months. 6.6 mL 2    fluorouraciL (EFUDEX) 5 % cream Compound fluorouracil 5% + calcipotriene 0.005% cream. Apply a pea-sized amount to entire face and scalp BID x 5 - 7 days. 60 g 1    methocarbamoL (ROBAXIN) 750 MG Tab       oxyCODONE-acetaminophen (PERCOCET) 7.5-325 mg per tablet          Past Medical History:  No date: Actinic keratosis  No date: Allergy      Comment:  seasonal  No date: Arthritis  02/06/2023: Basal cell carcinoma of right ala nasi  No date:  Hypertension  No date: Joint pain  No date: Organ transplant      Comment:  liver 2010  08/15/2022: SCC (squamous cell carcinoma)      Comment:  Right cheek -SSW  2013: Squamous Cell Carcinoma      Comment:  right temple  2013: Squamous Cell Carcinoma      Comment:  left forehead  No date: Stroke    Past Surgical History:   Procedure Laterality Date    ANKLE SURGERY      multipole surgeries    FACIAL RECONSTRUCTION SURGERY      HERNIA REPAIR      knee scope      bob, multiple    LIVER TRANSPLANT      2010    SHOULDER OPEN ROTATOR CUFF REPAIR      left       Social History     Tobacco Use   Smoking Status Former    Types: Cigarettes    Quit date:     Years since quittin.1   Smokeless Tobacco Never   Tobacco Comments    SMOKED FOR 1 YEAR, [employer] MADE HIM STOP       Social History     Substance and Sexual Activity   Alcohol Use Yes    Alcohol/week: 2.0 standard drinks    Types: 2 Glasses of wine per week    Comment: per week       Physical Activity: Not on file         No results for input(s): HCT in the last 72 hours.  No results for input(s): PLT in the last 72 hours.  No results for input(s): K in the last 72 hours.  No results for input(s): CREATININE in the last 72 hours.  No results for input(s): GLU in the last 72 hours.  No results for input(s): PT in the last 72 hours.                    Pre-op Assessment          Review of Systems  Anesthesia Hx:  No problems with previous Anesthesia    Hematology/Oncology:  Hematology Normal   Oncology Normal   Oncology Comments: Minor skin cured by excision     Cardiovascular:   Hypertension, well controlled Denies MI.    Denies Angina.    Pulmonary:  Pulmonary Normal  Denies COPD.  Denies Asthma.  Denies Shortness of breath.    Renal/:   Denies Chronic Renal Disease.     Hepatic/GI:   Liver Disease, (transplant  doing well)    Neurological:   Denies TIA. CVA (30 years without any sequelae, intracranial hemorrhage), no  residual symptoms Denies Seizures.    Endocrine:   Denies Diabetes.        Physical Exam  General: Well nourished, Cooperative, Alert and Oriented    Airway:  Mallampati: II   Mouth Opening: Normal  TM Distance: Normal  Tongue: Normal  Neck ROM: Normal ROM        Anesthesia Plan  Type of Anesthesia, risks & benefits discussed:    Anesthesia Type: Gen ETT  Intra-op Monitoring Plan: Standard ASA Monitors  Post Op Pain Control Plan: multimodal analgesia and IV/PO Opioids PRN  Induction:  IV  Informed Consent: Informed consent signed with the Patient and all parties understand the risks and agree with anesthesia plan.  All questions answered.   ASA Score: 3  Day of Surgery Review of History & Physical: H&P Update referred to the surgeon/provider.    Ready For Surgery From Anesthesia Perspective.     .

## 2023-02-09 NOTE — BRIEF OP NOTE
Demetri [facility] - Surgery ([facility])  Brief Operative Note    Surgery Date: 2/9/2023     Surgeon(s) and Role:     * Uzair Brandon MD - Primary     * Gabrielle Camarena MD - Resident - Assisting        Pre-op Diagnosis:  Mohs defect of right nose [M95.0]    Post-op Diagnosis:  Post-Op Diagnosis Codes:     * Mohs defect of right nose [M95.0]    Procedure(s) (LRB):  CLOSURE, MOHS PROCEDURE DEFECT (Right)    Anesthesia: General    Operative Findings:   R melolabial flap with L auricular cartilage graft for closure of 1.5 cm R alar defect     Estimated Blood Loss: 15 cc         Specimens:   Specimen (24h ago, onward)      None              Discharge Note    OUTCOME: Patient tolerated treatment/procedure well without complication and is now ready for discharge.    DISPOSITION: Home or Self Care    FINAL DIAGNOSIS:  Mohs defect of right nose    FOLLOWUP: In clinic    DISCHARGE INSTRUCTIONS:    Discharge Procedure Orders   Diet Adult Regular     Lifting restrictions   Order Comments: No heavy lifting (nothing greater than 10 pounds), no strenuous activity for 3 weeks     Notify your health care provider if you experience any of the following:  temperature >100.4     Notify your health care provider if you experience any of the following:  severe uncontrolled pain     Notify your health care provider if you experience any of the following:  redness, tenderness, or signs of infection (pain, swelling, redness, odor or green/yellow discharge around incision site)     No dressing needed   Order Comments: Apply vaseline ointment to incision sites   Exposed area may bleed, this is expected   OK to gently place gauze/dressing to capture any drainage   Avoid aggressive manipulation of the area

## 2023-02-09 NOTE — PLAN OF CARE
Discharge instructions given and explained to patient and family with verbalization of understanding all instructions. Prescription given and explained next time and doses of each medication. Patients v/s stable, denies n/v and tolerating po, rates pain level tolerable, IV removed, and wife at bedside for patient discharge home.

## 2023-02-09 NOTE — TRANSFER OF CARE
"Anesthesia Transfer of Care Note    Patient: Zander Kaur Sr.    Procedure(s) Performed: Procedure(s) (LRB):  CLOSURE, MOHS PROCEDURE DEFECT (Right)    Patient location: [facility]    Anesthesia Type: general    Transport from OR: Transported from OR on 6-10 L/min O2 by face mask with adequate spontaneous ventilation    Post pain: adequate analgesia    Post assessment: no apparent anesthetic complications and tolerated procedure well    Post vital signs: stable    Level of consciousness: awake and alert    Nausea/Vomiting: no nausea/vomiting    Complications: none    Transfer of care protocol was followed      Last vitals:   Visit Vitals  BP (!) 140/81   Pulse 69   Temp 36.9 °C (98.4 °F) (Temporal)   Resp 18   Ht 5' 7" (1.702 m)   Wt 96.2 kg (212 lb)   SpO2 (!) 92%   BMI 33.20 kg/m²     "

## 2023-02-10 NOTE — ANESTHESIA POSTPROCEDURE EVALUATION
Anesthesia Post Evaluation    Patient: Zander Kaur     Procedure(s) Performed: Procedure(s) (LRB):  CLOSURE, MOHS PROCEDURE DEFECT (Right)    Final Anesthesia Type: general      Patient location during evaluation: PACU  Patient participation: Yes- Able to Participate  Level of consciousness: awake and alert  Post-procedure vital signs: reviewed and stable  Pain management: adequate  Airway patency: patent    PONV status at discharge: No PONV  Anesthetic complications: no      Cardiovascular status: blood pressure returned to baseline  Respiratory status: unassisted  Hydration status: euvolemic  Follow-up not needed.          Vitals Value Taken Time   /91 02/09/23 1615   Temp 36.9 °C (98.4 °F) 02/09/23 1345   Pulse 60 02/09/23 1630   Resp 18 02/09/23 1615   SpO2 96 % 02/09/23 1630         No case tracking events are documented in the log.      Pain/Jose Score: Pain Rating Prior to Med Admin: 10 (2/9/2023  3:46 PM)  Jose Score: 10 (2/9/2023  4:00 PM)

## 2023-02-10 NOTE — OP NOTE
DATE OF PROCEDURE: 2/9/2023     PREOPERATIVE DIAGNOSES:   Full-thickness defect of R nasal ala status post Mohs micrographic surgery for basal cell carcinoma    POSTOPERATIVE DIAGNOSES:   Same    SURGEON:  Surgeon(s) and Role:     * Uzair Brandon MD - Primary     * Gabrielle Camarena MD - Resident - Assisting      PROCEDURES PERFORMED:   Wound prep of R nasal wound measuring roughly 2x1.5 cm  Bipedicled advancement flap from R nasal sidewall for closure of defect of nasal lining measuring 2x1.5 cm  Left auricular cartilage graft for reconstruction of right lower lateral cartilage  Right melolabial flap closure of right nasal alar defect    ANESTHESIA: General      INDICATIONS FOR PROCEDURE:   Zander Kaur Sr. is a 72 y.o. man status post Mohs micrographic surgery for basal cell carcinoma of the right nasal ala. He was referred to me for closure of a through and through defect of the right nasal ala.  Given the above, I recommended that we proceed to the operating room for the aforementioned procedures.    He was apprised of the risks, benefits and alternatives to surgery.  In spite of the risk inherent to surgery,he provided informed consent for the aforementioned procedures.     PROCEDURE IN DETAIL:  The patient was taken to the operating room and placed on the operating table in the supine position.  General endotracheal anesthesia was induced by the anesthesia team.     The operating table was rotated 90° to the right.  A right-sided melolabial flap was marked.  The skin paddle measured roughly 2 x 1.5 cm to match the size of the defect of the right nasal ala.  The left ear was marked in preparation for auricular cartilage graft for reconstruction of the absent lower lateral cartilage.  Intranasally, a bite pedicled nasal vestibular advancement flap was marked on the lateral nasal wall and injected with several cc of 1% lidocaine with epinephrine.    To begin, the edges of the wound were freshened  sharply with 15 blade.  Next, the lateral nasal wall skin was undermined.  An incision was made at the junction of the upper and lower lateral cartilages through the skin of the nasal vestibule and the skin was advanced as a bipedicle advancement flap and sutured to the edge of the alar defect reconstituting the nasal lining.      Next, the left ear was addressed.  The intended incision which had been marked the posterior aspect of the simple melody was incised with a 15 blade.  Sharp dissection proceeded down through the underlying perichondrium to expose the auricular cartilage.  The auricular cartilage was then incised circumferentially and freed from the deep soft tissue attachments.  Ultimately, the graft was passed off to the back table.  Hemostasis was assured the donor site with electrocautery and the wound was closed with chromic suture.  Several through and through Monocryl sutures were utilized as a bolster.      Next, the cartilage graft was trimmed to size.  A pocket was created in the subcutaneous plane circumferentially and the cartilage graft was approximated to the surrounding soft tissues and upper lateral cartilage with PDS suture.      Next, a right-sided melolabial flap was marked on a cutaneous pedicle.  The flap measured roughly 2 x 1.5 cm.  Flap was incised distally and elevated from distal to proximal within the subcutaneous plane maintaining roughly 3 mm subcutaneous tissue to assure adequate blood supply.  The flap was rotated in place and approximated to the edges of the defect with Prolene suture.  Hemostasis was achieved in the donor site of the donor site was closed with 3-0 Vicryl and 4-0 Prolene.  Once all wounds were closed, he was handed back to anesthesia.  He was awakened, extubated transferred recovery in satisfactory condition.    There were no intraoperative complications.  I was present for and participated in the entire procedure as dictated above.       ESTIMATED BLOOD  LOSS: 10 mL    SPECIMENS:   Specimen (24h ago, onward)      None

## 2023-02-26 DIAGNOSIS — M95.0 MOHS DEFECT OF RIGHT NOSE: Primary | ICD-10-CM

## 2023-02-26 RX ORDER — LIDOCAINE HYDROCHLORIDE 10 MG/ML
1 INJECTION, SOLUTION EPIDURAL; INFILTRATION; INTRACAUDAL; PERINEURAL ONCE
Status: CANCELLED | OUTPATIENT
Start: 2023-02-26 | End: 2023-02-26

## 2023-02-26 RX ORDER — SODIUM CHLORIDE 0.9 % (FLUSH) 0.9 %
10 SYRINGE (ML) INJECTION
Status: CANCELLED | OUTPATIENT
Start: 2023-02-26

## 2023-02-28 ENCOUNTER — EXTERNAL CHRONIC CARE MANAGEMENT (OUTPATIENT)
Dept: PRIMARY CARE CLINIC | Facility: CLINIC | Age: 73
End: 2023-02-28
Payer: MEDICARE

## 2023-02-28 PROCEDURE — 99439 CHRNC CARE MGMT STAF EA ADDL: CPT | Mod: PBBFAC,27 | Performed by: INTERNAL MEDICINE

## 2023-02-28 PROCEDURE — 99490 CHRNC CARE MGMT STAFF 1ST 20: CPT | Mod: PBBFAC,25 | Performed by: INTERNAL MEDICINE

## 2023-02-28 PROCEDURE — 99490 CHRNC CARE MGMT STAFF 1ST 20: CPT | Mod: S$PBB,,, | Performed by: INTERNAL MEDICINE

## 2023-02-28 PROCEDURE — 99439 PR CHRONIC CARE MGMT, EA ADDTL 20 MIN: ICD-10-PCS | Mod: S$PBB,,, | Performed by: INTERNAL MEDICINE

## 2023-02-28 PROCEDURE — 99490 PR CHRONIC CARE MGMT, 1ST 20 MIN: ICD-10-PCS | Mod: S$PBB,,, | Performed by: INTERNAL MEDICINE

## 2023-02-28 PROCEDURE — 99439 CHRNC CARE MGMT STAF EA ADDL: CPT | Mod: S$PBB,,, | Performed by: INTERNAL MEDICINE

## 2023-03-02 ENCOUNTER — OFFICE VISIT (OUTPATIENT)
Dept: TRANSPLANT | Facility: CLINIC | Age: 73
End: 2023-03-02
Payer: MEDICARE

## 2023-03-02 VITALS
RESPIRATION RATE: 16 BRPM | OXYGEN SATURATION: 95 % | HEART RATE: 68 BPM | HEIGHT: 66 IN | DIASTOLIC BLOOD PRESSURE: 96 MMHG | SYSTOLIC BLOOD PRESSURE: 178 MMHG | BODY MASS INDEX: 35.01 KG/M2 | WEIGHT: 217.81 LBS | TEMPERATURE: 97 F

## 2023-03-02 DIAGNOSIS — D84.9 IMMUNOSUPPRESSION: ICD-10-CM

## 2023-03-02 DIAGNOSIS — Z94.4 S/P LIVER TRANSPLANT: Primary | ICD-10-CM

## 2023-03-02 PROCEDURE — 99214 OFFICE O/P EST MOD 30 MIN: CPT | Mod: S$PBB,,, | Performed by: INTERNAL MEDICINE

## 2023-03-02 PROCEDURE — 99999 PR PBB SHADOW E&M-EST. PATIENT-LVL IV: ICD-10-PCS | Mod: PBBFAC,,, | Performed by: INTERNAL MEDICINE

## 2023-03-02 PROCEDURE — 99214 OFFICE O/P EST MOD 30 MIN: CPT | Mod: PBBFAC | Performed by: INTERNAL MEDICINE

## 2023-03-02 PROCEDURE — 99999 PR PBB SHADOW E&M-EST. PATIENT-LVL IV: CPT | Mod: PBBFAC,,, | Performed by: INTERNAL MEDICINE

## 2023-03-02 PROCEDURE — 99214 PR OFFICE/OUTPT VISIT, EST, LEVL IV, 30-39 MIN: ICD-10-PCS | Mod: S$PBB,,, | Performed by: INTERNAL MEDICINE

## 2023-03-02 NOTE — PROGRESS NOTES
Transplant Hepatology  Liver Transplant Recipient Follow-up    Transplant Date: 2010  UNOS Native Liver Dx: Alcoholic Cirrhosis    Zander is here for follow up of his liver transplant. 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  He remains well.  His main issue currently is his basl cell Ca for which he had Mohs surgery recently.    He contiues to have left knee pain for which he may need a knee replacement although he is reluctant to consider it.    Body mass index is 35.16 kg/m².    ORGAN: LIVER  Whole or Partial: whole liver  Donor Type:  - brain death  [facility] High Risk: no  Donor CMV Status: positive  Donor HCV Status: negative  Donor HBcAb: negative  Biliary Anastomosis:   Arterial Anatomy:   IVC reconstruction:   Portal vein status:     He has had the following complications since transplant: biliary stricture. The noted complications are well controlled.      Subjective:     Interval History: .  He has normal liver and kidney function and is on tac monotherapy.    His only liver issue post-OLT was an anastomotic biliary stricture treated with ERCP and stenting but his biliary stents were removed in Dec 2011 and his LFTs have since remained normal.    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 Sees dermatology routinely. He works and enjoys fixing cars and racing. He has been followed by orthopedics for multiple joint and bone fractures.   He has had another BCC recently.    Normal liver and kidney function in Sep 22.  On Tac monotherapy at 1 mg BID.    Review of Systems   Constitutional:  Negative for fatigue, fever and unexpected weight change.   Eyes: Negative.    Respiratory: Negative.  Negative for chest tightness and shortness of breath.    Cardiovascular:  Negative for chest pain and leg swelling.   Gastrointestinal:  Negative for abdominal pain, blood in stool, constipation, nausea and vomiting.   Genitourinary: Negative.     Musculoskeletal:  Positive for arthralgias (from previous injuries) and joint swelling.   Skin: Negative.  Negative for color change and rash.   Neurological: Negative.  Negative for dizziness and light-headedness.   Psychiatric/Behavioral: Negative.  Negative for confusion and suicidal ideas. The patient is not nervous/anxious.      Objective:     Physical Exam  Vitals and nursing note reviewed.   Constitutional:       Appearance: He is well-developed.   HENT:      Head: Normocephalic and atraumatic.   Eyes:      Conjunctiva/sclera: Conjunctivae normal.      Pupils: Pupils are equal, round, and reactive to light.   Neck:      Thyroid: No thyromegaly.   Cardiovascular:      Rate and Rhythm: Normal rate and regular rhythm.      Heart sounds: Normal heart sounds.   Pulmonary:      Effort: Pulmonary effort is normal.      Breath sounds: Normal breath sounds.   Abdominal:      General: Bowel sounds are normal. There is no distension.      Palpations: Abdomen is soft.      Tenderness: There is no abdominal tenderness.       Musculoskeletal:         General: No tenderness.      Cervical back: Normal range of motion and neck supple.   Skin:     General: Skin is warm and dry.   Neurological:      Mental Status: He is alert and oriented to person, place, and time.   Psychiatric:         Behavior: Behavior normal.         Thought Content: Thought content normal.         Judgment: Judgment normal.     Lab Results   Component Value Date    BILITOT 1.2 (H) 09/20/2022    AST 29 09/20/2022    ALT 32 09/20/2022    ALKPHOS 62 09/20/2022    CREATININE 0.8 09/20/2022    ALBUMIN 3.7 09/20/2022     Lab Results   Component Value Date    WBC 5.21 09/20/2022    HGB 15.8 09/20/2022    HCT 47.4 09/20/2022     09/20/2022     Lab Results   Component Value Date    TACROLIMUS 4.5 (L) 09/20/2022       Assessment/Plan:     1. S/P liver transplant    2. Immunosuppression        1. S/p liver transplant 6/15/2010 for Alcoholic cirrhosis.  Normal LFTs. Chronic immunosuppressive medications for rejection prophylaxis at target. On tacrolimus   Plan: no adjustment needed.Continue monitoring symptoms, labs and drug levels for drug-related toxicity and side effects  2. Squamous cell skin cancer + BCC. Advised to continue dermatology follow up.   3. Hypertension- controlled with medication- has previously enrolled in digital medicine program here  4. Osteoarthritis and multiple joint injuries. Continue follow up with orthopedics. On pain meds.   5. Uses alcohol infrequently- asked to minimize this if not able to stop completely.  6. Weight loss recommended.  7. Colonoscopy appointment to be made by patient    Clinic in 1 year.    MD PURVI Irving Patient Status  Functional Status: 100% - Normal, no complaints, no evidence of disease  Physical Capacity: No Limitations

## 2023-03-02 NOTE — LETTER
March 2, 2023        Shai Davison  [address]  [facility] [address]  Phone: [phone]  Fax: [phone]             [facility]st [facility]  [address]  [facility] [address]  Phone: [phone]   Patient: Zander Kaur Sr.   MR Number: [id number]   YOB: 1950   Date of Visit: 3/2/2023       Dear Dr. Shai Davison    Thank you for referring Zander Kaur to me for evaluation. Attached you will find relevant portions of my assessment and plan of care.    If you have questions, please do not hesitate to call me. I look forward to following Zander Kaur along with you.    Sincerely,    Carson Oswald MD    Enclosure    If you would like to receive this communication electronically, please contact [email or web address] or [phone] to request [employer] Link access.    [employer] Link is a tool which provides read-only access to select patient information with whom you have a relationship. Its easy to use and provides real time access to review your patients record including encounter summaries, notes, results, and demographic information.    If you feel you have received this communication in error or would no longer like to receive these types of communications, please e-mail [email or web address]

## 2023-03-03 ENCOUNTER — ANESTHESIA (OUTPATIENT)
Dept: SURGERY | Facility: HOSPITAL | Age: 73
End: 2023-03-03
Payer: MEDICARE

## 2023-03-03 ENCOUNTER — ANESTHESIA EVENT (OUTPATIENT)
Dept: SURGERY | Facility: HOSPITAL | Age: 73
End: 2023-03-03
Payer: MEDICARE

## 2023-03-03 ENCOUNTER — HOSPITAL ENCOUNTER (OUTPATIENT)
Facility: HOSPITAL | Age: 73
Discharge: HOME | End: 2023-03-03
Attending: OTOLARYNGOLOGY | Admitting: OTOLARYNGOLOGY
Payer: MEDICARE

## 2023-03-03 VITALS
HEIGHT: 66 IN | SYSTOLIC BLOOD PRESSURE: 120 MMHG | TEMPERATURE: 98 F | OXYGEN SATURATION: 94 % | DIASTOLIC BLOOD PRESSURE: 68 MMHG | WEIGHT: 217 LBS | HEART RATE: 62 BPM | RESPIRATION RATE: 18 BRPM | BODY MASS INDEX: 34.87 KG/M2

## 2023-03-03 DIAGNOSIS — M95.0 MOHS DEFECT OF RIGHT NOSE: Primary | ICD-10-CM

## 2023-03-03 PROCEDURE — D9220A PRA ANESTHESIA: Mod: CRNA,,, | Performed by: NURSE ANESTHETIST, CERTIFIED REGISTERED

## 2023-03-03 PROCEDURE — D9220A PRA ANESTHESIA: Mod: ANES,,, | Performed by: ANESTHESIOLOGY

## 2023-03-03 PROCEDURE — 71000015 HC POSTOP RECOV 1ST HR: Performed by: OTOLARYNGOLOGY

## 2023-03-03 PROCEDURE — 63600175 PHARM REV CODE 636 W HCPCS: Performed by: ANESTHESIOLOGY

## 2023-03-03 PROCEDURE — 15004 WOUND PREP F/N/HF/G: CPT | Mod: 58,,, | Performed by: OTOLARYNGOLOGY

## 2023-03-03 PROCEDURE — 37000009 HC ANESTHESIA EA ADD 15 MINS: Performed by: OTOLARYNGOLOGY

## 2023-03-03 PROCEDURE — 63600175 PHARM REV CODE 636 W HCPCS: Performed by: NURSE ANESTHETIST, CERTIFIED REGISTERED

## 2023-03-03 PROCEDURE — 71000044 HC DOSC ROUTINE RECOVERY FIRST HOUR: Performed by: OTOLARYNGOLOGY

## 2023-03-03 PROCEDURE — 15630 DELAY FLAP EYE/NOS/EAR/LIP: CPT | Mod: 58,,, | Performed by: OTOLARYNGOLOGY

## 2023-03-03 PROCEDURE — 15630 PR DELAY/SECTN FLAP LID,NOS,EAR,LIP: ICD-10-PCS | Mod: 58,,, | Performed by: OTOLARYNGOLOGY

## 2023-03-03 PROCEDURE — 71000016 HC POSTOP RECOV ADDL HR: Performed by: OTOLARYNGOLOGY

## 2023-03-03 PROCEDURE — 25000003 PHARM REV CODE 250: Performed by: ANESTHESIOLOGY

## 2023-03-03 PROCEDURE — 63600175 PHARM REV CODE 636 W HCPCS

## 2023-03-03 PROCEDURE — D9220A PRA ANESTHESIA: ICD-10-PCS | Mod: ANES,,, | Performed by: ANESTHESIOLOGY

## 2023-03-03 PROCEDURE — D9220A PRA ANESTHESIA: ICD-10-PCS | Mod: CRNA,,, | Performed by: NURSE ANESTHETIST, CERTIFIED REGISTERED

## 2023-03-03 PROCEDURE — 37000008 HC ANESTHESIA 1ST 15 MINUTES: Performed by: OTOLARYNGOLOGY

## 2023-03-03 PROCEDURE — 25000003 PHARM REV CODE 250: Performed by: OTOLARYNGOLOGY

## 2023-03-03 PROCEDURE — 15004 PR WND PREP PED, FACE/NCK/HND/FT/GEN 1ST 100 CM: ICD-10-PCS | Mod: 58,,, | Performed by: OTOLARYNGOLOGY

## 2023-03-03 PROCEDURE — 36000706: Performed by: OTOLARYNGOLOGY

## 2023-03-03 PROCEDURE — 36000707: Performed by: OTOLARYNGOLOGY

## 2023-03-03 PROCEDURE — 25000003 PHARM REV CODE 250: Performed by: NURSE ANESTHETIST, CERTIFIED REGISTERED

## 2023-03-03 RX ORDER — FENTANYL CITRATE 50 UG/ML
INJECTION, SOLUTION INTRAMUSCULAR; INTRAVENOUS
Status: DISCONTINUED | OUTPATIENT
Start: 2023-03-03 | End: 2023-03-03

## 2023-03-03 RX ORDER — ONDANSETRON 2 MG/ML
INJECTION INTRAMUSCULAR; INTRAVENOUS
Status: DISCONTINUED | OUTPATIENT
Start: 2023-03-03 | End: 2023-03-03

## 2023-03-03 RX ORDER — MIDAZOLAM HYDROCHLORIDE 1 MG/ML
INJECTION, SOLUTION INTRAMUSCULAR; INTRAVENOUS
Status: DISCONTINUED | OUTPATIENT
Start: 2023-03-03 | End: 2023-03-03

## 2023-03-03 RX ORDER — ACETAMINOPHEN 10 MG/ML
INJECTION, SOLUTION INTRAVENOUS
Status: DISCONTINUED | OUTPATIENT
Start: 2023-03-03 | End: 2023-03-03

## 2023-03-03 RX ORDER — PROPOFOL 10 MG/ML
VIAL (ML) INTRAVENOUS
Status: DISCONTINUED | OUTPATIENT
Start: 2023-03-03 | End: 2023-03-03

## 2023-03-03 RX ORDER — HALOPERIDOL 5 MG/ML
0.5 INJECTION INTRAMUSCULAR EVERY 10 MIN PRN
Status: DISCONTINUED | OUTPATIENT
Start: 2023-03-03 | End: 2023-03-03 | Stop reason: HOSPADM

## 2023-03-03 RX ORDER — PROCHLORPERAZINE EDISYLATE 5 MG/ML
5 INJECTION INTRAMUSCULAR; INTRAVENOUS EVERY 30 MIN PRN
Status: DISCONTINUED | OUTPATIENT
Start: 2023-03-03 | End: 2023-03-03 | Stop reason: HOSPADM

## 2023-03-03 RX ORDER — HYDROCODONE BITARTRATE AND ACETAMINOPHEN 5; 325 MG/1; MG/1
1 TABLET ORAL EVERY 6 HOURS PRN
Qty: 10 TABLET | Refills: 0 | Status: SHIPPED | OUTPATIENT
Start: 2023-03-03

## 2023-03-03 RX ORDER — LIDOCAINE HYDROCHLORIDE 10 MG/ML
1 INJECTION, SOLUTION EPIDURAL; INFILTRATION; INTRACAUDAL; PERINEURAL ONCE
Status: DISCONTINUED | OUTPATIENT
Start: 2023-03-03 | End: 2023-03-03 | Stop reason: HOSPADM

## 2023-03-03 RX ORDER — FENTANYL CITRATE 50 UG/ML
INJECTION, SOLUTION INTRAMUSCULAR; INTRAVENOUS
Status: COMPLETED
Start: 2023-03-03 | End: 2023-03-03

## 2023-03-03 RX ORDER — ROCURONIUM BROMIDE 10 MG/ML
INJECTION, SOLUTION INTRAVENOUS
Status: DISCONTINUED | OUTPATIENT
Start: 2023-03-03 | End: 2023-03-03

## 2023-03-03 RX ORDER — EPHEDRINE SULFATE 50 MG/ML
INJECTION, SOLUTION INTRAVENOUS
Status: DISCONTINUED | OUTPATIENT
Start: 2023-03-03 | End: 2023-03-03

## 2023-03-03 RX ORDER — HYDROMORPHONE HYDROCHLORIDE 1 MG/ML
0.2 INJECTION, SOLUTION INTRAMUSCULAR; INTRAVENOUS; SUBCUTANEOUS EVERY 5 MIN PRN
Status: DISCONTINUED | OUTPATIENT
Start: 2023-03-03 | End: 2023-03-03 | Stop reason: HOSPADM

## 2023-03-03 RX ORDER — SODIUM CHLORIDE 0.9 % (FLUSH) 0.9 %
10 SYRINGE (ML) INJECTION
Status: DISCONTINUED | OUTPATIENT
Start: 2023-03-03 | End: 2023-03-03 | Stop reason: HOSPADM

## 2023-03-03 RX ORDER — FENTANYL CITRATE 50 UG/ML
25 INJECTION, SOLUTION INTRAMUSCULAR; INTRAVENOUS EVERY 5 MIN PRN
Status: COMPLETED | OUTPATIENT
Start: 2023-03-03 | End: 2023-03-03

## 2023-03-03 RX ORDER — LIDOCAINE HYDROCHLORIDE AND EPINEPHRINE 10; 10 MG/ML; UG/ML
INJECTION, SOLUTION INFILTRATION; PERINEURAL
Status: DISCONTINUED | OUTPATIENT
Start: 2023-03-03 | End: 2023-03-03 | Stop reason: HOSPADM

## 2023-03-03 RX ORDER — BACITRACIN ZINC 500 UNIT/G
OINTMENT (GRAM) TOPICAL
Status: DISCONTINUED | OUTPATIENT
Start: 2023-03-03 | End: 2023-03-03 | Stop reason: HOSPADM

## 2023-03-03 RX ORDER — OXYCODONE HYDROCHLORIDE 5 MG/1
5 TABLET ORAL
Status: DISCONTINUED | OUTPATIENT
Start: 2023-03-03 | End: 2023-03-03 | Stop reason: HOSPADM

## 2023-03-03 RX ORDER — ONDANSETRON 4 MG/1
4 TABLET, ORALLY DISINTEGRATING ORAL EVERY 6 HOURS PRN
Qty: 15 TABLET | Refills: 0 | Status: SHIPPED | OUTPATIENT
Start: 2023-03-03 | End: 2023-06-26

## 2023-03-03 RX ORDER — CLINDAMYCIN PHOSPHATE 900 MG/50ML
900 INJECTION, SOLUTION INTRAVENOUS
Status: DISCONTINUED | OUTPATIENT
Start: 2023-03-03 | End: 2023-03-03 | Stop reason: HOSPADM

## 2023-03-03 RX ORDER — CLINDAMYCIN PHOSPHATE 900 MG/50ML
INJECTION, SOLUTION INTRAVENOUS
Status: DISCONTINUED | OUTPATIENT
Start: 2023-03-03 | End: 2023-03-03

## 2023-03-03 RX ORDER — LIDOCAINE HYDROCHLORIDE 20 MG/ML
INJECTION INTRAVENOUS
Status: DISCONTINUED | OUTPATIENT
Start: 2023-03-03 | End: 2023-03-03

## 2023-03-03 RX ORDER — DEXAMETHASONE SODIUM PHOSPHATE 4 MG/ML
INJECTION, SOLUTION INTRA-ARTICULAR; INTRALESIONAL; INTRAMUSCULAR; INTRAVENOUS; SOFT TISSUE
Status: DISCONTINUED | OUTPATIENT
Start: 2023-03-03 | End: 2023-03-03

## 2023-03-03 RX ADMIN — EPHEDRINE SULFATE 10 MG: 50 INJECTION INTRAVENOUS at 09:03

## 2023-03-03 RX ADMIN — FENTANYL CITRATE 100 MCG: 50 INJECTION, SOLUTION INTRAMUSCULAR; INTRAVENOUS at 11:03

## 2023-03-03 RX ADMIN — MIDAZOLAM HYDROCHLORIDE 2 MG: 1 INJECTION, SOLUTION INTRAMUSCULAR; INTRAVENOUS at 08:03

## 2023-03-03 RX ADMIN — SODIUM CHLORIDE: 0.9 INJECTION, SOLUTION INTRAVENOUS at 08:03

## 2023-03-03 RX ADMIN — ACETAMINOPHEN 1000 MG: 10 INJECTION, SOLUTION INTRAVENOUS at 09:03

## 2023-03-03 RX ADMIN — FENTANYL CITRATE 25 MCG: 50 INJECTION, SOLUTION INTRAMUSCULAR; INTRAVENOUS at 10:03

## 2023-03-03 RX ADMIN — CLINDAMYCIN IN 5 PERCENT DEXTROSE 900 MG: 18 INJECTION, SOLUTION INTRAVENOUS at 09:03

## 2023-03-03 RX ADMIN — FENTANYL CITRATE 25 MCG: 50 INJECTION, SOLUTION INTRAMUSCULAR; INTRAVENOUS at 11:03

## 2023-03-03 RX ADMIN — PROPOFOL 200 MG: 10 INJECTION, EMULSION INTRAVENOUS at 09:03

## 2023-03-03 RX ADMIN — ROCURONIUM BROMIDE 70 MG: 10 INJECTION INTRAVENOUS at 09:03

## 2023-03-03 RX ADMIN — DEXAMETHASONE SODIUM PHOSPHATE 4 MG: 4 INJECTION, SOLUTION INTRAMUSCULAR; INTRAVENOUS at 09:03

## 2023-03-03 RX ADMIN — FENTANYL CITRATE 50 MCG: 50 INJECTION, SOLUTION INTRAMUSCULAR; INTRAVENOUS at 09:03

## 2023-03-03 RX ADMIN — ONDANSETRON 4 MG: 2 INJECTION INTRAMUSCULAR; INTRAVENOUS at 09:03

## 2023-03-03 RX ADMIN — LIDOCAINE HYDROCHLORIDE 100 MG: 20 INJECTION INTRAVENOUS at 09:03

## 2023-03-03 RX ADMIN — OXYCODONE HYDROCHLORIDE 5 MG: 5 TABLET ORAL at 11:03

## 2023-03-03 RX ADMIN — SUGAMMADEX 400 MG: 100 INJECTION, SOLUTION INTRAVENOUS at 09:03

## 2023-03-03 NOTE — OP NOTE
DATE OF PROCEDURE: 3/3/2023     PREOPERATIVE DIAGNOSES:   Mohs defect of right nose [M95.0]    POSTOPERATIVE DIAGNOSES:   Mohs defect of right nose [M95.0]    SURGEON:  Surgeon(s) and Role:     * Uzair Brandon MD - Primary     * Gabrielle Camarena MD - Resident - Assisting      PROCEDURES PERFORMED:   1. Wound prep of right facial wound measuring 3 x 2 cm  2. Division and inset of right melolabial flap    ANESTHESIA: General      INDICATIONS FOR PROCEDURE:   Zander Kaur Sr. is a 72 y.o. man status post reconstruction of a defect of the right nasal ala status post Mohs micrographic surgery for basal cell carcinoma.  He presents today for division and inset of his flap.    He was apprised of the risks, benefits and alternatives to surgery.  In spite of the risk inherent to surgery,he provided informed consent for the aforementioned procedures.     PROCEDURE IN DETAIL:  The patient was taken to the operating room and placed on the operating table in the supine position.  General endotracheal anesthesia was induced by the anesthesia team.     The right face was addressed.  The sutures were removed from the right cheek and from the left ear.  The pedicle the flap was marked for incision.  The previous donor site was marked for reopening for inset of the proximal aspect of the flap.  The intended incisions were injected with several cc of 1% lidocaine with epinephrine.  The face was then prepped with dial soap.      To begin, the flap pedicle was divided.  The distal flap was trimmed and thinned.  The edges of the facial wound were freshened sharply and the flap was inset utilizing interrupted 4-0 Prolene.  Next, the donor site was opened.  The proximal flap was thinned and the edges were freshened with both the flap and the donor site and the proximal flap was inset utilizing 4-0 Prolene.  Once all wounds were closed, he was handed back to anesthesia.  He was awakened, extubated and transferred to recovery in  satisfactory condition.    There were no intraoperative complications.  I was present for and participated in the entire procedure as dictated above.       ESTIMATED BLOOD LOSS: 10 mL    SPECIMENS:   Specimen (24h ago, onward)      None

## 2023-03-03 NOTE — ANESTHESIA PREPROCEDURE EVALUATION
03/03/2023  Zander Kaur Sr. is a 72 y.o., male.      Pre-op Assessment    I have reviewed the Patient Summary Reports.    I have reviewed the NPO Status.      Review of Systems  Anesthesia Hx:  No problems with previous Anesthesia  History of prior surgery of interest to airway management or planning: Denies Family Hx of Anesthesia complications.   Denies Personal Hx of Anesthesia complications.   Social:  Non-Smoker, Social Alcohol Use    Hematology/Oncology:  Hematology Normal   Oncology Normal   Oncology Comments: Skin cancer     Cardiovascular:   Hypertension, well controlled Denies MI.    Denies Angina.    Pulmonary:  Pulmonary Normal  Denies COPD.  Denies Asthma.  Denies Shortness of breath.    Renal/:   Denies Chronic Renal Disease.     Hepatic/GI:   Liver Disease, (transplant 2010 doing well)    Neurological:   Denies TIA. CVA (30 years without any sequelae, intracranial hemorrhage), no residual symptoms Denies Seizures.    Endocrine:   Denies Diabetes.        Physical Exam  General: Well nourished, Cooperative, Alert and Oriented  Mohs   full beard   Airway:  Mallampati: II   Mouth Opening: Normal  TM Distance: Normal  Tongue: Normal  Neck ROM: Normal ROM    Dental:  Intact    Chest/Lungs:  Normal Respiratory Rate        Anesthesia Plan  Type of Anesthesia, risks & benefits discussed:    Anesthesia Type: Gen ETT, Gen Supraglottic Airway, Gen Natural Airway  Intra-op Monitoring Plan: Standard ASA Monitors  Induction:  IV  Informed Consent: Informed consent signed with the Patient and all parties understand the risks and agree with anesthesia plan.  All questions answered.   ASA Score: 3    Ready For Surgery From Anesthesia Perspective.     .

## 2023-03-03 NOTE — TRANSFER OF CARE
"Anesthesia Transfer of Care Note    Patient: Zander Kaur Sr.    Procedure(s) Performed: Procedure(s) (LRB):  REVISION, PROCEDURE INVOLVING FLAP GRAFT (Right)    Patient location: [facility]    Anesthesia Type: general    Transport from OR: Transported from OR on 6-10 L/min O2 by face mask with adequate spontaneous ventilation    Post pain: adequate analgesia    Post assessment: tolerated procedure well and no apparent anesthetic complications    Post vital signs: stable    Level of consciousness: responds to stimulation    Nausea/Vomiting: no nausea/vomiting    Complications: none    Transfer of care protocol was followed      Last vitals:   Visit Vitals  /79 (BP Location: Left arm, Patient Position: Lying)   Pulse 75   Temp 36.4 °C (97.5 °F) (Oral)   Resp 16   Ht 5' 6" (1.676 m)   Wt 98.4 kg (217 lb)   SpO2 94%   BMI 35.02 kg/m²     "

## 2023-03-03 NOTE — ANESTHESIA PROCEDURE NOTES
Intubation    Date/Time: 3/3/2023 9:07 AM  Performed by: Aura Rouse CRNA  Authorized by: Nory Cristina MD     Intubation:     Induction:  Intravenous    Intubated:  Postinduction    Mask Ventilation:  Moderately difficult with oral airway    Attempts:  1    Attempted By:  CRNA    Method of Intubation:  Video laryngoscopy    Blade:  Gillespie 3    Laryngeal View Grade: Grade I - full view of cords      Difficult Airway Encountered?: No      Complications:  None    Airway Device:  Oral floridalma    Airway Device Size:  8.0    Style/Cuff Inflation:  Cuffed (inflated to minimal occlusive pressure)    Tube secured:  21    Secured at:  The lips    Placement Verified By:  Capnometry    Complicating Factors:  None    Findings Post-Intubation:  BS equal bilateral and atraumatic/condition of teeth unchanged

## 2023-03-03 NOTE — INTERVAL H&P NOTE
The patient has been examined and the H&P has been reviewed:    I concur with the findings and changes have been noted since the H&P was written: S/P meliolabial flap with auricular cartilage graft repair on 2/9. Presents for division and inset.     Surgery risks, benefits and alternative options discussed and understood by patient/family.          There are no hospital problems to display for this patient.

## 2023-03-03 NOTE — ANESTHESIA POSTPROCEDURE EVALUATION
Anesthesia Post Evaluation    Patient: Zander Kaur     Procedure(s) Performed: Procedure(s) (LRB):  REVISION, PROCEDURE INVOLVING FLAP GRAFT (Right)    Final Anesthesia Type: general      Patient location during evaluation: GI PACU  Patient participation: Yes- Able to Participate  Level of consciousness: awake and alert  Post-procedure vital signs: reviewed and stable  Pain management: adequate  Airway patency: patent  NICOLA mitigation strategies: Extubation and recovery carried out in lateral, semiupright, or other nonsupine position  PONV status at discharge: No PONV  Anesthetic complications: no      Cardiovascular status: hemodynamically stable  Respiratory status: spontaneous ventilation and unassisted  Hydration status: euvolemic  Follow-up not needed.          Vitals Value Taken Time   /68 03/03/23 1215   Temp 36.7 °C (98.1 °F) 03/03/23 1215   Pulse 62 03/03/23 1215   Resp 18 03/03/23 1215   SpO2 94 % 03/03/23 1215         No case tracking events are documented in the log.      Pain/Jose Score: Pain Rating Prior to Med Admin: 6 (3/3/2023 11:24 AM)  Jose Score: 10 (3/3/2023 10:30 AM)

## 2023-03-03 NOTE — PROGRESS NOTES
Pt recovered to baseline and verbalizes readiness for discharge. Pt c/o pain to incision on face; rated at a 2-4/10; Pt states tolerable for discharge. PIV removed as ordered. Pt taken to vehicle with all personal belongings with spouse at side.

## 2023-03-03 NOTE — BRIEF OP NOTE
[facility] - [facility] ([facility])  Brief Operative Note    Surgery Date: 3/3/2023     Surgeon(s) and Role:     * Uzair Brandon MD - Primary     * Gabrielle Camarena MD - Resident - Assisting        Pre-op Diagnosis:  Mohs defect of right nose [M95.0]    Post-op Diagnosis:  Post-Op Diagnosis Codes:     * Mohs defect of right nose [M95.0]    Procedure(s) (LRB):  REVISION, PROCEDURE INVOLVING FLAP GRAFT (Right)    Anesthesia: General    Operative Findings:   D/I of R meliolabial flap of R ala    Estimated Blood Loss: 1 cc         Specimens:   Specimen (24h ago, onward)      None              Discharge Note    OUTCOME: Patient tolerated treatment/procedure well without complication and is now ready for discharge.    DISPOSITION: Home or Self Care    FINAL DIAGNOSIS:  Mohs defect of right nose    FOLLOWUP: In clinic    DISCHARGE INSTRUCTIONS:    Discharge Procedure Orders   Lifting restrictions   Order Comments: Light activity, no heavy lifting, straining, or strenuous activity for 2 weeks     Notify your health care provider if you experience any of the following:  temperature >100.4     Notify your health care provider if you experience any of the following:  redness, tenderness, or signs of infection (pain, swelling, redness, odor or green/yellow discharge around incision site)     No dressing needed   Order Comments: Keep incisions clean and dry, OK to shower in 24 hours but do not scrub incision  Apply bacitracin ointment to incision site daily

## 2023-03-16 ENCOUNTER — OFFICE VISIT (OUTPATIENT)
Dept: OTOLARYNGOLOGY | Facility: CLINIC | Age: 73
End: 2023-03-16
Payer: MEDICARE

## 2023-03-16 VITALS
BODY MASS INDEX: 35.29 KG/M2 | WEIGHT: 219.56 LBS | HEART RATE: 55 BPM | DIASTOLIC BLOOD PRESSURE: 89 MMHG | SYSTOLIC BLOOD PRESSURE: 143 MMHG | HEIGHT: 66 IN

## 2023-03-16 DIAGNOSIS — M95.0 MOHS DEFECT OF RIGHT NOSE: Primary | ICD-10-CM

## 2023-03-16 PROCEDURE — 99024 PR POST-OP FOLLOW-UP VISIT: ICD-10-PCS | Mod: POP,,, | Performed by: NURSE PRACTITIONER

## 2023-03-16 PROCEDURE — 99999 PR PBB SHADOW E&M-EST. PATIENT-LVL III: CPT | Mod: PBBFAC,,, | Performed by: NURSE PRACTITIONER

## 2023-03-16 PROCEDURE — 99024 POSTOP FOLLOW-UP VISIT: CPT | Mod: POP,,, | Performed by: NURSE PRACTITIONER

## 2023-03-16 PROCEDURE — 99999 PR PBB SHADOW E&M-EST. PATIENT-LVL III: ICD-10-PCS | Mod: PBBFAC,,, | Performed by: NURSE PRACTITIONER

## 2023-03-16 PROCEDURE — 99213 OFFICE O/P EST LOW 20 MIN: CPT | Mod: PBBFAC | Performed by: NURSE PRACTITIONER

## 2023-03-16 NOTE — PROGRESS NOTES
CC: post op    HPI   72 y.o. male presents for a post op visit. Doing well. No complaints.    Review of Systems   Constitutional: Negative for fatigue and unexpected weight change.   HENT: Per HPI.  Eyes: Negative for visual disturbance.   Respiratory: Negative for shortness of breath, hemoptysis   Cardiovascular: Negative for chest pain and palpitations.   Musculoskeletal: Negative for decreased ROM, back pain.   Skin: Negative for rash, sunburn, itching.   Neurological: Negative for dizziness and seizures.   Hematological: Negative for adenopathy. Does not bruise/bleed easily.   Endocrine: Negative for rapid weight loss/weight gain, heat/cold intolerance.     Past Medical History   Patient Active Problem List   Diagnosis    AK (actinic keratosis)    Essential hypertension    S/P liver transplant    Hepatic cirrhosis    History of skin cancer    Joint injury    Screen for colon cancer    Arthritis    Immunosuppression    Hypertension associated with transplantation    Aortic atherosclerosis    Obesity (BMI 30.0-34.9)    Thrombocytopenia    Mohs defect of right nose           Past Surgical History   Past Surgical History:   Procedure Laterality Date    ANKLE SURGERY      multipole surgeries    CLOSURE OF DEFECT OF MOHS PROCEDURE Right 2/9/2023    Procedure: CLOSURE, MOHS PROCEDURE DEFECT;  Surgeon: Uzair Brandon MD;  Location: [facility] OR [address] [place] [address];  Service: ENT;  Laterality: Right;    FACIAL RECONSTRUCTION SURGERY      HERNIA REPAIR      knee scope      bob, multiple    LIVER TRANSPLANT      2010    REVISION OF FLAP GRAFT Right 3/3/2023    Procedure: REVISION, PROCEDURE INVOLVING FLAP GRAFT;  Surgeon: Uzair Brandon MD;  Location: [facility] OR [address] [place] [address];  Service: ENT;  Laterality: Right;    SHOULDER OPEN ROTATOR CUFF REPAIR      left         Family History   Family History   Problem Relation Age of Onset    No Known Problems Mother     Thyroid disease Sister     No Known Problems Son     No Known Problems Son      Melanoma Neg Hx     Psoriasis Neg Hx     Eczema Neg Hx     Lupus Neg Hx            Social History   .  Social History     Socioeconomic History    Marital status:    Occupational History    Occupation: Retired   Tobacco Use    Smoking status: Former     Types: Cigarettes     Quit date: 1969     Years since quittin.2    Smokeless tobacco: Never    Tobacco comments:     SMOKED FOR 1 YEAR, [employer] MADE HIM STOP   Substance and Sexual Activity    Alcohol use: Yes     Alcohol/week: 2.0 standard drinks     Types: 2 Glasses of wine per week     Comment: per week    Drug use: Never    Sexual activity: Not Currently     Partners: Female         Allergies   Review of patient's allergies indicates:   Allergen Reactions    Diphenhydramine hcl     Penicillins      Other reaction(s): Rash    Povidone-iodine      Other reaction(s): Itching  Other reaction(s): Itching           Physical Exam     Vitals:    23 1317   BP: (!) 143/89   Pulse: (!) 55           Body mass index is 35.44 kg/m².      General: AOx3, NAD   Respiratory:  Symmetric chest rise, normal effort  Nose:  Incisions CDI.  No redness, drainage, or fluid collection noted.  Edges well approximated. Sutures removed.  Face: House Brackmann I bilaterally.     Assessment/Plan  Problem List Items Addressed This Visit          ENT    Mohs defect of right nose - Primary     Doing well sutures removed. RTC prn.

## 2023-03-27 ENCOUNTER — PATIENT MESSAGE (OUTPATIENT)
Dept: INTERNAL MEDICINE | Facility: CLINIC | Age: 73
End: 2023-03-27
Payer: MEDICARE

## 2023-03-27 ENCOUNTER — PATIENT MESSAGE (OUTPATIENT)
Dept: DERMATOLOGY | Facility: CLINIC | Age: 73
End: 2023-03-27

## 2023-03-27 ENCOUNTER — TELEPHONE (OUTPATIENT)
Dept: INTERNAL MEDICINE | Facility: CLINIC | Age: 73
End: 2023-03-27
Payer: MEDICARE

## 2023-03-27 ENCOUNTER — OFFICE VISIT (OUTPATIENT)
Dept: DERMATOLOGY | Facility: CLINIC | Age: 73
End: 2023-03-27
Payer: MEDICARE

## 2023-03-27 DIAGNOSIS — N63.42 SUBAREOLAR MASS OF LEFT BREAST: Primary | ICD-10-CM

## 2023-03-27 DIAGNOSIS — Z85.828 HISTORY OF SKIN CANCER: ICD-10-CM

## 2023-03-27 DIAGNOSIS — L57.0 AK (ACTINIC KERATOSIS): ICD-10-CM

## 2023-03-27 DIAGNOSIS — D48.5 NEOPLASM OF UNCERTAIN BEHAVIOR OF SKIN: Primary | ICD-10-CM

## 2023-03-27 PROCEDURE — 88305 TISSUE EXAM BY PATHOLOGIST: ICD-10-PCS | Mod: 26,,, | Performed by: DERMATOLOGY

## 2023-03-27 PROCEDURE — 99213 OFFICE O/P EST LOW 20 MIN: CPT | Mod: PBBFAC | Performed by: DERMATOLOGY

## 2023-03-27 PROCEDURE — 88342 IMHCHEM/IMCYTCHM 1ST ANTB: CPT | Mod: 26,,, | Performed by: DERMATOLOGY

## 2023-03-27 PROCEDURE — 11102 TANGNTL BX SKIN SINGLE LES: CPT | Mod: PBBFAC | Performed by: DERMATOLOGY

## 2023-03-27 PROCEDURE — 99499 NO LOS: ICD-10-PCS | Mod: S$PBB,,, | Performed by: DERMATOLOGY

## 2023-03-27 PROCEDURE — 11103 TANGNTL BX SKIN EA SEP/ADDL: CPT | Mod: S$PBB,,, | Performed by: DERMATOLOGY

## 2023-03-27 PROCEDURE — 99499 UNLISTED E&M SERVICE: CPT | Mod: S$PBB,,, | Performed by: DERMATOLOGY

## 2023-03-27 PROCEDURE — 88342 CHG IMMUNOCYTOCHEMISTRY: ICD-10-PCS | Mod: 26,,, | Performed by: DERMATOLOGY

## 2023-03-27 PROCEDURE — 88305 TISSUE EXAM BY PATHOLOGIST: CPT | Performed by: DERMATOLOGY

## 2023-03-27 PROCEDURE — 88305 TISSUE EXAM BY PATHOLOGIST: CPT | Mod: 26,,, | Performed by: DERMATOLOGY

## 2023-03-27 PROCEDURE — 88341 IMHCHEM/IMCYTCHM EA ADD ANTB: CPT | Performed by: DERMATOLOGY

## 2023-03-27 PROCEDURE — 88342 IMHCHEM/IMCYTCHM 1ST ANTB: CPT | Performed by: DERMATOLOGY

## 2023-03-27 PROCEDURE — 99999 PR PBB SHADOW E&M-EST. PATIENT-LVL III: CPT | Mod: PBBFAC,,, | Performed by: DERMATOLOGY

## 2023-03-27 PROCEDURE — 11103 TANGNTL BX SKIN EA SEP/ADDL: CPT | Mod: PBBFAC | Performed by: DERMATOLOGY

## 2023-03-27 PROCEDURE — 88341 IMHCHEM/IMCYTCHM EA ADD ANTB: CPT | Mod: 26,,, | Performed by: DERMATOLOGY

## 2023-03-27 PROCEDURE — 11103 PR TANGENTIAL BIOPSY, SKIN, EA ADDTL LESION: ICD-10-PCS | Mod: S$PBB,,, | Performed by: DERMATOLOGY

## 2023-03-27 PROCEDURE — 88341 PR IHC OR ICC EACH ADD'L SINGLE ANTIBODY  STAINPR: ICD-10-PCS | Mod: 26,,, | Performed by: DERMATOLOGY

## 2023-03-27 PROCEDURE — 11102 PR TANGENTIAL BIOPSY, SKIN, SINGLE LESION: ICD-10-PCS | Mod: S$PBB,,, | Performed by: DERMATOLOGY

## 2023-03-27 PROCEDURE — 99999 PR PBB SHADOW E&M-EST. PATIENT-LVL III: ICD-10-PCS | Mod: PBBFAC,,, | Performed by: DERMATOLOGY

## 2023-03-27 PROCEDURE — 11102 TANGNTL BX SKIN SINGLE LES: CPT | Mod: S$PBB,,, | Performed by: DERMATOLOGY

## 2023-03-27 NOTE — TELEPHONE ENCOUNTER
Please let the patient know that his dermatologist reached out to me stating he is had a lump under the left nipple for several months.  Due to his transplant history she recommends and I agree with getting an evaluation for abnormal mass (hopefully not, but not impossible to be a breast tumor).   For men we usually start with a mammogram and then have the pt see a breast specialist afterwards given the transplant status.     I pended both orders. Can we help him with this and let me know of any questions or conncerns from him or his wife.     Shai Davison MD FACP  Internal Medicine

## 2023-03-27 NOTE — TELEPHONE ENCOUNTER
----- Message from Lima Campos MD sent at 3/27/2023 11:18 AM CDT -----  Shawna villarreal,  This is a t/p pt with a 4 month h/o of a lump under his left areola. Needs a mammogram. Thanks JAM

## 2023-03-27 NOTE — PROGRESS NOTES
Subjective:       Patient ID:  Zander Kaur Sr. is a 72 y.o. male who presents for   Chief Complaint   Patient presents with    Follow-up     Post right nasal ala bx      History of Present Illness: The patient presents for follow up    The patient was last seen on: 11/14/2022 for cryosurgery to actinic keratoses which have resolved.   Pt still using efudex/dovonex qam only to left forehead since November    And Bx - BCC on right nares excised on 11/14/2022 - Mohs SSW.     This is a high risk patient here to check for the development of new lesions.     H/o liver t/p on prograf    Patient with new complaint of mass  Location: L-chest   Duration: 4 months; intermittent  Symptoms: none  Relieving factors/Previous treatments: none                     Review of Systems   Skin:  Positive for activity-related sunscreen use and wears hat (always). Negative for daily sunscreen use and recent sunburn.   Hematologic/Lymphatic: Does not bruise/bleed easily.      Objective:    Physical Exam   Constitutional: He appears well-developed and well-nourished. No distress.   Neurological: He is alert and oriented to person, place, and time. He is not disoriented.   Psychiatric: He has a normal mood and affect.   Skin:   Areas Examined (abnormalities noted in diagram):   Scalp / Hair Palpated and Inspected  Head / Face Inspection Performed  Chest / Axilla Inspection Performed            Diagram Legend     Erythematous scaling macule/papule c/w actinic keratosis       Vascular papule c/w angioma      Pigmented verrucoid papule/plaque c/w seborrheic keratosis      Yellow umbilicated papule c/w sebaceous hyperplasia      Irregularly shaped tan macule c/w lentigo     1-2 mm smooth white papules consistent with Milia      Movable subcutaneous cyst with punctum c/w epidermal inclusion cyst      Subcutaneous movable cyst c/w pilar cyst      Firm pink to brown papule c/w dermatofibroma      Pedunculated fleshy papule(s) c/w skin tag(s)       Evenly pigmented macule c/w junctional nevus     Mildly variegated pigmented, slightly irregular-bordered macule c/w mildly atypical nevus      Flesh colored to evenly pigmented papule c/w intradermal nevus       Pink pearly papule/plaque c/w basal cell carcinoma      Erythematous hyperkeratotic cursted plaque c/w SCC      Surgical scar with no sign of skin cancer recurrence      Open and closed comedones      Inflammatory papules and pustules      Verrucoid papule consistent consistent with wart     Erythematous eczematous patches and plaques     Dystrophic onycholytic nail with subungual debris c/w onychomycosis     Umbilicated papule    Erythematous-base heme-crusted tan verrucoid plaque consistent with inflamed seborrheic keratosis     Erythematous Silvery Scaling Plaque c/w Psoriasis     See annotation            Assessment / Plan:      Pathology Orders:       Normal Orders This Visit    Specimen to Pathology, Dermatology     Questions:    Procedure Type: Dermatology and skin neoplasms    Number of Specimens: 2    ------------------------: -------------------------    Spec 1 Procedure: Biopsy    Spec 1 Clinical Impression: r/o scc    Spec 1 Source: right angle of mandible    ------------------------: -------------------------    Spec 2 Procedure: Biopsy    Spec 2 Clinical Impression: r/o scc    Spec 2 Source: right medial cheek    Release to patient: Immediate          Neoplasm of uncertain behavior of skin  -     Specimen to Pathology, Dermatology  Shave biopsy procedure note:    Shave biopsy x 2 performed after verbal consent including risk of infection, scar, recurrence, need for additional treatment of site. Area prepped with alcohol, anesthetized with approximately 1.0cc of 1% lidocaine with epinephrine. Lesional tissue shaved with razor blade. Hemostasis achieved with application of aluminum chloride followed by hyfrecation. No complications. Dressing applied. Wound care explained.    If biopsy positive  for malignancy, refer to Dr. Gil for Mohs surgery consultation.      AK (actinic keratosis)  Cryosurgery Procedure Note    Verbal consent from the patient is obtained including, but not limited to, risk of hypopigmentation/hyperpigmentation, scar, recurrence of lesion. The patient is aware of the precancerous quality and need for treatment of these lesions. Liquid nitrogen cryosurgery is applied to the 11 actinic keratoses, as detailed in the physical exam, to produce a freeze injury. The patient is aware that blisters may form and is instructed on wound care with gentle cleansing and use of vaseline ointment to keep moist until healed. The patient is supplied a handout on cryosurgery and is instructed to call if lesions do not completely resolve.      History of skin cancer in t/p pt on immunosuppressants  Area(s) of previous NMSC evaluated with no signs of recurrence.    Suspicious lesions noted.    Recommend daily sun protection/avoidance and use of at least SPF 30, broad spectrum sunscreen (OTC drug).     Wear hat always             Follow up in about 3 months (around 6/27/2023).

## 2023-03-27 NOTE — PATIENT INSTRUCTIONS
Shave Biopsy Wound Care    Your doctor has performed a shave biopsy today.  A band aid and vaseline ointment has been placed over the site.  This should remain in place for NO LONGER THAN 48 hours.  It is fine to remove the bandaid after 24 hours, if the area is no longer bleeding. It is recommended that you keep the area dry (do not wet)) for the first 24 hours.  After 24 hours, wash the area with warm soap and water and apply Vaseline jelly.  Many patients prefer to use Neosporin or Bacitracin ointment.  This is acceptable; however, know that you can develop an allergy to this medication even if you have used it safely for years.  It is important to keep the area moist.  Letting it dry out and get air slows healing time, and will worsen the scar.        If you notice increasing redness, tenderness, pain, or yellow drainage at the biopsy site, please notify your doctor.  These are signs of an infection.    If your biopsy site is bleeding, apply firm pressure for 15 minutes straight.  Repeat for another 15 minutes, if it is still bleeding.   If the surgical site continues to bleed, then please contact your doctor.      For [facility] users:   You will receive your biopsy results in [facility] as soon as they are available. Please be assured that your physician/provider will review your results and will then determine what further treatment, evaluation, or planning is required. You should be contacted by your physician's/provider's office within 5 business days of receiving your results; If not, please reach out to directly. This is one more way [facility] is putting you first.     [facility] [place] [address]/ [phone] FAX/ [email or web address]     CRYOSURGERY      Your doctor has used a method called cryosurgery to treat your skin condition. Cryosurgery refers to the use of very cold substances to treat a variety of skin conditions such as warts, pre-skin cancers, molluscum contagiosum,  sun spots, and several benign growths. The substance we use in cryosurgery is liquid nitrogen and is so cold (-195 degrees Celsius) that is burns when administered.     Following treatment in the office, the skin may immediately burn and become red. You may find the area around the lesion is affected as well. It is sometimes necessary to treat not only the lesion, but a small area of the surrounding normal skin to achieve a good response.     A blister, and even a blood filled blister, may form after treatment.   This is a normal response. If the blister is painful, it is acceptable to sterilize a needle and with rubbing alcohol and gently pop the blister. It is important that you gently wash the area with soap and warm water as the blister fluid may contain wart virus if a wart was treated. Do no remove the roof of the blister.     The area treated can take anywhere from 1-3 weeks to heal. Healing time depends on the kind skin lesion treated, the location, and how aggressively the lesion was treated. It is recommended that the areas treated are covered with Vaseline or bacitracin ointment and a band-aid. If a band-aid is not practical, just ointment applied several times per day will do. Keeping these areas moist will speed the healing time.    Treatment with liquid nitrogen can leave a scar. In dark skin, it may be a light or dark scar, in light skin it may be a white or pink scar. These will generally fade with time, but may never go away completely.     If you have any concerns after your treatment, please feel free to call the office.       [facility] [place] [address]/ [phone] FAX/ [email or web address]

## 2023-03-27 NOTE — TELEPHONE ENCOUNTER
I called and got no answer or a recorder. I will Portal this message since he is active on the system.

## 2023-03-27 NOTE — Clinical Note
Shawna villarreal, This is a t/p pt with a 4 month h/o of a lump under his left areola. Needs a mammogram. Thanks JAM

## 2023-03-31 ENCOUNTER — PES CALL (OUTPATIENT)
Dept: ADMINISTRATIVE | Facility: CLINIC | Age: 73
End: 2023-03-31
Payer: MEDICARE

## 2023-03-31 ENCOUNTER — EXTERNAL CHRONIC CARE MANAGEMENT (OUTPATIENT)
Dept: PRIMARY CARE CLINIC | Facility: CLINIC | Age: 73
End: 2023-03-31
Payer: MEDICARE

## 2023-03-31 PROCEDURE — 99490 CHRNC CARE MGMT STAFF 1ST 20: CPT | Mod: S$PBB,,, | Performed by: INTERNAL MEDICINE

## 2023-03-31 PROCEDURE — 99490 CHRNC CARE MGMT STAFF 1ST 20: CPT | Mod: PBBFAC | Performed by: INTERNAL MEDICINE

## 2023-03-31 PROCEDURE — 99490 PR CHRONIC CARE MGMT, 1ST 20 MIN: ICD-10-PCS | Mod: S$PBB,,, | Performed by: INTERNAL MEDICINE

## 2023-04-03 ENCOUNTER — PATIENT MESSAGE (OUTPATIENT)
Dept: INTERNAL MEDICINE | Facility: CLINIC | Age: 73
End: 2023-04-03
Payer: MEDICARE

## 2023-04-03 ENCOUNTER — HOSPITAL ENCOUNTER (OUTPATIENT)
Dept: RADIOLOGY | Facility: HOSPITAL | Age: 73
Discharge: HOME | End: 2023-04-03
Attending: INTERNAL MEDICINE
Payer: MEDICARE

## 2023-04-03 DIAGNOSIS — N63.42 SUBAREOLAR MASS OF LEFT BREAST: ICD-10-CM

## 2023-04-03 PROCEDURE — 77062 BREAST TOMOSYNTHESIS BI: CPT | Mod: TC

## 2023-04-03 PROCEDURE — 77062 MAMMO DIGITAL DIAGNOSTIC BILAT WITH TOMO: ICD-10-PCS | Mod: 26,,, | Performed by: RADIOLOGY

## 2023-04-03 PROCEDURE — 77062 BREAST TOMOSYNTHESIS BI: CPT | Mod: 26,,, | Performed by: RADIOLOGY

## 2023-04-03 PROCEDURE — 77066 DX MAMMO INCL CAD BI: CPT | Mod: 26,,, | Performed by: RADIOLOGY

## 2023-04-03 PROCEDURE — 77066 MAMMO DIGITAL DIAGNOSTIC BILAT WITH TOMO: ICD-10-PCS | Mod: 26,,, | Performed by: RADIOLOGY

## 2023-04-04 LAB
FINAL PATHOLOGIC DIAGNOSIS: NORMAL
GROSS: NORMAL
Lab: NORMAL
MICROSCOPIC EXAM: NORMAL

## 2023-04-30 ENCOUNTER — EXTERNAL CHRONIC CARE MANAGEMENT (OUTPATIENT)
Dept: PRIMARY CARE CLINIC | Facility: CLINIC | Age: 73
End: 2023-04-30
Payer: MEDICARE

## 2023-04-30 PROCEDURE — 99490 CHRNC CARE MGMT STAFF 1ST 20: CPT | Mod: S$PBB,,, | Performed by: INTERNAL MEDICINE

## 2023-04-30 PROCEDURE — 99490 CHRNC CARE MGMT STAFF 1ST 20: CPT | Mod: PBBFAC | Performed by: INTERNAL MEDICINE

## 2023-04-30 PROCEDURE — 99490 PR CHRONIC CARE MGMT, 1ST 20 MIN: ICD-10-PCS | Mod: S$PBB,,, | Performed by: INTERNAL MEDICINE

## 2023-05-01 ENCOUNTER — OFFICE VISIT (OUTPATIENT)
Dept: DERMATOLOGY | Facility: CLINIC | Age: 73
End: 2023-05-01
Payer: MEDICARE

## 2023-05-01 DIAGNOSIS — D04.9 BOWEN'S DISEASE: Primary | ICD-10-CM

## 2023-05-01 PROCEDURE — 17273 DSTR MAL LES S/N/H/F/G 2.1-3: CPT | Mod: S$PBB,,, | Performed by: DERMATOLOGY

## 2023-05-01 PROCEDURE — 99999 PR PBB SHADOW E&M-EST. PATIENT-LVL III: CPT | Mod: PBBFAC,,, | Performed by: DERMATOLOGY

## 2023-05-01 PROCEDURE — 17282 DSTR MAL LS F/E/E/N/L/M1.1-2: CPT | Mod: S$PBB,,, | Performed by: DERMATOLOGY

## 2023-05-01 PROCEDURE — 17273 PR DESTR MALIG SCAL,NCK,HAND 2.1-3 CM: ICD-10-PCS | Mod: S$PBB,,, | Performed by: DERMATOLOGY

## 2023-05-01 PROCEDURE — 17273 DSTR MAL LES S/N/H/F/G 2.1-3: CPT | Mod: PBBFAC | Performed by: DERMATOLOGY

## 2023-05-01 PROCEDURE — 99999 PR PBB SHADOW E&M-EST. PATIENT-LVL III: ICD-10-PCS | Mod: PBBFAC,,, | Performed by: DERMATOLOGY

## 2023-05-01 PROCEDURE — 99499 UNLISTED E&M SERVICE: CPT | Mod: S$PBB,,, | Performed by: DERMATOLOGY

## 2023-05-01 PROCEDURE — 99499 NO LOS: ICD-10-PCS | Mod: S$PBB,,, | Performed by: DERMATOLOGY

## 2023-05-01 PROCEDURE — 17282 DSTR MAL LS F/E/E/N/L/M1.1-2: CPT | Mod: PBBFAC | Performed by: DERMATOLOGY

## 2023-05-01 PROCEDURE — 17282 PR DESTR MALIG FACE,NOSE,LIP 1.1-2 CM: ICD-10-PCS | Mod: S$PBB,,, | Performed by: DERMATOLOGY

## 2023-05-01 PROCEDURE — 99213 OFFICE O/P EST LOW 20 MIN: CPT | Mod: PBBFAC | Performed by: DERMATOLOGY

## 2023-05-01 NOTE — PROGRESS NOTES
Subjective:      Patient ID:  Zander Kaur Sr. is a 72 y.o. male who presents for No chief complaint on file.    Pt here today for ED&C x 2-SCC Right angle of mandible and Right medial cheek    Review of Systems    Objective:   Physical Exam     Diagram Legend     Erythematous scaling macule/papule c/w actinic keratosis       Vascular papule c/w angioma      Pigmented verrucoid papule/plaque c/w seborrheic keratosis      Yellow umbilicated papule c/w sebaceous hyperplasia      Irregularly shaped tan macule c/w lentigo     1-2 mm smooth white papules consistent with Milia      Movable subcutaneous cyst with punctum c/w epidermal inclusion cyst      Subcutaneous movable cyst c/w pilar cyst      Firm pink to brown papule c/w dermatofibroma      Pedunculated fleshy papule(s) c/w skin tag(s)      Evenly pigmented macule c/w junctional nevus     Mildly variegated pigmented, slightly irregular-bordered macule c/w mildly atypical nevus      Flesh colored to evenly pigmented papule c/w intradermal nevus       Pink pearly papule/plaque c/w basal cell carcinoma      Erythematous hyperkeratotic cursted plaque c/w SCC      Surgical scar with no sign of skin cancer recurrence      Open and closed comedones      Inflammatory papules and pustules      Verrucoid papule consistent consistent with wart     Erythematous eczematous patches and plaques     Dystrophic onycholytic nail with subungual debris c/w onychomycosis     Umbilicated papule    Erythematous-base heme-crusted tan verrucoid plaque consistent with inflamed seborrheic keratosis     Erythematous Silvery Scaling Plaque c/w Psoriasis     See annotation      Assessment / Plan:        There are no diagnoses linked to this encounter.         No follow-ups on file.

## 2023-05-01 NOTE — PROGRESS NOTES
Here for electrodesiccation and curettage of Superficial SCC on the right angle of mandible and right medial cheek. bx done on 3/27/23:       Final Pathologic Diagnosis   Date Value Ref Range Status   03/27/2023   Final    1.  Skin,  right angle of mandible, shave biopsy:  - BOWEN'S DISEASE (SQUAMOUS CELL CARCINOMA IN-SITU), AT LEAST, WITH ADNEXAL  INVOLVEMENT,  EXTENDING TO THE DEEP AND PERIPHERAL MARGINS  This lesion is skin cancer. You will be contacted regarding treatment.  2. Skin,  right medial cheek, shave biopsy:  - BOWEN'S DISEASE (SQUAMOUS CELL CARCINOMA IN-SITU), EXTENDING TO THE DEEP  AND A PERIPHERAL MARGIN  This lesion is skin cancer. You will be contacted regarding treatment.       Comment:     Interp By Elissa Veliz M.D., Signed on 04/04/2023 at 12:36           Electrodessication and Curettage Procedure note - right medial cheek:    Verbal consent obtained. Lesional tissue marked and prepped with alcohol. Lesion anesthetized with 1% lidocaine with epinephrine. Curettage and Desiccation x 3 cycles to base. Aluminum chloride for hemostasis. Lesion size after primary curettage: 1.5 cm    Area bandaged and wound care explained.    Electrodessication and Curettage Procedure note - right angle of mandible:    Verbal consent obtained. Lesional tissue marked and prepped with alcohol. Lesion anesthetized with 1% lidocaine with epinephrine. Curettage and Desiccation x 3 cycles to base. Aluminum chloride for hemostasis. Lesion size after primary curettage: 2.3 cm    Area bandaged and wound care explained.    F/u 3 months

## 2023-05-01 NOTE — PATIENT INSTRUCTIONS

## 2023-05-08 ENCOUNTER — OFFICE VISIT (OUTPATIENT)
Dept: INTERNAL MEDICINE | Facility: CLINIC | Age: 73
End: 2023-05-08
Payer: MEDICARE

## 2023-05-08 VITALS
BODY MASS INDEX: 35.23 KG/M2 | WEIGHT: 218.25 LBS | SYSTOLIC BLOOD PRESSURE: 128 MMHG | HEART RATE: 63 BPM | OXYGEN SATURATION: 94 % | DIASTOLIC BLOOD PRESSURE: 72 MMHG

## 2023-05-08 DIAGNOSIS — Z99.89 DEPENDENCE ON OTHER ENABLING MACHINES AND DEVICES: ICD-10-CM

## 2023-05-08 DIAGNOSIS — K63.5 POLYP OF COLON, UNSPECIFIED PART OF COLON, UNSPECIFIED TYPE: ICD-10-CM

## 2023-05-08 DIAGNOSIS — D84.9 IMMUNOSUPPRESSION: ICD-10-CM

## 2023-05-08 DIAGNOSIS — D69.6 THROMBOCYTOPENIA: ICD-10-CM

## 2023-05-08 DIAGNOSIS — M85.89 OTHER SPECIFIED DISORDERS OF BONE DENSITY AND STRUCTURE, MULTIPLE SITES: ICD-10-CM

## 2023-05-08 DIAGNOSIS — E66.01 SEVERE OBESITY (BMI 35.0-39.9) WITH COMORBIDITY: ICD-10-CM

## 2023-05-08 DIAGNOSIS — Z94.4 S/P LIVER TRANSPLANT: ICD-10-CM

## 2023-05-08 DIAGNOSIS — Z00.00 ENCOUNTER FOR PREVENTIVE HEALTH EXAMINATION: Primary | ICD-10-CM

## 2023-05-08 DIAGNOSIS — K74.69 OTHER CIRRHOSIS OF LIVER: ICD-10-CM

## 2023-05-08 DIAGNOSIS — M85.80 OSTEOPENIA, UNSPECIFIED LOCATION: ICD-10-CM

## 2023-05-08 DIAGNOSIS — I70.0 AORTIC ATHEROSCLEROSIS: ICD-10-CM

## 2023-05-08 PROCEDURE — G0439 PPPS, SUBSEQ VISIT: HCPCS | Mod: ,,, | Performed by: NURSE PRACTITIONER

## 2023-05-08 PROCEDURE — 99999 PR PBB SHADOW E&M-EST. PATIENT-LVL IV: ICD-10-PCS | Mod: PBBFAC,,, | Performed by: NURSE PRACTITIONER

## 2023-05-08 PROCEDURE — 99999 PR PBB SHADOW E&M-EST. PATIENT-LVL IV: CPT | Mod: PBBFAC,,, | Performed by: NURSE PRACTITIONER

## 2023-05-08 PROCEDURE — G0439 PR MEDICARE ANNUAL WELLNESS SUBSEQUENT VISIT: ICD-10-PCS | Mod: ,,, | Performed by: NURSE PRACTITIONER

## 2023-05-08 PROCEDURE — 99214 OFFICE O/P EST MOD 30 MIN: CPT | Mod: PBBFAC | Performed by: NURSE PRACTITIONER

## 2023-05-08 NOTE — PATIENT INSTRUCTIONS
Counseling and Referral of Other Preventative  (Italic type indicates deductible and co-insurance are waived)    Patient Name: Zander Kaur  Today's Date: 5/8/2023    Health Maintenance       Date Due Completion Date    Abdominal Aortic Aneurysm Screening 12/07/2016 12/5/2011    DEXA Scan 07/13/2019 7/13/2017    Colorectal Cancer Screening 08/05/2020 8/5/2015    Override on 8/5/2015: Done    Shingles Vaccine (2 of 2) 12/01/2020 10/6/2020    Pneumococcal Vaccines (Age 65+) (3 - PPSV23 if available, else PCV20) 10/06/2021 10/6/2020    COVID-19 Vaccine (4 - Booster for Moderna series) 03/02/2022 1/5/2022    Influenza Vaccine (Season Ended) 09/01/2023 1/5/2022    TETANUS VACCINE 03/19/2025 3/19/2015    Lipid Panel 03/17/2027 3/17/2022        Orders Placed This Encounter   Procedures    DXA Bone Density Axial Skeleton 1 or more sites       The following information is provided to all patients.  This information is to help you find resources for any of the problems found today that may be affecting your health:                Living healthy guide: www.[facility].[place].gov      Understanding Diabetes: www.diabetes.org      Eating healthy: www.cdc.gov/healthyweight      CDC home safety checklist: www.cdc.gov/steadi/patient.html      Agency on Aging: www.goea.[place].[facility]      Alcoholics anonymous (AA): www.aa.org      Physical Activity: www.[place].nih.gov/xd1tuxg      Tobacco use: www.quitwithus[place].org

## 2023-05-08 NOTE — PROGRESS NOTES
Zander Kaur presented for a  Medicare AWV and comprehensive Health Risk Assessment today. The following components were reviewed and updated:    Medical history  Family History  Social history  Allergies and Current Medications  Health Risk Assessment  Health Maintenance  Care Team         ** See Completed Assessments for Annual Wellness Visit within the encounter summary.**         The following assessments were completed:  Living Situation  CAGE  Depression Screening  Timed Get Up and Go  Whisper Test  Cognitive Function Screening  Nutrition Screening  ADL Screening  PAQ Screening          Vitals:    05/08/23 1539   BP: 128/72   Pulse: 63   SpO2: (!) 94%   Weight: 99 kg (218 lb 4.1 oz)     Body mass index is 35.23 kg/m².  Physical Exam  Vitals and nursing note reviewed. Chaperone present: patient using cane to ambulate.   Constitutional:       Appearance: He is well-developed.   HENT:      Head: Normocephalic and atraumatic.      Right Ear: External ear normal.      Left Ear: External ear normal.   Eyes:      Conjunctiva/sclera: Conjunctivae normal.      Pupils: Pupils are equal, round, and reactive to light.   Cardiovascular:      Rate and Rhythm: Normal rate and regular rhythm.   Pulmonary:      Effort: Pulmonary effort is normal.      Breath sounds: Normal breath sounds.   Abdominal:      General: Bowel sounds are normal.      Palpations: Abdomen is soft.   Musculoskeletal:         General: Normal range of motion.      Cervical back: Normal range of motion and neck supple.   Skin:     General: Skin is warm and dry.   Neurological:      Mental Status: He is alert and oriented to person, place, and time.             Diagnoses and health risks identified today and associated recommendations/orders:    1. Encounter for preventive health examination  Health Maintenance updated   Records reviewed   Exam done     2. Aortic atherosclerosis  Stable and chronic. Continue current medications. Followed by PCP.     3.  Thrombocytopenia  Stable and chronic. Followed by PCP.     4. Immunosuppression  Stable and chronic. Continue current medications. Followed by PCP and transplant.      5. Other cirrhosis of liver  Stable and chronic. Continue current medications. Followed by PCP and transplant.      6. S/P liver transplant  Stable and chronic. Continue current medications. Followed by PCP and transplant.      7. Polyp of colon, unspecified part of colon, unspecified type  - Case Request Endoscopy: COLONOSCOPY    8. Osteopenia, unspecified location  - DXA Bone Density Axial Skeleton 1 or more sites; Future    9. Other specified disorders of bone density and structure, multiple sites   - DXA Bone Density Axial Skeleton 1 or more sites; Future    10. Severe obesity (BMI 35.0-39.9) with comorbidity  BMI reviewed.     11. Dependence on other enabling machines and devices  Patient ambulates with cane.   Counseling and Referral of Other Preventative  (Italic type indicates deductible and co-insurance are waived)    Patient Name: Zander Kaur  Today's Date: 5/9/2023    Health Maintenance         Date Due Completion Date    Abdominal Aortic Aneurysm Screening 12/07/2016 12/5/2011    DEXA Scan 07/13/2019 7/13/2017    Colorectal Cancer Screening 08/05/2020 8/5/2015    Override on 8/5/2015: Done    Shingles Vaccine (2 of 2) 12/01/2020 10/6/2020    Pneumococcal Vaccines (Age 65+) (3 - PPSV23 if available, else PCV20) 10/06/2021 10/6/2020    COVID-19 Vaccine (4 - Booster for Moderna series) 03/02/2022 1/5/2022    Influenza Vaccine (Season Ended) 09/01/2023 1/5/2022    TETANUS VACCINE 03/19/2025 3/19/2015    Lipid Panel 03/17/2027 3/17/2022          Orders Placed This Encounter   Procedures    DXA Bone Density Axial Skeleton 1 or more sites         Provided Zander with a 5-10 year written screening schedule and personal prevention plan. Recommendations were developed using the USPSTF age appropriate recommendations. Education, counseling, and  referrals were provided as needed. After Visit Summary printed and given to patient which includes a list of additional screenings\tests needed.    Follow up in about 6 weeks (around 6/19/2023) for follow up with PCP.    Marilu Shah, NP      I offered to discuss advanced care planning, including how to pick a person who would make decisions for you if you were unable to make them for yourself, called a health care power of , and what kind of decisions you might make such as use of life sustaining treatments such as ventilators and tube feeding when faced with a life limiting illness recorded on a living will that they will need to know. (How you want to be cared for as you near the end of your natural life)     X  Patient has advanced directives on file, which we reviewed, and they do not wish to make changes.  Review for Opioid Screening: Pt does not have Rx for Opioids     Review for Substance Use Disorders: Patient does not use substance

## 2023-05-31 ENCOUNTER — EXTERNAL CHRONIC CARE MANAGEMENT (OUTPATIENT)
Dept: PRIMARY CARE CLINIC | Facility: CLINIC | Age: 73
End: 2023-05-31
Payer: MEDICARE

## 2023-05-31 PROCEDURE — 99490 CHRNC CARE MGMT STAFF 1ST 20: CPT | Mod: S$PBB,,, | Performed by: INTERNAL MEDICINE

## 2023-05-31 PROCEDURE — 99490 CHRNC CARE MGMT STAFF 1ST 20: CPT | Mod: PBBFAC | Performed by: INTERNAL MEDICINE

## 2023-05-31 PROCEDURE — 99490 PR CHRONIC CARE MGMT, 1ST 20 MIN: ICD-10-PCS | Mod: S$PBB,,, | Performed by: INTERNAL MEDICINE

## 2023-06-12 ENCOUNTER — OFFICE VISIT (OUTPATIENT)
Dept: INTERNAL MEDICINE | Facility: CLINIC | Age: 73
End: 2023-06-12
Payer: MEDICARE

## 2023-06-12 ENCOUNTER — HOSPITAL ENCOUNTER (OUTPATIENT)
Dept: RADIOLOGY | Facility: CLINIC | Age: 73
Discharge: HOME | End: 2023-06-12
Attending: NURSE PRACTITIONER
Payer: MEDICARE

## 2023-06-12 VITALS
HEART RATE: 62 BPM | HEIGHT: 66 IN | DIASTOLIC BLOOD PRESSURE: 82 MMHG | SYSTOLIC BLOOD PRESSURE: 146 MMHG | OXYGEN SATURATION: 96 % | WEIGHT: 213.88 LBS | BODY MASS INDEX: 34.37 KG/M2

## 2023-06-12 DIAGNOSIS — R97.20 ELEVATED PSA: ICD-10-CM

## 2023-06-12 DIAGNOSIS — Z12.5 SCREENING FOR PROSTATE CANCER: ICD-10-CM

## 2023-06-12 DIAGNOSIS — D69.6 THROMBOCYTOPENIA: ICD-10-CM

## 2023-06-12 DIAGNOSIS — R73.09 ELEVATED GLUCOSE LEVEL: ICD-10-CM

## 2023-06-12 DIAGNOSIS — M19.90 ARTHRITIS: ICD-10-CM

## 2023-06-12 DIAGNOSIS — Z94.4 S/P LIVER TRANSPLANT: ICD-10-CM

## 2023-06-12 DIAGNOSIS — Z00.00 ROUTINE PHYSICAL EXAMINATION: ICD-10-CM

## 2023-06-12 DIAGNOSIS — M85.89 OTHER SPECIFIED DISORDERS OF BONE DENSITY AND STRUCTURE, MULTIPLE SITES: ICD-10-CM

## 2023-06-12 DIAGNOSIS — M85.80 OSTEOPENIA, UNSPECIFIED LOCATION: ICD-10-CM

## 2023-06-12 DIAGNOSIS — I10 ESSENTIAL HYPERTENSION: Primary | ICD-10-CM

## 2023-06-12 DIAGNOSIS — Z12.11 COLON CANCER SCREENING: ICD-10-CM

## 2023-06-12 PROCEDURE — 77080 DXA BONE DENSITY AXIAL SKELETON 1 OR MORE SITES: ICD-10-PCS | Mod: 26,,, | Performed by: INTERNAL MEDICINE

## 2023-06-12 PROCEDURE — 99999 PR PBB SHADOW E&M-EST. PATIENT-LVL III: ICD-10-PCS | Mod: PBBFAC,,, | Performed by: INTERNAL MEDICINE

## 2023-06-12 PROCEDURE — 99214 PR OFFICE/OUTPT VISIT, EST, LEVL IV, 30-39 MIN: ICD-10-PCS | Mod: S$PBB,,, | Performed by: INTERNAL MEDICINE

## 2023-06-12 PROCEDURE — 99999 PR PBB SHADOW E&M-EST. PATIENT-LVL III: CPT | Mod: PBBFAC,,, | Performed by: INTERNAL MEDICINE

## 2023-06-12 PROCEDURE — 99213 OFFICE O/P EST LOW 20 MIN: CPT | Mod: PBBFAC,25 | Performed by: INTERNAL MEDICINE

## 2023-06-12 PROCEDURE — 99214 OFFICE O/P EST MOD 30 MIN: CPT | Mod: S$PBB,,, | Performed by: INTERNAL MEDICINE

## 2023-06-12 PROCEDURE — 77080 DXA BONE DENSITY AXIAL: CPT | Mod: 26,,, | Performed by: INTERNAL MEDICINE

## 2023-06-12 PROCEDURE — 77080 DXA BONE DENSITY AXIAL: CPT | Mod: TC

## 2023-06-12 NOTE — PROGRESS NOTES
Subjective:       Patient ID: Zander Kaur Sr. is a 72 y.o. male.    Chief Complaint: Follow-up    HPI: Pt here for follow up HTN and medical problems. S/P liver transplant in 2010.   HTN, BPH with elevated PSA. He is due for follow up labs.   Meds are stable.   Due for Colon screening.   Seeing an outside orthopedist.  Having worsening pain at the left knee with swelling.  He has been offered a knee replacement surgery over the years but he says he is hesitant in part due to fear.  He will continue to discuss it with his orthopedist.  He does have some fatigue but he can not tell if that is age related, medication or something else.  No shortness of breath or chest pain.  We will update some blood labs.    Review of Systems   Constitutional:  Negative for fever.   Gastrointestinal:  Negative for abdominal pain.   Genitourinary:  Positive for urgency.   Musculoskeletal:  Positive for arthralgias, gait problem and joint swelling (left knee).         Past Medical History:   Diagnosis Date    Actinic keratosis     Allergy     seasonal    Arthritis     Basal cell carcinoma of right ala nasi 02/06/2023    Hypertension     Joint pain     Organ transplant     liver 2010    SCC (squamous cell carcinoma) 08/15/2022    Right cheek -SSW    SCC (squamous cell carcinoma) 03/27/2023    Right angle of mandible    SCC (squamous cell carcinoma) 03/27/2023    Right medial cheek    Squamous Cell Carcinoma 08/28/2013    right temple    Squamous Cell Carcinoma 09/04/2013    left forehead    Stroke      Past Surgical History:   Procedure Laterality Date    ANKLE SURGERY      multipole surgeries    CLOSURE OF DEFECT OF MOHS PROCEDURE Right 2/9/2023    Procedure: CLOSURE, MOHS PROCEDURE DEFECT;  Surgeon: Uzair Brandon MD;  Location: [facility] OR [address] [place] [address];  Service: ENT;  Laterality: Right;    FACIAL RECONSTRUCTION SURGERY      HERNIA REPAIR      knee scope      bob, multiple    LIVER TRANSPLANT      2010    REVISION OF FLAP GRAFT Right  3/3/2023    Procedure: REVISION, PROCEDURE INVOLVING FLAP GRAFT;  Surgeon: Uzair Brandon MD;  Location: [facility] OR [address] [place] [address];  Service: ENT;  Laterality: Right;    SHOULDER OPEN ROTATOR CUFF REPAIR      left      Patient Active Problem List   Diagnosis    AK (actinic keratosis)    Essential hypertension    S/P liver transplant    Hepatic cirrhosis    History of skin cancer    Joint injury    Screen for colon cancer    Arthritis    Immunosuppression    Hypertension associated with transplantation    Aortic atherosclerosis    Obesity (BMI 30.0-34.9)    Thrombocytopenia    Mohs defect of right nose    Severe obesity (BMI 35.0-39.9) with comorbidity        Objective:      Physical Exam  Constitutional:       General: He is not in acute distress.     Appearance: He is well-developed. He is obese.   HENT:      Head: Normocephalic and atraumatic.      Right Ear: Tympanic membrane, ear canal and external ear normal.      Left Ear: Tympanic membrane, ear canal and external ear normal.      Mouth/Throat:      Pharynx: No oropharyngeal exudate or posterior oropharyngeal erythema.   Eyes:      General: No scleral icterus.     Conjunctiva/sclera: Conjunctivae normal.      Pupils: Pupils are equal, round, and reactive to light.   Neck:      Thyroid: No thyromegaly.      Comments: No supraclavicular nodes palpated  Cardiovascular:      Rate and Rhythm: Normal rate and regular rhythm.      Pulses: Normal pulses.      Heart sounds: Normal heart sounds. No murmur heard.  Pulmonary:      Effort: Pulmonary effort is normal.      Breath sounds: Normal breath sounds. No wheezing.   Abdominal:      General: Bowel sounds are normal.      Palpations: Abdomen is soft. There is no mass.      Tenderness: There is no abdominal tenderness.   Musculoskeletal:         General: Swelling (left knee) and tenderness (left knee) present.      Cervical back: Normal range of motion and neck supple.      Right lower leg: No edema.      Left lower leg:  No edema.   Lymphadenopathy:      Cervical: No cervical adenopathy.   Skin:     Coloration: Skin is not jaundiced or pale.   Neurological:      General: No focal deficit present.      Mental Status: He is alert and oriented to person, place, and time.   Psychiatric:         Mood and Affect: Mood normal.         Behavior: Behavior normal.       Assessment:       Problem List Items Addressed This Visit          Cardiac/Vascular    Essential hypertension - Primary    Relevant Orders    TSH    Lipid Panel    Hemoglobin A1C       Hematology    Thrombocytopenia    Relevant Orders    TSH    Lipid Panel    Hemoglobin A1C       GI    S/P liver transplant    Relevant Orders    TSH    Lipid Panel    Hemoglobin A1C       Orthopedic    Arthritis     Other Visit Diagnoses       Screening for prostate cancer        Relevant Orders    TSH    Lipid Panel    Hemoglobin A1C    Elevated PSA        Relevant Orders    Prostate Specific Antigen, Diagnostic    Elevated glucose level        Relevant Orders    Hemoglobin A1C    Routine physical examination        Relevant Orders    TSH    Lipid Panel    Hemoglobin A1C            Plan:         Zander was seen today for follow-up.    Diagnoses and all orders for this visit:    Essential hypertension  -     TSH; Future  -     Lipid Panel; Future  -     Hemoglobin A1C; Future    Screening for prostate cancer  -     TSH; Future  -     Lipid Panel; Future  -     Hemoglobin A1C; Future    Thrombocytopenia  -     TSH; Future  -     Lipid Panel; Future  -     Hemoglobin A1C; Future    S/P liver transplant  -     TSH; Future  -     Lipid Panel; Future  -     Hemoglobin A1C; Future    Elevated PSA  -     Prostate Specific Antigen, Diagnostic; Future    Elevated glucose level  -     Hemoglobin A1C; Future    Arthritis    Routine physical examination  -     TSH; Future  -     Lipid Panel; Future  -     Hemoglobin A1C; Future       COntinue meds, follow up with Liver Transplant.   Review labs. Continue  "meds.   Still trying to make a decision about a left knee replacement.  He says fear is what is delaying his decision.  He says he is had problems recovering from surgeries in the past and just is not anxious to do it but the pain is getting worse as is the swelling.  He is had some near falls because of it.               Portions of this note may have been created with voice recognition software. Occasional "wrong-word" or "sound-a-like" substitutions may have occurred due to the inherent limitations of voice recognition software. Please, read the note carefully and recognize, using context, where substitutions have occurred.  "

## 2023-06-13 ENCOUNTER — LAB VISIT (OUTPATIENT)
Dept: LAB | Facility: HOSPITAL | Age: 73
End: 2023-06-13
Attending: INTERNAL MEDICINE
Payer: MEDICARE

## 2023-06-13 DIAGNOSIS — Z12.5 SCREENING FOR PROSTATE CANCER: ICD-10-CM

## 2023-06-13 DIAGNOSIS — I10 ESSENTIAL HYPERTENSION: ICD-10-CM

## 2023-06-13 DIAGNOSIS — R97.20 ELEVATED PSA: ICD-10-CM

## 2023-06-13 DIAGNOSIS — Z00.00 ROUTINE PHYSICAL EXAMINATION: ICD-10-CM

## 2023-06-13 DIAGNOSIS — Z94.4 S/P LIVER TRANSPLANT: ICD-10-CM

## 2023-06-13 DIAGNOSIS — D69.6 THROMBOCYTOPENIA: ICD-10-CM

## 2023-06-13 DIAGNOSIS — R73.09 ELEVATED GLUCOSE LEVEL: ICD-10-CM

## 2023-06-13 DIAGNOSIS — Z94.4 LIVER REPLACED BY TRANSPLANT: ICD-10-CM

## 2023-06-13 LAB
ALBUMIN SERPL BCP-MCNC: 3.8 G/DL (ref 3.5–5.2)
ALP SERPL-CCNC: 66 U/L (ref 55–135)
ALT SERPL W/O P-5'-P-CCNC: 29 U/L (ref 10–44)
ANION GAP SERPL CALC-SCNC: 13 MMOL/L (ref 8–16)
AST SERPL-CCNC: 25 U/L (ref 10–40)
BASOPHILS # BLD AUTO: 0.05 K/UL (ref 0–0.2)
BASOPHILS NFR BLD: 1 % (ref 0–1.9)
BILIRUB SERPL-MCNC: 0.9 MG/DL (ref 0.1–1)
BUN SERPL-MCNC: 23 MG/DL (ref 8–23)
CALCIUM SERPL-MCNC: 9.5 MG/DL (ref 8.7–10.5)
CHLORIDE SERPL-SCNC: 106 MMOL/L (ref 95–110)
CHOLEST SERPL-MCNC: 180 MG/DL (ref 120–199)
CHOLEST/HDLC SERPL: 3 {RATIO} (ref 2–5)
CO2 SERPL-SCNC: 24 MMOL/L (ref 23–29)
COMPLEXED PSA SERPL-MCNC: 6.4 NG/ML (ref 0–4)
CREAT SERPL-MCNC: 0.9 MG/DL (ref 0.5–1.4)
DIFFERENTIAL METHOD: ABNORMAL
EOSINOPHIL # BLD AUTO: 0.2 K/UL (ref 0–0.5)
EOSINOPHIL NFR BLD: 4.4 % (ref 0–8)
ERYTHROCYTE [DISTWIDTH] IN BLOOD BY AUTOMATED COUNT: 12.8 % (ref 11.5–14.5)
EST. GFR  (NO RACE VARIABLE): >60 ML/MIN/1.73 M^2
ESTIMATED AVG GLUCOSE: 123 MG/DL (ref 68–131)
GLUCOSE SERPL-MCNC: 129 MG/DL (ref 70–110)
HBA1C MFR BLD: 5.9 % (ref 4–5.6)
HCT VFR BLD AUTO: 48.3 % (ref 40–54)
HDLC SERPL-MCNC: 60 MG/DL (ref 40–75)
HDLC SERPL: 33.3 % (ref 20–50)
HGB BLD-MCNC: 15.7 G/DL (ref 14–18)
IMM GRANULOCYTES # BLD AUTO: 0.01 K/UL (ref 0–0.04)
IMM GRANULOCYTES NFR BLD AUTO: 0.2 % (ref 0–0.5)
LDLC SERPL CALC-MCNC: 102.6 MG/DL (ref 63–159)
LYMPHOCYTES # BLD AUTO: 2.1 K/UL (ref 1–4.8)
LYMPHOCYTES NFR BLD: 42.3 % (ref 18–48)
MCH RBC QN AUTO: 32.6 PG (ref 27–31)
MCHC RBC AUTO-ENTMCNC: 32.5 G/DL (ref 32–36)
MCV RBC AUTO: 100 FL (ref 82–98)
MONOCYTES # BLD AUTO: 0.5 K/UL (ref 0.3–1)
MONOCYTES NFR BLD: 9.6 % (ref 4–15)
NEUTROPHILS # BLD AUTO: 2.1 K/UL (ref 1.8–7.7)
NEUTROPHILS NFR BLD: 42.5 % (ref 38–73)
NONHDLC SERPL-MCNC: 120 MG/DL
NRBC BLD-RTO: 0 /100 WBC
PLATELET # BLD AUTO: 183 K/UL (ref 150–450)
PMV BLD AUTO: 10.1 FL (ref 9.2–12.9)
POTASSIUM SERPL-SCNC: 4.3 MMOL/L (ref 3.5–5.1)
PROT SERPL-MCNC: 6.9 G/DL (ref 6–8.4)
RBC # BLD AUTO: 4.81 M/UL (ref 4.6–6.2)
SODIUM SERPL-SCNC: 143 MMOL/L (ref 136–145)
TRIGL SERPL-MCNC: 87 MG/DL (ref 30–150)
TSH SERPL DL<=0.005 MIU/L-ACNC: 2.58 UIU/ML (ref 0.4–4)
WBC # BLD AUTO: 5.01 K/UL (ref 3.9–12.7)

## 2023-06-13 PROCEDURE — 80061 LIPID PANEL: CPT | Performed by: INTERNAL MEDICINE

## 2023-06-13 PROCEDURE — 36415 COLL VENOUS BLD VENIPUNCTURE: CPT | Performed by: INTERNAL MEDICINE

## 2023-06-13 PROCEDURE — 85025 COMPLETE CBC W/AUTO DIFF WBC: CPT | Performed by: INTERNAL MEDICINE

## 2023-06-13 PROCEDURE — 84443 ASSAY THYROID STIM HORMONE: CPT | Performed by: INTERNAL MEDICINE

## 2023-06-13 PROCEDURE — 80197 ASSAY OF TACROLIMUS: CPT | Performed by: INTERNAL MEDICINE

## 2023-06-13 PROCEDURE — 80053 COMPREHEN METABOLIC PANEL: CPT | Performed by: INTERNAL MEDICINE

## 2023-06-13 PROCEDURE — 84153 ASSAY OF PSA TOTAL: CPT | Performed by: INTERNAL MEDICINE

## 2023-06-13 PROCEDURE — 83036 HEMOGLOBIN GLYCOSYLATED A1C: CPT | Performed by: INTERNAL MEDICINE

## 2023-06-14 LAB — TACROLIMUS BLD-MCNC: 5.4 NG/ML (ref 5–15)

## 2023-06-15 ENCOUNTER — PATIENT MESSAGE (OUTPATIENT)
Dept: INTERNAL MEDICINE | Facility: CLINIC | Age: 73
End: 2023-06-15
Payer: MEDICARE

## 2023-06-16 ENCOUNTER — TELEPHONE (OUTPATIENT)
Dept: TRANSPLANT | Facility: CLINIC | Age: 73
End: 2023-06-16
Payer: MEDICARE

## 2023-06-16 NOTE — TELEPHONE ENCOUNTER
Fk pending review  ----- Message from Carson Oswald MD sent at 6/14/2023  1:34 AM CDT -----  Results reviewed

## 2023-06-19 ENCOUNTER — TELEPHONE (OUTPATIENT)
Dept: TRANSPLANT | Facility: CLINIC | Age: 73
End: 2023-06-19
Payer: MEDICARE

## 2023-06-19 NOTE — LETTER
June 19, 2023    Zander Kaur  [place] [address]          Dear Zander :  MRN: [id number]    This is a follow up to your recent labs, your lab results were stable.  There are no medicine changes.  Please have your labs drawn again on 12/12/23.      If you cannot have your labs drawn on the scheduled date, it is your responsibility to call the transplant department to reschedule.  Please call [phone] and ask to speak to Tenzin Roman Medical Assistant for all scheduling requests.     Sincerely,    [facility]  [facility] [place], [place] [id number] [phone]

## 2023-06-19 NOTE — TELEPHONE ENCOUNTER
Letter sent, labs stable and no medication changes are needed. Repeat labs due 12/12/23 per protocol.  ----- Message from Carson Oswald MD sent at 6/18/2023 11:30 AM CDT -----  Results reviewed

## 2023-06-21 ENCOUNTER — PATIENT MESSAGE (OUTPATIENT)
Dept: INTERNAL MEDICINE | Facility: CLINIC | Age: 73
End: 2023-06-21
Payer: MEDICARE

## 2023-06-26 ENCOUNTER — OFFICE VISIT (OUTPATIENT)
Dept: OTOLARYNGOLOGY | Facility: CLINIC | Age: 73
End: 2023-06-26
Payer: MEDICARE

## 2023-06-26 ENCOUNTER — OFFICE VISIT (OUTPATIENT)
Dept: UROLOGY | Facility: CLINIC | Age: 73
End: 2023-06-26
Payer: MEDICARE

## 2023-06-26 VITALS
HEART RATE: 58 BPM | HEIGHT: 66 IN | SYSTOLIC BLOOD PRESSURE: 128 MMHG | BODY MASS INDEX: 35.43 KG/M2 | DIASTOLIC BLOOD PRESSURE: 86 MMHG | WEIGHT: 220.44 LBS

## 2023-06-26 VITALS — HEIGHT: 66 IN | BODY MASS INDEX: 34.73 KG/M2 | WEIGHT: 216.13 LBS

## 2023-06-26 DIAGNOSIS — R97.20 ELEVATED PSA: Primary | ICD-10-CM

## 2023-06-26 DIAGNOSIS — M95.0 MOHS DEFECT OF RIGHT NOSE: Primary | ICD-10-CM

## 2023-06-26 PROCEDURE — 99999 PR PBB SHADOW E&M-EST. PATIENT-LVL III: ICD-10-PCS | Mod: PBBFAC,,, | Performed by: OTOLARYNGOLOGY

## 2023-06-26 PROCEDURE — 99999 PR PBB SHADOW E&M-EST. PATIENT-LVL III: CPT | Mod: PBBFAC,,, | Performed by: OTOLARYNGOLOGY

## 2023-06-26 PROCEDURE — 99213 OFFICE O/P EST LOW 20 MIN: CPT | Mod: PBBFAC | Performed by: UROLOGY

## 2023-06-26 PROCEDURE — 99213 OFFICE O/P EST LOW 20 MIN: CPT | Mod: PBBFAC,27 | Performed by: OTOLARYNGOLOGY

## 2023-06-26 PROCEDURE — 99213 PR OFFICE/OUTPT VISIT, EST, LEVL III, 20-29 MIN: ICD-10-PCS | Mod: S$PBB,,, | Performed by: OTOLARYNGOLOGY

## 2023-06-26 PROCEDURE — 99213 PR OFFICE/OUTPT VISIT, EST, LEVL III, 20-29 MIN: ICD-10-PCS | Mod: S$PBB,,, | Performed by: UROLOGY

## 2023-06-26 PROCEDURE — 99999 PR PBB SHADOW E&M-EST. PATIENT-LVL III: ICD-10-PCS | Mod: PBBFAC,,, | Performed by: UROLOGY

## 2023-06-26 PROCEDURE — 99999 PR PBB SHADOW E&M-EST. PATIENT-LVL III: CPT | Mod: PBBFAC,,, | Performed by: UROLOGY

## 2023-06-26 PROCEDURE — 99213 OFFICE O/P EST LOW 20 MIN: CPT | Mod: S$PBB,,, | Performed by: OTOLARYNGOLOGY

## 2023-06-26 PROCEDURE — 99213 OFFICE O/P EST LOW 20 MIN: CPT | Mod: S$PBB,,, | Performed by: UROLOGY

## 2023-06-26 NOTE — PROGRESS NOTES
CHIEF COMPLAINT:    Mr. Kaur is a 72 y.o. male presenting with elevated PSA.    PRESENTING ILLNESS:    Zander Kaur Sr. is a 72 y.o. male s/p liver transplant who presents with elevated PSA. PSA 03/2022 5.1. PSA 06/2023 6.4. Denies LUTS, gross hematuria.     REVIEW OF SYSTEMS:    Zander Kaur Sr. denies headache, blurred vision, fever, nausea, vomiting, chills, abdominal pain, chest pain, sore throat, bleeding per rectum, cough, SOB, recent loss of consciousness, recent mental status changes, seizures, dizziness, or upper or lower extremity weakness.      PATIENT HISTORY:    Past Medical History:   Diagnosis Date    Actinic keratosis     Allergy     seasonal    Arthritis     Basal cell carcinoma of right ala nasi 02/06/2023    Hypertension     Joint pain     Organ transplant     liver 2010    SCC (squamous cell carcinoma) 08/15/2022    Right cheek -SSW    SCC (squamous cell carcinoma) 03/27/2023    Right angle of mandible    SCC (squamous cell carcinoma) 03/27/2023    Right medial cheek    Squamous Cell Carcinoma 08/28/2013    right temple    Squamous Cell Carcinoma 09/04/2013    left forehead    Stroke        Past Surgical History:   Procedure Laterality Date    ANKLE SURGERY      multipole surgeries    CLOSURE OF DEFECT OF MOHS PROCEDURE Right 2/9/2023    Procedure: CLOSURE, MOHS PROCEDURE DEFECT;  Surgeon: Uzair Brandon MD;  Location: [address];  Service: ENT;  Laterality: Right;    FACIAL RECONSTRUCTION SURGERY      HERNIA REPAIR      knee scope      bob, multiple    LIVER TRANSPLANT      2010    REVISION OF FLAP GRAFT Right 3/3/2023    Procedure: REVISION, PROCEDURE INVOLVING FLAP GRAFT;  Surgeon: Uzair Brandon MD;  Location: [facility] OR [address] [place] [address];  Service: ENT;  Laterality: Right;    SHOULDER OPEN ROTATOR CUFF REPAIR      left       Family History   Problem Relation Age of Onset    No Known Problems Mother     Thyroid disease Sister     No Known Problems Son     No Known Problems Son      Melanoma Neg Hx     Psoriasis Neg Hx     Eczema Neg Hx     Lupus Neg Hx        Social History     Socioeconomic History    Marital status:    Occupational History    Occupation: Retired   Tobacco Use    Smoking status: Former     Types: Cigarettes     Quit date: 1969     Years since quittin.5    Smokeless tobacco: Never    Tobacco comments:     SMOKED FOR 1 YEAR, [employer] MADE HIM STOP   Substance and Sexual Activity    Alcohol use: Yes     Alcohol/week: 2.0 standard drinks     Types: 2 Glasses of wine per week     Comment: per week    Drug use: Never    Sexual activity: Not Currently     Partners: Female     Social Determinants of Health     Financial Resource Strain: Low Risk     Difficulty of Paying Living Expenses: Not hard at all   Food Insecurity: No Food Insecurity    Worried About Running Out of Food in the Last Year: Never true    Ran Out of Food in the Last Year: Never true   Transportation Needs: No Transportation Needs    Lack of Transportation (Medical): No    Lack of Transportation (Non-Medical): No   Physical Activity: Inactive    Days of Exercise per Week: 0 days    Minutes of Exercise per Session: 0 min   Housing Stability: Low Risk     Unable to Pay for Housing in the Last Year: No    Number of Places Lived in the Last Year: 1    Unstable Housing in the Last Year: No       Allergies:  Diphenhydramine hcl, Penicillins, and Povidone-iodine    Medications:    Current Outpatient Medications:     amlodipine (NORVASC) 10 MG tablet, TAKE 1 TABLET BY MOUTH DAILY, Disp: 30 tablet, Rfl: 11    carisoprodoL (SOMA) 350 MG tablet, Take 1 tablet by mouth 2 (two) times daily. , Disp: , Rfl:     celecoxib (CELEBREX) 200 MG capsule, Take 200 mg by mouth 2 (two) times daily., Disp: , Rfl:     ciclopirox (PENLAC) 8 % Soln, Apply topically nightly. Paint on and under nail nightly and remove every 7-10 days and start over with application. May use for 3 months., Disp: 6.6 mL, Rfl: 2    doxazosin  (CARDURA) 8 MG Tab, Take 1 tablet by mouth once daily.  *Due for annual with primary care provider*., Disp: 90 tablet, Rfl: 0    fluorouraciL (EFUDEX) 5 % cream, Compound fluorouracil 5% + calcipotriene 0.005% cream. Apply a pea-sized amount to entire face and scalp BID x 5 - 7 days., Disp: 60 g, Rfl: 1    gabapentin (NEURONTIN) 300 MG capsule, Take 300 mg by mouth 3 (three) times daily., Disp: , Rfl:     HYDROcodone-acetaminophen (NORCO) 5-325 mg per tablet, Take 1 tablet by mouth every 6 (six) hours as needed for Pain., Disp: 10 tablet, Rfl: 0    methocarbamoL (ROBAXIN) 750 MG Tab, , Disp: , Rfl:     metoprolol tartrate (LOPRESSOR) 100 MG tablet, Take 1 tablet (100 mg) by mouth twice daily., Disp: 180 tablet, Rfl: 2    multivitamin (THERAGRAN) per tablet, Take 1 tablet by mouth., Disp: , Rfl:     oxyCODONE-acetaminophen (PERCOCET) 7.5-325 mg per tablet, , Disp: , Rfl:     PROGRAF 1 mg Cap, Take 1 capsule (1 mg total) by mouth every 12 (twelve) hours., Disp: 60 capsule, Rfl: 11    ropinirole (REQUIP) 0.5 MG tablet, Take 0.5 mg by mouth every evening., Disp: , Rfl:     vitamin D 1000 units Tab, Take 1,000 Units by mouth once daily., Disp: , Rfl:     PHYSICAL EXAMINATION:    The patient generally appears in good health, is appropriately interactive, and is in no apparent distress.     Eyes: anicteric sclerae, moist conjunctivae; no lid-lag; PERRLA     HENT: Atraumatic; oropharynx clear with moist mucous membranes and no mucosal ulcerations;normal hard and soft palate.  No evidence of lymphadenopathy.    Neck: Trachea midline.  No thyromegaly.    Skin: No lesions.    Mental: Cooperative with normal affect.  Is oriented to time, place, and person.    Neuro: Grossly intact.    Chest: Normal inspiratory effort.   No accessory muscles.  No audible wheezes.  Respirations symmetric on inspiration and expiration.    Heart: Regular rhythm.      Abdomen:  Soft, non-tender. No masses or organomegaly. Bladder is not palpable. No  evidence of flank discomfort. No evidence of inguinal hernia.    Genitourinary: Normal anal sphincter tone. No rectal mass.    The prostate is 20 g. Normal landmarks. Lateral sulci. Median furrow intact.  No nodularity or induration. Seminal vesicles are normal.    Extremities: No clubbing, cyanosis, or edema      LABS:    Lab Results   Component Value Date    PSA 5.1 (H) 03/17/2022    PSA 3.3 10/18/2018    PSA 3.1 09/26/2017    PSADIAG 6.4 (H) 06/13/2023       IMPRESSION:    Encounter Diagnoses   Name Primary?    Elevated PSA          PLAN:    1. The patient was counseled on the implications of an elevated PSA and velocity. It was explained in detail that this is a screening test and does not indicate any known presence of prostate cancer but that he is at increased risk given he has an elevated PSA.  2. Will obtain MRI prostate

## 2023-06-26 NOTE — PROGRESS NOTES
CC: Flap revision      HPI   72 y.o. male presents roughly 3 months status post melolabial flap reconstruction of right nasal alar Mohs defect.  He is seeking revision of his flap.  He reports it was somewhat prominent and that is bothered by the appearance.  No difficulty breathing through his nose.    Review of Systems   Constitutional: Negative for fatigue and unexpected weight change.   HENT: Per HPI.  Eyes: Negative for visual disturbance.   Respiratory: Negative for shortness of breath, hemoptysis   Cardiovascular: Negative for chest pain and palpitations.   Musculoskeletal: Negative for decreased ROM, back pain.   Skin: Negative for rash, sunburn, itching.   Neurological: Negative for dizziness and seizures.   Hematological: Negative for adenopathy. Does not bruise/bleed easily.   Endocrine: Negative for rapid weight loss/weight gain, heat/cold intolerance.     Past Medical History   Patient Active Problem List   Diagnosis    AK (actinic keratosis)    Essential hypertension    S/P liver transplant    Hepatic cirrhosis    History of skin cancer    Joint injury    Screen for colon cancer    Arthritis    Immunosuppression    Hypertension associated with transplantation    Aortic atherosclerosis    Obesity (BMI 30.0-34.9)    Thrombocytopenia    Mohs defect of right nose    Severe obesity (BMI 35.0-39.9) with comorbidity    Elevated PSA           Past Surgical History   Past Surgical History:   Procedure Laterality Date    ANKLE SURGERY      multipole surgeries    CLOSURE OF DEFECT OF MOHS PROCEDURE Right 2/9/2023    Procedure: CLOSURE, MOHS PROCEDURE DEFECT;  Surgeon: Uzair Brandon MD;  Location: [facility] OR [address] [place] [address];  Service: ENT;  Laterality: Right;    FACIAL RECONSTRUCTION SURGERY      HERNIA REPAIR      knee scope      bob, multiple    LIVER TRANSPLANT      2010    REVISION OF FLAP GRAFT Right 3/3/2023    Procedure: REVISION, PROCEDURE INVOLVING FLAP GRAFT;  Surgeon: Uzair Brandon MD;  Location:  [facility] OR 2ND FLR;  Service: ENT;  Laterality: Right;    SHOULDER OPEN ROTATOR CUFF REPAIR      left         Family History   Family History   Problem Relation Age of Onset    No Known Problems Mother     Thyroid disease Sister     No Known Problems Son     No Known Problems Son     Melanoma Neg Hx     Psoriasis Neg Hx     Eczema Neg Hx     Lupus Neg Hx            Social History   .  Social History     Socioeconomic History    Marital status:    Occupational History    Occupation: Retired   Tobacco Use    Smoking status: Former     Types: Cigarettes     Quit date:      Years since quittin.5    Smokeless tobacco: Never    Tobacco comments:     SMOKED FOR 1 YEAR, [employer] MADE HIM STOP   Substance and Sexual Activity    Alcohol use: Yes     Alcohol/week: 2.0 standard drinks     Types: 2 Glasses of wine per week     Comment: per week    Drug use: Never    Sexual activity: Not Currently     Partners: Female     Social Determinants of Health     Financial Resource Strain: Low Risk     Difficulty of Paying Living Expenses: Not hard at all   Food Insecurity: No Food Insecurity    Worried About Running Out of Food in the Last Year: Never true    Ran Out of Food in the Last Year: Never true   Transportation Needs: No Transportation Needs    Lack of Transportation (Medical): No    Lack of Transportation (Non-Medical): No   Physical Activity: Inactive    Days of Exercise per Week: 0 days    Minutes of Exercise per Session: 0 min   Housing Stability: Low Risk     Unable to Pay for Housing in the Last Year: No    Number of Places Lived in the Last Year: 1    Unstable Housing in the Last Year: No         Allergies   Review of patient's allergies indicates:   Allergen Reactions    Diphenhydramine hcl     Penicillins      Other reaction(s): Rash    Povidone-iodine      Other reaction(s): Itching  Other reaction(s): Itching           Physical Exam     Vitals:    23 1443   BP: 128/86   Pulse: (!) 58          Body mass index is 35.58 kg/m².      General: AOx3, NAD   Respiratory:  Symmetric chest rise, normal effort  Nose: Melolabial flap prominent at R nasal ala.  No gross nasal septal deviation. Inferior Turbinates WNL bilaterally. No septal perforation. No masses/lesions.   Neck: No scars.  No cervical lymphadenopathy, thyromegaly or thyroid nodules.  Normal range of motion.    Face: House Brackmann I bilaterally.     Assessment/Plan  Problem List Items Addressed This Visit          ENT    Mohs defect of right nose - Primary     Melolabial flap prominent.  Refer to Dr. Augustine for steroid injection vs revision.

## 2023-06-30 ENCOUNTER — EXTERNAL CHRONIC CARE MANAGEMENT (OUTPATIENT)
Dept: PRIMARY CARE CLINIC | Facility: CLINIC | Age: 73
End: 2023-06-30
Payer: MEDICARE

## 2023-06-30 PROCEDURE — 99490 CHRNC CARE MGMT STAFF 1ST 20: CPT | Mod: S$PBB,,, | Performed by: INTERNAL MEDICINE

## 2023-06-30 PROCEDURE — 99490 PR CHRONIC CARE MGMT, 1ST 20 MIN: ICD-10-PCS | Mod: S$PBB,,, | Performed by: INTERNAL MEDICINE

## 2023-06-30 PROCEDURE — 99490 CHRNC CARE MGMT STAFF 1ST 20: CPT | Mod: PBBFAC,25 | Performed by: INTERNAL MEDICINE

## 2023-06-30 PROCEDURE — 99439 CHRNC CARE MGMT STAF EA ADDL: CPT | Mod: PBBFAC,27 | Performed by: INTERNAL MEDICINE

## 2023-06-30 PROCEDURE — 99439 PR CHRONIC CARE MGMT, EA ADDTL 20 MIN: ICD-10-PCS | Mod: S$PBB,,, | Performed by: INTERNAL MEDICINE

## 2023-06-30 PROCEDURE — 99439 CHRNC CARE MGMT STAF EA ADDL: CPT | Mod: S$PBB,,, | Performed by: INTERNAL MEDICINE

## 2023-07-10 ENCOUNTER — OFFICE VISIT (OUTPATIENT)
Dept: OTOLARYNGOLOGY | Facility: CLINIC | Age: 73
End: 2023-07-10
Payer: MEDICARE

## 2023-07-10 VITALS
BODY MASS INDEX: 34.12 KG/M2 | WEIGHT: 212.31 LBS | HEART RATE: 55 BPM | SYSTOLIC BLOOD PRESSURE: 99 MMHG | HEIGHT: 66 IN | DIASTOLIC BLOOD PRESSURE: 74 MMHG

## 2023-07-10 DIAGNOSIS — M95.0 MOHS DEFECT OF RIGHT NOSE: Primary | ICD-10-CM

## 2023-07-10 PROCEDURE — 99999 PR PBB SHADOW E&M-EST. PATIENT-LVL IV: CPT | Mod: PBBFAC,,, | Performed by: OTOLARYNGOLOGY

## 2023-07-10 PROCEDURE — 99214 OFFICE O/P EST MOD 30 MIN: CPT | Mod: 25,S$PBB,, | Performed by: OTOLARYNGOLOGY

## 2023-07-10 PROCEDURE — 11900 INJECT SKIN LESIONS </W 7: CPT | Mod: S$PBB,,, | Performed by: OTOLARYNGOLOGY

## 2023-07-10 PROCEDURE — 11900 INJECT SKIN LESIONS </W 7: CPT | Mod: PBBFAC | Performed by: OTOLARYNGOLOGY

## 2023-07-10 PROCEDURE — 99999 PR PBB SHADOW E&M-EST. PATIENT-LVL IV: ICD-10-PCS | Mod: PBBFAC,,, | Performed by: OTOLARYNGOLOGY

## 2023-07-10 PROCEDURE — 99214 OFFICE O/P EST MOD 30 MIN: CPT | Mod: PBBFAC | Performed by: OTOLARYNGOLOGY

## 2023-07-10 PROCEDURE — 99214 PR OFFICE/OUTPT VISIT, EST, LEVL IV, 30-39 MIN: ICD-10-PCS | Mod: 25,S$PBB,, | Performed by: OTOLARYNGOLOGY

## 2023-07-10 PROCEDURE — 11900 PR INJECTION INTO SKIN LESIONS, UP TO 7: ICD-10-PCS | Mod: S$PBB,,, | Performed by: OTOLARYNGOLOGY

## 2023-07-10 RX ADMIN — TRIAMCINOLONE ACETONIDE 10 MG: 10 INJECTION, SUSPENSION INTRA-ARTICULAR; INTRALESIONAL at 04:07

## 2023-07-10 NOTE — H&P (VIEW-ONLY)
History of Present Illness:   Zander Kaur is a 72 y.o. year old male evaluated on 7/10/2023, in the Otolaryngology-Head and Neck Surgery Clinic at [facility]. The patient was referred by Dr. Brandon for evaluation of  revision of melolabial flap versus steroid injection.  Patient had a Mohs defect reconstruction for basal cell carcinoma by Dr. Brandon in March and was seen for his three-month follow-up with good healing of the flap the patient complaining about rounded illness and bump bothering him.  He denies any nasal obstruction.  When inquired further he is bothered by bump but also notching along the ala. he also reports some tenderness to the right cheek.  He denies any erythema or other skin changes or drainage from incisions.            Past Medical/Surgical History  Past Medical History:   Diagnosis Date    Actinic keratosis     Allergy     seasonal    Arthritis     Basal cell carcinoma of right ala nasi 02/06/2023    Hypertension     Joint pain     Organ transplant     liver 2010    SCC (squamous cell carcinoma) 08/15/2022    Right cheek -SSW    SCC (squamous cell carcinoma) 03/27/2023    Right angle of mandible    SCC (squamous cell carcinoma) 03/27/2023    Right medial cheek    Squamous Cell Carcinoma 08/28/2013    right temple    Squamous Cell Carcinoma 09/04/2013    left forehead    Stroke      His  has a past surgical history that includes Liver transplant; knee scope; Hernia repair; Shoulder open rotator cuff repair; Facial reconstruction surgery; Ankle surgery; Closure of defect of Mohs procedure (Right, 2/9/2023); and Revision of flap graft (Right, 3/3/2023).     Past Family/Social History  His family history includes No Known Problems in his mother, son, and son; Thyroid disease in his sister.  He  reports that he quit smoking about 54 years ago. His smoking use included cigarettes. He has never used smokeless tobacco. He reports current alcohol use of about 2.0 standard drinks per  week. He reports that he does not use drugs.     Medications/Allergies/Immunizations  His current medication(s) include:   Current Outpatient Medications   Medication Sig Dispense Refill    amlodipine (NORVASC) 10 MG tablet TAKE 1 TABLET BY MOUTH DAILY 30 tablet 11    celecoxib (CELEBREX) 200 MG capsule Take 200 mg by mouth 2 (two) times daily.      ciclopirox (PENLAC) 8 % Soln Apply topically nightly. Paint on and under nail nightly and remove every 7-10 days and start over with application. May use for 3 months. 6.6 mL 2    doxazosin (CARDURA) 8 MG Tab Take 1 tablet by mouth once daily.  *Due for annual with primary care provider*. 90 tablet 0    fluorouraciL (EFUDEX) 5 % cream Compound fluorouracil 5% + calcipotriene 0.005% cream. Apply a pea-sized amount to entire face and scalp BID x 5 - 7 days. 60 g 1    gabapentin (NEURONTIN) 300 MG capsule Take 300 mg by mouth 3 (three) times daily.      HYDROcodone-acetaminophen (NORCO) 5-325 mg per tablet Take 1 tablet by mouth every 6 (six) hours as needed for Pain. 10 tablet 0    methocarbamoL (ROBAXIN) 750 MG Tab       metoprolol tartrate (LOPRESSOR) 100 MG tablet Take 1 tablet (100 mg) by mouth twice daily. 180 tablet 2    multivitamin (THERAGRAN) per tablet Take 1 tablet by mouth.      oxyCODONE-acetaminophen (PERCOCET) 7.5-325 mg per tablet       PROGRAF 1 mg Cap Take 1 capsule (1 mg total) by mouth every 12 (twelve) hours. 60 capsule 11    ropinirole (REQUIP) 0.5 MG tablet Take 0.5 mg by mouth every evening.      vitamin D 1000 units Tab Take 1,000 Units by mouth once daily.      carisoprodoL (SOMA) 350 MG tablet Take 1 tablet by mouth 2 (two) times daily.        No current facility-administered medications for this visit.        Allergies: Diphenhydramine hcl, Penicillins, and Povidone-iodine     Immunizations:   Immunization History   Administered Date(s) Administered    COVID-19, MRNA, LN-S, PF (MODERNA FULL 0.5 ML DOSE) 03/17/2021, 04/14/2021, 01/05/2022     "Hepatitis A / Hepatitis B 05/12/2010, 06/14/2010    Influenza 03/01/2011    Influenza (FLUAD) - Quadrivalent - Adjuvanted - PF *Preferred* (65+) 12/13/2021    Influenza (FLUAD) - Trivalent - Adjuvanted - PF (65+) 01/07/2019    Influenza - High Dose - PF (65 years and older) 03/01/2011, 11/14/2018, 10/06/2020    Influenza - Quadrivalent - High Dose - PF (65 years and older) 10/06/2020    Pneumococcal Conjugate - 13 Valent 10/06/2020    Pneumococcal Polysaccharide - 23 Valent 05/12/2010    Tdap 03/19/2015    Zoster Recombinant 10/06/2020         Review of Systems   Constitutional: Negative for fever, weight loss and weight gain.  Skin: Negative for rash, itchiness, dryness  HENT:  As per HPI  Cardiovascular: Negative for chest pain and dyspnea on exertion .   Respiratory: Is not experiencing shortness of breath.   Gastrointestinal: Negative for nausea and vomiting.   Neurological: Negative for headaches.   Lymph/Heme: Negative for lymphadenopathy or easy bruising  Musculoskeletal: Negative for joint or muscle pain  Psychiatric: The patient is not nervous/anxious.        All other systems are negative except for that listed in the HPI.      PHYSICAL EXAM:   Vital Signs:  BP 99/74 (BP Location: Left arm, Patient Position: Sitting, BP Method: Large (Automatic))   Pulse (!) 55   Ht 5' 6" (1.676 m)   Wt 96.3 kg (212 lb 4.9 oz)   BMI 34.27 kg/m²      General:  Well-developed, well-nourished  Communication and Voice:  Clear pitch and clarity  Hearing: Hearing adequate for verbal communication bilaterally   Inspection:  Normocephalic and atraumatic without mass or lesion  Palpation:  Facial skeleton intact without bony stepoffs  Parotid Glands:  No mass or tenderness  Facial Strength:  Facial motility symmetric and full bilaterally  Pinna:  External ear intact and fully developed  External canal:  Canal is patent with intact skin  Tympanic Membrane:  Clear and mobile  External nose:  Right ala subunit with well healed " melolabial flap with some contracture and been cushioning edema.  There is separation at mid flap with 2 ala edges with notching  Internal Nose:  Septum intact and midline.  No edema, polyp, or rhinorrhea.  TMJ:  No pain to palpation with full mobility  Oral cavity, Lips, Teeth, and Gums:  Mucosa and teeth intact and viable, No lesions, masses or ulcers  Oropharynx: No erythema or exudate, no masses or ulcerations, non-obstructive tonsils  Nasopharynx:  No mass or lesion with intact mucosa  Hypopharynx:  Not well visualized secondary to gagging  Larynx:  Not well visualized secondary to gagging  Neck, Trachea, Lymphatics:  Midline trachea without mass or lesion, no lymphadenopathy  Thyroid:  No mass or nodularity  Eyes: No nystagmus with equal extraocular motion bilaterally  Neuro/Psych/Balance: Patient oriented and appropriate in interaction;  Appropriate mood and affect;  Gait is intact with no imbalance; Cranial nerves I-XII are intact  Respiratory effort:  Equal inspiration and expiration without stridor  Peripheral Vascular:  Warm extremities with equal pulses     Procedure:  Kenalog injection   Indications:  Pin cushioning edema  Surgeon: Consuelo Augustine MD  Procedure note/findings: After informed discussion of the risks, benefits, and alternatives after indications as noted above, approximately 0.5 cc of Kenalog mixed with 0.5 cc of lidocaine administered to flap to the right of the nasal ala.  He tolerated this well.        ASSESSMENT:   1. Mohs defect of right nose            PLAN:   Patient is status post melolabial flap with some pin cushioning edema addressed with Kenalog today.  If this does not help will plan on surgical revision and I have scheduled him tentatively on 08/01/23 with risks benefits and alternatives discussed.      I believe that Mr. Kaur has a good understanding of the issues involved and I answered all of his questions.     DISCLAIMER: This note was prepared with [employer] voice recognition  transcription software. Garbled syntax, mangled pronouns, and other bizarre constructions may be attributed to that software system. While efforts were made to correct any mistakes made by this voice recognition program, some errors and/or omissions may remain in the note that were missed when the note was originally created.

## 2023-07-10 NOTE — PROGRESS NOTES
History of Present Illness:   Zander Kaur is a 72 y.o. year old male evaluated on 7/10/2023, in the Otolaryngology-Head and Neck Surgery Clinic at [facility]. The patient was referred by Dr. Brandon for evaluation of  revision of melolabial flap versus steroid injection.  Patient had a Mohs defect reconstruction for basal cell carcinoma by Dr. Bradnon in March and was seen for his three-month follow-up with good healing of the flap the patient complaining about rounded illness and bump bothering him.  He denies any nasal obstruction.  When inquired further he is bothered by bump but also notching along the ala. he also reports some tenderness to the right cheek.  He denies any erythema or other skin changes or drainage from incisions.            Past Medical/Surgical History  Past Medical History:   Diagnosis Date    Actinic keratosis     Allergy     seasonal    Arthritis     Basal cell carcinoma of right ala nasi 02/06/2023    Hypertension     Joint pain     Organ transplant     liver 2010    SCC (squamous cell carcinoma) 08/15/2022    Right cheek -SSW    SCC (squamous cell carcinoma) 03/27/2023    Right angle of mandible    SCC (squamous cell carcinoma) 03/27/2023    Right medial cheek    Squamous Cell Carcinoma 08/28/2013    right temple    Squamous Cell Carcinoma 09/04/2013    left forehead    Stroke      His  has a past surgical history that includes Liver transplant; knee scope; Hernia repair; Shoulder open rotator cuff repair; Facial reconstruction surgery; Ankle surgery; Closure of defect of Mohs procedure (Right, 2/9/2023); and Revision of flap graft (Right, 3/3/2023).     Past Family/Social History  His family history includes No Known Problems in his mother, son, and son; Thyroid disease in his sister.  He  reports that he quit smoking about 54 years ago. His smoking use included cigarettes. He has never used smokeless tobacco. He reports current alcohol use of about 2.0 standard drinks per  week. He reports that he does not use drugs.     Medications/Allergies/Immunizations  His current medication(s) include:   Current Outpatient Medications   Medication Sig Dispense Refill    amlodipine (NORVASC) 10 MG tablet TAKE 1 TABLET BY MOUTH DAILY 30 tablet 11    celecoxib (CELEBREX) 200 MG capsule Take 200 mg by mouth 2 (two) times daily.      ciclopirox (PENLAC) 8 % Soln Apply topically nightly. Paint on and under nail nightly and remove every 7-10 days and start over with application. May use for 3 months. 6.6 mL 2    doxazosin (CARDURA) 8 MG Tab Take 1 tablet by mouth once daily.  *Due for annual with primary care provider*. 90 tablet 0    fluorouraciL (EFUDEX) 5 % cream Compound fluorouracil 5% + calcipotriene 0.005% cream. Apply a pea-sized amount to entire face and scalp BID x 5 - 7 days. 60 g 1    gabapentin (NEURONTIN) 300 MG capsule Take 300 mg by mouth 3 (three) times daily.      HYDROcodone-acetaminophen (NORCO) 5-325 mg per tablet Take 1 tablet by mouth every 6 (six) hours as needed for Pain. 10 tablet 0    methocarbamoL (ROBAXIN) 750 MG Tab       metoprolol tartrate (LOPRESSOR) 100 MG tablet Take 1 tablet (100 mg) by mouth twice daily. 180 tablet 2    multivitamin (THERAGRAN) per tablet Take 1 tablet by mouth.      oxyCODONE-acetaminophen (PERCOCET) 7.5-325 mg per tablet       PROGRAF 1 mg Cap Take 1 capsule (1 mg total) by mouth every 12 (twelve) hours. 60 capsule 11    ropinirole (REQUIP) 0.5 MG tablet Take 0.5 mg by mouth every evening.      vitamin D 1000 units Tab Take 1,000 Units by mouth once daily.      carisoprodoL (SOMA) 350 MG tablet Take 1 tablet by mouth 2 (two) times daily.        No current facility-administered medications for this visit.        Allergies: Diphenhydramine hcl, Penicillins, and Povidone-iodine     Immunizations:   Immunization History   Administered Date(s) Administered    COVID-19, MRNA, LN-S, PF (MODERNA FULL 0.5 ML DOSE) 03/17/2021, 04/14/2021, 01/05/2022     "Hepatitis A / Hepatitis B 05/12/2010, 06/14/2010    Influenza 03/01/2011    Influenza (FLUAD) - Quadrivalent - Adjuvanted - PF *Preferred* (65+) 12/13/2021    Influenza (FLUAD) - Trivalent - Adjuvanted - PF (65+) 01/07/2019    Influenza - High Dose - PF (65 years and older) 03/01/2011, 11/14/2018, 10/06/2020    Influenza - Quadrivalent - High Dose - PF (65 years and older) 10/06/2020    Pneumococcal Conjugate - 13 Valent 10/06/2020    Pneumococcal Polysaccharide - 23 Valent 05/12/2010    Tdap 03/19/2015    Zoster Recombinant 10/06/2020         Review of Systems   Constitutional: Negative for fever, weight loss and weight gain.  Skin: Negative for rash, itchiness, dryness  HENT:  As per HPI  Cardiovascular: Negative for chest pain and dyspnea on exertion .   Respiratory: Is not experiencing shortness of breath.   Gastrointestinal: Negative for nausea and vomiting.   Neurological: Negative for headaches.   Lymph/Heme: Negative for lymphadenopathy or easy bruising  Musculoskeletal: Negative for joint or muscle pain  Psychiatric: The patient is not nervous/anxious.        All other systems are negative except for that listed in the HPI.      PHYSICAL EXAM:   Vital Signs:  BP 99/74 (BP Location: Left arm, Patient Position: Sitting, BP Method: Large (Automatic))   Pulse (!) 55   Ht 5' 6" (1.676 m)   Wt 96.3 kg (212 lb 4.9 oz)   BMI 34.27 kg/m²      General:  Well-developed, well-nourished  Communication and Voice:  Clear pitch and clarity  Hearing: Hearing adequate for verbal communication bilaterally   Inspection:  Normocephalic and atraumatic without mass or lesion  Palpation:  Facial skeleton intact without bony stepoffs  Parotid Glands:  No mass or tenderness  Facial Strength:  Facial motility symmetric and full bilaterally  Pinna:  External ear intact and fully developed  External canal:  Canal is patent with intact skin  Tympanic Membrane:  Clear and mobile  External nose:  Right ala subunit with well healed " melolabial flap with some contracture and been cushioning edema.  There is separation at mid flap with 2 ala edges with notching  Internal Nose:  Septum intact and midline.  No edema, polyp, or rhinorrhea.  TMJ:  No pain to palpation with full mobility  Oral cavity, Lips, Teeth, and Gums:  Mucosa and teeth intact and viable, No lesions, masses or ulcers  Oropharynx: No erythema or exudate, no masses or ulcerations, non-obstructive tonsils  Nasopharynx:  No mass or lesion with intact mucosa  Hypopharynx:  Not well visualized secondary to gagging  Larynx:  Not well visualized secondary to gagging  Neck, Trachea, Lymphatics:  Midline trachea without mass or lesion, no lymphadenopathy  Thyroid:  No mass or nodularity  Eyes: No nystagmus with equal extraocular motion bilaterally  Neuro/Psych/Balance: Patient oriented and appropriate in interaction;  Appropriate mood and affect;  Gait is intact with no imbalance; Cranial nerves I-XII are intact  Respiratory effort:  Equal inspiration and expiration without stridor  Peripheral Vascular:  Warm extremities with equal pulses     Procedure:  Kenalog injection   Indications:  Pin cushioning edema  Surgeon: Consuelo Augustine MD  Procedure note/findings: After informed discussion of the risks, benefits, and alternatives after indications as noted above, approximately 0.5 cc of Kenalog mixed with 0.5 cc of lidocaine administered to flap to the right of the nasal ala.  He tolerated this well.        ASSESSMENT:   1. Mohs defect of right nose            PLAN:   Patient is status post melolabial flap with some pin cushioning edema addressed with Kenalog today.  If this does not help will plan on surgical revision and I have scheduled him tentatively on 08/01/23 with risks benefits and alternatives discussed.      I believe that Mr. Kaur has a good understanding of the issues involved and I answered all of his questions.     DISCLAIMER: This note was prepared with [employer] voice recognition  transcription software. Garbled syntax, mangled pronouns, and other bizarre constructions may be attributed to that software system. While efforts were made to correct any mistakes made by this voice recognition program, some errors and/or omissions may remain in the note that were missed when the note was originally created.

## 2023-07-17 ENCOUNTER — TELEPHONE (OUTPATIENT)
Dept: INTERNAL MEDICINE | Facility: CLINIC | Age: 73
End: 2023-07-17
Payer: MEDICARE

## 2023-07-17 NOTE — TELEPHONE ENCOUNTER
----- Message from Billie Loredo sent at 7/17/2023 11:18 AM CDT -----  Contact: [phone]  Pt is calling he states he is loosing a dr by the end of the month for his bone dr and he is needing some help getting another dr and who you all recommend please give return call

## 2023-07-24 ENCOUNTER — HOSPITAL ENCOUNTER (OUTPATIENT)
Dept: RADIOLOGY | Facility: HOSPITAL | Age: 73
Discharge: HOME | End: 2023-07-24
Payer: MEDICARE

## 2023-07-24 DIAGNOSIS — R97.20 ELEVATED PSA: ICD-10-CM

## 2023-07-27 ENCOUNTER — ANESTHESIA EVENT (OUTPATIENT)
Dept: SURGERY | Facility: HOSPITAL | Age: 73
End: 2023-07-27
Payer: MEDICARE

## 2023-07-27 NOTE — ANESTHESIA PREPROCEDURE EVALUATION
07/27/2023  Zander Kaur Sr. is a 72 y.o., male.      Pre-op Assessment    I have reviewed the Patient Summary Reports.    I have reviewed the NPO Status.   I have reviewed the Medications.     Review of Systems  Anesthesia Hx:  Problems recovering from surgires post op  Inadequate sedation on induction of  anesthesia Denies Family Hx of Anesthesia complications.   Denies Personal Hx of Anesthesia complications.   Hematology/Oncology:        Hematology Comments: H/o thrombocytopenia resolved as per pt --  Cancer in past history:  surgery  Oncology Comments: Skin cancer     EENT/Dental:EENT/Dental Normal   Cardiovascular:   Hypertension, well controlled Dysrhythmias (bradycardia) NYHA Classification II    Renal/:   BPH    Hepatic/GI:   Liver Disease, Hepatitis S/p liver transplant 10 years ago   Musculoskeletal:   Arthritis   Spine Disorders: Chronic Pain and Degenerative disease    Neurological:   CVA, no residual symptoms    Endocrine:  Obesity / BMI > 30    Actinic keratosis Organ transplant   Hypertension Allergy   Joint pain Arthritis   Squamous Cell Carcinoma Squamous Cell Carcinoma   Stroke SCC (squamous cell carcinoma)   Basal cell carcinoma of right ala nasi SCC (squamous cell carcinoma)   SCC (squamous cell carcinoma)      Actinic keratosis       Allergy       seasonal    Arthritis      Basal cell carcinoma of right ala nasi 02/06/2023    Hypertension      Joint pain      Organ transplant       liver 2010    SCC (squamous cell carcinoma) 08/15/2022     Right cheek -SSW    SCC (squamous cell carcinoma) 03/27/2023     Right angle of mandible    SCC (squamous cell carcinoma) 03/27/2023     Right medial cheek    Squamous Cell Carcinoma 08/28/2013     right temple    Squamous Cell Carcinoma 09/04/2013     left forehead    Stroke              Past Surgical History:   Procedure Laterality  Date    ANKLE SURGERY         multipole surgeries    CLOSURE OF DEFECT OF MOHS PROCEDURE Right 2/9/2023     Procedure: CLOSURE, MOHS PROCEDURE DEFECT;  Surgeon: Uzair Brandon MD;  Location: [facility] OR [address] [place] [address];  Service: ENT;  Laterality: Right;    FACIAL RECONSTRUCTION SURGERY        HERNIA REPAIR        knee scope         bob, multiple    LIVER TRANSPLANT         2010    REVISION OF FLAP GRAFT Right 3/3/2023     Procedure: REVISION, PROCEDURE INVOLVING FLAP GRAFT;  Surgeon: Uzair Brandon MD;  Location: [facility] OR [address] [place] [address];  Service: ENT;  Laterality: Right;    SHOULDER OPEN ROTATOR CUFF REPAIR         left          Patient Active Problem List   Diagnosis    AK (actinic keratosis)    Essential hypertension    S/P liver transplant    Hepatic cirrhosis    History of skin cancer    Joint injury    Screen for colon cancer    Arthritis    Immunosuppression    Hypertension associated with transplantation    Aortic atherosclerosis    Obesity (BMI 30.0-34.9)    Thrombocytopenia    Mohs defect of right nose    Severe obesity (BMI 35.0-39.9) with comorbidity        Surgical History    LIVER TRANSPLANT knee scope   HERNIA REPAIR SHOULDER OPEN ROTATOR CUFF REPAIR   FACIAL RECONSTRUCTION SURGERY ANKLE SURGERY   CLOSURE OF DEFECT OF MOHS PROCEDURE REVISION OF FLAP GRAFT         Physical Exam  General: Well nourished, Cooperative, Alert, Oriented and Anxious    Airway:  Mallampati: II   Mouth Opening: Normal  TM Distance: Normal  Tongue: Normal  Neck ROM: Normal ROM    Dental:  Prominent Incisors  Over riding tooth  beard      Anesthesia Plan  Type of Anesthesia, risks & benefits discussed:    Anesthesia Type: Gen ETT  Intra-op Monitoring Plan: Standard ASA Monitors  Post Op Pain Control Plan: multimodal analgesia and IV/PO Opioids PRN  Induction:  IV  Airway Plan: Video  Informed Consent: Informed consent signed with the Patient and all parties understand the risks and agree with anesthesia plan.   All questions answered.   ASA Score: 3  Day of Surgery Review of History & Physical: H&P Update referred to the surgeon/provider.I have interviewed and examined the patient. I have reviewed the patient's H&P dated: There are no significant changes. H&P completed by Anesthesiologist.  Anesthesia Plan Notes: Pre op steroid stress dose and his daily pain meds  Careful titration of fentanyl,versed and muscle relaxant   avoid hypotension    Ready For Surgery From Anesthesia Perspective.     .

## 2023-07-31 ENCOUNTER — OFFICE VISIT (OUTPATIENT)
Dept: DERMATOLOGY | Facility: CLINIC | Age: 73
End: 2023-07-31
Payer: MEDICARE

## 2023-07-31 ENCOUNTER — EXTERNAL CHRONIC CARE MANAGEMENT (OUTPATIENT)
Dept: PRIMARY CARE CLINIC | Facility: CLINIC | Age: 73
End: 2023-07-31
Payer: MEDICARE

## 2023-07-31 DIAGNOSIS — L57.0 AK (ACTINIC KERATOSIS): Primary | ICD-10-CM

## 2023-07-31 DIAGNOSIS — Z85.828 HISTORY OF SKIN CANCER: ICD-10-CM

## 2023-07-31 PROCEDURE — 99490 PR CHRONIC CARE MGMT, 1ST 20 MIN: ICD-10-PCS | Mod: S$PBB,,, | Performed by: INTERNAL MEDICINE

## 2023-07-31 PROCEDURE — 99212 PR OFFICE/OUTPT VISIT, EST, LEVL II, 10-19 MIN: ICD-10-PCS | Mod: 25,S$PBB,, | Performed by: DERMATOLOGY

## 2023-07-31 PROCEDURE — 99490 CHRNC CARE MGMT STAFF 1ST 20: CPT | Mod: PBBFAC | Performed by: INTERNAL MEDICINE

## 2023-07-31 PROCEDURE — 99212 OFFICE O/P EST SF 10 MIN: CPT | Mod: 25,S$PBB,, | Performed by: DERMATOLOGY

## 2023-07-31 PROCEDURE — 99490 CHRNC CARE MGMT STAFF 1ST 20: CPT | Mod: S$PBB,,, | Performed by: INTERNAL MEDICINE

## 2023-07-31 PROCEDURE — 99213 OFFICE O/P EST LOW 20 MIN: CPT | Mod: PBBFAC,25 | Performed by: DERMATOLOGY

## 2023-07-31 PROCEDURE — 99999 PR PBB SHADOW E&M-EST. PATIENT-LVL III: ICD-10-PCS | Mod: PBBFAC,,, | Performed by: DERMATOLOGY

## 2023-07-31 PROCEDURE — 99999 PR PBB SHADOW E&M-EST. PATIENT-LVL III: CPT | Mod: PBBFAC,,, | Performed by: DERMATOLOGY

## 2023-07-31 NOTE — PATIENT INSTRUCTIONS

## 2023-07-31 NOTE — PROGRESS NOTES
Subjective:      Patient ID:  Zander Kaur Sr. is a 72 y.o. male who presents for   Chief Complaint   Patient presents with    Follow-up     F/u to ED&C     Pt is a 17 y/o male presenting to the clinic or follow up of ED&C and skin check.    Pt was last seen on 05/01/2023 for ED&C of superficial SCC on the right angle of mandible and right medial cheek.    H/o of SCC   Pt is here for a UBSE.    Pt has no concerns        Review of Systems   Skin:  Positive for activity-related sunscreen use and wears hat (always). Negative for daily sunscreen use and recent sunburn.   Hematologic/Lymphatic: Does not bruise/bleed easily.       Objective:   Physical Exam   Constitutional: He appears well-developed and well-nourished. No distress.   Neurological: He is alert and oriented to person, place, and time. He is not disoriented.   Psychiatric: He has a normal mood and affect.   Skin:   Areas Examined (abnormalities noted in diagram):   Scalp / Hair Palpated and Inspected  Head / Face Inspection Performed            Diagram Legend     Erythematous scaling macule/papule c/w actinic keratosis       Vascular papule c/w angioma      Pigmented verrucoid papule/plaque c/w seborrheic keratosis      Yellow umbilicated papule c/w sebaceous hyperplasia      Irregularly shaped tan macule c/w lentigo     1-2 mm smooth white papules consistent with Milia      Movable subcutaneous cyst with punctum c/w epidermal inclusion cyst      Subcutaneous movable cyst c/w pilar cyst      Firm pink to brown papule c/w dermatofibroma      Pedunculated fleshy papule(s) c/w skin tag(s)      Evenly pigmented macule c/w junctional nevus     Mildly variegated pigmented, slightly irregular-bordered macule c/w mildly atypical nevus      Flesh colored to evenly pigmented papule c/w intradermal nevus       Pink pearly papule/plaque c/w basal cell carcinoma      Erythematous hyperkeratotic cursted plaque c/w SCC      Surgical scar with no sign of skin cancer  recurrence      Open and closed comedones      Inflammatory papules and pustules      Verrucoid papule consistent consistent with wart     Erythematous eczematous patches and plaques     Dystrophic onycholytic nail with subungual debris c/w onychomycosis     Umbilicated papule    Erythematous-base heme-crusted tan verrucoid plaque consistent with inflamed seborrheic keratosis     Erythematous Silvery Scaling Plaque c/w Psoriasis     See annotation      Assessment / Plan:        AK (actinic keratosis)  Cryosurgery Procedure Note    Verbal consent from the patient is obtained including, but not limited to, risk of hypopigmentation/hyperpigmentation, scar, recurrence of lesion. The patient is aware of the precancerous quality and need for treatment of these lesions. Liquid nitrogen cryosurgery is applied to the 12 actinic keratoses, as detailed in the physical exam, to produce a freeze injury. The patient is aware that blisters may form and is instructed on wound care with gentle cleansing and use of vaseline ointment to keep moist until healed. The patient is supplied a handout on cryosurgery and is instructed to call if lesions do not completely resolve.    History of skin cancer  S/p ED and C to ana x 2 5/2023  No clinical evidence of lesions today. No further treatment needed at this time.                No follow-ups on file.     Crescentic Advancement Flap Text: The defect edges were debeveled with a #15 scalpel blade.  Given the location of the defect and the proximity to free margins a crescentic advancement flap was deemed most appropriate.  Using a sterile surgical marker, the appropriate advancement flap was drawn incorporating the defect and placing the expected incisions within the relaxed skin tension lines where possible.    The area thus outlined was incised deep to adipose tissue with a #15 scalpel blade.  The skin margins were undermined to an appropriate distance in all directions utilizing iris scissors.

## 2023-07-31 NOTE — PRE-PROCEDURE INSTRUCTIONS
The following was discussed with pt via phone and sent to pt portal. Pt verbalized understanding.    Dear Zander ,    You are scheduled for a procedure with Dr. Augustine on 8/1/2023. Your scheduled arrival time is 10:35am.  This arrival time is roughly 2 hours before your anticipated procedure time to allow sufficient time for pre-op.  Please wear comfortable clothes.  Most patients do not need to change into a gown.  Please do not wear a dress.  This procedure will take place at the [facility] at the corner of [facility] and [facility].  [facility] [place] [facility] to [facility].  The address is:    [address] [place] [address].  [place] [phone]    After entering the building, you will proceed to the second floor where you can check in with registration. You should take any medications that you routinely take for blood pressure (other than those listed below), heart medications, thyroid, cholesterol, etc.     If you wear contact lenses, please wear glasses to your procedure.    Your fasting instructions are as follow:  Nothing to eat or drink after midnight tonight. You may have small amounts of water to take medications in the morning. You MUST have a responsible adult to bring you home.      This evening (7/31/2023) and tomorrow morning, please hold the following medications:  -Aspirin and Aspirin-containing products (Goody's powder, Excedrin)  -NSAIDs (Advil, Ibuprofen, Aleve, Diclofenac)  -Vitamins/Supplements  -Herbal remedies/Teas  -Stimulants (Adderall, Vyvanse, Adipex)  -Diabetic medication (Please bring with you day of procedure)  -CELEBREX  -EFUDEX CREAM  -MULTIVITAMIN  -VITAMIN D    -May take Tylenol      This evening (7/31/2023) and tomorrow morning, take a shower using antibacterial soap (ex: Hibiclens or Dial antibacterial soap). DO NOT apply deodorant, lotion, cologne, or anything else to the skin.    If you have any other questions, don't hesitate to call at [phone].    Thanks,  ENRIKE Ontiveros

## 2023-08-01 ENCOUNTER — ANESTHESIA (OUTPATIENT)
Dept: SURGERY | Facility: HOSPITAL | Age: 73
End: 2023-08-01
Payer: MEDICARE

## 2023-08-01 ENCOUNTER — TELEPHONE (OUTPATIENT)
Dept: DERMATOLOGY | Facility: CLINIC | Age: 73
End: 2023-08-01
Payer: MEDICARE

## 2023-08-01 ENCOUNTER — HOSPITAL ENCOUNTER (OUTPATIENT)
Facility: HOSPITAL | Age: 73
Discharge: HOME | End: 2023-08-01
Attending: OTOLARYNGOLOGY | Admitting: OTOLARYNGOLOGY
Payer: MEDICARE

## 2023-08-01 VITALS
DIASTOLIC BLOOD PRESSURE: 71 MMHG | TEMPERATURE: 98 F | RESPIRATION RATE: 20 BRPM | HEART RATE: 62 BPM | OXYGEN SATURATION: 98 % | SYSTOLIC BLOOD PRESSURE: 109 MMHG

## 2023-08-01 DIAGNOSIS — Z98.890 MOHS DEFECT: ICD-10-CM

## 2023-08-01 DIAGNOSIS — L98.8 MOHS DEFECT: ICD-10-CM

## 2023-08-01 PROCEDURE — 36000707: Performed by: OTOLARYNGOLOGY

## 2023-08-01 PROCEDURE — 63600175 PHARM REV CODE 636 W HCPCS: Performed by: NURSE ANESTHETIST, CERTIFIED REGISTERED

## 2023-08-01 PROCEDURE — 25000003 PHARM REV CODE 250: Performed by: NURSE ANESTHETIST, CERTIFIED REGISTERED

## 2023-08-01 PROCEDURE — D9220A PRA ANESTHESIA: ICD-10-PCS | Mod: ,,, | Performed by: NURSE ANESTHETIST, CERTIFIED REGISTERED

## 2023-08-01 PROCEDURE — 71000015 HC POSTOP RECOV 1ST HR: Performed by: OTOLARYNGOLOGY

## 2023-08-01 PROCEDURE — 14060 TIS TRNFR E/N/E/L 10 SQ CM/<: CPT | Mod: ,,, | Performed by: OTOLARYNGOLOGY

## 2023-08-01 PROCEDURE — 37000008 HC ANESTHESIA 1ST 15 MINUTES: Performed by: OTOLARYNGOLOGY

## 2023-08-01 PROCEDURE — D9220A PRA ANESTHESIA: Mod: ,,, | Performed by: NURSE ANESTHETIST, CERTIFIED REGISTERED

## 2023-08-01 PROCEDURE — 94761 N-INVAS EAR/PLS OXIMETRY MLT: CPT

## 2023-08-01 PROCEDURE — 25000003 PHARM REV CODE 250: Performed by: ANESTHESIOLOGY

## 2023-08-01 PROCEDURE — 36000706: Performed by: OTOLARYNGOLOGY

## 2023-08-01 PROCEDURE — 37000009 HC ANESTHESIA EA ADD 15 MINS: Performed by: OTOLARYNGOLOGY

## 2023-08-01 PROCEDURE — 14060 PR ADJ TISS XFER LID,NOS,EAR <10 SQCM: ICD-10-PCS | Mod: ,,, | Performed by: OTOLARYNGOLOGY

## 2023-08-01 PROCEDURE — 25000003 PHARM REV CODE 250: Performed by: OTOLARYNGOLOGY

## 2023-08-01 PROCEDURE — 63600175 PHARM REV CODE 636 W HCPCS: Performed by: ANESTHESIOLOGY

## 2023-08-01 PROCEDURE — 71000033 HC RECOVERY, INTIAL HOUR: Performed by: OTOLARYNGOLOGY

## 2023-08-01 PROCEDURE — 99900035 HC TECH TIME PER 15 MIN (STAT)

## 2023-08-01 RX ORDER — ONDANSETRON 2 MG/ML
INJECTION INTRAMUSCULAR; INTRAVENOUS
Status: DISCONTINUED | OUTPATIENT
Start: 2023-08-01 | End: 2023-08-01

## 2023-08-01 RX ORDER — MIDAZOLAM HYDROCHLORIDE 1 MG/ML
INJECTION, SOLUTION INTRAMUSCULAR; INTRAVENOUS
Status: DISCONTINUED | OUTPATIENT
Start: 2023-08-01 | End: 2023-08-01

## 2023-08-01 RX ORDER — MORPHINE SULFATE 2 MG/ML
1 INJECTION, SOLUTION INTRAMUSCULAR; INTRAVENOUS EVERY 10 MIN PRN
Status: DISCONTINUED | OUTPATIENT
Start: 2023-08-01 | End: 2023-08-01 | Stop reason: HOSPADM

## 2023-08-01 RX ORDER — SODIUM CHLORIDE 0.9 % (FLUSH) 0.9 %
10 SYRINGE (ML) INJECTION
Status: ACTIVE | OUTPATIENT
Start: 2023-08-01

## 2023-08-01 RX ORDER — LIDOCAINE HYDROCHLORIDE AND EPINEPHRINE 10; 10 MG/ML; UG/ML
INJECTION, SOLUTION INFILTRATION; PERINEURAL
Status: DISCONTINUED | OUTPATIENT
Start: 2023-08-01 | End: 2023-08-01 | Stop reason: HOSPADM

## 2023-08-01 RX ORDER — OXYCODONE AND ACETAMINOPHEN 7.5; 325 MG/1; MG/1
1 TABLET ORAL EVERY 4 HOURS PRN
Status: DISCONTINUED | OUTPATIENT
Start: 2023-08-01 | End: 2023-08-01 | Stop reason: HOSPADM

## 2023-08-01 RX ORDER — ONDANSETRON 2 MG/ML
4 INJECTION INTRAMUSCULAR; INTRAVENOUS ONCE AS NEEDED
Status: DISCONTINUED | OUTPATIENT
Start: 2023-08-01 | End: 2023-08-01 | Stop reason: HOSPADM

## 2023-08-01 RX ORDER — FAMOTIDINE 10 MG/ML
INJECTION INTRAVENOUS
Status: DISCONTINUED | OUTPATIENT
Start: 2023-08-01 | End: 2023-08-01

## 2023-08-01 RX ORDER — PROPOFOL 10 MG/ML
VIAL (ML) INTRAVENOUS
Status: DISCONTINUED | OUTPATIENT
Start: 2023-08-01 | End: 2023-08-01

## 2023-08-01 RX ORDER — LIDOCAINE HYDROCHLORIDE 10 MG/ML
INJECTION, SOLUTION INTRAVENOUS
Status: DISCONTINUED | OUTPATIENT
Start: 2023-08-01 | End: 2023-08-01

## 2023-08-01 RX ADMIN — METHYLPREDNISOLONE SODIUM SUCCINATE 20 MG: 40 INJECTION, POWDER, FOR SOLUTION INTRAMUSCULAR; INTRAVENOUS at 10:08

## 2023-08-01 RX ADMIN — MIDAZOLAM HYDROCHLORIDE 2 MG: 1 INJECTION, SOLUTION INTRAMUSCULAR; INTRAVENOUS at 11:08

## 2023-08-01 RX ADMIN — PROPOFOL 120 MG: 10 INJECTION, EMULSION INTRAVENOUS at 11:08

## 2023-08-01 RX ADMIN — ONDANSETRON 4 MG: 2 INJECTION INTRAMUSCULAR; INTRAVENOUS at 11:08

## 2023-08-01 RX ADMIN — LIDOCAINE HYDROCHLORIDE 50 MG: 10 INJECTION, SOLUTION INTRAVENOUS at 11:08

## 2023-08-01 RX ADMIN — SODIUM CHLORIDE: 0.9 INJECTION, SOLUTION INTRAVENOUS at 11:08

## 2023-08-01 RX ADMIN — OXYCODONE AND ACETAMINOPHEN 1 TABLET: 7.5; 325 TABLET ORAL at 10:08

## 2023-08-01 RX ADMIN — FAMOTIDINE 20 MG: 10 INJECTION, SOLUTION INTRAVENOUS at 11:08

## 2023-08-01 NOTE — PLAN OF CARE
VSS. Discharge instructions reviewed with patient and family, verbalized understanding. No medications for home needed. Family at bedside to help patient dress. IV dc'd with cath tip intact. Patient wheeled to car via staff.

## 2023-08-01 NOTE — TRANSFER OF CARE
Anesthesia Transfer of Care Note    Patient: Zander Kaur SrLiu    Procedure(s) Performed: Procedure(s) (LRB):  REVISION, PROCEDURE INVOLVING FLAP GRAFT (Right)    Patient location: PACU    Transport from OR: Transported from OR on 6-10 L/min O2 by face mask with adequate spontaneous ventilation    Post pain: adequate analgesia    Post assessment: no apparent anesthetic complications    Post vital signs: stable    Level of consciousness: sedated    Nausea/Vomiting: no nausea/vomiting    Complications: none    Transfer of care protocol was followed      Last vitals:   Visit Vitals  BP (!) 149/61 (BP Location: Right arm, Patient Position: Lying)   Pulse 65   Temp 36.6 °C (97.9 °F) (Temporal)   Resp 17   SpO2 (!) 92%

## 2023-08-01 NOTE — PATIENT INSTRUCTIONS
Please apply vaseline to the wound twice a day for 1 week  Return for follow up in 2-3 weeks    DO NOT CALL [facility] ON CALL FOR POSTOPERATIVE PROBLEMS. CALL THE  [phone] AND ASK FOR ENT ON CALL.

## 2023-08-01 NOTE — OP NOTE
Date of service: 8/1/2023    Pre-operative Diagnosis:   Right nasal deformity    Post-operative Diagnosis:  Same    Procedures Performed:  Adjacent tissue transfer to correct right nasal deformity with advanced and rotated the area 1 x 0.5 cm    Surgeon: Consuelo Augustine MD    Assistant(s):  Suzette Young MD    Anesthesia: General Endotracheal via LMA    EBL:  Minimal less than 2 cc    Specimens:   none    Findings:  Notching in the ala corrected with debulking of prior melolabial flap    Indications for Procedure:  Mr. Kaur is a 72-year-old gentleman Dr. Brandon had repaired a ala defect with a melolabial flap with some pin cushioning of the flap.  I saw him in clinic with injection of steroids and he was scheduled for debulking as well as correction of ala notching.    Procedure in Detail:  After informed consent had been obtained in holding area with risks and benefits reviewed patient was taken back to OR 3.  Anesthesia proceeded with general endotracheal anesthesia with LMA placed.  Patient was turned 90° and time-out was undertaken verification of patient and correct procedure.  Area the previous melolabial flap and ala was injected with 1 cc of 1% lidocaine with [id number] of epinephrine.  Right nose and surrounding area was prepped and draped in usual sterile fashion.      The skin at the notching at the ala margin was freshened with small sliver of skin taken and the inferior border of the inset of the flap was reopened with at least 8 mm opened inferiorly.  Elevation was undertaken with iris scissors with fat underneath the flap debulked and removed with needle-tip cautery.  Fifteen C blade was then used to make elevation into the ala both anteriorly and posteriorly with excess scar removed with a 15 C blade centrally.  In order to correct the notching at the ala the debulked flaps based superiorly was used in his advancement flap into the defect into the ala the back cuts were widened into the edge of the inset  and trifurcation stitch was placed with 4-0 PDS from anterior side of the ala notch into the flap and then into the posterior aspect of the notching.  This brought the edge of the advancement flap to the ala margin with better continuity.  Total advanced area was 1 x 0.5 cm.  The mucosal aspect was then closed with interrupted 4-0 chromic suture 2 vertical mattress sutures were placed along the ala margin in order to provide eversion and then remainder of the advancement flap superiorly was closed with 5 0 fast-absorbing gut suture.  Complications:  None    Attestation:  I was present for the entire procedure    DISCLAIMER: This note was prepared with [employer] voice recognition transcription software. Garbled syntax, mangled pronouns, and other bizarre constructions may be attributed to that software system. While efforts were made to correct any mistakes made by this voice recognition program, some errors and/or omissions may remain in the note that were missed when the note was originally created.

## 2023-08-01 NOTE — BRIEF OP NOTE
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    DISPOSITION: Home or Self Care    FINAL DIAGNOSIS:  Mohs defect    FOLLOWUP: In clinic    DISCHARGE INSTRUCTIONS:    Discharge Procedure Orders   Diet Adult Regular     Lifting restrictions     No dressing needed     Notify your health care provider if you experience any of the following:  temperature >100.4     Notify your health care provider if you experience any of the following:  persistent nausea and vomiting or diarrhea     Notify your health care provider if you experience any of the following:  severe uncontrolled pain     Notify your health care provider if you experience any of the following:  redness, tenderness, or signs of infection (pain, swelling, redness, odor or green/yellow discharge around incision site)     Notify your health care provider if you experience any of the following:  difficulty breathing or increased cough     Notify your health care provider if you experience any of the following:  severe persistent headache     Notify your health care provider if you experience any of the following:  worsening rash     Notify your health care provider if you experience any of the following:  persistent dizziness, light-headedness, or visual disturbances      Notify your health care provider if you experience any of the following:  increased confusion or weakness

## 2023-08-01 NOTE — ANESTHESIA POSTPROCEDURE EVALUATION
Anesthesia Post Evaluation    Patient: Zander Kaur     Procedure(s) Performed: Procedure(s) (LRB):  REVISION, PROCEDURE INVOLVING FLAP GRAFT (Right)    Final Anesthesia Type: general      Patient location during evaluation: PACU  Patient participation: Yes- Able to Participate  Level of consciousness: awake and alert  Post-procedure vital signs: reviewed and stable  Pain management: adequate  Airway patency: patent    PONV status at discharge: No PONV  Anesthetic complications: no      Cardiovascular status: blood pressure returned to baseline  Respiratory status: unassisted, spontaneous ventilation and room air  Hydration status: euvolemic  Follow-up not needed.          Vitals Value Taken Time   /69 08/01/23 1217   Temp 36.7 °C (98 °F) 08/01/23 1155   Pulse 56 08/01/23 1223   Resp 12 08/01/23 1223   SpO2 100 % 08/01/23 1223   Vitals shown include unvalidated device data.      No case tracking events are documented in the log.      Pain/Jose Score: Pain Rating Prior to Med Admin: 3 (8/1/2023 10:40 AM)  Jose Score: 5 (8/1/2023 11:55 AM)

## 2023-08-01 NOTE — ANESTHESIA PROCEDURE NOTES
Intubation    Date/Time: 8/1/2023 11:29 AM    Performed by: Junacarlos Santillan CRNA  Authorized by: Juancarlos Santillan CRNA    Intubation:     Mask Ventilation:  Easy mask    Attempts:  1    Attempted By:  CRNA    Difficult Airway Encountered?: No      Complications:  None    Airway Device:  Supraglottic airway/LMA    Airway Device Size:  4.5    Style/Cuff Inflation:  Cuffed    Secured at:  The lips    Placement Verified By:  Capnometry    Complicating Factors:  None    Findings Post-Intubation:  BS equal bilateral

## 2023-08-04 RX ORDER — DOXAZOSIN 8 MG/1
TABLET ORAL
Qty: 90 TABLET | Refills: 3 | Status: SHIPPED | OUTPATIENT
Start: 2023-08-04

## 2023-08-04 NOTE — TELEPHONE ENCOUNTER
Refill Decision Note   Zander   is requesting a refill authorization.  Brief Assessment and Rationale for Refill:  Approve     Medication Therapy Plan:         Comments:     Note composed:2:08 PM 08/04/2023

## 2023-08-04 NOTE — TELEPHONE ENCOUNTER
No care due was identified.  Health [facility] Embedded Care Due Messages. Reference number: [id number].   8/04/2023 4:25:34 AM CDT

## 2023-08-17 ENCOUNTER — HOSPITAL ENCOUNTER (OUTPATIENT)
Dept: RADIOLOGY | Facility: HOSPITAL | Age: 73
Discharge: HOME | End: 2023-08-17
Payer: MEDICARE

## 2023-08-17 PROCEDURE — 72197 MRI PROSTATE W W/O CONTRAST: ICD-10-PCS | Mod: 26,,, | Performed by: RADIOLOGY

## 2023-08-17 PROCEDURE — 25500020 PHARM REV CODE 255

## 2023-08-17 PROCEDURE — A9585 GADOBUTROL INJECTION: HCPCS

## 2023-08-17 PROCEDURE — 72197 MRI PELVIS W/O & W/DYE: CPT | Mod: TC

## 2023-08-17 PROCEDURE — 72197 MRI PELVIS W/O & W/DYE: CPT | Mod: 26,,, | Performed by: RADIOLOGY

## 2023-08-17 RX ORDER — GADOBUTROL 604.72 MG/ML
10 INJECTION INTRAVENOUS
Status: COMPLETED | OUTPATIENT
Start: 2023-08-17 | End: 2023-08-17

## 2023-08-17 RX ADMIN — GADOBUTROL 10 ML: 604.72 INJECTION INTRAVENOUS at 05:08

## 2023-08-31 ENCOUNTER — EXTERNAL CHRONIC CARE MANAGEMENT (OUTPATIENT)
Dept: PRIMARY CARE CLINIC | Facility: CLINIC | Age: 73
End: 2023-08-31
Payer: MEDICARE

## 2023-08-31 PROCEDURE — 99490 CHRNC CARE MGMT STAFF 1ST 20: CPT | Mod: S$PBB,,, | Performed by: INTERNAL MEDICINE

## 2023-08-31 PROCEDURE — 99490 CHRNC CARE MGMT STAFF 1ST 20: CPT | Mod: PBBFAC | Performed by: INTERNAL MEDICINE

## 2023-08-31 PROCEDURE — 99490 PR CHRONIC CARE MGMT, 1ST 20 MIN: ICD-10-PCS | Mod: S$PBB,,, | Performed by: INTERNAL MEDICINE

## 2023-09-25 ENCOUNTER — TELEPHONE (OUTPATIENT)
Dept: ENDOSCOPY | Facility: HOSPITAL | Age: 73
End: 2023-09-25

## 2023-09-25 ENCOUNTER — CLINICAL SUPPORT (OUTPATIENT)
Dept: ENDOSCOPY | Facility: HOSPITAL | Age: 73
End: 2023-09-25
Attending: INTERNAL MEDICINE
Payer: MEDICARE

## 2023-09-25 ENCOUNTER — PATIENT MESSAGE (OUTPATIENT)
Dept: ENDOSCOPY | Facility: HOSPITAL | Age: 73
End: 2023-09-25

## 2023-09-25 VITALS — BODY MASS INDEX: 34.39 KG/M2 | HEIGHT: 66 IN | WEIGHT: 214 LBS

## 2023-09-25 DIAGNOSIS — Z12.11 COLON CANCER SCREENING: ICD-10-CM

## 2023-09-25 DIAGNOSIS — Z12.11 COLON CANCER SCREENING: Primary | ICD-10-CM

## 2023-09-25 NOTE — TELEPHONE ENCOUNTER
Spoke to pt's wife to schedule procedure(s) Colonoscopy       Physician to perform procedure(s) Dr. ROB Penny  Date of Procedure (s) 12/18/23  Arrival Time 8:45 AM  Time of Procedure(s) 9:45 AM   Location of Procedure(s) [place] 2nd Floor  Type of Rx Prep sent to patient: PEG  Instructions provided to patient via [facility]    Patient was informed on the following information and verbalized understanding. Screening questionnaire reviewed with patient and complete. If procedure requires anesthesia, a responsible adult needs to be present to accompany the patient home, patient cannot drive after receiving anesthesia. Appointment details are tentative, especially check-in time. Patient will receive a prep-op call 4 days prior to confirm check-in time for procedure. If applicable the patient should contact their pharmacy to verify Rx for procedure prep is ready for pick-up. Patient was advised to call the scheduling department at [phone] if pharmacy states no Rx is available. Patient was advised to call the endoscopy scheduling department if any questions or concerns arise.      [facility] Endoscopy Scheduling Department

## 2023-09-27 DIAGNOSIS — Z94.4 LIVER REPLACED BY TRANSPLANT: ICD-10-CM

## 2023-09-27 RX ORDER — TACROLIMUS 1 MG/1
1 CAPSULE, GELATIN COATED ORAL EVERY 12 HOURS
Qty: 60 CAPSULE | Refills: 10 | Status: SHIPPED | OUTPATIENT
Start: 2023-09-27 | End: 2023-09-29 | Stop reason: SDUPTHER

## 2023-09-29 DIAGNOSIS — Z94.4 LIVER REPLACED BY TRANSPLANT: ICD-10-CM

## 2023-09-30 ENCOUNTER — EXTERNAL CHRONIC CARE MANAGEMENT (OUTPATIENT)
Dept: PRIMARY CARE CLINIC | Facility: CLINIC | Age: 73
End: 2023-09-30
Payer: MEDICARE

## 2023-09-30 PROCEDURE — 99490 CHRNC CARE MGMT STAFF 1ST 20: CPT | Mod: PBBFAC | Performed by: INTERNAL MEDICINE

## 2023-09-30 PROCEDURE — 99490 PR CHRONIC CARE MGMT, 1ST 20 MIN: ICD-10-PCS | Mod: S$PBB,,, | Performed by: INTERNAL MEDICINE

## 2023-09-30 PROCEDURE — 99490 CHRNC CARE MGMT STAFF 1ST 20: CPT | Mod: S$PBB,,, | Performed by: INTERNAL MEDICINE

## 2023-10-01 RX ORDER — TACROLIMUS 1 MG/1
1 CAPSULE, GELATIN COATED ORAL EVERY 12 HOURS
Qty: 180 CAPSULE | Refills: 3 | Status: SHIPPED | OUTPATIENT
Start: 2023-10-01 | End: 2024-09-30

## 2023-10-03 ENCOUNTER — PATIENT MESSAGE (OUTPATIENT)
Dept: UROLOGY | Facility: CLINIC | Age: 73
End: 2023-10-03
Payer: MEDICARE

## 2023-10-16 ENCOUNTER — OFFICE VISIT (OUTPATIENT)
Dept: UROLOGY | Facility: CLINIC | Age: 73
End: 2023-10-16
Payer: MEDICARE

## 2023-10-16 VITALS
WEIGHT: 213 LBS | BODY MASS INDEX: 33.43 KG/M2 | HEART RATE: 65 BPM | RESPIRATION RATE: 22 BRPM | HEIGHT: 67 IN | DIASTOLIC BLOOD PRESSURE: 101 MMHG | SYSTOLIC BLOOD PRESSURE: 164 MMHG

## 2023-10-16 DIAGNOSIS — R97.20 ELEVATED PSA: Primary | ICD-10-CM

## 2023-10-16 PROCEDURE — 99999 PR PBB SHADOW E&M-EST. PATIENT-LVL III: CPT | Mod: PBBFAC,,, | Performed by: UROLOGY

## 2023-10-16 PROCEDURE — 99215 PR OFFICE/OUTPT VISIT, EST, LEVL V, 40-54 MIN: ICD-10-PCS | Mod: S$PBB,,, | Performed by: UROLOGY

## 2023-10-16 PROCEDURE — 99213 OFFICE O/P EST LOW 20 MIN: CPT | Mod: PBBFAC | Performed by: UROLOGY

## 2023-10-16 PROCEDURE — 99215 OFFICE O/P EST HI 40 MIN: CPT | Mod: S$PBB,,, | Performed by: UROLOGY

## 2023-10-16 PROCEDURE — 99999 PR PBB SHADOW E&M-EST. PATIENT-LVL III: ICD-10-PCS | Mod: PBBFAC,,, | Performed by: UROLOGY

## 2023-10-16 RX ORDER — TAMSULOSIN HYDROCHLORIDE 0.4 MG/1
0.4 CAPSULE ORAL DAILY
Qty: 30 CAPSULE | Refills: 11 | Status: SHIPPED | OUTPATIENT
Start: 2023-10-16 | End: 2023-10-16 | Stop reason: SDUPTHER

## 2023-10-16 NOTE — PROGRESS NOTES
[facility]  Urologic Oncology      Date of Service: 10/16/2023    Chief Complaint/Reason for Consultation: Elevated PSA, BPH with LUTS    Requesting Provider:   No referring provider defined for this encounter.      History of Present Illness:   Patient is a 72 y.o. male presenting for evaluation of elevated PSA, BPH with lower urinary tract symptoms.    His PSA trend is as follows:    Lab Results   Component Value Date    PSA 5.1 (H) 03/17/2022    PSA 3.3 10/18/2018    PSA 3.1 09/26/2017    PSADIAG 6.4 (H) 06/13/2023     Imaging: I have reviewed the imaging study MRI of the prostate on 08/17/2023 personally, and agree with the findings and interpretation.     From a functional standpoint, he has a good urinary stream and feels as though he empties to completion.  He has nocturia x1, but does have urinary urgency which is worse with drinking bladder irritants such as tea.  He would like to consider medical therapy    Current Problem List:  Patient Active Problem List    Diagnosis Date Noted    Elevated PSA 06/26/2023    Severe obesity (BMI 35.0-39.9) with comorbidity 05/08/2023    Mohs defect 02/08/2023    Thrombocytopenia 02/14/2022    Aortic atherosclerosis 11/11/2020    Obesity (BMI 30.0-34.9) 11/11/2020    Hypertension associated with transplantation 07/08/2019    Immunosuppression 07/09/2018    Arthritis 08/03/2017    Screen for colon cancer 08/05/2015    Essential hypertension 06/19/2013    S/P liver transplant 06/19/2013    Hepatic cirrhosis 06/19/2013    History of skin cancer 06/19/2013    Joint injury 06/19/2013    AK (actinic keratosis) 02/26/2013        Allergies:  Review of patient's allergies indicates:   Allergen Reactions    Diphenhydramine hcl     Penicillins      Other reaction(s): Rash    Povidone-iodine      Other reaction(s): Itching  Other reaction(s): Itching        Medications per EMR:  (Not in a hospital admission)      Past Medical History:  Past Medical History:   Diagnosis Date     Actinic keratosis     Allergy     seasonal    Arthritis     Basal cell carcinoma of right ala nasi 2023    Hypertension     Joint pain     Organ transplant     liver 2010    SCC (squamous cell carcinoma) 08/15/2022    Right cheek -SSW    SCC (squamous cell carcinoma) 2023    Right angle of mandible    SCC (squamous cell carcinoma) 2023    Right medial cheek    Squamous Cell Carcinoma 2013    right temple    Squamous Cell Carcinoma 2013    left forehead    Stroke         Past Surgical History:  Past Surgical History:   Procedure Laterality Date    ANKLE SURGERY      multipole surgeries    CLOSURE OF DEFECT OF MOHS PROCEDURE Right 2023    Procedure: CLOSURE, MOHS PROCEDURE DEFECT;  Surgeon: Uzair Brandon MD;  [facility];  Service: ENT;  Laterality: Right;    FACIAL RECONSTRUCTION SURGERY      HERNIA REPAIR      knee scope      bob, multiple    LIVER TRANSPLANT          REVISION OF FLAP GRAFT Right 3/3/2023    Procedure: REVISION, PROCEDURE INVOLVING FLAP GRAFT;  Surgeon: Uzair Brandon MD;  [facility];  Service: ENT;  Laterality: Right;    REVISION OF FLAP GRAFT Right 2023    Procedure: REVISION, PROCEDURE INVOLVING FLAP GRAFT;  Surgeon: Consuelo Augustine MD;  Location: [facility] OR;  Service: ENT;  Laterality: Right;    SHOULDER OPEN ROTATOR CUFF REPAIR      left        Family History:  Family History   Problem Relation Age of Onset    No Known Problems Mother     Thyroid disease Sister     No Known Problems Son     No Known Problems Son     Melanoma Neg Hx     Psoriasis Neg Hx     Eczema Neg Hx     Lupus Neg Hx         Social History:  Social History     Tobacco Use    Smoking status: Former     Current packs/day: 0.00     Types: Cigarettes     Quit date:      Years since quittin.8    Smokeless tobacco: Never    Tobacco comments:     SMOKED FOR 1 YEAR, [facility] MADE HIM STOP   Substance Use Topics    Alcohol use: Yes     " Alcohol/week: 2.0 standard drinks of alcohol     Types: 2 Glasses of wine per week     Comment: per week          OBJECTIVE:     Vitals:    10/16/23 0825   BP: (!) 164/101   BP Location: Left arm   Patient Position: Sitting   BP Method: Medium (Automatic)   Pulse: 65   Resp: (!) 22   Weight: 96.6 kg (213 lb)   Height: 5' 7" (1.702 m)          General Appearance: Alert, cooperative, no distress  Head: Normocephalic  Eyes: Clear conjunctiva  Neck: No obvious LND or JVD  Lungs: Normal chest excursion, no accessory muscle use  Extremities: Atraumatic   Lymph Nodes: No appreciable lymph adenopathy  Neurologic: No gross gait, motor or sensory deficits        LAB:    CBC:  Lab Results   Component Value Date    WBC 5.01 06/13/2023    HGB 15.7 06/13/2023    HCT 48.3 06/13/2023     (H) 06/13/2023     06/13/2023         BMP:  Lab Results   Component Value Date     06/13/2023    K 4.3 06/13/2023     06/13/2023    CO2 24 06/13/2023    BUN 23 06/13/2023    CREATININE 0.9 06/13/2023    CALCIUM 9.5 06/13/2023    ANIONGAP 13 06/13/2023    EGFRNORACEVR >60.0 06/13/2023       ASSESSMENT/PLAN:     Assessment:  72-year-old male with BPH and lower urinary tract symptoms, elevated PSA with a PI-RADS 3 lesion on MRI    Plan:  I had a thorough discussion with the patient regarding prostate biopsy.  We discussed the pros cons and alternatives of proceeding with a biopsy.  Specifically, we discussed 2 approaches including transrectal and transperineal.  We discussed the reported risk of sepsis in both, and the need for general anesthesia and transperineal.  I discussed that both options have benefits and risks, and that we personalized the selection based on our capabilities and technology.  We discussed that if there are detectable lesions on the MRI, we will perform targeted biopsies in addition to a template biopsy, which at minimum includes 12 cores.    We then discussed risks, benefits, alternatives specific to " this procedure, including complications, detailed below:  -General anesthesia:  Associated risks include pneumonia, cerebrovascular accident, cardiac events including myocardial infarction, pulmonary embolism, deep vein thrombosis  -Prostate biopsy: I discussed complications such as pain, blood in the urine, blood in the semen, and blood in the stool, the risk of urinary tract infection, urinary retention, prostatitis, and sepsis.    We also discussed his BPH with lower urinary tract symptoms.  He would like to try tamsulosin. We discussed side effects such as hypotension, dizziness, lightheadedness, retrograde ejaculation, and floppy iris syndrome.  He has no planned upcoming surgeries on his eyes.  I reviewed his MAR, and he currently takes doxazosin. We will pursue flexible cystoscopy at the time of his biopsy first, then discuss medical options.     He will check his calendar and reach back out to us if he would like to pursue transperineal prostate biopsy given his history of liver transplant, we will perform a flexible cystoscopy for his BPH/lower urinary tract symptoms at that time.    I spent a total of 45 minutes on the day of the visit.This includes face to face time and non-face to face time preparing to see the patient (eg, review of tests), obtaining and/or reviewing separately obtained history, documenting clinical information in the electronic or other health record, independently interpreting results and communicating results to the patient/family/caregiver, or care coordinator.

## 2023-10-28 NOTE — TELEPHONE ENCOUNTER
No care due was identified.  Health [facility] Embedded Care Due Messages. Reference number: [id number].   10/28/2023 5:01:29 AM CDT

## 2023-10-29 NOTE — TELEPHONE ENCOUNTER
Refill Routing Note   Medication(s) are not appropriate for processing by [facility] for the following reason(s):      Required vitals abnormal    [facility] action(s):  Defer Care Due:  None identified          Appointments  past 12m or future 3m with PCP    Date Provider   Last Visit   6/12/2023 Shai Davison MD   Next Visit   Visit date not found Shai Davison MD   ED visits in past 90 days: 0        Note composed:8:04 PM 10/28/2023

## 2023-10-30 RX ORDER — METOPROLOL TARTRATE 100 MG/1
TABLET ORAL
Qty: 180 TABLET | Refills: 2 | Status: SHIPPED | OUTPATIENT
Start: 2023-10-30

## 2023-10-31 ENCOUNTER — EXTERNAL CHRONIC CARE MANAGEMENT (OUTPATIENT)
Dept: PRIMARY CARE CLINIC | Facility: CLINIC | Age: 73
End: 2023-10-31
Payer: MEDICARE

## 2023-10-31 PROCEDURE — 99490 PR CHRONIC CARE MGMT, 1ST 20 MIN: ICD-10-PCS | Mod: S$PBB,,, | Performed by: INTERNAL MEDICINE

## 2023-10-31 PROCEDURE — 99490 CHRNC CARE MGMT STAFF 1ST 20: CPT | Mod: S$PBB,,, | Performed by: INTERNAL MEDICINE

## 2023-10-31 PROCEDURE — 99490 CHRNC CARE MGMT STAFF 1ST 20: CPT | Mod: PBBFAC | Performed by: INTERNAL MEDICINE

## 2023-11-13 ENCOUNTER — OFFICE VISIT (OUTPATIENT)
Dept: DERMATOLOGY | Facility: CLINIC | Age: 73
End: 2023-11-13
Payer: MEDICARE

## 2023-11-13 DIAGNOSIS — L57.0 AK (ACTINIC KERATOSIS): Primary | ICD-10-CM

## 2023-11-13 DIAGNOSIS — Z85.828 HISTORY OF SKIN CANCER: ICD-10-CM

## 2023-11-13 PROCEDURE — 17003 DESTRUCT PREMALG LES 2-14: CPT | Mod: S$PBB,,, | Performed by: DERMATOLOGY

## 2023-11-13 PROCEDURE — 17000 PR DESTRUCTION(LASER SURGERY,CRYOSURGERY,CHEMOSURGERY),PREMALIGNANT LESIONS,FIRST LESION: ICD-10-PCS | Mod: S$PBB,,, | Performed by: DERMATOLOGY

## 2023-11-13 PROCEDURE — 17003 DESTRUCTION, PREMALIGNANT LESIONS; SECOND THROUGH 14 LESIONS: ICD-10-PCS | Mod: S$PBB,,, | Performed by: DERMATOLOGY

## 2023-11-13 PROCEDURE — 99213 OFFICE O/P EST LOW 20 MIN: CPT | Mod: PBBFAC | Performed by: DERMATOLOGY

## 2023-11-13 PROCEDURE — 99214 OFFICE O/P EST MOD 30 MIN: CPT | Mod: 25,S$PBB,, | Performed by: DERMATOLOGY

## 2023-11-13 PROCEDURE — 99999 PR PBB SHADOW E&M-EST. PATIENT-LVL III: CPT | Mod: PBBFAC,,, | Performed by: DERMATOLOGY

## 2023-11-13 PROCEDURE — 17000 DESTRUCT PREMALG LESION: CPT | Mod: PBBFAC | Performed by: DERMATOLOGY

## 2023-11-13 PROCEDURE — 99999 PR PBB SHADOW E&M-EST. PATIENT-LVL III: ICD-10-PCS | Mod: PBBFAC,,, | Performed by: DERMATOLOGY

## 2023-11-13 PROCEDURE — 99214 PR OFFICE/OUTPT VISIT, EST, LEVL IV, 30-39 MIN: ICD-10-PCS | Mod: 25,S$PBB,, | Performed by: DERMATOLOGY

## 2023-11-13 PROCEDURE — 17003 DESTRUCT PREMALG LES 2-14: CPT | Mod: PBBFAC | Performed by: DERMATOLOGY

## 2023-11-13 PROCEDURE — 17000 DESTRUCT PREMALG LESION: CPT | Mod: S$PBB,,, | Performed by: DERMATOLOGY

## 2023-11-13 RX ORDER — FLUOROURACIL 50 MG/G
CREAM TOPICAL
Qty: 30 G | Refills: 0 | Status: SHIPPED | OUTPATIENT
Start: 2023-11-13

## 2023-11-13 NOTE — PROGRESS NOTES
Subjective:      Patient ID:  Zander Kaur Sr. is a 72 y.o. male who presents for   Chief Complaint   Patient presents with    Skin Check     UBSE       Pt was last seen on 07/31/2023 for actnic keratosis treated with cryosurgery which has resolved.    H/o of SCC   This is a high risk patient here to check for the development of new lesions.'    Pt is here for a UBSE.      Pt has no concerns        Review of Systems   Skin:  Positive for daily sunscreen use, activity-related sunscreen use and wears hat (always). Negative for recent sunburn.   Hematologic/Lymphatic: Does not bruise/bleed easily.       Objective:   Physical Exam   Constitutional: He appears well-developed and well-nourished. No distress.   Neurological: He is alert and oriented to person, place, and time. He is not disoriented.   Psychiatric: He has a normal mood and affect.   Skin:   Areas Examined (abnormalities noted in diagram):   Scalp / Hair Palpated and Inspected  Head / Face Inspection Performed  RUE Inspected  LUE Inspection Performed  Nails and Digits Inspection Performed                     Diagram Legend     Erythematous scaling macule/papule c/w actinic keratosis       Vascular papule c/w angioma      Pigmented verrucoid papule/plaque c/w seborrheic keratosis      Yellow umbilicated papule c/w sebaceous hyperplasia      Irregularly shaped tan macule c/w lentigo     1-2 mm smooth white papules consistent with Milia      Movable subcutaneous cyst with punctum c/w epidermal inclusion cyst      Subcutaneous movable cyst c/w pilar cyst      Firm pink to brown papule c/w dermatofibroma      Pedunculated fleshy papule(s) c/w skin tag(s)      Evenly pigmented macule c/w junctional nevus     Mildly variegated pigmented, slightly irregular-bordered macule c/w mildly atypical nevus      Flesh colored to evenly pigmented papule c/w intradermal nevus       Pink pearly papule/plaque c/w basal cell carcinoma      Erythematous hyperkeratotic cursted  plaque c/w SCC      Surgical scar with no sign of skin cancer recurrence      Open and closed comedones      Inflammatory papules and pustules      Verrucoid papule consistent consistent with wart     Erythematous eczematous patches and plaques     Dystrophic onycholytic nail with subungual debris c/w onychomycosis     Umbilicated papule    Erythematous-base heme-crusted tan verrucoid plaque consistent with inflamed seborrheic keratosis     Erythematous Silvery Scaling Plaque c/w Psoriasis     See annotation      Assessment / Plan:        AK (actinic keratosis)  -     fluorouraciL (EFUDEX) 5 % cream; Compound fluorouracil 5% + calcipotriene 0.005% cream. Apply a pea-sized amount to entire ant scalp BID x 7 days.  Dispense: 30 g; Refill: 0    And    Cryosurgery Procedure Note    Verbal consent from the patient is obtained including, but not limited to, risk of hypopigmentation/hyperpigmentation, scar, recurrence of lesion. The patient is aware of the precancerous quality and need for treatment of these lesions. Liquid nitrogen cryosurgery is applied to the 7 actinic keratoses, as detailed in the physical exam, to produce a freeze injury. The patient is aware that blisters may form and is instructed on wound care with gentle cleansing and use of vaseline ointment to keep moist until healed. The patient is supplied a handout on cryosurgery and is instructed to call if lesions do not completely resolve.    Cont wear hat always    History of skin cancer  Area(s) of previous NMSC evaluated with no signs of recurrence.    Recommend daily sun protection/avoidance and use of at least SPF 30, broad spectrum sunscreen (OTC drug).                Follow up in about 3 months (around 2/13/2024).

## 2023-11-13 NOTE — PATIENT INSTRUCTIONS
CRYOSURGERY      Your doctor has used a method called cryosurgery to treat your skin condition. Cryosurgery refers to the use of very cold substances to treat a variety of skin conditions such as warts, pre-skin cancers, molluscum contagiosum, sun spots, and several benign growths. The substance we use in cryosurgery is liquid nitrogen and is so cold (-195 degrees Celsius) that is burns when administered.     Following treatment in the office, the skin may immediately burn and become red. You may find the area around the lesion is affected as well. It is sometimes necessary to treat not only the lesion, but a small area of the surrounding normal skin to achieve a good response.     A blister, and even a blood filled blister, may form after treatment.   This is a normal response. If the blister is painful, it is acceptable to sterilize a needle and with rubbing alcohol and gently pop the blister. It is important that you gently wash the area with soap and warm water as the blister fluid may contain wart virus if a wart was treated. Do no remove the roof of the blister.     The area treated can take anywhere from 1-3 weeks to heal. Healing time depends on the kind skin lesion treated, the location, and how aggressively the lesion was treated. It is recommended that the areas treated are covered with Vaseline or bacitracin ointment and a band-aid. If a band-aid is not practical, just ointment applied several times per day will do. Keeping these areas moist will speed the healing time.    Treatment with liquid nitrogen can leave a scar. In dark skin, it may be a light or dark scar, in light skin it may be a white or pink scar. These will generally fade with time, but may never go away completely.     If you have any concerns after your treatment, please feel free to call the office.       [address] [facility] [place] [address]/ [phone] FAX/ [email or web address]       Field Treatment for Actinic  Keratoses (precancerous lesions)    5-Fluoruracil + Dovonex (calcipotriene) - This is a topical chemotherapy for your skin. This cream should never be applied without discussing with you dermatologist.    This treatment gets your immune system involved in fighting precancers (actinic keratoses), even those we can't yet see.     HOW TO APPLY: Apply the combination cream to the affected area ant scalp 2x/day x 7 days. Apply a fingertip length amount to the entire area. Wash your hands carefully after applying the creams and wipe glasses, BIPAP/CPAP machines, or anything else that comes in frequent contact with the creams.    WHAT TO EXPECT: Your skin will likely become red, crusted, sore, and tender during the treatment usually starting around day 3 and continuing for about 1 week after last application.     HOW TO HEAL FAST: To speed healing, wash with a gentle wash and apply Vaseline jelly especially to crusted open areas.    WE CAN HELP: Please contact us for any questions or problem shooting the application of these creams: [phone] or [email or web address]    GREAT NEWS: Newer studies suggest that the combination of these creams not only decrease the sun damage spots and precancers (actinic keratoses) but also decrease your risk of getting skin cancer the year following application.     IT WILL BE WORTH IT!!!     Your prescription has been sent to [place] [facility].  The phone number is [phone].  Their location is:  [facility]. [place], [place], [facility]    The prescription will be mailed to you unless you indicated at the time of your appointment that you would like to pick it up    There hours are:  Monday- Friday:  8 a.m. to 6:00 p.m.  Saturday:  8 a.m. to 1:00 p.m.  Sunday:  Closed

## 2023-11-30 ENCOUNTER — EXTERNAL CHRONIC CARE MANAGEMENT (OUTPATIENT)
Dept: PRIMARY CARE CLINIC | Facility: CLINIC | Age: 73
End: 2023-11-30
Payer: MEDICARE

## 2023-11-30 PROCEDURE — 99490 PR CHRONIC CARE MGMT, 1ST 20 MIN: ICD-10-PCS | Mod: S$PBB,,, | Performed by: INTERNAL MEDICINE

## 2023-11-30 PROCEDURE — 99490 CHRNC CARE MGMT STAFF 1ST 20: CPT | Mod: S$PBB,,, | Performed by: INTERNAL MEDICINE

## 2023-11-30 PROCEDURE — 99490 CHRNC CARE MGMT STAFF 1ST 20: CPT | Mod: PBBFAC | Performed by: INTERNAL MEDICINE

## 2023-12-11 DIAGNOSIS — Z12.11 SCREEN FOR COLON CANCER: Primary | ICD-10-CM

## 2023-12-13 ENCOUNTER — LAB VISIT (OUTPATIENT)
Dept: LAB | Facility: HOSPITAL | Age: 73
End: 2023-12-13
Attending: INTERNAL MEDICINE
Payer: MEDICARE

## 2023-12-13 DIAGNOSIS — Z94.4 LIVER REPLACED BY TRANSPLANT: ICD-10-CM

## 2023-12-13 LAB
ALBUMIN SERPL BCP-MCNC: 3.8 G/DL (ref 3.5–5.2)
ALP SERPL-CCNC: 71 U/L (ref 55–135)
ALT SERPL W/O P-5'-P-CCNC: 34 U/L (ref 10–44)
ANION GAP SERPL CALC-SCNC: 8 MMOL/L (ref 8–16)
AST SERPL-CCNC: 33 U/L (ref 10–40)
BASOPHILS # BLD AUTO: 0.07 K/UL (ref 0–0.2)
BASOPHILS NFR BLD: 1.2 % (ref 0–1.9)
BILIRUB SERPL-MCNC: 0.9 MG/DL (ref 0.1–1)
BUN SERPL-MCNC: 18 MG/DL (ref 8–23)
CALCIUM SERPL-MCNC: 9.4 MG/DL (ref 8.7–10.5)
CHLORIDE SERPL-SCNC: 108 MMOL/L (ref 95–110)
CO2 SERPL-SCNC: 26 MMOL/L (ref 23–29)
CREAT SERPL-MCNC: 0.9 MG/DL (ref 0.5–1.4)
DIFFERENTIAL METHOD: ABNORMAL
EOSINOPHIL # BLD AUTO: 0.5 K/UL (ref 0–0.5)
EOSINOPHIL NFR BLD: 7.8 % (ref 0–8)
ERYTHROCYTE [DISTWIDTH] IN BLOOD BY AUTOMATED COUNT: 12.6 % (ref 11.5–14.5)
EST. GFR  (NO RACE VARIABLE): >60 ML/MIN/1.73 M^2
GLUCOSE SERPL-MCNC: 146 MG/DL (ref 70–110)
HCT VFR BLD AUTO: 46 % (ref 40–54)
HGB BLD-MCNC: 16 G/DL (ref 14–18)
IMM GRANULOCYTES # BLD AUTO: 0.02 K/UL (ref 0–0.04)
IMM GRANULOCYTES NFR BLD AUTO: 0.3 % (ref 0–0.5)
LYMPHOCYTES # BLD AUTO: 2.6 K/UL (ref 1–4.8)
LYMPHOCYTES NFR BLD: 44 % (ref 18–48)
MCH RBC QN AUTO: 33.7 PG (ref 27–31)
MCHC RBC AUTO-ENTMCNC: 34.8 G/DL (ref 32–36)
MCV RBC AUTO: 97 FL (ref 82–98)
MONOCYTES # BLD AUTO: 0.5 K/UL (ref 0.3–1)
MONOCYTES NFR BLD: 8.8 % (ref 4–15)
NEUTROPHILS # BLD AUTO: 2.2 K/UL (ref 1.8–7.7)
NEUTROPHILS NFR BLD: 37.9 % (ref 38–73)
NRBC BLD-RTO: 0 /100 WBC
PLATELET # BLD AUTO: 178 K/UL (ref 150–450)
PMV BLD AUTO: 9.9 FL (ref 9.2–12.9)
POTASSIUM SERPL-SCNC: 4.2 MMOL/L (ref 3.5–5.1)
PROT SERPL-MCNC: 7 G/DL (ref 6–8.4)
RBC # BLD AUTO: 4.75 M/UL (ref 4.6–6.2)
SODIUM SERPL-SCNC: 142 MMOL/L (ref 136–145)
TACROLIMUS BLD-MCNC: 4.6 NG/ML (ref 5–15)
WBC # BLD AUTO: 5.79 K/UL (ref 3.9–12.7)

## 2023-12-13 PROCEDURE — 80197 ASSAY OF TACROLIMUS: CPT | Performed by: INTERNAL MEDICINE

## 2023-12-13 PROCEDURE — 85025 COMPLETE CBC W/AUTO DIFF WBC: CPT | Performed by: INTERNAL MEDICINE

## 2023-12-13 PROCEDURE — 36415 COLL VENOUS BLD VENIPUNCTURE: CPT | Performed by: INTERNAL MEDICINE

## 2023-12-13 PROCEDURE — 80053 COMPREHEN METABOLIC PANEL: CPT | Performed by: INTERNAL MEDICINE

## 2023-12-14 ENCOUNTER — TELEPHONE (OUTPATIENT)
Dept: TRANSPLANT | Facility: CLINIC | Age: 73
End: 2023-12-14
Payer: MEDICARE

## 2023-12-14 DIAGNOSIS — Z94.4 S/P LIVER TRANSPLANT: Primary | ICD-10-CM

## 2023-12-14 NOTE — TELEPHONE ENCOUNTER
Letter sent, labs stable and no medication changes are needed. Repeat labs due 6/11/24 per protocol.  ----- Message from Carson Oswald MD sent at 12/13/2023  8:12 PM CST -----  Results reviewed

## 2023-12-14 NOTE — LETTER
December 14, 2023    Zander Kaur  [place] [address]          Dear Zander :  MRN: [id number]    This is a follow up to your recent labs, your lab results were stable.  There are no medicine changes.  Please have your labs drawn again on Tuesday 6/11/24.      If you cannot have your labs drawn on the scheduled date, it is your responsibility to call the transplant department to reschedule.  Please call [phone] and ask to speak to [facility]  for all scheduling requests.     Happy Holidays and Happy New Year!    Oksana  Your Liver Transplant Coordinator    [facility]  [address] [place] [id number]  [phone]

## 2023-12-15 ENCOUNTER — ANESTHESIA EVENT (OUTPATIENT)
Dept: ENDOSCOPY | Facility: HOSPITAL | Age: 73
End: 2023-12-15
Payer: MEDICARE

## 2023-12-15 NOTE — ANESTHESIA PREPROCEDURE EVALUATION
12/15/2023  Zander Kaur Sr. is a 73 y.o., male.  Past Medical History:   Diagnosis Date    Actinic keratosis     Allergy     seasonal    Arthritis     Basal cell carcinoma of right ala nasi 02/06/2023    Hypertension     Joint pain     Organ transplant     liver 2010    SCC (squamous cell carcinoma) 08/15/2022    Right cheek -SSW    SCC (squamous cell carcinoma) 03/27/2023    Right angle of mandible    SCC (squamous cell carcinoma) 03/27/2023    Right medial cheek    Squamous Cell Carcinoma 08/28/2013    right temple    Squamous Cell Carcinoma 09/04/2013    left forehead    Stroke      Past Surgical History:   Procedure Laterality Date    ANKLE SURGERY      multipole surgeries    CLOSURE OF DEFECT OF MOHS PROCEDURE Right 2/9/2023    Procedure: CLOSURE, MOHS PROCEDURE DEFECT;  Surgeon: Uzair Brandon MD;  Location: [address];  Service: ENT;  Laterality: Right;    FACIAL RECONSTRUCTION SURGERY      HERNIA REPAIR      knee scope      bob, multiple    LIVER TRANSPLANT      2010    REVISION OF FLAP GRAFT Right 3/3/2023    Procedure: REVISION, PROCEDURE INVOLVING FLAP GRAFT;  Surgeon: Uzair Brandon MD;  Location: [address];  Service: ENT;  Laterality: Right;    REVISION OF FLAP GRAFT Right 8/1/2023    Procedure: REVISION, PROCEDURE INVOLVING FLAP GRAFT;  Surgeon: Consuelo Augustine MD;  Location: [facility];  Service: ENT;  Laterality: Right;    SHOULDER OPEN ROTATOR CUFF REPAIR      left         Pre-op Assessment    I have reviewed the Patient Summary Reports.     I have reviewed the Nursing Notes. I have reviewed the NPO Status.   I have reviewed the Medications.     Review of Systems  Anesthesia Hx:  No problems with previous Anesthesia             Denies Family Hx of Anesthesia complications.    Denies Personal Hx of Anesthesia complications.                    Social:  Non-Smoker, No  Alcohol Use       Hematology/Oncology:  Hematology Normal   Oncology Normal                                   EENT/Dental:  EENT/Dental Normal           Cardiovascular:     Hypertension                                        Pulmonary:  Pulmonary Normal                       Renal/:  Renal/ Normal                 Hepatic/GI:      Liver Disease,  Liver Transplant 2010          Musculoskeletal:  Musculoskeletal Normal                Neurological:   CVA                                    Endocrine:  Endocrine Normal            Dermatological:  Skin Normal    Psych:  Psychiatric Normal                    Physical Exam  General: Well nourished, Cooperative, Alert and Oriented    Airway:  Mallampati: II / II  Mouth Opening: Normal  TM Distance: Normal  Tongue: Normal  Neck ROM: Normal ROM    Dental:  Intact    Chest/Lungs:  Clear to auscultation    Heart:  Rate: Normal    Abdomen:  Normal        Anesthesia Plan  Type of Anesthesia, risks & benefits discussed:    Anesthesia Type: Gen Natural Airway  Intra-op Monitoring Plan: Standard ASA Monitors  Induction:  IV  Informed Consent: Informed consent signed with the Patient and all parties understand the risks and agree with anesthesia plan.  All questions answered.   ASA Score: 3    Ready For Surgery From Anesthesia Perspective.     .

## 2023-12-18 ENCOUNTER — ANESTHESIA (OUTPATIENT)
Dept: ENDOSCOPY | Facility: HOSPITAL | Age: 73
End: 2023-12-18
Payer: MEDICARE

## 2023-12-18 ENCOUNTER — HOSPITAL ENCOUNTER (OUTPATIENT)
Facility: HOSPITAL | Age: 73
Discharge: HOME | End: 2023-12-18
Attending: INTERNAL MEDICINE | Admitting: INTERNAL MEDICINE
Payer: MEDICARE

## 2023-12-18 VITALS
WEIGHT: 205 LBS | DIASTOLIC BLOOD PRESSURE: 72 MMHG | RESPIRATION RATE: 16 BRPM | TEMPERATURE: 98 F | BODY MASS INDEX: 31.07 KG/M2 | HEIGHT: 68 IN | HEART RATE: 65 BPM | OXYGEN SATURATION: 94 % | SYSTOLIC BLOOD PRESSURE: 130 MMHG

## 2023-12-18 DIAGNOSIS — Z12.11 SCREEN FOR COLON CANCER: ICD-10-CM

## 2023-12-18 DIAGNOSIS — Z12.11 COLON CANCER SCREENING: Primary | ICD-10-CM

## 2023-12-18 PROCEDURE — D9220A PRA ANESTHESIA: Mod: PT,,, | Performed by: NURSE ANESTHETIST, CERTIFIED REGISTERED

## 2023-12-18 PROCEDURE — 88305 TISSUE EXAM BY PATHOLOGIST: ICD-10-PCS | Mod: 26,,, | Performed by: PATHOLOGY

## 2023-12-18 PROCEDURE — D9220A PRA ANESTHESIA: ICD-10-PCS | Mod: PT,,, | Performed by: NURSE ANESTHETIST, CERTIFIED REGISTERED

## 2023-12-18 PROCEDURE — 27201089 HC SNARE, DISP (ANY): Performed by: INTERNAL MEDICINE

## 2023-12-18 PROCEDURE — 45385 PR COLONOSCOPY,REMV LESN,SNARE: ICD-10-PCS | Mod: PT,,, | Performed by: INTERNAL MEDICINE

## 2023-12-18 PROCEDURE — 37000009 HC ANESTHESIA EA ADD 15 MINS: Performed by: INTERNAL MEDICINE

## 2023-12-18 PROCEDURE — 45385 COLONOSCOPY W/LESION REMOVAL: CPT | Mod: PT,,, | Performed by: INTERNAL MEDICINE

## 2023-12-18 PROCEDURE — 94761 N-INVAS EAR/PLS OXIMETRY MLT: CPT

## 2023-12-18 PROCEDURE — 88305 TISSUE EXAM BY PATHOLOGIST: CPT | Mod: 26,,, | Performed by: PATHOLOGY

## 2023-12-18 PROCEDURE — 37000008 HC ANESTHESIA 1ST 15 MINUTES: Performed by: INTERNAL MEDICINE

## 2023-12-18 PROCEDURE — 45385 COLONOSCOPY W/LESION REMOVAL: CPT | Performed by: INTERNAL MEDICINE

## 2023-12-18 PROCEDURE — 25000003 PHARM REV CODE 250: Performed by: NURSE ANESTHETIST, CERTIFIED REGISTERED

## 2023-12-18 PROCEDURE — 27200997: Performed by: INTERNAL MEDICINE

## 2023-12-18 PROCEDURE — 63600175 PHARM REV CODE 636 W HCPCS: Performed by: NURSE ANESTHETIST, CERTIFIED REGISTERED

## 2023-12-18 PROCEDURE — 99900035 HC TECH TIME PER 15 MIN (STAT)

## 2023-12-18 PROCEDURE — 88305 TISSUE EXAM BY PATHOLOGIST: CPT | Performed by: PATHOLOGY

## 2023-12-18 RX ORDER — LIDOCAINE HYDROCHLORIDE 20 MG/ML
INJECTION INTRAVENOUS
Status: DISCONTINUED | OUTPATIENT
Start: 2023-12-18 | End: 2023-12-18

## 2023-12-18 RX ORDER — PROPOFOL 10 MG/ML
VIAL (ML) INTRAVENOUS
Status: DISCONTINUED | OUTPATIENT
Start: 2023-12-18 | End: 2023-12-18

## 2023-12-18 RX ORDER — SODIUM CHLORIDE 9 MG/ML
INJECTION, SOLUTION INTRAVENOUS CONTINUOUS
Status: DISCONTINUED | OUTPATIENT
Start: 2023-12-18 | End: 2023-12-18 | Stop reason: HOSPADM

## 2023-12-18 RX ORDER — PROPOFOL 10 MG/ML
VIAL (ML) INTRAVENOUS CONTINUOUS PRN
Status: DISCONTINUED | OUTPATIENT
Start: 2023-12-18 | End: 2023-12-18

## 2023-12-18 RX ADMIN — LIDOCAINE HYDROCHLORIDE 100 MG: 20 INJECTION INTRAVENOUS at 09:12

## 2023-12-18 RX ADMIN — SODIUM CHLORIDE: 0.9 INJECTION, SOLUTION INTRAVENOUS at 08:12

## 2023-12-18 RX ADMIN — PROPOFOL 150 MCG/KG/MIN: 10 INJECTION, EMULSION INTRAVENOUS at 09:12

## 2023-12-18 RX ADMIN — PROPOFOL 80 MG: 10 INJECTION, EMULSION INTRAVENOUS at 09:12

## 2023-12-18 RX ADMIN — GLYCOPYRROLATE 0.1 MG: 0.2 INJECTION, SOLUTION INTRAMUSCULAR; INTRAVENOUS at 09:12

## 2023-12-18 NOTE — PROVATION PATIENT INSTRUCTIONS
Discharge Summary/Instructions after an Endoscopic Procedure  Patient Name: Zander Kaur  Patient MRN: [id number]  Patient YOB: 1950 Monday, December 18, 2023  Leonardo Penny MD  Dear patient,  As a result of recent federal legislation (The Federal Cures Act), you may   receive lab or pathology results from your procedure in your [facility]   account before your physician is able to contact you. Your physician or   their representative will relay the results to you with their   recommendations at their soonest availability.  Thank you,  RESTRICTIONS:  During your procedure today, you received medications for sedation.  These   medications may affect your judgment, balance and coordination.  Therefore,   for 24 hours, you have the following restrictions:   - DO NOT drive a car, operate machinery, make legal/financial decisions,   sign important papers or drink alcohol.    ACTIVITY:  Today: no heavy lifting, straining or running due to procedural   sedation/anesthesia.  The following day: return to full activity including work.  DIET:  Eat and drink normally unless instructed otherwise.     TREATMENT FOR COMMON SIDE EFFECTS:  - Mild abdominal pain, nausea, belching, bloating or excessive gas:  rest,   eat lightly and use a heating pad.  - Sore Throat: treat with throat lozenges and/or gargle with warm salt   water.  - Because air was used during the procedure, expelling large amounts of air   from your rectum or belching is normal.  - If a bowel prep was taken, you may not have a bowel movement for 1-3 days.    This is normal.  SYMPTOMS TO WATCH FOR AND REPORT TO YOUR PHYSICIAN:  1. Abdominal pain or bloating, other than gas cramps.  2. Chest pain.  3. Back pain.  4. Signs of infection such as: chills or fever occurring within 24 hours   after the procedure.  5. Rectal bleeding, which would show as bright red, maroon, or black stools.   (A tablespoon of blood from the rectum is not serious, especially if    hemorrhoids are present.)  6. Vomiting.  7. Weakness or dizziness.  GO DIRECTLY TO THE NEAREST EMERGENCY ROOM IF YOU HAVE ANY OF THE FOLLOWING:      Difficulty breathing              Chills and/or fever over 101 F   Persistent vomiting and/or vomiting blood   Severe abdominal pain   Severe chest pain   Black, tarry stools   Bleeding- more than one tablespoon   Any other symptom or condition that you feel may need urgent attention  Your doctor recommends these additional instructions:  If any biopsies were taken, your doctors clinic will contact you in 1 to 2   weeks with any results.  - Patient has a contact number available for emergencies.  The signs and   symptoms of potential delayed complications were discussed with the   patient.  Return to normal activities tomorrow.  Written discharge   instructions were provided to the patient.   - Discharge patient to home.   - Resume previous diet.   - Continue present medications.   - Await pathology results.   - Repeat colonoscopy in 5 years for surveillance.   For questions, problems or results please call your physician - Leonardo Penny MD at Work:  [phone].  [facility], EMERGENCY ROOM PHONE NUMBER: [phone]  IF A COMPLICATION OR EMERGENCY SITUATION ARISES AND YOU ARE UNABLE TO REACH   YOUR PHYSICIAN - GO DIRECTLY TO THE EMERGENCY ROOM.  Leonardo Penny MD  12/18/2023 10:09:32 AM  This report has been verified and signed electronically.  Dear patient,  As a result of recent federal legislation (The Federal Cures Act), you may   receive lab or pathology results from your procedure in your [facility]   account before your physician is able to contact you. Your physician or   their representative will relay the results to you with their   recommendations at their soonest availability.  Thank you,  PROVATION

## 2023-12-18 NOTE — PLAN OF CARE
Pt arrived to recovery dosc via stretcher per ENDO team. Bedside report received. Pt attached to bedside monitor. VSS. Pt sedated post procedure. Pt on room air; oxygen sats 92%. Pt IV access CDI and infusing. Warm blanket applied. Safety maintained.

## 2023-12-18 NOTE — ANESTHESIA POSTPROCEDURE EVALUATION
Anesthesia Post Evaluation    Patient: Zander Kaur     Procedure(s) Performed: Procedure(s) (LRB):  COLONOSCOPY (N/A)    Final Anesthesia Type: general      Patient location during evaluation: PACU  Patient participation: Yes- Able to Participate  Level of consciousness: awake and alert, oriented and awake  Post-procedure vital signs: reviewed and stable  Pain management: adequate  Airway patency: patent    PONV status at discharge: No PONV  Anesthetic complications: no      Cardiovascular status: stable  Respiratory status: unassisted and room air  Hydration status: euvolemic  Follow-up not needed.              Vitals Value Taken Time   /72 12/18/23 1035   Temp 36.7 °C (98.1 °F) 12/18/23 1010   Pulse 65 12/18/23 1035   Resp 16 12/18/23 1035   SpO2 94 % 12/18/23 1035         Event Time   Out of Recovery 10:25:00         Pain/Jose Score: Jose Score: 9 (12/18/2023 10:25 AM)

## 2023-12-18 NOTE — H&P
Short Stay Endoscopy History and Physical    PCP - Shai Davison MD    Procedure - Colonoscopy  Sedation: GA  ASA - per anesthesia  Mallampati - per anesthesia  History of Anesthesia problems - no  Family history Anesthesia problems -  no     HPI:  This is a 73 y.o. male here for evaluation of : Screening for CRC    Reflux - no  Dysphagia - no  Abdominal pain - no  Diarrhea - no    ROS:  Constitutional: No fevers, chills, No weight loss  ENT: No allergies  CV: No chest pain  Pulm: No cough, No shortness of breath  Ophtho: No vision changes  GI: see HPI  Medical History:  has a past medical history of Actinic keratosis, Allergy, Arthritis, Basal cell carcinoma of right ala nasi (02/06/2023), Hypertension, Joint pain, Organ transplant, SCC (squamous cell carcinoma) (08/15/2022), SCC (squamous cell carcinoma) (03/27/2023), SCC (squamous cell carcinoma) (03/27/2023), Squamous Cell Carcinoma (08/28/2013), Squamous Cell Carcinoma (09/04/2013), and Stroke.    Surgical History:  has a past surgical history that includes Liver transplant; knee scope; Hernia repair; Shoulder open rotator cuff repair; Facial reconstruction surgery; Ankle surgery; Closure of defect of Mohs procedure (Right, 2/9/2023); Revision of flap graft (Right, 3/3/2023); and Revision of flap graft (Right, 8/1/2023).    Family History: family history includes No Known Problems in his mother, son, and son; Thyroid disease in his sister.. Otherwise no colon cancer, inflammatory bowel disease, or GI malignancies.    Social History:  reports that he quit smoking about 54 years ago. His smoking use included cigarettes. He has never used smokeless tobacco. He reports current alcohol use of about 2.0 standard drinks of alcohol per week. He reports that he does not use drugs.    Review of patient's allergies indicates:   Allergen Reactions    Diphenhydramine hcl     Penicillins      Other reaction(s): Rash    Povidone-iodine      Other reaction(s):  Itching  Other reaction(s): Itching       Medications:   Medications Prior to Admission   Medication Sig Dispense Refill Last Dose    celecoxib (CELEBREX) 200 MG capsule Take 200 mg by mouth 2 (two) times daily.   12/18/2023    doxazosin (CARDURA) 8 MG Tab Take 1 tablet by mouth once daily. **Please schedule an appointment with primary care provider for further refills.** 90 tablet 3 12/18/2023    gabapentin (NEURONTIN) 300 MG capsule Take 300 mg by mouth 3 (three) times daily.   12/17/2023    methocarbamoL (ROBAXIN) 750 MG Tab    12/17/2023    metoprolol tartrate (LOPRESSOR) 100 MG tablet Take 1 tablet (100 mg) by mouth twice daily. 180 tablet 2 12/18/2023    multivitamin (THERAGRAN) per tablet Take 1 tablet by mouth.   12/17/2023    oxyCODONE-acetaminophen (PERCOCET) 7.5-325 mg per tablet    12/18/2023    PROGRAF 1 mg Cap Take 1 capsule (1 mg total) by mouth every 12 (twelve) hours. 180 capsule 3 12/18/2023    vitamin D 1000 units Tab Take 1,000 Units by mouth once daily.   12/17/2023    amlodipine (NORVASC) 10 MG tablet TAKE 1 TABLET BY MOUTH DAILY 30 tablet 11     ciclopirox (PENLAC) 8 % Soln Apply topically nightly. Paint on and under nail nightly and remove every 7-10 days and start over with application. May use for 3 months. 6.6 mL 2     fluorouraciL (EFUDEX) 5 % cream Compound fluorouracil 5% + calcipotriene 0.005% cream. Apply a pea-sized amount to entire ant scalp BID x 7 days. 30 g 0     HYDROcodone-acetaminophen (NORCO) 5-325 mg per tablet Take 1 tablet by mouth every 6 (six) hours as needed for Pain. 10 tablet 0     ropinirole (REQUIP) 0.5 MG tablet Take 0.5 mg by mouth every evening.   More than a month       Objective Findings:    Vital Signs: Per nursing notes.    Physical Exam:  General Appearance: Well appearing in no acute distress  Head:   Normocephalic, without obvious abnormality  Eyes:    No scleral icterus  Airway: Open  Neck: No restriction in mobility  Lungs: CTA bilaterally in anterior  and posterior fields, no wheezes, no crackles.  Heart:  Regular rate and rhythm, S1, S2 normal, no murmurs heard  Abdomen: Soft, non tender, non distended      Labs:  Lab Results   Component Value Date    WBC 5.79 12/13/2023    HGB 16.0 12/13/2023    HCT 46.0 12/13/2023     12/13/2023    CHOL 180 06/13/2023    TRIG 87 06/13/2023    HDL 60 06/13/2023    ALT 34 12/13/2023    AST 33 12/13/2023     12/13/2023    K 4.2 12/13/2023     12/13/2023    CREATININE 0.9 12/13/2023    BUN 18 12/13/2023    CO2 26 12/13/2023    TSH 2.577 06/13/2023    PSA 5.1 (H) 03/17/2022    INR 0.9 03/04/2021    HGBA1C 5.9 (H) 06/13/2023         I have explained the risks and benefits of endoscopy procedures to the patient including but not limited to bleeding, perforation, infection, and death.    Thank you so much for allowing me to participate in the care of Zander Kaur Sr.    Leonardo Penny MD

## 2023-12-18 NOTE — TRANSFER OF CARE
"Anesthesia Transfer of Care Note    Patient: Zander Kaur Sr.    Procedure(s) Performed: Procedure(s) (LRB):  COLONOSCOPY (N/A)    Patient location: PACU    Anesthesia Type: general    Transport from OR: Transported from OR on room air with adequate spontaneous ventilation    Post pain: adequate analgesia    Post assessment: no apparent anesthetic complications and tolerated procedure well    Post vital signs: stable    Level of consciousness: responds to stimulation and sedated    Nausea/Vomiting: no nausea/vomiting    Complications: none    Transfer of care protocol was followed    Last vitals: Visit Vitals  /88 (BP Location: Left arm, Patient Position: Lying)   Pulse 77   Temp 36.6 °C (97.9 °F) (Temporal)   Resp 16   Ht 5' 8" (1.727 m)   Wt 93 kg (205 lb)   SpO2 96%   BMI 31.17 kg/m²     "

## 2023-12-18 NOTE — PLAN OF CARE
Chart reviewed. Preop nursing care completed per orders. Safe surgery checklist complete. Pt denies any open wounds cuts or sores. Pt denies any metal in body. Family at bedside and to take belongings. Waiting for H&P; admission order; procedural consent; and anesthesia consent prior to surgery. Pt AAOX3, VSS on room air. Pt toileted, Bed locked in lowest position, Call light within reach. Pt denies any needs at this time.

## 2023-12-20 LAB
FINAL PATHOLOGIC DIAGNOSIS: NORMAL
GROSS: NORMAL
Lab: NORMAL

## 2023-12-22 ENCOUNTER — TELEPHONE (OUTPATIENT)
Dept: GASTROENTEROLOGY | Facility: CLINIC | Age: 73
End: 2023-12-22
Payer: MEDICARE

## 2023-12-22 NOTE — TELEPHONE ENCOUNTER
----- Message from Leonardo Penny MD sent at 12/21/2023 10:42 AM CST -----  Please call and notify patient, the colon polyp was benign.

## 2023-12-31 ENCOUNTER — EXTERNAL CHRONIC CARE MANAGEMENT (OUTPATIENT)
Dept: PRIMARY CARE CLINIC | Facility: CLINIC | Age: 73
End: 2023-12-31
Payer: MEDICARE

## 2023-12-31 PROCEDURE — 99490 CHRNC CARE MGMT STAFF 1ST 20: CPT | Mod: S$PBB,,, | Performed by: INTERNAL MEDICINE

## 2023-12-31 PROCEDURE — 99490 CHRNC CARE MGMT STAFF 1ST 20: CPT | Mod: PBBFAC | Performed by: INTERNAL MEDICINE

## 2024-01-31 ENCOUNTER — EXTERNAL CHRONIC CARE MANAGEMENT (OUTPATIENT)
Dept: PRIMARY CARE CLINIC | Facility: CLINIC | Age: 74
End: 2024-01-31
Payer: MEDICARE

## 2024-01-31 PROCEDURE — 99490 CHRNC CARE MGMT STAFF 1ST 20: CPT | Mod: PBBFAC | Performed by: INTERNAL MEDICINE

## 2024-01-31 PROCEDURE — 99490 CHRNC CARE MGMT STAFF 1ST 20: CPT | Mod: S$PBB,,, | Performed by: INTERNAL MEDICINE

## 2024-02-08 ENCOUNTER — TELEPHONE (OUTPATIENT)
Dept: UROLOGY | Facility: CLINIC | Age: 74
End: 2024-02-08
Payer: MEDICARE

## 2024-02-12 ENCOUNTER — OFFICE VISIT (OUTPATIENT)
Dept: DERMATOLOGY | Facility: CLINIC | Age: 74
End: 2024-02-12
Payer: MEDICARE

## 2024-02-12 DIAGNOSIS — L57.0 AK (ACTINIC KERATOSIS): ICD-10-CM

## 2024-02-12 DIAGNOSIS — D48.5 NEOPLASM OF UNCERTAIN BEHAVIOR OF SKIN: Primary | ICD-10-CM

## 2024-02-12 PROCEDURE — 11102 TANGNTL BX SKIN SINGLE LES: CPT | Mod: PBBFAC | Performed by: DERMATOLOGY

## 2024-02-12 PROCEDURE — 17000 DESTRUCT PREMALG LESION: CPT | Mod: S$PBB,XS,, | Performed by: DERMATOLOGY

## 2024-02-12 PROCEDURE — 17003 DESTRUCT PREMALG LES 2-14: CPT | Mod: S$PBB,,, | Performed by: DERMATOLOGY

## 2024-02-12 PROCEDURE — 99999 PR PBB SHADOW E&M-EST. PATIENT-LVL III: CPT | Mod: PBBFAC,,, | Performed by: DERMATOLOGY

## 2024-02-12 PROCEDURE — 11102 TANGNTL BX SKIN SINGLE LES: CPT | Mod: S$PBB,,, | Performed by: DERMATOLOGY

## 2024-02-12 PROCEDURE — 88305 TISSUE EXAM BY PATHOLOGIST: CPT | Performed by: DERMATOLOGY

## 2024-02-12 PROCEDURE — 88305 TISSUE EXAM BY PATHOLOGIST: CPT | Mod: 26,,, | Performed by: DERMATOLOGY

## 2024-02-12 PROCEDURE — 17003 DESTRUCT PREMALG LES 2-14: CPT | Mod: 59,PBBFAC | Performed by: DERMATOLOGY

## 2024-02-12 PROCEDURE — 17000 DESTRUCT PREMALG LESION: CPT | Mod: PBBFAC,XS | Performed by: DERMATOLOGY

## 2024-02-12 PROCEDURE — 99214 OFFICE O/P EST MOD 30 MIN: CPT | Mod: 25,S$PBB,, | Performed by: DERMATOLOGY

## 2024-02-12 PROCEDURE — 99213 OFFICE O/P EST LOW 20 MIN: CPT | Mod: PBBFAC | Performed by: DERMATOLOGY

## 2024-02-12 NOTE — ASSESSMENT & PLAN NOTE
Today's Plan:      Will treat central upper forehead and post scalp with efudex/dovonex bid x 1 week    And     Cryosurgery Procedure Note    Verbal consent from the patient is obtained including, but not limited to, risk of hypopigmentation/hyperpigmentation, scar, recurrence of lesion. The patient is aware of the precancerous quality and need for treatment of these lesions. Liquid nitrogen cryosurgery is applied to the 2 actinic keratoses, as detailed in the physical exam, to produce a freeze injury. The patient is aware that blisters may form and is instructed on wound care with gentle cleansing and use of vaseline ointment to keep moist until healed. The patient is supplied a handout on cryosurgery and is instructed to call if lesions do not completely resolve.

## 2024-02-12 NOTE — PATIENT INSTRUCTIONS
Shave Biopsy Wound Care    Your doctor has performed a shave biopsy today.  A band aid and vaseline ointment has been placed over the site.  This should remain in place for NO LONGER THAN 48 hours.  It is fine to remove the bandaid after 24 hours, if the area is no longer bleeding. It is recommended that you keep the area dry (do not wet)) for the first 24 hours.  After 24 hours, wash the area with warm soap and water and apply Vaseline jelly.  Many patients prefer to use Neosporin or Bacitracin ointment.  This is acceptable; however, know that you can develop an allergy to this medication even if you have used it safely for years.  It is important to keep the area moist.  Letting it dry out and get air slows healing time, and will worsen the scar.        If you notice increasing redness, tenderness, pain, or yellow drainage at the biopsy site, please notify your doctor.  These are signs of an infection.    If your biopsy site is bleeding, apply firm pressure for 15 minutes straight.  Repeat for another 15 minutes, if it is still bleeding.   If the surgical site continues to bleed, then please contact your doctor.      For [facility] users:   You will receive your biopsy results in [facility] as soon as they are available. Please be assured that your physician/provider will review your results and will then determine what further treatment, evaluation, or planning is required. You should be contacted by your physician's/provider's office within 5 business days of receiving your results; If not, please reach out to directly. This is one more way [facility] you first.     [facility] [place] [address]/ [phone] FAX/ [email or web address]     CRYOSURGERY      Your doctor has used a method called cryosurgery to treat your skin condition. Cryosurgery refers to the use of very cold substances to treat a variety of skin conditions such as warts, pre-skin cancers, molluscum contagiosum,  sun spots, and several benign growths. The substance we use in cryosurgery is liquid nitrogen and is so cold (-195 degrees Celsius) that is burns when administered.     Following treatment in the office, the skin may immediately burn and become red. You may find the area around the lesion is affected as well. It is sometimes necessary to treat not only the lesion, but a small area of the surrounding normal skin to achieve a good response.     A blister, and even a blood filled blister, may form after treatment.   This is a normal response. If the blister is painful, it is acceptable to sterilize a needle and with rubbing alcohol and gently pop the blister. It is important that you gently wash the area with soap and warm water as the blister fluid may contain wart virus if a wart was treated. Do no remove the roof of the blister.     The area treated can take anywhere from 1-3 weeks to heal. Healing time depends on the kind skin lesion treated, the location, and how aggressively the lesion was treated. It is recommended that the areas treated are covered with Vaseline or bacitracin ointment and a band-aid. If a band-aid is not practical, just ointment applied several times per day will do. Keeping these areas moist will speed the healing time.    Treatment with liquid nitrogen can leave a scar. In dark skin, it may be a light or dark scar, in light skin it may be a white or pink scar. These will generally fade with time, but may never go away completely.     If you have any concerns after your treatment, please feel free to call the office.       [facility] [place] [address]/ [phone] FAX/ [email or web address]       Field Treatment for Actinic Keratoses (precancerous lesions)    5-Fluoruracil + Dovonex (calcipotriene) - This is a topical chemotherapy for your skin. This cream should never be applied without discussing with you dermatologist.    This treatment gets your immune system  involved in fighting precancers (actinic keratoses), even those we can't yet see.     HOW TO APPLY: Apply the combination cream to the affected area upper forehead and posterior scalp 2x/day x 7 days. Apply a fingertip length amount to the entire area. Wash your hands carefully after applying the creams and wipe glasses, BIPAP/CPAP machines, or anything else that comes in frequent contact with the creams.    WHAT TO EXPECT: Your skin will likely become red, crusted, sore, and tender during the treatment usually starting around day 3 and continuing for about 1 week after last application.     HOW TO HEAL FAST: To speed healing, wash with a gentle wash and apply Vaseline jelly especially to crusted open areas.    WE CAN HELP: Please contact us for any questions or problem shooting the application of these creams: [phone] or [email or web address]    GREAT NEWS: Newer studies suggest that the combination of these creams not only decrease the sun damage spots and precancers (actinic keratoses) but also decrease your risk of getting skin cancer the year following application.     IT WILL BE WORTH IT!!!    2

## 2024-02-12 NOTE — PROGRESS NOTES
Subjective:      Patient ID:  Zander Kaur Sr. is a 73 y.o. male who presents for   Chief Complaint   Patient presents with    Follow-up     Efudex     History of Present Illness: The patient presents for follow up of skin check.    The patient was last seen on: 11/13/2023 for cryosurgery to actinic keratoses which have resolved and Efudex/Dovonex tx to entire ant scalp BID x 7 days.  This is a high risk patient here to check for the development of new lesions.    Other skin complaints: none       Review of Systems   Skin:  Positive for daily sunscreen use, activity-related sunscreen use and wears hat (always). Negative for recent sunburn.   Hematologic/Lymphatic: Does not bruise/bleed easily.       Objective:   Physical Exam   Constitutional: He appears well-developed and well-nourished. No distress.   Neurological: He is alert and oriented to person, place, and time. He is not disoriented.   Psychiatric: He has a normal mood and affect.   Skin:   Areas Examined (abnormalities noted in diagram):   Scalp / Hair Palpated and Inspected  Head / Face Inspection Performed            Diagram Legend     Erythematous scaling macule/papule c/w actinic keratosis       Vascular papule c/w angioma      Pigmented verrucoid papule/plaque c/w seborrheic keratosis      Yellow umbilicated papule c/w sebaceous hyperplasia      Irregularly shaped tan macule c/w lentigo     1-2 mm smooth white papules consistent with Milia      Movable subcutaneous cyst with punctum c/w epidermal inclusion cyst      Subcutaneous movable cyst c/w pilar cyst      Firm pink to brown papule c/w dermatofibroma      Pedunculated fleshy papule(s) c/w skin tag(s)      Evenly pigmented macule c/w junctional nevus     Mildly variegated pigmented, slightly irregular-bordered macule c/w mildly atypical nevus      Flesh colored to evenly pigmented papule c/w intradermal nevus       Pink pearly papule/plaque c/w basal cell carcinoma      Erythematous  hyperkeratotic cursted plaque c/w SCC      Surgical scar with no sign of skin cancer recurrence      Open and closed comedones      Inflammatory papules and pustules      Verrucoid papule consistent consistent with wart     Erythematous eczematous patches and plaques     Dystrophic onycholytic nail with subungual debris c/w onychomycosis     Umbilicated papule    Erythematous-base heme-crusted tan verrucoid plaque consistent with inflamed seborrheic keratosis     Erythematous Silvery Scaling Plaque c/w Psoriasis     See annotation            Assessment / Plan:      Pathology Orders:       Normal Orders This Visit    Specimen to Pathology, Dermatology     Comments:    Number of Specimens:->1  ------------------------->-------------------------  Spec 1 Procedure:->Biopsy  Spec 1 Clinical Impression:->R/o bcc  Spec 1 Source:->Anterior chin  Release to patient->Immediate    Questions:    Procedure Type: Dermatology and skin neoplasms    Number of Specimens: 1    ------------------------: -------------------------    Spec 1 Procedure: Biopsy    Spec 1 Clinical Impression: R/o bcc    Spec 1 Source: Anterior chin    Release to patient: Immediate          Neoplasm of uncertain behavior of skin  -     Specimen to Pathology, Dermatology  Shave biopsy procedure note:    Shave biopsy performed after verbal consent including risk of infection, scar, recurrence, need for additional treatment of site. Area prepped with alcohol, anesthetized with approximately 1.0cc of 1% lidocaine with epinephrine. Lesional tissue shaved with razor blade. Hemostasis achieved with application of aluminum chloride followed by hyfrecation. No complications. Dressing applied. Wound care explained.    If biopsy positive for malignancy, refer to Dr. Gil for Mohs surgery consultation.    AK (actinic keratosis)  Today's Plan:      Will treat central upper forehead and post scalp with efudex/dovonex bid x 1 week    And     Cryosurgery Procedure  Note    Verbal consent from the patient is obtained including, but not limited to, risk of hypopigmentation/hyperpigmentation, scar, recurrence of lesion. The patient is aware of the precancerous quality and need for treatment of these lesions. Liquid nitrogen cryosurgery is applied to the 2 actinic keratoses, as detailed in the physical exam, to produce a freeze injury. The patient is aware that blisters may form and is instructed on wound care with gentle cleansing and use of vaseline ointment to keep moist until healed. The patient is supplied a handout on cryosurgery and is instructed to call if lesions do not completely resolve.      Follow up in about 3 months (around 5/12/2024).

## 2024-02-20 ENCOUNTER — TELEPHONE (OUTPATIENT)
Dept: UROLOGY | Facility: CLINIC | Age: 74
End: 2024-02-20
Payer: MEDICARE

## 2024-02-26 LAB
FINAL PATHOLOGIC DIAGNOSIS: NORMAL
GROSS: NORMAL
Lab: NORMAL
MICROSCOPIC EXAM: NORMAL

## 2024-02-26 NOTE — PROGRESS NOTES
1.Skin, anterior chin, shave biopsy:   - WELL-DIFFERENTIATED SQUAMOUS CELL CARCINOMA   - The deep tissue edge is involved by carcinoma.  The peripheral tissue edge is involved by actinic keratosis.    Recommend patient to Dr. Gil for Mohs surgery consultation. Picture in chart.     General derm team: Sent patient for Mohs surgery. F/u with me in 3 months.

## 2024-02-27 ENCOUNTER — TELEPHONE (OUTPATIENT)
Dept: UROLOGY | Facility: CLINIC | Age: 74
End: 2024-02-27
Payer: MEDICARE

## 2024-02-29 ENCOUNTER — EXTERNAL CHRONIC CARE MANAGEMENT (OUTPATIENT)
Dept: PRIMARY CARE CLINIC | Facility: CLINIC | Age: 74
End: 2024-02-29
Payer: MEDICARE

## 2024-02-29 PROCEDURE — 99490 CHRNC CARE MGMT STAFF 1ST 20: CPT | Mod: PBBFAC | Performed by: INTERNAL MEDICINE

## 2024-02-29 PROCEDURE — 99490 CHRNC CARE MGMT STAFF 1ST 20: CPT | Mod: S$PBB,,, | Performed by: INTERNAL MEDICINE

## 2024-03-04 DIAGNOSIS — R97.20 ELEVATED PSA: Primary | ICD-10-CM

## 2024-03-21 ENCOUNTER — TELEPHONE (OUTPATIENT)
Dept: ORTHOPEDICS | Facility: CLINIC | Age: 74
End: 2024-03-21
Payer: MEDICARE

## 2024-03-21 DIAGNOSIS — M25.562 LEFT KNEE PAIN, UNSPECIFIED CHRONICITY: Primary | ICD-10-CM

## 2024-03-21 NOTE — TELEPHONE ENCOUNTER
Spoke to patients spouse in regards to upcoming Xray appointment. Patients spouse agreed to and confirmed appointment date and time

## 2024-03-25 ENCOUNTER — HOSPITAL ENCOUNTER (OUTPATIENT)
Dept: RADIOLOGY | Facility: HOSPITAL | Age: 74
Discharge: HOME | End: 2024-03-25
Attending: ORTHOPAEDIC SURGERY
Payer: MEDICARE

## 2024-03-25 DIAGNOSIS — M25.562 LEFT KNEE PAIN, UNSPECIFIED CHRONICITY: ICD-10-CM

## 2024-03-25 PROCEDURE — 73562 X-RAY EXAM OF KNEE 3: CPT | Mod: 26,LT,, | Performed by: RADIOLOGY

## 2024-03-25 PROCEDURE — 73560 X-RAY EXAM OF KNEE 1 OR 2: CPT | Mod: 26,59,RT, | Performed by: RADIOLOGY

## 2024-03-25 PROCEDURE — 73560 X-RAY EXAM OF KNEE 1 OR 2: CPT | Mod: 59,TC,RT

## 2024-03-27 ENCOUNTER — OFFICE VISIT (OUTPATIENT)
Dept: ORTHOPEDICS | Facility: CLINIC | Age: 74
End: 2024-03-27
Payer: MEDICARE

## 2024-03-27 ENCOUNTER — LAB VISIT (OUTPATIENT)
Dept: LAB | Facility: HOSPITAL | Age: 74
End: 2024-03-27
Attending: ORTHOPAEDIC SURGERY
Payer: MEDICARE

## 2024-03-27 VITALS — BODY MASS INDEX: 35.96 KG/M2 | WEIGHT: 223.75 LBS | HEIGHT: 66 IN

## 2024-03-27 DIAGNOSIS — M17.10 ARTHRITIS OF KNEE: ICD-10-CM

## 2024-03-27 DIAGNOSIS — M17.10 ARTHRITIS OF KNEE: Primary | ICD-10-CM

## 2024-03-27 LAB
CRP SERPL-MCNC: 3.9 MG/L (ref 0–8.2)
ERYTHROCYTE [SEDIMENTATION RATE] IN BLOOD BY PHOTOMETRIC METHOD: <2 MM/HR (ref 0–23)

## 2024-03-27 PROCEDURE — 99999 PR PBB SHADOW E&M-EST. PATIENT-LVL II: CPT | Mod: PBBFAC,,, | Performed by: ORTHOPAEDIC SURGERY

## 2024-03-27 PROCEDURE — 85652 RBC SED RATE AUTOMATED: CPT | Performed by: ORTHOPAEDIC SURGERY

## 2024-03-27 PROCEDURE — 99212 OFFICE O/P EST SF 10 MIN: CPT | Mod: PBBFAC | Performed by: ORTHOPAEDIC SURGERY

## 2024-03-27 PROCEDURE — 99204 OFFICE O/P NEW MOD 45 MIN: CPT | Mod: S$PBB,,, | Performed by: ORTHOPAEDIC SURGERY

## 2024-03-27 PROCEDURE — 36415 COLL VENOUS BLD VENIPUNCTURE: CPT | Performed by: ORTHOPAEDIC SURGERY

## 2024-03-27 PROCEDURE — 86140 C-REACTIVE PROTEIN: CPT | Performed by: ORTHOPAEDIC SURGERY

## 2024-03-27 NOTE — PROGRESS NOTES
"Subjective:     Patient ID: Zander Kaur Sr. is a 73 y.o. male.    Chief Complaint: No chief complaint on file.    HPI  Zander Kaur Sr. is a 73 year old male here with a *** {DAYS, WEEKS ,MONTHS:21894} history of {RIGHT LEFT BILATERAL:21113} knee pain. The patient is a  {occupation/activities:683360}. There {WAS/WAS NOT:5568371491::"was not"} a history of trauma.  The pain is {DESC; MILD/MOD/SEVERE:04048} The pain is located in the {Knee pain location:58204} aspect of the knee. There is {IS / IS NOT:03934} radiation.  The pain radaites to the ***.  There {IS / IS NOT:95483} catching or locking.   The pain is described as {Desc; dull;sharp;burning;achy:19374}. The patient {HAS HAS NOT:18304} had prior surgery. It is aggravated by {HH ACTIVITY/POSITION:135832}.  It {IS / IS NOT:67905} alleviated by rest. There {IS / IS NOT:57800} numbness or tingling of the lower extremity.  There {IS / IS NOT:05792} back pain.  He  {HAS HAS NOT:80122} tried medications or injections. They {HAVE/HAVE NOT:10130} helped.  He {does/does not:36755} have difficulty getting in or out of a car, getting dressed, or going up or down stairs.  The patient {does/does not:47926} use an assistive device.     Past Medical History:   Diagnosis Date    Actinic keratosis     Allergy     seasonal    Arthritis     Basal cell carcinoma of right ala nasi 02/06/2023    Hypertension     Joint pain     Organ transplant     liver 2010    SCC (squamous cell carcinoma) 08/15/2022    Right cheek -SSW    SCC (squamous cell carcinoma) 03/27/2023    Right angle of mandible    SCC (squamous cell carcinoma) 03/27/2023    Right medial cheek    Squamous Cell Carcinoma 08/28/2013    right temple    Squamous Cell Carcinoma 09/04/2013    left forehead    Stroke        Current Outpatient Medications on File Prior to Visit   Medication Sig Dispense Refill    celecoxib (CELEBREX) 200 MG capsule Take 200 mg by mouth 2 (two) times daily.      ciclopirox (PENLAC) 8 % Soln " Apply topically nightly. Paint on and under nail nightly and remove every 7-10 days and start over with application. May use for 3 months. 6.6 mL 2    doxazosin (CARDURA) 8 MG Tab Take 1 tablet by mouth once daily. **Please schedule an appointment with primary care provider for further refills.** 90 tablet 3    fluorouraciL (EFUDEX) 5 % cream Compound fluorouracil 5% + calcipotriene 0.005% cream. Apply a pea-sized amount to entire ant scalp BID x 7 days. 30 g 0    gabapentin (NEURONTIN) 300 MG capsule Take 300 mg by mouth 3 (three) times daily.      HYDROcodone-acetaminophen (NORCO) 5-325 mg per tablet Take 1 tablet by mouth every 6 (six) hours as needed for Pain. 10 tablet 0    methocarbamoL (ROBAXIN) 750 MG Tab       metoprolol tartrate (LOPRESSOR) 100 MG tablet Take 1 tablet (100 mg) by mouth twice daily. 180 tablet 2    multivitamin (THERAGRAN) per tablet Take 1 tablet by mouth.      oxyCODONE-acetaminophen (PERCOCET) 7.5-325 mg per tablet       PROGRAF 1 mg Cap Take 1 capsule (1 mg total) by mouth every 12 (twelve) hours. 180 capsule 3    ropinirole (REQUIP) 0.5 MG tablet Take 0.5 mg by mouth every evening.      vitamin D 1000 units Tab Take 1,000 Units by mouth once daily.       Current Facility-Administered Medications on File Prior to Visit   Medication Dose Route Frequency Provider Last Rate Last Admin    sodium chloride 0.9% flush 10 mL  10 mL Intravenous PRN Shai Mills MD           Past Surgical History:   Procedure Laterality Date    ANKLE SURGERY      multipole surgeries    CLOSURE OF DEFECT OF MOHS PROCEDURE Right 2/9/2023    Procedure: CLOSURE, MOHS PROCEDURE DEFECT;  Surgeon: Uzair Brandon MD;  Location: [facility] OR [address] [place] [address];  Service: ENT;  Laterality: Right;    COLONOSCOPY N/A 12/18/2023    Procedure: COLONOSCOPY;  Surgeon: Leonardo Penny MD;  Location: [facility] ENDOSCOPY;  Service: Endoscopy;  Laterality: N/A;  Referral: SHAI HODGE / PEG / [facility] portal -   12/11- pre call  complete.  DBM  12.15 precall complete; pt has instr.; all questions answered; AP    FACIAL RECONSTRUCTION SURGERY      HERNIA REPAIR      knee scope      bob, multiple    LIVER TRANSPLANT          REVISION OF FLAP GRAFT Right 3/3/2023    Procedure: REVISION, PROCEDURE INVOLVING FLAP GRAFT;  Surgeon: Uzair Brandon MD;  Location: [facility] OR [address] [place] [address];  Service: ENT;  Laterality: Right;    REVISION OF FLAP GRAFT Right 2023    Procedure: REVISION, PROCEDURE INVOLVING FLAP GRAFT;  Surgeon: Consuelo Augustine MD;  Location: [facility] OR;  Service: ENT;  Laterality: Right;    SHOULDER OPEN ROTATOR CUFF REPAIR      left       Family History   Problem Relation Age of Onset    No Known Problems Mother     Thyroid disease Sister     No Known Problems Son     No Known Problems Son     Melanoma Neg Hx     Psoriasis Neg Hx     Eczema Neg Hx     Lupus Neg Hx        Social History     Socioeconomic History    Marital status:    Occupational History    Occupation: Retired   Tobacco Use    Smoking status: Former     Current packs/day: 0.00     Types: Cigarettes     Quit date:      Years since quittin.2    Smokeless tobacco: Never    Tobacco comments:     SMOKED FOR 1 YEAR, [employer] MADE HIM STOP   Substance and Sexual Activity    Alcohol use: Yes     Alcohol/week: 2.0 standard drinks of alcohol     Types: 2 Glasses of wine per week     Comment: per week    Drug use: Never    Sexual activity: Not Currently     Partners: Female     Social Determinants of Health     Financial Resource Strain: Low Risk  (2024)    Overall Financial Resource Strain (CARDIA)     Difficulty of Paying Living Expenses: Not hard at all   Food Insecurity: No Food Insecurity (2024)    Hunger Vital Sign     Worried About Running Out of Food in the Last Year: Never true     Ran Out of Food in the Last Year: Never true   Transportation Needs: No Transportation Needs (2024)    PRAPARE - Transportation     Lack of Transportation  (Medical): No     Lack of Transportation (Non-Medical): No   Physical Activity: Inactive (2/9/2024)    Exercise Vital Sign     Days of Exercise per Week: 0 days     Minutes of Exercise per Session: 0 min   Stress: No Stress Concern Present (2/9/2024)    [place] [employer] - Occupational Stress Questionnaire     Feeling of Stress : Not at all   Social Connections: Unknown (2/9/2024)    Social Connection and Isolation Panel [NHANES]     Frequency of Communication with Friends and Family: More than three times a week     Frequency of Social Gatherings with Friends and Family: Once a week     Active Member of Clubs or Organizations: No     Attends Club or Organization Meetings: Never     Marital Status:    Housing Stability: Low Risk  (2/9/2024)    Housing Stability Vital Sign     Unable to Pay for Housing in the Last Year: No     Number of Places Lived in the Last Year: 1     Unstable Housing in the Last Year: No        Review of Systems   Constitutional: Negative for fever and night sweats.   HENT:  Negative for hearing loss.    Eyes:  Negative for blurred vision and visual disturbance.   Cardiovascular:  Negative for chest pain and leg swelling.   Respiratory:  Negative for shortness of breath.    Endocrine: Negative for polyuria.   Hematologic/Lymphatic: Negative for bleeding problem.   Skin:  Negative for rash.   Musculoskeletal:  Positive for joint pain. Negative for back pain, joint swelling, muscle cramps and muscle weakness.   Gastrointestinal:  Negative for melena.   Genitourinary:  Negative for hematuria.   Neurological:  Negative for loss of balance, numbness and paresthesias.   Psychiatric/Behavioral:  Negative for altered mental status.      Objective:           General Musculoskeletal Exam   Gait: normal       Right Knee Exam     Inspection   Erythema: absent  Scars: absent  Swelling: absent  Effusion: absent  Deformity: absent  Bruising: absent    Tenderness   The patient is  experiencing no tenderness.     Range of Motion   Extension:  0   Flexion:  130     Tests   Ligament Examination   Lachman: normal (-1 to 2mm)   MCL - Valgus: normal (0 to 2mm)  LCL - Varus: normal  Patella   Passive Patellar Tilt: neutral    Other   Sensation: normal    Left Knee Exam     Inspection   Erythema: absent  Scars: absent  Swelling: absent  Effusion: absent  Deformity: absent  Bruising: absent    Tenderness   The patient is experiencing no tenderness.     Range of Motion   Extension:  0   Flexion:  130     Tests   Stability   Lachman: normal (-1 to 2mm)   MCL - Valgus: normal (0 to 2mm)  LCL - Varus: normal (0 to 2mm)  Patella   Passive Patellar Tilt: neutral    Other   Sensation: normal    Muscle Strength   Right Lower Extremity   Hip Abduction: 5/5   Quadriceps:  5/5   Hamstrin/5   Left Lower Extremity   Hip Abduction: 5/5   Quadriceps:  5/5   Hamstrin/5     Reflexes     Left Side  Quadriceps:  2+    Right Side   Quadriceps:  2+    Vascular Exam     Right Pulses  Dorsalis Pedis:      2+          Left Pulses  Dorsalis Pedis:      2+          Edema  Right Lower Leg: absent  Left Lower Leg: absent        Physical Exam  Cardiovascular:      Pulses:           Dorsalis pedis pulses are 2+ on the right side and 2+ on the left side.   Musculoskeletal:      Right knee: No swelling, deformity or effusion.      Left knee: No swelling, deformity or effusion.      Right lower leg: No edema.      Left lower leg: No edema.       Radiographs taken two days ago and reviewed by me demonstrate severe arthritic change of the bilateral KNEE(s).There  is not bone destruction.  There is not a fracture. For the left knee, the medial, lateral, and patellofemoral compartments are all bone on bone. For the right knee, the medial and patellofemoral compartments are most involved. There is a varus deformity.  The changes are tricompartmental.     Assessment:     No diagnosis found.    Plan:      There are no diagnoses  linked to this encounter.

## 2024-03-27 NOTE — PROGRESS NOTES
Subjective:      Patient ID: Zander Kaur Sr. is a 73 y.o. male.    Chief Complaint: Pain of the Left Knee      History of Present Illness  The patient presents for evaluation of left knee pain. He is accompanied by an adult female.    His left knee has been hurting for 20 years. He thinks it all started with a broken right ankle, compound fracture, pins, and bolts. He thinks it is from favoring that one. He has pain in the whole knee. He has a lot of popping and locking. Pain is achy. The pain stays in his knee, no radiation. He uses a cane to walk around. He has had a lot of injections. He has not done physical therapy. His knee gets drained regularly. It was drained on Monday by Dr. David Hays. They drained 150 cc 3 months ago and it was 100 cc. Prior surgery on both knees.   He is officially retired, but he is a .    Past Medical History:   Diagnosis Date    Actinic keratosis     Allergy     seasonal    Arthritis     Basal cell carcinoma of right ala nasi 02/06/2023    Hypertension     Joint pain     Organ transplant     liver 2010    SCC (squamous cell carcinoma) 08/15/2022    Right cheek -SSW    SCC (squamous cell carcinoma) 03/27/2023    Right angle of mandible    SCC (squamous cell carcinoma) 03/27/2023    Right medial cheek    Squamous Cell Carcinoma 08/28/2013    right temple    Squamous Cell Carcinoma 09/04/2013    left forehead    Stroke      Past Surgical History:   Procedure Laterality Date    ANKLE SURGERY      multipole surgeries    CLOSURE OF DEFECT OF MOHS PROCEDURE Right 2/9/2023    Procedure: CLOSURE, MOHS PROCEDURE DEFECT;  Surgeon: Uzair Brandon MD;  Location: [facility] OR [address] [place] [address];  Service: ENT;  Laterality: Right;    COLONOSCOPY N/A 12/18/2023    Procedure: COLONOSCOPY;  Surgeon: Leonardo Penny MD;  Location: [facility] ENDOSCOPY;  Service: Endoscopy;  Laterality: N/A;  Referral: RENÉE HODGE / PEG / [facility] portal - LW  12/11- pre call complete.  DBM  12.15 precall complete; pt  has instr.; all questions answered; AP    FACIAL RECONSTRUCTION SURGERY      HERNIA REPAIR      knee scope      bob, multiple    LIVER TRANSPLANT          REVISION OF FLAP GRAFT Right 3/3/2023    Procedure: REVISION, PROCEDURE INVOLVING FLAP GRAFT;  Surgeon: Uzair Brandon MD;  Location: [facility] OR [address] [place] [address];  Service: ENT;  Laterality: Right;    REVISION OF FLAP GRAFT Right 2023    Procedure: REVISION, PROCEDURE INVOLVING FLAP GRAFT;  Surgeon: Consuelo Augustine MD;  Location: [facility];  Service: ENT;  Laterality: Right;    SHOULDER OPEN ROTATOR CUFF REPAIR      left     Family History   Problem Relation Age of Onset    No Known Problems Mother     Thyroid disease Sister     No Known Problems Son     No Known Problems Son     Melanoma Neg Hx     Psoriasis Neg Hx     Eczema Neg Hx     Lupus Neg Hx      Social History     Socioeconomic History    Marital status:    Occupational History    Occupation: Retired   Tobacco Use    Smoking status: Former     Current packs/day: 0.00     Types: Cigarettes     Quit date:      Years since quittin.2    Smokeless tobacco: Never    Tobacco comments:     SMOKED FOR 1 YEAR, [employer] MADE HIM STOP   Substance and Sexual Activity    Alcohol use: Yes     Alcohol/week: 2.0 standard drinks of alcohol     Types: 2 Glasses of wine per week     Comment: per week    Drug use: Never    Sexual activity: Not Currently     Partners: Female     Social Determinants of Health     Financial Resource Strain: Low Risk  (2024)    Overall Financial Resource Strain (CARDIA)     Difficulty of Paying Living Expenses: Not hard at all   Food Insecurity: No Food Insecurity (2024)    Hunger Vital Sign     Worried About Running Out of Food in the Last Year: Never true     Ran Out of Food in the Last Year: Never true   Transportation Needs: No Transportation Needs (2024)    PRAPARE - Transportation     Lack of Transportation (Medical): No     Lack of Transportation  (Non-Medical): No   Physical Activity: Inactive (2/9/2024)    Exercise Vital Sign     Days of Exercise per Week: 0 days     Minutes of Exercise per Session: 0 min   Stress: No Stress Concern Present (2/9/2024)    [employer] - Occupational Stress Questionnaire     Feeling of Stress : Not at all   Social Connections: Unknown (2/9/2024)    Social Connection and Isolation Panel [NHANES]     Frequency of Communication with Friends and Family: More than three times a week     Frequency of Social Gatherings with Friends and Family: Once a week     Active Member of Clubs or Organizations: No     Attends Club or Organization Meetings: Never     Marital Status:    Housing Stability: Low Risk  (2/9/2024)    Housing Stability Vital Sign     Unable to Pay for Housing in the Last Year: No     Number of Places Lived in the Last Year: 1     Unstable Housing in the Last Year: No     Current Outpatient Medications on File Prior to Visit   Medication Sig Dispense Refill    celecoxib (CELEBREX) 200 MG capsule Take 200 mg by mouth 2 (two) times daily.      ciclopirox (PENLAC) 8 % Soln Apply topically nightly. Paint on and under nail nightly and remove every 7-10 days and start over with application. May use for 3 months. 6.6 mL 2    doxazosin (CARDURA) 8 MG Tab Take 1 tablet by mouth once daily. **Please schedule an appointment with primary care provider for further refills.** 90 tablet 3    fluorouraciL (EFUDEX) 5 % cream Compound fluorouracil 5% + calcipotriene 0.005% cream. Apply a pea-sized amount to entire ant scalp BID x 7 days. 30 g 0    gabapentin (NEURONTIN) 300 MG capsule Take 300 mg by mouth 3 (three) times daily.      HYDROcodone-acetaminophen (NORCO) 5-325 mg per tablet Take 1 tablet by mouth every 6 (six) hours as needed for Pain. 10 tablet 0    methocarbamoL (ROBAXIN) 750 MG Tab       metoprolol tartrate (LOPRESSOR) 100 MG tablet Take 1 tablet (100 mg) by mouth twice daily. 180 tablet  "2    multivitamin (THERAGRAN) per tablet Take 1 tablet by mouth.      oxyCODONE-acetaminophen (PERCOCET) 7.5-325 mg per tablet       PROGRAF 1 mg Cap Take 1 capsule (1 mg total) by mouth every 12 (twelve) hours. 180 capsule 3    ropinirole (REQUIP) 0.5 MG tablet Take 0.5 mg by mouth every evening.      vitamin D 1000 units Tab Take 1,000 Units by mouth once daily.       Current Facility-Administered Medications on File Prior to Visit   Medication Dose Route Frequency Provider Last Rate Last Admin    sodium chloride 0.9% flush 10 mL  10 mL Intravenous PRN Shai Mills MD         Review of patient's allergies indicates:   Allergen Reactions    Diphenhydramine hcl     Penicillins      Other reaction(s): Rash    Povidone-iodine      Other reaction(s): Itching  Other reaction(s): Itching       Review of Systems   Constitutional: Negative for chills, fever and night sweats.   HENT:  Negative for hearing loss.    Eyes:  Negative for blurred vision and double vision.   Cardiovascular:  Negative for chest pain, claudication and leg swelling.   Respiratory:  Negative for shortness of breath.    Endocrine: Negative for polydipsia, polyphagia and polyuria.   Hematologic/Lymphatic: Negative for adenopathy and bleeding problem. Does not bruise/bleed easily.   Skin:  Negative for poor wound healing.   Gastrointestinal:  Negative for diarrhea and heartburn.   Genitourinary:  Negative for bladder incontinence.   Neurological:  Negative for focal weakness, headaches, numbness, paresthesias and sensory change.   Psychiatric/Behavioral:  The patient is not nervous/anxious.    Allergic/Immunologic: Negative for persistent infections.         Objective:      Body mass index is 35.75 kg/m².  Vitals:    03/27/24 1325   Weight: 101.5 kg (223 lb 12.3 oz)   Height: 5' 6.34" (1.685 m)         General    Constitutional: He is oriented to person, place, and time. He appears well-developed and well-nourished.   HENT:   Head: Normocephalic and " atraumatic.   Eyes: EOM are normal.   Cardiovascular:  Normal rate.            Pulmonary/Chest: Effort normal.   Neurological: He is alert and oriented to person, place, and time.   Psychiatric: He has a normal mood and affect. His behavior is normal.     General Musculoskeletal Exam   Gait: normal       Right Knee Exam     Inspection   Erythema: absent  Scars: present  Swelling: absent  Effusion: absent  Deformity: present (mild varus)  Bruising: absent    Tenderness   The patient is experiencing no tenderness.     Range of Motion   Extension:  0   Flexion:  120     Tests   Ligament Examination   Lachman: normal (-1 to 2mm)   MCL - Valgus: normal (0 to 2mm)  LCL - Varus: normal  Patella   Passive Patellar Tilt: neutral    Other   Sensation: normal    Left Knee Exam     Inspection   Erythema: absent  Scars: present  Swelling: present  Effusion: present  Deformity: present (varus)  Bruising: absent    Tenderness   The patient tender to palpation of the medial joint line and lateral joint line.    Range of Motion   Extension:  0   Flexion:  120     Tests   Stability   Lachman: normal (-1 to 2mm)   MCL - Valgus: normal (0 to 2mm)  LCL - Varus: normal (0 to 2mm)  Patella   Passive Patellar Tilt: neutral    Other   Sensation: normal    Muscle Strength   Right Lower Extremity   Hip Abduction: 5/5   Quadriceps:  5/5   Hamstrin/5   Left Lower Extremity   Hip Abduction: 5/5   Quadriceps:  5/5   Hamstrin/5     Reflexes     Left Side  Quadriceps:  2+    Right Side   Quadriceps:  2+    Vascular Exam     Right Pulses  Dorsalis Pedis:      2+          Left Pulses  Dorsalis Pedis:      2+          Edema  Right Lower Leg: present  Left Lower Leg: present                Results  Imaging  Knee radiographs obtained today were reviewed by me. He has severe arthritic change of both knees. The right is mainly in the medial compartment, although the changes are tricompartmental. The left is more severely involved.  Tricompartmental changes including articular surface loss, osteophyte formation, subchondral sclerosis, and cysts. There is some bone erosion of the left knee.    Assessment:       Encounter Diagnosis   Name Primary?    Arthritis of knee Yes          Plan:       Zander was seen today for pain.    Diagnoses and all orders for this visit:    Arthritis of knee  -     C-Reactive Protein; Future  -     Sedimentation rate; Future      He is looking at a knee replacement however there is a couple of issues I would like to to get looked at 1st.  I am going to send him to Dr. Mejia to see if we can optimize his lower extremity edema.  He also has a significant effusion of the left knee.  He is immunocompromised from the liver transplant and the knee has been aspirated several times.  I would like to check a CRP and sed rate to ensure there has not any type of low-grade infection.  We will call him with the lab results and we will see him back after he sees Dr. Mejia.              This note was generated with the assistance of ambient listening technology. Verbal consent was obtained by the patient and accompanying visitor(s) for the recording of patient appointment to facilitate this note. I attest to having reviewed and edited the generated note for accuracy, though some syntax or spelling errors may persist. Please contact the author of this note for any clarification.

## 2024-03-28 ENCOUNTER — TELEPHONE (OUTPATIENT)
Dept: ORTHOPEDICS | Facility: CLINIC | Age: 74
End: 2024-03-28
Payer: MEDICARE

## 2024-03-28 NOTE — TELEPHONE ENCOUNTER
Spoke to patient regarding recent labs as well as scheduling MRI for patient. Patient agreed to the date and time of the MRI as well as understood all information regarding the lab results.    ----- Message from Carson Lock MD sent at 3/28/2024  6:48 AM CDT -----  Please call pt.  Labs look good. Id like to get an MRI to r/o PVNS

## 2024-03-31 ENCOUNTER — EXTERNAL CHRONIC CARE MANAGEMENT (OUTPATIENT)
Dept: PRIMARY CARE CLINIC | Facility: CLINIC | Age: 74
End: 2024-03-31
Payer: MEDICARE

## 2024-03-31 PROCEDURE — 99490 CHRNC CARE MGMT STAFF 1ST 20: CPT | Mod: S$PBB,,, | Performed by: INTERNAL MEDICINE

## 2024-03-31 PROCEDURE — 99490 CHRNC CARE MGMT STAFF 1ST 20: CPT | Mod: PBBFAC | Performed by: INTERNAL MEDICINE

## 2024-04-04 NOTE — PRE-PROCEDURE INSTRUCTIONS
PreOp Instructions given:   - Verbal medication information (what to hold and what to take)   - NPO guidelines 2400  - Arrival place directions given; time to be given the day before procedure by the   Surgeon's Office [address] [facility]  - Bathing with antibacterial soap   - Don't wear any jewelry or bring any valuables AM of surgery   - No makeup or moisturizer to face   - No perfume/cologne, powder, lotions or aftershave   Pt. verbalized understanding.   Pt denies any h/o Anesthesia/Sedation complications or side effects.

## 2024-04-05 ENCOUNTER — HOSPITAL ENCOUNTER (OUTPATIENT)
Facility: HOSPITAL | Age: 74
Discharge: HOME | End: 2024-04-05
Attending: UROLOGY | Admitting: UROLOGY
Payer: MEDICARE

## 2024-04-05 ENCOUNTER — ANESTHESIA EVENT (OUTPATIENT)
Dept: SURGERY | Facility: HOSPITAL | Age: 74
End: 2024-04-05
Payer: MEDICARE

## 2024-04-05 ENCOUNTER — ANESTHESIA (OUTPATIENT)
Dept: SURGERY | Facility: HOSPITAL | Age: 74
End: 2024-04-05
Payer: MEDICARE

## 2024-04-05 VITALS
WEIGHT: 219 LBS | RESPIRATION RATE: 18 BRPM | DIASTOLIC BLOOD PRESSURE: 93 MMHG | TEMPERATURE: 98 F | BODY MASS INDEX: 35.2 KG/M2 | HEART RATE: 58 BPM | SYSTOLIC BLOOD PRESSURE: 171 MMHG | HEIGHT: 66 IN | OXYGEN SATURATION: 94 %

## 2024-04-05 DIAGNOSIS — R97.20 ELEVATED PSA: Primary | ICD-10-CM

## 2024-04-05 PROCEDURE — 71000044 HC DOSC ROUTINE RECOVERY FIRST HOUR: Performed by: UROLOGY

## 2024-04-05 PROCEDURE — 88344 IMHCHEM/IMCYTCHM EA MLT ANTB: CPT | Performed by: PATHOLOGY

## 2024-04-05 PROCEDURE — 63600175 PHARM REV CODE 636 W HCPCS: Performed by: NURSE ANESTHETIST, CERTIFIED REGISTERED

## 2024-04-05 PROCEDURE — 55700 PR BIOPSY OF PROSTATE,NEEDLE/PUNCH: CPT | Mod: ,,, | Performed by: UROLOGY

## 2024-04-05 PROCEDURE — D9220A PRA ANESTHESIA: Mod: CRNA,,, | Performed by: NURSE ANESTHETIST, CERTIFIED REGISTERED

## 2024-04-05 PROCEDURE — 25000003 PHARM REV CODE 250

## 2024-04-05 PROCEDURE — G0416 PROSTATE BIOPSY, ANY MTHD: HCPCS | Mod: 26,,, | Performed by: PATHOLOGY

## 2024-04-05 PROCEDURE — 25000003 PHARM REV CODE 250: Performed by: NURSE ANESTHETIST, CERTIFIED REGISTERED

## 2024-04-05 PROCEDURE — D9220A PRA ANESTHESIA: Mod: ANES,,, | Performed by: ANESTHESIOLOGY

## 2024-04-05 PROCEDURE — 36000707: Performed by: UROLOGY

## 2024-04-05 PROCEDURE — 36000706: Performed by: UROLOGY

## 2024-04-05 PROCEDURE — 37000008 HC ANESTHESIA 1ST 15 MINUTES: Performed by: UROLOGY

## 2024-04-05 PROCEDURE — 25000003 PHARM REV CODE 250: Performed by: UROLOGY

## 2024-04-05 PROCEDURE — 76942 ECHO GUIDE FOR BIOPSY: CPT | Mod: 26,,, | Performed by: UROLOGY

## 2024-04-05 PROCEDURE — 37000009 HC ANESTHESIA EA ADD 15 MINS: Performed by: UROLOGY

## 2024-04-05 PROCEDURE — 63600175 PHARM REV CODE 636 W HCPCS

## 2024-04-05 PROCEDURE — 71000015 HC POSTOP RECOV 1ST HR: Performed by: UROLOGY

## 2024-04-05 PROCEDURE — 88344 IMHCHEM/IMCYTCHM EA MLT ANTB: CPT | Mod: 26,,, | Performed by: PATHOLOGY

## 2024-04-05 PROCEDURE — 88305 TISSUE EXAM BY PATHOLOGIST: CPT | Performed by: PATHOLOGY

## 2024-04-05 PROCEDURE — 52000 CYSTOURETHROSCOPY: CPT | Mod: 59,,, | Performed by: UROLOGY

## 2024-04-05 RX ORDER — PHENAZOPYRIDINE HYDROCHLORIDE 200 MG/1
200 TABLET, FILM COATED ORAL 3 TIMES DAILY PRN
Qty: 9 TABLET | Refills: 0 | Status: SHIPPED | OUTPATIENT
Start: 2024-04-05 | End: 2024-04-20

## 2024-04-05 RX ORDER — LIDOCAINE HYDROCHLORIDE 20 MG/ML
JELLY TOPICAL
Status: DISCONTINUED | OUTPATIENT
Start: 2024-04-05 | End: 2024-04-05 | Stop reason: HOSPADM

## 2024-04-05 RX ORDER — PROPOFOL 10 MG/ML
VIAL (ML) INTRAVENOUS CONTINUOUS PRN
Status: DISCONTINUED | OUTPATIENT
Start: 2024-04-05 | End: 2024-04-05

## 2024-04-05 RX ORDER — SODIUM CHLORIDE 0.9 % (FLUSH) 0.9 %
3 SYRINGE (ML) INJECTION
Status: DISCONTINUED | OUTPATIENT
Start: 2024-04-05 | End: 2024-04-05 | Stop reason: HOSPADM

## 2024-04-05 RX ORDER — MIDAZOLAM HYDROCHLORIDE 1 MG/ML
INJECTION INTRAMUSCULAR; INTRAVENOUS
Status: DISCONTINUED | OUTPATIENT
Start: 2024-04-05 | End: 2024-04-05

## 2024-04-05 RX ADMIN — MIDAZOLAM HYDROCHLORIDE 2 MG: 2 INJECTION, SOLUTION INTRAMUSCULAR; INTRAVENOUS at 07:04

## 2024-04-05 RX ADMIN — SODIUM CHLORIDE: 0.9 INJECTION, SOLUTION INTRAVENOUS at 07:04

## 2024-04-05 RX ADMIN — CEFTRIAXONE 1 G: 1 INJECTION, POWDER, FOR SOLUTION INTRAMUSCULAR; INTRAVENOUS at 08:04

## 2024-04-05 RX ADMIN — PROPOFOL 50 MG: 10 INJECTION, EMULSION INTRAVENOUS at 07:04

## 2024-04-05 NOTE — TRANSFER OF CARE
"Anesthesia Transfer of Care Note    Patient: Zander Kaur Sr.    Procedure(s) Performed: Procedure(s) (LRB):  BIOPSY, PROSTATE (N/A)  ULTRASOUND, RECTAL APPROACH (N/A)  CYSTOSCOPY (N/A)    Patient location: PACU    Transport from OR: Transported from OR on room air with adequate spontaneous ventilation    Post pain: adequate analgesia    Post assessment: no apparent anesthetic complications and tolerated procedure well    Post vital signs: stable    Level of consciousness: awake and alert    Nausea/Vomiting: no nausea/vomiting    Complications: none    Transfer of care protocol was followed      Last vitals: Visit Vitals  BP (!) 143/83 (BP Location: Left arm, Patient Position: Lying)   Pulse 70   Temp 36.8 °C (98.2 °F) (Temporal)   Resp (!) 22   Ht 5' 6" (1.676 m)   Wt 99.3 kg (219 lb)   SpO2 96%   BMI 35.35 kg/m²     "

## 2024-04-05 NOTE — PROGRESS NOTES
Plan of care reviewed with pt & spouse, both verbalized understanding, pt progressing with plan of care, denies nausea, no acute pain r/t procedure, tolerating PO, reviewed all DC instructions, home meds, when to call MD, when to follow-up, answered questions.

## 2024-04-05 NOTE — ANESTHESIA PREPROCEDURE EVALUATION
[facility]  Anesthesia Pre-Operative Evaluation     Patient Name: Zander Kaur Sr.  YOB: 1950  MRN: [id number]  [facility]: [id number]       Admit Date: (Not on file)   Admit Team: Networked reference to record PCT      Date of Procedure: 4/5/2024  Anesthesia: Monitor Anesthesia Care Procedure: Procedure(s) (LRB):  BIOPSY, PROSTATE (N/A)  ULTRASOUND, RECTAL APPROACH, WITH PROSTATE BIOPSY AND PROSTATE VOLUME DETERMINATION (N/A)  Pre-Operative Diagnosis: Elevated PSA [R97.20]  Proceduralist:Surgeon(s) and Role:     * Galileo Ramso MD - Primary  Code Status: Prior   Advanced Directive: <no information>  Isolation Precautions: No active isolations  Capacity: Full capacity     SUBJECTIVE:   Zander Kaur Sr. is a 73 y.o. male who  has a past medical history of Actinic keratosis, Allergy, Arthritis, Basal cell carcinoma of right ala nasi (02/06/2023), Hypertension, Joint pain, Organ transplant, SCC (squamous cell carcinoma) (08/15/2022), SCC (squamous cell carcinoma) (03/27/2023), SCC (squamous cell carcinoma) (03/27/2023), Squamous Cell Carcinoma (08/28/2013), Squamous Cell Carcinoma (09/04/2013), and Stroke.  No notes on file      Hospital LOS: 0 days  ICU LOS: Patient does not have an ICU stay during this admission.    he has a current medication list which includes the following long-term medication(s): metoprolol tartrate, ciclopirox, doxazosin, gabapentin, prograf, and ropinirole.     ALLERGIES:     Review of patient's allergies indicates:   Allergen Reactions    Diphenhydramine hcl     Penicillins      Other reaction(s): Rash    Povidone-iodine      Other reaction(s): Itching  Other reaction(s): Itching     LDA:   AIRWAY:         [unfilled]     Lines/Drains/Airways       None                  Anesthesia Evaluation      Airway   Mallampati: II  TM distance: Normal  Neck ROM: Normal ROM  Dental    (+) Intact    Pulmonary    Cardiovascular   Exercise tolerance: good  (+) hypertension well  controlled    Neuro/Psych    (+) CVA    GI/Hepatic/Renal    (+) liver disease    Endo/Other    Abdominal                      Current Outpatient Medications on File Prior to Encounter   Medication Sig Dispense Refill Last Dose    metoprolol tartrate (LOPRESSOR) 100 MG tablet Take 1 tablet (100 mg) by mouth twice daily. 180 tablet 2 4/4/2024    celecoxib (CELEBREX) 200 MG capsule Take 200 mg by mouth 2 (two) times daily.       ciclopirox (PENLAC) 8 % Soln Apply topically nightly. Paint on and under nail nightly and remove every 7-10 days and start over with application. May use for 3 months. 6.6 mL 2     doxazosin (CARDURA) 8 MG Tab Take 1 tablet by mouth once daily. **Please schedule an appointment with primary care provider for further refills.** (Patient taking differently: Take 8 mg by mouth once daily.) 90 tablet 3     fluorouraciL (EFUDEX) 5 % cream Compound fluorouracil 5% + calcipotriene 0.005% cream. Apply a pea-sized amount to entire ant scalp BID x 7 days. 30 g 0     gabapentin (NEURONTIN) 300 MG capsule Take 300 mg by mouth 3 (three) times daily.       HYDROcodone-acetaminophen (NORCO) 5-325 mg per tablet Take 1 tablet by mouth every 6 (six) hours as needed for Pain. (Patient not taking: Reported on 4/4/2024) 10 tablet 0 Not Taking    methocarbamoL (ROBAXIN) 750 MG Tab        multivitamin (THERAGRAN) per tablet Take 1 tablet by mouth.       oxyCODONE-acetaminophen (PERCOCET) 7.5-325 mg per tablet        PROGRAF 1 mg Cap Take 1 capsule (1 mg total) by mouth every 12 (twelve) hours. 180 capsule 3     ropinirole (REQUIP) 0.5 MG tablet Take 0.5 mg by mouth every evening.       vitamin D 1000 units Tab Take 1,000 Units by mouth once daily.         Inpatient Medications:  Antibiotics (From admission, onward)      None          VTE Risk Mitigation (From admission, onward)      None              No current facility-administered medications for this encounter.     Current Outpatient Medications   Medication Sig  Dispense Refill    metoprolol tartrate (LOPRESSOR) 100 MG tablet Take 1 tablet (100 mg) by mouth twice daily. 180 tablet 2    celecoxib (CELEBREX) 200 MG capsule Take 200 mg by mouth 2 (two) times daily.      ciclopirox (PENLAC) 8 % Soln Apply topically nightly. Paint on and under nail nightly and remove every 7-10 days and start over with application. May use for 3 months. 6.6 mL 2    doxazosin (CARDURA) 8 MG Tab Take 1 tablet by mouth once daily. **Please schedule an appointment with primary care provider for further refills.** (Patient taking differently: Take 8 mg by mouth once daily.) 90 tablet 3    fluorouraciL (EFUDEX) 5 % cream Compound fluorouracil 5% + calcipotriene 0.005% cream. Apply a pea-sized amount to entire ant scalp BID x 7 days. 30 g 0    gabapentin (NEURONTIN) 300 MG capsule Take 300 mg by mouth 3 (three) times daily.      HYDROcodone-acetaminophen (NORCO) 5-325 mg per tablet Take 1 tablet by mouth every 6 (six) hours as needed for Pain. (Patient not taking: Reported on 4/4/2024) 10 tablet 0    methocarbamoL (ROBAXIN) 750 MG Tab       multivitamin (THERAGRAN) per tablet Take 1 tablet by mouth.      oxyCODONE-acetaminophen (PERCOCET) 7.5-325 mg per tablet       PROGRAF 1 mg Cap Take 1 capsule (1 mg total) by mouth every 12 (twelve) hours. 180 capsule 3    ropinirole (REQUIP) 0.5 MG tablet Take 0.5 mg by mouth every evening.      vitamin D 1000 units Tab Take 1,000 Units by mouth once daily.       Facility-Administered Medications Ordered in Other Encounters   Medication Dose Route Frequency Provider Last Rate Last Admin    sodium chloride 0.9% flush 10 mL  10 mL Intravenous PRN Shai Mills MD              History:   There are no hospital problems to display for this patient.    Surgical History:    has a past surgical history that includes Liver transplant; knee scope; Hernia repair; Shoulder open rotator cuff repair; Facial reconstruction surgery; Ankle surgery; Closure of defect of Mohs  "procedure (Right, 2/9/2023); Revision of flap graft (Right, 3/3/2023); Revision of flap graft (Right, 8/1/2023); and Colonoscopy (N/A, 12/18/2023).   Social History:    reports that he is not currently sexually active and has had partner(s) who are female.  reports that he quit smoking about 55 years ago. His smoking use included cigarettes. He has never used smokeless tobacco. He reports current alcohol use of about 2.0 standard drinks of alcohol per week. He reports that he does not use drugs.    There were no vitals filed for this visit.  Vital Signs Range (Last 24H):       There is no height or weight on file to calculate BMI.  Wt Readings from Last 4 Encounters:   03/27/24 101.5 kg (223 lb 12.3 oz)   12/18/23 93 kg (205 lb)   10/16/23 96.6 kg (213 lb)   09/25/23 97.1 kg (214 lb)        Intake/Output - Last 3 Shifts       None          Lab Results   Component Value Date    WBC 5.79 12/13/2023    HGB 16.0 12/13/2023    HCT 46.0 12/13/2023     12/13/2023     12/13/2023    K 4.2 12/13/2023     12/13/2023    CREATININE 0.9 12/13/2023    BUN 18 12/13/2023    CO2 26 12/13/2023     (H) 12/13/2023    CALCIUM 9.4 12/13/2023    MG 2.2 06/29/2016    PHOS 2.8 11/26/2010    ALKPHOS 71 12/13/2023    ALT 34 12/13/2023    AST 33 12/13/2023    ALBUMIN 3.8 12/13/2023    INR 0.9 03/04/2021    APTT 23.8 03/04/2021    HGBA1C 5.9 (H) 06/13/2023     (H) 11/03/2006     No results found for this or any previous visit (from the past 12 hour(s)).  No results for input(s): "WBC", "HGB", "HCT", "PLT", "NA", "K", "CREATININE", "GLU", "INR" in the last 168 hours.  No LMP for male patient.    EKG:     TTE:  No results found for this or any previous visit.  No results found for this or any previous visit.  MANDY:  No results found for this or any previous visit.  Stress Test:  No results found for this or any previous visit.     LHC:  No results found for this or any previous visit.     PFT:  No results found for: " ""FEV1", "FVC", "ZDJ7PBS", "TLC", "DLCO"                                                                                                                  04/05/2024  Zander Kaur Sr. is a 73 y.o., male.      Pre-op Assessment    I have reviewed the Patient Summary Reports.       I have reviewed the Medications.     Review of Systems  Anesthesia Hx:  No problems with previous Anesthesia   History of prior surgery of interest to airway management or planning: liver transplant. Previous anesthesia: General           Social:  Former Smoker, Social Alcohol Use       Hematology/Oncology:  Hematology Normal   Oncology Normal                                   EENT/Dental:  EENT/Dental Normal           Cardiovascular:  Exercise tolerance: good   Hypertension, well controlled                                  Hypertension         Pulmonary:  Pulmonary Normal                       Renal/:  Renal/ Normal                 Hepatic/GI:      Liver Disease,         Liver Disease        Neurological:   CVA                       CVA - Cerebrovasular Accident                 Endocrine:  Endocrine Normal            Dermatological:  Skin Normal    Psych:  Psychiatric Normal                    Physical Exam  General: Well nourished, Cooperative, Alert and Oriented    Airway:  Mallampati: II   Mouth Opening: Normal  TM Distance: Normal  Tongue: Normal  Neck ROM: Normal ROM    Dental:  Intact      Anesthesia Plan  Type of Anesthesia, risks & benefits discussed:    Anesthesia Type: MAC  Intra-op Monitoring Plan: Standard ASA Monitors  Post Op Pain Control Plan: multimodal analgesia and IV/PO Opioids PRN  Induction:  IV  Airway Plan: , Post-Induction  Informed Consent: Informed consent signed with the Patient and all parties understand the risks and agree with anesthesia plan.  All questions answered.   ASA Score: 3    Ready For Surgery From Anesthesia Perspective.     .      "

## 2024-04-05 NOTE — ANESTHESIA POSTPROCEDURE EVALUATION
Anesthesia Post Evaluation    Patient: Zander Kaur     Procedure(s) Performed: Procedure(s) (LRB):  BIOPSY, PROSTATE (N/A)  ULTRASOUND, RECTAL APPROACH (N/A)  CYSTOSCOPY (N/A)    Final Anesthesia Type: general      Patient location during evaluation: PACU  Patient participation: Yes- Able to Participate  Level of consciousness: awake and alert  Post-procedure vital signs: reviewed and stable  Pain management: adequate  Airway patency: patent    PONV status at discharge: No PONV  Anesthetic complications: no      Cardiovascular status: blood pressure returned to baseline  Respiratory status: unassisted  Hydration status: euvolemic  Follow-up not needed.          Vitals Value Taken Time   /88 04/05/24 0958   Temp 36.7 °C (98 °F) 04/05/24 0850   Pulse 54 04/05/24 1001   Resp 18 04/05/24 0945   SpO2 97 % 04/05/24 1001   Vitals shown include unvalidated device data.      No case tracking events are documented in the log.      Pain/Jose Score: Jose Score: 10 (4/5/2024  9:49 AM)

## 2024-04-05 NOTE — OP NOTE
[facility]  Operative Note    Date: 04/05/2024    Pre-Op Diagnosis:  abnormal PSA  BPH    Post-Op Diagnosis: same    Procedure(s) Performed:   1. Transperineal prostate needle biopsy  2.  Transrectal US  3.  Prostatic nerve block  4. Flexible cystoscopy    Specimen(s): 7 biopsy sites, 3 from RAND, 2 from each of standard template x6    Staff Surgeon: Galileo Ramos MD    Assistant Surgeon: Riley Boyle MD (TA); Phillip Phelps MD    Anesthesia: Monitored Local Anesthesia with Sedation    Indications: Zander Kaur Sr. is a 73 y.o. male with abnormal PSA and BPH with LUTS.      Findings:   Flexible cystoscopy with mildly obstructing prostate with bilobar hyperplasia, no lesions within bladder  Cognitive fusion biopsy of RAND x3; 12 cores standard biopsy; 7 total locations    Estimated Blood Loss: min    Drains: none    Procedure in detail:  We confirmed that the patient took their pre-preocedure antibiotic as prescribed - cipro 500 mg po bid - as well as their fleet's enema.  After informed consent was obtained, the patient was transferred to the cystoscopy suite and placed in the supine position.  Anesthesia was administered.  Once adequately sedated, he was placed in left lateral decubitus position. Time out was performed, danial procedural antibiotics were confirmed.    We performed flexible cystoscopy with a 14 Fr scope. This passed easily via the urethra, which had no lesions, strictures, or false passages. The prostate was approximately 3.5 cm in length and mildly obstructing. There was no median lobe. Formal cystoscopy revealed no mucosal abnormalities. The ureteral orifices were seen in the orthotopic position effluxing clear yellow urine. The cystoscope was removed and the bladder drained with a 16 Fr straight catheter, which was then removed/,    2% lidocaine jelly was introduced into the patient's rectum for local anesthesia.  The US probe was introduced into the rectum and the prostate  was identified on ultrasound. 12 mL of 1% lidocaine was injected into the submucosal space and periprostatic area for local anesthetic. We then performed 3 biopsy cores from the cognitive fusion of the region of interest under ultrasound guidance. We then took 2 biopsies were taken two from the right and left, base, mid and apical regions of the prostate. These specimens were sent to pathology for analysis. The ultra sound probe was removed and the procedure was terminated.      The patient tolerated the procedure well and was transferred to there ecovery room in stable condition.      Disposition:  The patient will follow up with Dr. Ramos in 2 weeks.      Riley Boyle MD

## 2024-04-05 NOTE — DISCHARGE SUMMARY
[facility] (1st Fl)  Discharge Note  Short Stay    Procedure(s) (LRB):  BIOPSY, PROSTATE (N/A)  ULTRASOUND, RECTAL APPROACH (N/A)  CYSTOSCOPY (N/A)      OUTCOME: Patient tolerated treatment/procedure well without complication and is now ready for discharge.    DISPOSITION: Home or Self Care    FINAL DIAGNOSIS:  Elevated PSA    FOLLOWUP: In clinic with Dr. Ramos in 2 weeks    DISCHARGE INSTRUCTIONS:    Discharge Procedure Orders   Notify your health care provider if you experience any of the following:  temperature >100.4     Notify your health care provider if you experience any of the following:  persistent nausea and vomiting or diarrhea     Notify your health care provider if you experience any of the following:  severe uncontrolled pain     Notify your health care provider if you experience any of the following:  redness, tenderness, or signs of infection (pain, swelling, redness, odor or green/yellow discharge around incision site)     Notify your health care provider if you experience any of the following:  worsening rash     Notify your health care provider if you experience any of the following:  persistent dizziness, light-headedness, or visual disturbances        TIME SPENT ON DISCHARGE: 10 minutes

## 2024-04-05 NOTE — DISCHARGE INSTRUCTIONS
What to Expect After a Prostate Biopsy     Please be sure to finish your pre-procedure antibiotics as instructed.     You may have mild bleeding from the rectum or urine for about 1 week to 1 month, or in your ejaculate for several months. This bleeding is normal and expected, and it will stop. You may have mild discomfort in your rectal or urethral area for 24-48 hours.     You cannot do any strenuous lifting, straining, or exercising for 24 hours. You may return to full activity the day after the biopsy.     You may continue to take all your regular medications after the procedure except for the blood thinners for 72 hours.     You may resume all blood-thinning medications once you no longer see any bleeding or whenever your physician prescribing the medication says it is all right to do so. You may take Tylenol if you have a fever and your temperature is less than 100° F or if you have some discomfort.     You will receive a call from the Urology Department at [facility] with the results of your prostate biopsy typically within two weeks.     Signs and Symptoms to Report     Call your [facility] urologist at [phone] if you develop any of the following:  Temperature greater than 101°  F  Inability to urinate  A large amount of bleeding from the rectum or in the urine  Persistent or severe pain     After hours or on weekends, you may reach a urology resident on call at this number: [phone].

## 2024-04-05 NOTE — H&P
The patient has been examined and the H&P has been reviewed:    I concur with the findings and no changes have occurred since H&P was written.    Surgery risks, benefits and alternative options discussed and understood by patient/family.    Here for transperineal prostate biopsy and flexible cystoscopy. Consent obtained. UA today negative for nitrites and leukocytes. Will proceed to OR.      [facility]  Urologic Oncology        Date of Service: 10/16/2023     Chief Complaint/Reason for Consultation: Elevated PSA, BPH with LUTS     Requesting Provider:   No referring provider defined for this encounter.        History of Present Illness:   Patient is a 72 y.o. male presenting for evaluation of elevated PSA, BPH with lower urinary tract symptoms.     His PSA trend is as follows:           Lab Results   Component Value Date     PSA 5.1 (H) 03/17/2022     PSA 3.3 10/18/2018     PSA 3.1 09/26/2017     PSADIAG 6.4 (H) 06/13/2023      Imaging: I have reviewed the imaging study MRI of the prostate on 08/17/2023 personally, and agree with the findings and interpretation.      From a functional standpoint, he has a good urinary stream and feels as though he empties to completion.  He has nocturia x1, but does have urinary urgency which is worse with drinking bladder irritants such as tea.  He would like to consider medical therapy     Current Problem List:       Patient Active Problem List     Diagnosis Date Noted    Elevated PSA 06/26/2023    Severe obesity (BMI 35.0-39.9) with comorbidity 05/08/2023    Mohs defect 02/08/2023    Thrombocytopenia 02/14/2022    Aortic atherosclerosis 11/11/2020    Obesity (BMI 30.0-34.9) 11/11/2020    Hypertension associated with transplantation 07/08/2019    Immunosuppression 07/09/2018    Arthritis 08/03/2017    Screen for colon cancer 08/05/2015    Essential hypertension 06/19/2013    S/P liver transplant 06/19/2013    Hepatic cirrhosis 06/19/2013    History of skin cancer  06/19/2013    Joint injury 06/19/2013    AK (actinic keratosis) 02/26/2013         Allergies:        Review of patient's allergies indicates:   Allergen Reactions    Diphenhydramine hcl      Penicillins         Other reaction(s): Rash    Povidone-iodine         Other reaction(s): Itching  Other reaction(s): Itching         Medications per EMR:    Prescriptions Prior to Admission   (Not in a hospital admission)         Past Medical History:       Past Medical History:   Diagnosis Date    Actinic keratosis      Allergy       seasonal    Arthritis      Basal cell carcinoma of right ala nasi 02/06/2023    Hypertension      Joint pain      Organ transplant       liver 2010    SCC (squamous cell carcinoma) 08/15/2022     Right cheek -SSW    SCC (squamous cell carcinoma) 03/27/2023     Right angle of mandible    SCC (squamous cell carcinoma) 03/27/2023     Right medial cheek    Squamous Cell Carcinoma 08/28/2013     right temple    Squamous Cell Carcinoma 09/04/2013     left forehead    Stroke           Past Surgical History:        Past Surgical History:   Procedure Laterality Date    ANKLE SURGERY         multipole surgeries    CLOSURE OF DEFECT OF MOHS PROCEDURE Right 2/9/2023     Procedure: CLOSURE, MOHS PROCEDURE DEFECT;  Surgeon: Uzair Brandon MD;  Location: [address];  Service: ENT;  Laterality: Right;    FACIAL RECONSTRUCTION SURGERY        HERNIA REPAIR        knee scope         bob, multiple    LIVER TRANSPLANT         2010    REVISION OF FLAP GRAFT Right 3/3/2023     Procedure: REVISION, PROCEDURE INVOLVING FLAP GRAFT;  Surgeon: Uzair Brandon MD;  Location: [address];  Service: ENT;  Laterality: Right;    REVISION OF FLAP GRAFT Right 8/1/2023     Procedure: REVISION, PROCEDURE INVOLVING FLAP GRAFT;  Surgeon: Consuelo Augustine MD;  Location: [facility];  Service: ENT;  Laterality: Right;    SHOULDER OPEN ROTATOR CUFF REPAIR         left         Family History:        Family History   Problem  "Relation Age of Onset    No Known Problems Mother      Thyroid disease Sister      No Known Problems Son      No Known Problems Son      Melanoma Neg Hx      Psoriasis Neg Hx      Eczema Neg Hx      Lupus Neg Hx           Social History:  Social History            Tobacco Use    Smoking status: Former       Current packs/day: 0.00       Types: Cigarettes       Quit date: 1969       Years since quittin.8    Smokeless tobacco: Never    Tobacco comments:       SMOKED FOR 1 YEAR, [facility] [employer] MADE HIM STOP   Substance Use Topics    Alcohol use: Yes       Alcohol/week: 2.0 standard drinks of alcohol       Types: 2 Glasses of wine per week       Comment: per week            OBJECTIVE:      Vitals       Vitals:     10/16/23 0825   BP: (!) 164/101   BP Location: Left arm   Patient Position: Sitting   BP Method: Medium (Automatic)   Pulse: 65   Resp: (!) 22   Weight: 96.6 kg (213 lb)   Height: 5' 7" (1.702 m)               General Appearance: Alert, cooperative, no distress  Head: Normocephalic  Eyes: Clear conjunctiva  Neck: No obvious LND or JVD  Lungs: Normal chest excursion, no accessory muscle use  Extremities: Atraumatic   Lymph Nodes: No appreciable lymph adenopathy  Neurologic: No gross gait, motor or sensory deficits           LAB:    CBC:        Lab Results   Component Value Date     WBC 5.01 2023     HGB 15.7 2023     HCT 48.3 2023      (H) 2023      2023            BMP:        Lab Results   Component Value Date      2023     K 4.3 2023      2023     CO2 24 2023     BUN 23 2023     CREATININE 0.9 2023     CALCIUM 9.5 2023     ANIONGAP 13 2023     EGFRNORACEVR >60.0 2023         ASSESSMENT/PLAN:      Assessment:  72-year-old male with BPH and lower urinary tract symptoms, elevated PSA with a PI-RADS 3 lesion on MRI     Plan:  I had a thorough discussion with the patient regarding prostate biopsy. "  We discussed the pros cons and alternatives of proceeding with a biopsy.  Specifically, we discussed 2 approaches including transrectal and transperineal.  We discussed the reported risk of sepsis in both, and the need for general anesthesia and transperineal.  I discussed that both options have benefits and risks, and that we personalized the selection based on our capabilities and technology.  We discussed that if there are detectable lesions on the MRI, we will perform targeted biopsies in addition to a template biopsy, which at minimum includes 12 cores.     We then discussed risks, benefits, alternatives specific to this procedure, including complications, detailed below:  -General anesthesia:  Associated risks include pneumonia, cerebrovascular accident, cardiac events including myocardial infarction, pulmonary embolism, deep vein thrombosis  -Prostate biopsy: I discussed complications such as pain, blood in the urine, blood in the semen, and blood in the stool, the risk of urinary tract infection, urinary retention, prostatitis, and sepsis.     We also discussed his BPH with lower urinary tract symptoms.  He would like to try tamsulosin. We discussed side effects such as hypotension, dizziness, lightheadedness, retrograde ejaculation, and floppy iris syndrome.  He has no planned upcoming surgeries on his eyes.  I reviewed his MAR, and he currently takes doxazosin. We will pursue flexible cystoscopy at the time of his biopsy first, then discuss medical options.      He will check his calendar and reach back out to us if he would like to pursue transperineal prostate biopsy given his history of liver transplant, we will perform a flexible cystoscopy for his BPH/lower urinary tract symptoms at that time.     I spent a total of 45 minutes on the day of the visit.This includes face to face time and non-face to face time preparing to see the patient (eg, review of tests), obtaining and/or reviewing separately  obtained history, documenting clinical information in the electronic or other health record, independently interpreting results and communicating results to the patient/family/caregiver, or care coordinator.

## 2024-04-08 ENCOUNTER — PROCEDURE VISIT (OUTPATIENT)
Dept: DERMATOLOGY | Facility: CLINIC | Age: 74
End: 2024-04-08
Payer: MEDICARE

## 2024-04-08 VITALS
BODY MASS INDEX: 35.19 KG/M2 | DIASTOLIC BLOOD PRESSURE: 100 MMHG | HEART RATE: 51 BPM | WEIGHT: 218.94 LBS | SYSTOLIC BLOOD PRESSURE: 157 MMHG | HEIGHT: 66 IN

## 2024-04-08 DIAGNOSIS — C44.329 SQUAMOUS CELL CARCINOMA OF CHIN: Primary | ICD-10-CM

## 2024-04-08 PROCEDURE — 17311 MOHS 1 STAGE H/N/HF/G: CPT | Mod: S$PBB,,, | Performed by: DERMATOLOGY

## 2024-04-08 PROCEDURE — 13132 CMPLX RPR F/C/C/M/N/AX/G/H/F: CPT | Mod: PBBFAC | Performed by: DERMATOLOGY

## 2024-04-08 PROCEDURE — 17312 MOHS ADDL STAGE: CPT | Mod: PBBFAC | Performed by: DERMATOLOGY

## 2024-04-08 PROCEDURE — 13132 CMPLX RPR F/C/C/M/N/AX/G/H/F: CPT | Mod: S$PBB,51,, | Performed by: DERMATOLOGY

## 2024-04-08 PROCEDURE — 17311 MOHS 1 STAGE H/N/HF/G: CPT | Mod: PBBFAC | Performed by: DERMATOLOGY

## 2024-04-08 PROCEDURE — 99499 UNLISTED E&M SERVICE: CPT | Mod: S$PBB,,, | Performed by: DERMATOLOGY

## 2024-04-08 PROCEDURE — 17312 MOHS ADDL STAGE: CPT | Mod: S$PBB,,, | Performed by: DERMATOLOGY

## 2024-04-08 NOTE — PROGRESS NOTES
PROCEDURE: Mohs' Micrographic Surgery    INDICATION: Location in mask areas of face including central face, nose, eyelids, eyebrows, lips, chin, preauricular, temple, and ear. Biopsy-proven skin cancer of cosmetically and functionally important areas, including head, neck, genital, hand, foot, or areas known for having difficulty in healing, such as the lower anterior legs. Tumor with ill-defined borders. Tumors in immunosuppressed patients.    REFERRING PROVIDER: Lima Campos M.D.    CASE NUMBER:     ANESTHETIC: 3.5 cc 0.5% Lidocaine with Epi 1:200,000 mixed 1:1 with 0.5% Bupivacaine    SURGICAL PREP: Hibiclens    SURGEON: Jc Gil MD    ASSISTANTS: Bernadette Cao PA-C, Yesenia Ortega MA, and Allie Loya, Surg Tech    PREOPERATIVE DIAGNOSIS: squamous cell carcinoma- well differentiated    POSTOPERATIVE DIAGNOSIS: squamous cell carcinoma- superficial    PATHOLOGIC DIAGNOSIS: squamous cell carcinoma- well differentiated, superficial    HISTOLOGY OF SPECIMENS IN FIRST STAGE:   Tumor Type: Tumor seen. Superficial squamous cell carcinoma: Proliferation of squamous cells exhibiting atypia and infiltrating within the superficial papillary dermis.   Depth of Invasion: epidermis and dermis  Perineural Invasion: No    HISTOLOGY OF SPECIMENS IN SUBSEQUENT STAGES:  Tumor Type:  No tumor seen.    STAGES OF MOHS' SURGERY PERFORMED: 2    TUMOR-FREE PLANE ACHIEVED: Yes. No invasive SCC.    HEMOSTASIS: electrocoagulation     SPECIMENS: 3 (2 in stage A and 1 in stage B)    LOCATION: anterior chin. Location verified with Dr. Campos's clinical photograph. Patient also verified location with hand held mirror.    INITIAL LESION SIZE: 0.7 x 0.8 cm    FINAL DEFECT SIZE: 1.2 x 1.3 cm    WOUND REPAIR/DISPOSITION: The patient tolerated Mohs' Micrographic Surgery for a squamous cell carcinoma very well. When the tumor was completely removed, a repair of the surgical defect was undertaken.    PROCEDURE: Complex Linear  "Repair    INDICATION: Status post Mohs' Micrographic Surgery for squamous cell carcinoma.    CASE NUMBER:     SURGEON: Jc Gil MD    ASSISTANTS: Bernadette Cao PA-C and Ralph Schofield    ANESTHETIC: 2 cc 0.5% Lidocaine with Epi 1:200,000 mixed 1:1 with 0.5% Bupivacaine    SURGICAL PREP: Hibiclens, prepped by Allie Loya Surg Cecily    LOCATION: anterior chin    DEFECT SIZE: 1.2 x 1.3 cm    WOUND REPAIR/DISPOSITION:  After the patient's carcinoma had been completely removed with Mohs' Micrographic Surgery, a repair of the surgical defect was undertaken. The patient was returned to the operating suite where the area of anterior chin was prepped, draped, and anesthetized in the usual sterile fashion. The wound was widely undermined in all directions. The wound was undermined to a distance at least the maximum width of the defect as measured perpendicular to the closure line along at least one entire edge of the defect, in this case 2 cm. Then, electrocoagulation was used to obtain meticulous hemostasis. 4-0 Vicryl buried vertical mattress sutures were placed into the subcutaneous and dermal plane to close the wound and giuseppe the cutaneous wound edge. Bilateral dog ears were identified and were removed by a standard Burow's triangle technique. The cutaneous wound edges were closed using interrupted 5-0 Prolene suture.    The patient tolerated the procedure well.    The area was cleaned and dressed appropriately and the patient was given wound care instructions, as well as appointment for follow-up evaluation and suture removal in 7 days.    LENGTH OF REPAIR: 2.8 cm    Vitals:    04/08/24 1131 04/08/24 1341   BP: 119/77 (!) 157/100   BP Location: Left arm Left arm   Patient Position: Sitting Lying   BP Method: Small (Automatic) Medium (Automatic)   Pulse: (!) 59 (!) 51   Weight: 99.3 kg (218 lb 14.7 oz)    Height: 5' 6" (1.676 m)          "

## 2024-04-10 LAB
FINAL PATHOLOGIC DIAGNOSIS: NORMAL
GROSS: NORMAL
Lab: NORMAL

## 2024-04-15 ENCOUNTER — OFFICE VISIT (OUTPATIENT)
Dept: DERMATOLOGY | Facility: CLINIC | Age: 74
End: 2024-04-15
Payer: MEDICARE

## 2024-04-15 DIAGNOSIS — Z09 POSTOP CHECK: Primary | ICD-10-CM

## 2024-04-15 PROCEDURE — 99024 POSTOP FOLLOW-UP VISIT: CPT | Mod: POP,,, | Performed by: DERMATOLOGY

## 2024-04-15 PROCEDURE — 99213 OFFICE O/P EST LOW 20 MIN: CPT | Mod: PBBFAC | Performed by: DERMATOLOGY

## 2024-04-15 PROCEDURE — 99999 PR PBB SHADOW E&M-EST. PATIENT-LVL III: CPT | Mod: PBBFAC,,, | Performed by: DERMATOLOGY

## 2024-04-15 NOTE — PROGRESS NOTES
73 y.o. male patient is here for suture removal following Mohs' surgery.    Patient reports no problems.    WOUND PE:  The anterior chin sutures intact. Wound healing well. Good skin edges. No signs or symptoms of infection.      IMPRESSION:  Healing operative site from Mohs' surgery SCC anterior chin s/p Mohs with CLC, postop day #7.    PLAN:  Sutures removed today by  Rosa Doherty MA . Steri-strips applied.  Continue wound care.  Keep moist with Aquaphor.  Call if any issues arise    RTC:  In 3-6 months with Lima Campos M.D. for skin check or sooner if new concern arises.

## 2024-04-20 ENCOUNTER — HOSPITAL ENCOUNTER (OUTPATIENT)
Dept: RADIOLOGY | Facility: HOSPITAL | Age: 74
Discharge: HOME | End: 2024-04-20
Attending: ORTHOPAEDIC SURGERY
Payer: MEDICARE

## 2024-04-20 DIAGNOSIS — M17.10 ARTHRITIS OF KNEE: ICD-10-CM

## 2024-04-20 PROCEDURE — 73721 MRI JNT OF LWR EXTRE W/O DYE: CPT | Mod: 26,LT,, | Performed by: RADIOLOGY

## 2024-04-20 PROCEDURE — 73721 MRI JNT OF LWR EXTRE W/O DYE: CPT | Mod: TC,LT

## 2024-04-21 NOTE — PROGRESS NOTES
[facility]  Urologic Oncology      Date of Service: 04/21/2024    Chief Complaint/Reason for Consultation: Elevated PSA, BPH with LUTS    Requesting Provider:   No referring provider defined for this encounter.      History of Present Illness:   Patient is a 73 y.o. male presenting for evaluation of elevated PSA, BPH with lower urinary tract symptoms.    4/5/24: he underwent TP prostate biopsy and cystoscopy. Findings as follows: Flexible cystoscopy with mildly obstructing prostate with bilobar hyperplasia, no lesions within bladder. Cognitive fusion biopsy of RAND x3; 12 cores standard biopsy; 7 total locations.     Pathology: G 3+4 RA (1/2, 10%), G 3+3 LM (1/2, <5%), HGPIN RAND. Total 2/15 cores positive.     His PSA trend is as follows:    Lab Results   Component Value Date    PSA 5.1 (H) 03/17/2022    PSA 3.3 10/18/2018    PSA 3.1 09/26/2017    PSADIAG 6.4 (H) 06/13/2023     Imaging: I have reviewed the imaging study MRI of the prostate on 08/17/2023 personally, and agree with the findings and interpretation.     From a functional standpoint, he has a good urinary stream and feels as though he empties to completion.  He has nocturia x1, but does have urinary urgency which is worse with drinking bladder irritants such as tea.  He would like to consider medical therapy    Current Problem List:  Patient Active Problem List    Diagnosis Date Noted    Elevated PSA 06/26/2023    Severe obesity (BMI 35.0-39.9) with comorbidity 05/08/2023    Mohs defect 02/08/2023    Thrombocytopenia 02/14/2022    Aortic atherosclerosis 11/11/2020    Obesity (BMI 30.0-34.9) 11/11/2020    Hypertension associated with transplantation 07/08/2019    Immunosuppression 07/09/2018    Arthritis 08/03/2017    Screen for colon cancer 08/05/2015    Essential hypertension 06/19/2013    S/P liver transplant 06/19/2013    Hepatic cirrhosis 06/19/2013    History of skin cancer 06/19/2013    Joint injury 06/19/2013    AK (actinic keratosis)  02/26/2013        Allergies:  Review of patient's allergies indicates:   Allergen Reactions    Diphenhydramine hcl     Penicillins      Other reaction(s): Rash    Povidone-iodine      Other reaction(s): Itching  Other reaction(s): Itching        Medications per EMR:  (Not in a hospital admission)      Past Medical History:  Past Medical History:   Diagnosis Date    Actinic keratosis     Allergy     seasonal    Arthritis     Basal cell carcinoma of right ala nasi 02/06/2023    Hypertension     Joint pain     Organ transplant     liver 2010    SCC (squamous cell carcinoma) 08/15/2022    Right cheek -SSW    SCC (squamous cell carcinoma) 03/27/2023    Right angle of mandible    SCC (squamous cell carcinoma) 03/27/2023    Right medial cheek    Squamous Cell Carcinoma 08/28/2013    right temple    Squamous Cell Carcinoma 09/04/2013    left forehead    Stroke         Past Surgical History:  Past Surgical History:   Procedure Laterality Date    ANKLE SURGERY      multipole surgeries    CLOSURE OF DEFECT OF MOHS PROCEDURE Right 2/9/2023    Procedure: CLOSURE, MOHS PROCEDURE DEFECT;  Surgeon: Uzair Brandon MD;  Location: [facility] OR [address] [place] [address];  Service: ENT;  Laterality: Right;    COLONOSCOPY N/A 12/18/2023    Procedure: COLONOSCOPY;  Surgeon: Leonardo Penny MD;  Location: [facility] ENDOSCOPY;  Service: Endoscopy;  Laterality: N/A;  Referral: RENÉE HODGE / PEG / [facility] portal -   12/11- pre call complete.  DBM  12.15 precall complete; pt has instr.; all questions answered; AP    CYSTOSCOPY N/A 4/5/2024    Procedure: CYSTOSCOPY;  Surgeon: Galileo Ramos MD;  Location: [facility] OR [address] [place] [address];  Service: Urology;  Laterality: N/A;    FACIAL RECONSTRUCTION SURGERY      HERNIA REPAIR      knee scope      bob, multiple    LIVER TRANSPLANT      2010    PROSTATE BIOPSY N/A 4/5/2024    Procedure: BIOPSY, PROSTATE;  Surgeon: Galileo Ramos MD;  Location: [facility] OR [address] [place] [address];  Service: Urology;  Laterality: N/A;  TransPERINEAL biopsy     REVISION OF FLAP GRAFT Right 3/3/2023    Procedure: REVISION, PROCEDURE INVOLVING FLAP GRAFT;  Surgeon: Uzair Brandon MD;  Location: [facility] OR 2ND FLR;  Service: ENT;  Laterality: Right;    REVISION OF FLAP GRAFT Right 2023    Procedure: REVISION, PROCEDURE INVOLVING FLAP GRAFT;  Surgeon: Consuelo Augustine MD;  Location: [facility] OR;  Service: ENT;  Laterality: Right;    SHOULDER OPEN ROTATOR CUFF REPAIR      left    TRANSRECTAL ULTRASOUND EXAMINATION N/A 2024    Procedure: ULTRASOUND, RECTAL APPROACH;  Surgeon: Galileo Ramos MD;  Location: [facility] OR 1ST FLR;  Service: Urology;  Laterality: N/A;        Family History:  Family History   Problem Relation Name Age of Onset    No Known Problems Mother      Thyroid disease Sister      No Known Problems Son      No Known Problems Son      Melanoma Neg Hx      Psoriasis Neg Hx      Eczema Neg Hx      Lupus Neg Hx          Social History:  Social History     Tobacco Use    Smoking status: Former     Current packs/day: 0.00     Types: Cigarettes     Quit date:      Years since quittin.3    Smokeless tobacco: Never    Tobacco comments:     SMOKED FOR 1 YEAR, [employer] MADE HIM STOP   Substance Use Topics    Alcohol use: Yes     Alcohol/week: 2.0 standard drinks of alcohol     Types: 2 Glasses of wine per week     Comment: per week          OBJECTIVE:     There were no vitals filed for this visit.         General Appearance: Alert, cooperative, no distress  Head: Normocephalic  Eyes: Clear conjunctiva  Neck: No obvious LND or JVD  Lungs: Normal chest excursion, no accessory muscle use  Extremities: Atraumatic   Lymph Nodes: No appreciable lymph adenopathy  Neurologic: No gross gait, motor or sensory deficits        LAB:    CBC:  Lab Results   Component Value Date    WBC 5.79 2023    HGB 16.0 2023    HCT 46.0 2023    MCV 97 2023     2023         BMP:  Lab Results   Component Value Date     2023    K 4.2  12/13/2023     12/13/2023    CO2 26 12/13/2023    BUN 18 12/13/2023    CREATININE 0.9 12/13/2023    CALCIUM 9.4 12/13/2023    ANIONGAP 8 12/13/2023    EGFRNORACEVR >60.0 12/13/2023       ASSESSMENT/PLAN:     Assessment:  72-year-old male with BPH and lower urinary tract symptoms, and low volume Favorable Intermediate risk Prostate cancer.     Plan:  - Today's visit was spent almost entirely on counseling. We reviewed his diagnosis, stage, grade, risk group, and prognosis. We discussed [place] (NCCN) and BRYAN risk stratification  We discussed the concept of low risk, intermediate risk, and high risk disease. We also reviewed the NCCN treatment nomogram.We discussed the different treatment options including active surveillance (as well as the surveillance protocol), prostate brachytherapy, EBRT, SBRT (stereotactic body radiation therapy),cryotherapy, HIFU and both open and robotic prostatectomy.We also discussed the advantages, disadvantages, risks and benefits, as well as complications of each option. Regarding radiation therapy we discussed treatment planning, the different techniques, short and long term complications. These included radiation cystitis, radiation proctitis, and impotence. We discussed success, failure, and salvage therapeutic options.Also discussed the use of SpaceOAR for brachytherapy and EBRT and fiducials for EBRT.  - We discussed surgical therapy in depth including preoperative preparation, surgical technique (including bladder neck and nerve-sparing techniques), postoperative recuperation and recovery, and short and long term complications including UTI, bleeding, blood clots,catheter dislodgement, etc. We discussed the risks of reoperation, incontinence, impotence, and recurrence. We discussed preop and postop Kegels, post op penile rehab, and treatment options for incontinence and impotence. We discussed rates of cancer free survival and recurrence, as well as salvage therapeutic  options. We discussed the possible  indications for adjuvant radiation therapy.   I answered questions and addressed concerns. Also discussed the Prolaris test to get genetic information about this tumors potential   - Patient wishes to proceed with active surveillance.   - RTC to clinic in 6 months with PSA prior.     We also discussed his BPH with lower urinary tract symptoms, which mostly revolve around urinary urgency and nocturia. He is already on Doxazosin. Discussed lifestyle modifications.  He would like to try Vesicare. Side effects discussed. Prescription written.       Attending attestation:   I have reviewed the imaging notes and discussion of Dr. Mosher and agree in full with his above documentation.  Patient made an informed decision to pursue active surveillance.  We have prescribed solifenacin, and we will see him back in 6 months with a repeat PSA

## 2024-04-22 ENCOUNTER — OFFICE VISIT (OUTPATIENT)
Dept: UROLOGY | Facility: CLINIC | Age: 74
End: 2024-04-22
Payer: MEDICARE

## 2024-04-22 VITALS
HEIGHT: 66 IN | BODY MASS INDEX: 35.19 KG/M2 | DIASTOLIC BLOOD PRESSURE: 92 MMHG | WEIGHT: 218.94 LBS | SYSTOLIC BLOOD PRESSURE: 152 MMHG | HEART RATE: 80 BPM

## 2024-04-22 DIAGNOSIS — C61 PROSTATE CANCER: ICD-10-CM

## 2024-04-22 DIAGNOSIS — R39.15 URGENCY OF URINATION: Primary | ICD-10-CM

## 2024-04-22 PROCEDURE — 99213 OFFICE O/P EST LOW 20 MIN: CPT | Mod: PBBFAC | Performed by: UROLOGY

## 2024-04-22 PROCEDURE — 99214 OFFICE O/P EST MOD 30 MIN: CPT | Mod: S$PBB,,, | Performed by: UROLOGY

## 2024-04-22 PROCEDURE — 99999 PR PBB SHADOW E&M-EST. PATIENT-LVL III: CPT | Mod: PBBFAC,,, | Performed by: UROLOGY

## 2024-04-22 RX ORDER — TAMSULOSIN HYDROCHLORIDE 0.4 MG/1
CAPSULE ORAL
COMMUNITY
Start: 2024-03-18 | End: 2024-05-02

## 2024-04-22 RX ORDER — SOLIFENACIN SUCCINATE 5 MG/1
5 TABLET, FILM COATED ORAL DAILY
Qty: 30 TABLET | Refills: 11 | Status: SHIPPED | OUTPATIENT
Start: 2024-04-22 | End: 2025-04-17

## 2024-04-22 RX ORDER — OXYCODONE AND ACETAMINOPHEN 10; 325 MG/1; MG/1
1 TABLET ORAL
COMMUNITY
Start: 2024-04-17

## 2024-04-30 ENCOUNTER — EXTERNAL CHRONIC CARE MANAGEMENT (OUTPATIENT)
Dept: PRIMARY CARE CLINIC | Facility: CLINIC | Age: 74
End: 2024-04-30
Payer: MEDICARE

## 2024-04-30 PROCEDURE — 99490 CHRNC CARE MGMT STAFF 1ST 20: CPT | Mod: S$PBB,,, | Performed by: INTERNAL MEDICINE

## 2024-04-30 PROCEDURE — 99490 CHRNC CARE MGMT STAFF 1ST 20: CPT | Mod: PBBFAC | Performed by: INTERNAL MEDICINE

## 2024-05-02 ENCOUNTER — OFFICE VISIT (OUTPATIENT)
Dept: CARDIOLOGY | Facility: CLINIC | Age: 74
End: 2024-05-02
Payer: MEDICARE

## 2024-05-02 ENCOUNTER — LAB VISIT (OUTPATIENT)
Dept: LAB | Facility: HOSPITAL | Age: 74
End: 2024-05-02
Attending: INTERNAL MEDICINE
Payer: MEDICARE

## 2024-05-02 VITALS
SYSTOLIC BLOOD PRESSURE: 137 MMHG | DIASTOLIC BLOOD PRESSURE: 87 MMHG | HEART RATE: 55 BPM | BODY MASS INDEX: 34.36 KG/M2 | HEIGHT: 67 IN | WEIGHT: 218.94 LBS

## 2024-05-02 DIAGNOSIS — Z94.4 S/P LIVER TRANSPLANT: ICD-10-CM

## 2024-05-02 DIAGNOSIS — R60.0 EDEMA OF BOTH LOWER EXTREMITIES: ICD-10-CM

## 2024-05-02 DIAGNOSIS — E66.9 OBESITY (BMI 30-39.9): ICD-10-CM

## 2024-05-02 DIAGNOSIS — I10 ESSENTIAL HYPERTENSION: ICD-10-CM

## 2024-05-02 DIAGNOSIS — I15.8 HYPERTENSION ASSOCIATED WITH TRANSPLANTATION: ICD-10-CM

## 2024-05-02 DIAGNOSIS — I89.0 LYMPHEDEMA OF BOTH LOWER EXTREMITIES: ICD-10-CM

## 2024-05-02 DIAGNOSIS — I87.2 VENOUS STASIS DERMATITIS OF BOTH LOWER EXTREMITIES: ICD-10-CM

## 2024-05-02 DIAGNOSIS — I83.893 VARICOSE VEINS OF BILATERAL LOWER EXTREMITIES WITH OTHER COMPLICATIONS: ICD-10-CM

## 2024-05-02 DIAGNOSIS — Z86.73 HISTORY OF STROKE: ICD-10-CM

## 2024-05-02 DIAGNOSIS — R60.0 EDEMA OF BOTH LOWER EXTREMITIES: Primary | ICD-10-CM

## 2024-05-02 DIAGNOSIS — Z94.9 HYPERTENSION ASSOCIATED WITH TRANSPLANTATION: ICD-10-CM

## 2024-05-02 DIAGNOSIS — I70.0 AORTIC ATHEROSCLEROSIS: ICD-10-CM

## 2024-05-02 DIAGNOSIS — E66.01 SEVERE OBESITY (BMI 35.0-39.9) WITH COMORBIDITY: ICD-10-CM

## 2024-05-02 DIAGNOSIS — E66.9 OBESITY (BMI 30.0-34.9): ICD-10-CM

## 2024-05-02 DIAGNOSIS — Z01.810 PREOPERATIVE CARDIOVASCULAR EXAMINATION: ICD-10-CM

## 2024-05-02 LAB
ALBUMIN SERPL BCP-MCNC: 3.8 G/DL (ref 3.5–5.2)
ALP SERPL-CCNC: 64 U/L (ref 55–135)
ALT SERPL W/O P-5'-P-CCNC: 43 U/L (ref 10–44)
ANION GAP SERPL CALC-SCNC: 7 MMOL/L (ref 8–16)
AST SERPL-CCNC: 31 U/L (ref 10–40)
BILIRUB SERPL-MCNC: 1 MG/DL (ref 0.1–1)
BUN SERPL-MCNC: 26 MG/DL (ref 8–23)
CALCIUM SERPL-MCNC: 9.4 MG/DL (ref 8.7–10.5)
CHLORIDE SERPL-SCNC: 105 MMOL/L (ref 95–110)
CO2 SERPL-SCNC: 27 MMOL/L (ref 23–29)
CREAT SERPL-MCNC: 1 MG/DL (ref 0.5–1.4)
EST. GFR  (NO RACE VARIABLE): >60 ML/MIN/1.73 M^2
GLUCOSE SERPL-MCNC: 132 MG/DL (ref 70–110)
OHS QRS DURATION: 90 MS
OHS QTC CALCULATION: 400 MS
POTASSIUM SERPL-SCNC: 5.1 MMOL/L (ref 3.5–5.1)
PROT SERPL-MCNC: 6.9 G/DL (ref 6–8.4)
SODIUM SERPL-SCNC: 139 MMOL/L (ref 136–145)

## 2024-05-02 PROCEDURE — 99205 OFFICE O/P NEW HI 60 MIN: CPT | Mod: S$PBB,,, | Performed by: INTERNAL MEDICINE

## 2024-05-02 PROCEDURE — 99215 OFFICE O/P EST HI 40 MIN: CPT | Mod: PBBFAC,25,PO | Performed by: INTERNAL MEDICINE

## 2024-05-02 PROCEDURE — 93010 ELECTROCARDIOGRAM REPORT: CPT | Mod: S$PBB,,, | Performed by: INTERNAL MEDICINE

## 2024-05-02 PROCEDURE — 99999 PR PBB SHADOW E&M-EST. PATIENT-LVL V: CPT | Mod: PBBFAC,,, | Performed by: INTERNAL MEDICINE

## 2024-05-02 PROCEDURE — 36415 COLL VENOUS BLD VENIPUNCTURE: CPT | Mod: PO | Performed by: INTERNAL MEDICINE

## 2024-05-02 PROCEDURE — 93005 ELECTROCARDIOGRAM TRACING: CPT | Mod: PBBFAC,PO | Performed by: INTERNAL MEDICINE

## 2024-05-02 PROCEDURE — 80053 COMPREHEN METABOLIC PANEL: CPT | Performed by: INTERNAL MEDICINE

## 2024-05-02 RX ORDER — BUMETANIDE 0.5 MG/1
0.5 TABLET ORAL DAILY
Qty: 90 TABLET | Refills: 3 | Status: SHIPPED | OUTPATIENT
Start: 2024-05-02 | End: 2025-05-02

## 2024-05-02 RX ORDER — ATORVASTATIN CALCIUM 40 MG/1
40 TABLET, FILM COATED ORAL DAILY
Qty: 90 TABLET | Refills: 3 | Status: SHIPPED | OUTPATIENT
Start: 2024-05-02 | End: 2025-05-02

## 2024-05-02 NOTE — PATIENT INSTRUCTIONS
Assessment/Plan:  Zander Kaur . is a 73 y.o. male with HTN, s/p liver transplant (2010), obesity, history of stroke, who presents for an initial appointment.    BLE Edema- Due to BLE lymphedema and possible venous insufficiency.  Start bumex 0.5 mg daily.  Check cmp today and in 1 week.  Refer to lymphedema clinic.  Recommend wearing graduated compression hose.  Limit sodium intake to 2000 mg daily.  Limit volume intake to 1.5 L daily.  Elevate legs when resting.    2. HTN- Continue current medications.    3. Obesity- Encourage diet, exercise, and weight loss.  Refer to Bariatric Medicine for assistance with weight loss.    4. Preoperative Cardiac Risk Stratification- Mr. Kaur has no chest pain, no heart failure symptoms and no arrhythmia.  He can perform greater than 4 METS with no difficulty.  EKG today shows    5. Aortic atherosclerosis/history of stroke- Start atorvastatin 40 mg daily.     Follow up in 4 months with lipids prior

## 2024-05-02 NOTE — PROGRESS NOTES
[facility]      Chief Complaint   Patient presents with    Unilateral primary osteoarthritis        Patient ID: Zander Kaur Sr. is a 73 y.o. male with HTN, s/p liver transplant (2010), obesity, who presents for an initial appointment.  Pertinent history/events are as follows:     -Pt kindly referred by Dr. Lock for evaluation of leg swelling.     HPI:  Mr. Kaur reports leg swelling for 20 years.  He has no claudication or tissue loss.      Past Medical History:   Diagnosis Date    Actinic keratosis     Allergy     seasonal    Arthritis     Basal cell carcinoma of right ala nasi 02/06/2023    Hypertension     Joint pain     Organ transplant     liver 2010    SCC (squamous cell carcinoma) 08/15/2022    Right cheek -SSW    SCC (squamous cell carcinoma) 03/27/2023    Right angle of mandible    SCC (squamous cell carcinoma) 03/27/2023    Right medial cheek    Squamous Cell Carcinoma 08/28/2013    right temple    Squamous Cell Carcinoma 09/04/2013    left forehead    Stroke      Past Surgical History:   Procedure Laterality Date    ANKLE SURGERY      multipole surgeries    CLOSURE OF DEFECT OF MOHS PROCEDURE Right 2/9/2023    Procedure: CLOSURE, MOHS PROCEDURE DEFECT;  Surgeon: Uzair Brandon MD;  Location: [facility] OR [address] [place] [address];  Service: ENT;  Laterality: Right;    COLONOSCOPY N/A 12/18/2023    Procedure: COLONOSCOPY;  Surgeon: Leonardo Penny MD;  Location: [facility] ENDOSCOPY;  Service: Endoscopy;  Laterality: N/A;  Referral: RENÉE HODGE / PEG / [facility] portal - LW  12/11- pre call complete.  DBM  12.15 precall complete; pt has instr.; all questions answered; AP    CYSTOSCOPY N/A 4/5/2024    Procedure: CYSTOSCOPY;  Surgeon: Galileo Ramos MD;  Location: [facility] OR [address] [place] [address];  Service: Urology;  Laterality: N/A;    FACIAL RECONSTRUCTION SURGERY      HERNIA REPAIR      knee scope      bob, multiple    LIVER TRANSPLANT      2010    PROSTATE BIOPSY N/A 4/5/2024    Procedure: BIOPSY, PROSTATE;  Surgeon:  Galileo Ramos MD;  Location: [facility] OR 1ST FLR;  Service: Urology;  Laterality: N/A;  TransPERINEAL biopsy    REVISION OF FLAP GRAFT Right 3/3/2023    Procedure: REVISION, PROCEDURE INVOLVING FLAP GRAFT;  Surgeon: Uzair Brandon MD;  Location: [facility] OR 2ND FLR;  Service: ENT;  Laterality: Right;    REVISION OF FLAP GRAFT Right 2023    Procedure: REVISION, PROCEDURE INVOLVING FLAP GRAFT;  Surgeon: Consuelo Augustine MD;  Location: [facility] OR;  Service: ENT;  Laterality: Right;    SHOULDER OPEN ROTATOR CUFF REPAIR      left    TRANSRECTAL ULTRASOUND EXAMINATION N/A 2024    Procedure: ULTRASOUND, RECTAL APPROACH;  Surgeon: Galileo Ramos MD;  Location: [facility] OR 1ST FLR;  Service: Urology;  Laterality: N/A;     Social History     Socioeconomic History    Marital status:    Occupational History    Occupation: Retired   Tobacco Use    Smoking status: Former     Current packs/day: 0.00     Types: Cigarettes     Quit date: 1969     Years since quittin.3    Smokeless tobacco: Never    Tobacco comments:     SMOKED FOR 1 YEAR, [employer] MADE HIM STOP   Substance and Sexual Activity    Alcohol use: Yes     Alcohol/week: 2.0 standard drinks of alcohol     Types: 2 Glasses of wine per week     Comment: per week    Drug use: Never    Sexual activity: Not Currently     Partners: Female     Social Determinants of Health     Financial Resource Strain: Low Risk  (2024)    Overall Financial Resource Strain (CARDIA)     Difficulty of Paying Living Expenses: Not hard at all   Food Insecurity: No Food Insecurity (2024)    Hunger Vital Sign     Worried About Running Out of Food in the Last Year: Never true     Ran Out of Food in the Last Year: Never true   Transportation Needs: No Transportation Needs (2024)    PRAPARE - Transportation     Lack of Transportation (Medical): No     Lack of Transportation (Non-Medical): No   Physical Activity: Inactive (2024)    Exercise Vital Sign     Days of Exercise  per Week: 0 days     Minutes of Exercise per Session: 0 min   Stress: No Stress Concern Present (2/9/2024)    [employer] - Occupational Stress Questionnaire     Feeling of Stress : Not at all   Housing Stability: Low Risk  (2/9/2024)    Housing Stability Vital Sign     Unable to Pay for Housing in the Last Year: No     Number of Places Lived in the Last Year: 1     Unstable Housing in the Last Year: No     Family History   Problem Relation Name Age of Onset    No Known Problems Mother      Thyroid disease Sister      No Known Problems Son      No Known Problems Son      Melanoma Neg Hx      Psoriasis Neg Hx      Eczema Neg Hx      Lupus Neg Hx         Review of patient's allergies indicates:   Allergen Reactions    Diphenhydramine hcl     Penicillins      Other reaction(s): Rash    Povidone-iodine      Other reaction(s): Itching  Other reaction(s): Itching       Medication List with Changes/Refills   Current Medications    CELECOXIB (CELEBREX) 200 MG CAPSULE    Take 200 mg by mouth 2 (two) times daily.    CICLOPIROX (PENLAC) 8 % SOLN    Apply topically nightly. Paint on and under nail nightly and remove every 7-10 days and start over with application. May use for 3 months.    DOXAZOSIN (CARDURA) 8 MG TAB    Take 1 tablet by mouth once daily. **Please schedule an appointment with primary care provider for further refills.**    FLUOROURACIL (EFUDEX) 5 % CREAM    Compound fluorouracil 5% + calcipotriene 0.005% cream. Apply a pea-sized amount to entire ant scalp BID x 7 days.    GABAPENTIN (NEURONTIN) 300 MG CAPSULE    Take 300 mg by mouth 3 (three) times daily.    METHOCARBAMOL (ROBAXIN) 750 MG TAB        METOPROLOL TARTRATE (LOPRESSOR) 100 MG TABLET    Take 1 tablet (100 mg) by mouth twice daily.    MULTIVIT WITH MINERALS/LUTEIN (MULTIVITAMIN 50 PLUS ORAL)        OXYCODONE-ACETAMINOPHEN (PERCOCET)  MG PER TABLET    Take 1 tablet by mouth as needed.    PROGRAF 1 MG CAP    Take 1  "capsule (1 mg total) by mouth every 12 (twelve) hours.    ROPINIROLE (REQUIP) 0.5 MG TABLET    Take 0.5 mg by mouth every evening.    SOLIFENACIN (VESICARE) 5 MG TABLET    Take 1 tablet (5 mg total) by mouth once daily.    VITAMIN D 1000 UNITS TAB    Take 1,000 Units by mouth once daily.   Discontinued Medications    MULTIVITAMIN (THERAGRAN) PER TABLET    Take 1 tablet by mouth.    TAMSULOSIN (FLOMAX) 0.4 MG CAP    Take by mouth.    VIT D3-FOLIC ACID-B2-B6-B12 ORAL           Review of Systems  Constitution: Denies chills, fever, and sweats.  HENT: Denies headaches or blurry vision.  Cardiovascular: Denies chest pain or irregular heart beat.  Respiratory: Denies cough or shortness of breath.  Gastrointestinal: Denies abdominal pain, nausea, or vomiting.  Musculoskeletal: Positive for leg swelling.  Neurological: Denies dizziness or focal weakness.  Psychiatric/Behavioral: Normal mental status.  Hematologic/Lymphatic: Denies bleeding problem or easy bruising/bleeding.  Skin: Denies rash or suspicious lesions    Physical Examination  /87   Pulse (!) 55   Ht 5' 7" (1.702 m)   Wt 99.3 kg (218 lb 14.7 oz)   BMI 34.29 kg/m²     Constitutional: No acute distress, conversant  HEENT: Sclera anicteric, Pupils equal, round and reactive to light, extraocular motions intact, Oropharynx clear  Neck: No JVD, no carotid bruits  Cardiovascular: regular rate and rhythm, no murmur, rubs or gallops, normal S1/S2  Pulmonary: Clear to auscultation bilaterally  Abdominal: Abdomen soft, nontender, nondistended, positive bowel sounds  Extremities: BLE's with 1+ pitting edema, venous stasis dermatitis, and changes consistent with lymphedema   Pulses:  Carotid pulses are 2+ on the right side, and 2+ on the left side.  Radial pulses are 2+ on the right side, and 2+ on the left side.   Femoral pulses are 2+ on the right side, and 2+ on the left side.  Popliteal pulses are 2+ on the right side, and 2+ on the left side.   Dorsalis pedis " "pulses are 2+ on the right side, and 2+ on the left side.   Posterior tibial pulses are 2+ on the right side, and 2+ on the left side.    Skin: No ecchymosis, erythema, or ulcers  Psych: Alert and oriented x 3, appropriate affect  Neuro: CNII-XII intact, no focal deficits    Labs:  Most Recent Data  CBC:   Lab Results   Component Value Date    WBC 5.79 12/13/2023    HGB 16.0 12/13/2023    HCT 46.0 12/13/2023     12/13/2023    MCV 97 12/13/2023    RDW 12.6 12/13/2023     BMP:   Lab Results   Component Value Date     12/13/2023    K 4.2 12/13/2023     12/13/2023    CO2 26 12/13/2023    BUN 18 12/13/2023    CREATININE 0.9 12/13/2023     (H) 12/13/2023    CALCIUM 9.4 12/13/2023    MG 2.2 06/29/2016    PHOS 2.8 11/26/2010     LFTS;   Lab Results   Component Value Date    PROT 7.0 12/13/2023    ALBUMIN 3.8 12/13/2023    BILITOT 0.9 12/13/2023    AST 33 12/13/2023    ALKPHOS 71 12/13/2023    ALT 34 12/13/2023    GGT 19 10/23/2012     COAGS:   Lab Results   Component Value Date    INR 0.9 03/04/2021     FLP:   Lab Results   Component Value Date    CHOL 180 06/13/2023    HDL 60 06/13/2023    LDLCALC 102.6 06/13/2023    TRIG 87 06/13/2023    CHOLHDL 33.3 06/13/2023     CARDIAC: No results found for: "TROPONINI", "CKTOTAL", "CKMB", "BNP"    Imaging:    Assessment/Plan:  Zander Kaur  is a 73 y.o. male with HTN, s/p liver transplant (2010), obesity, history of stroke, who presents for an initial appointment.    BLE Edema- Due to BLE lymphedema and possible venous insufficiency.  Start bumex 0.5 mg daily.  Check cmp today and in 1 week.  Refer to lymphedema clinic.  Recommend wearing graduated compression hose.  Limit sodium intake to 2000 mg daily.  Limit volume intake to 1.5 L daily.  Elevate legs when resting.    2. HTN- Continue current medications.    3. Obesity- Encourage diet, exercise, and weight loss.  Refer to Bariatric Medicine for assistance with weight loss.    4. Preoperative Cardiac " Risk Stratification- Mr. Kaur has no chest pain, no heart failure symptoms and no arrhythmia.  He can perform greater than 4 METS with no difficulty.  EKG today shows    5. Aortic atherosclerosis/history of stroke- Start atorvastatin 40 mg daily.     Follow up in 4 months with lipids prior    Total duration of face to face visit time 45 minutes.  Total time spent counseling greater than fifty percent of total visit time.  Counseling included discussion regarding imaging findings, diagnosis, possibilities, treatment options, risks and benefits.  The patient had many questions regarding the options and long-term effects.    Zander Mejia MD, PhD  Interventional Cardiology

## 2024-05-06 NOTE — PROGRESS NOTES
Lymphedema Pump Progress:  [x] Order, clinical notes, and face sheet faxed to [facility] for home pneumatic compression.  [] Reached out to patient for follow up. Wish to inquire about symptoms of lymphedema and if conservative therapy has been working.  [] Updated progress note sent to [facility].  [] Patient pneumatic compression pump approved and shipped by [facility].

## 2024-05-13 ENCOUNTER — HOSPITAL ENCOUNTER (OUTPATIENT)
Dept: CARDIOLOGY | Facility: HOSPITAL | Age: 74
Discharge: HOME | End: 2024-05-13
Attending: INTERNAL MEDICINE
Payer: MEDICARE

## 2024-05-13 DIAGNOSIS — R60.0 EDEMA OF BOTH LOWER EXTREMITIES: ICD-10-CM

## 2024-05-13 LAB
LEFT ABI: 1.39
LEFT ARM BP: 143 MMHG
LEFT DORSALIS PEDIS: 179 MMHG
LEFT GREAT SAPHENOUS DISTAL THIGH DIA: 0.35 CM
LEFT GREAT SAPHENOUS JUNCTION DIA: 0.8 CM
LEFT GREAT SAPHENOUS KNEE DIA: 0.29 CM
LEFT GREAT SAPHENOUS MIDDLE THIGH DIA: 0.43 CM
LEFT GREAT SAPHENOUS PROXIMAL CALF DIA: 0.15 CM
LEFT POSTERIOR TIBIAL: 205 MMHG
LEFT SMALL SAPHENOUS KNEE DIA: 0.16 CM
LEFT SMALL SAPHENOUS SPJ DIA: 0.12 CM
RIGHT ABI: 1.35
RIGHT ARM BP: 147 MMHG
RIGHT DORSALIS PEDIS: 174 MMHG
RIGHT GREAT SAPHENOUS DISTAL THIGH DIA: 0.39 CM
RIGHT GREAT SAPHENOUS JUNCTION DIA: 0.74 CM
RIGHT GREAT SAPHENOUS KNEE DIA: 0.31 CM
RIGHT GREAT SAPHENOUS MIDDLE THIGH DIA: 0.48 CM
RIGHT GREAT SAPHENOUS PROXIMAL CALF DIA: 0.36 CM
RIGHT GREAT SAPHENOUS PROXIMAL CALF REFLUX: 1336 MS
RIGHT POSTERIOR TIBIAL: 198 MMHG
RIGHT SMALL SAPHENOUS KNEE DIA: 28 CM
RIGHT SMALL SAPHENOUS SPJ DIA: 0.27 CM

## 2024-05-13 PROCEDURE — 93970 EXTREMITY STUDY: CPT | Mod: TC

## 2024-05-13 PROCEDURE — 93922 UPR/L XTREMITY ART 2 LEVELS: CPT | Mod: 26,,, | Performed by: INTERNAL MEDICINE

## 2024-05-13 PROCEDURE — 93922 UPR/L XTREMITY ART 2 LEVELS: CPT

## 2024-05-13 PROCEDURE — 93970 EXTREMITY STUDY: CPT | Mod: 26,,, | Performed by: INTERNAL MEDICINE

## 2024-05-16 ENCOUNTER — LAB VISIT (OUTPATIENT)
Dept: LAB | Facility: HOSPITAL | Age: 74
End: 2024-05-16
Attending: INTERNAL MEDICINE
Payer: MEDICARE

## 2024-05-16 DIAGNOSIS — R60.0 EDEMA OF BOTH LOWER EXTREMITIES: ICD-10-CM

## 2024-05-16 LAB
ALBUMIN SERPL BCP-MCNC: 3.8 G/DL (ref 3.5–5.2)
ALP SERPL-CCNC: 70 U/L (ref 55–135)
ALT SERPL W/O P-5'-P-CCNC: 58 U/L (ref 10–44)
ANION GAP SERPL CALC-SCNC: 10 MMOL/L (ref 8–16)
AST SERPL-CCNC: 44 U/L (ref 10–40)
BILIRUB SERPL-MCNC: 0.9 MG/DL (ref 0.1–1)
BUN SERPL-MCNC: 19 MG/DL (ref 8–23)
CALCIUM SERPL-MCNC: 9.9 MG/DL (ref 8.7–10.5)
CHLORIDE SERPL-SCNC: 106 MMOL/L (ref 95–110)
CO2 SERPL-SCNC: 26 MMOL/L (ref 23–29)
CREAT SERPL-MCNC: 1.2 MG/DL (ref 0.5–1.4)
EST. GFR  (NO RACE VARIABLE): >60 ML/MIN/1.73 M^2
GLUCOSE SERPL-MCNC: 125 MG/DL (ref 70–110)
POTASSIUM SERPL-SCNC: 4.7 MMOL/L (ref 3.5–5.1)
PROT SERPL-MCNC: 7 G/DL (ref 6–8.4)
SODIUM SERPL-SCNC: 142 MMOL/L (ref 136–145)

## 2024-05-16 PROCEDURE — 36415 COLL VENOUS BLD VENIPUNCTURE: CPT | Performed by: INTERNAL MEDICINE

## 2024-05-16 PROCEDURE — 80053 COMPREHEN METABOLIC PANEL: CPT | Performed by: INTERNAL MEDICINE

## 2024-05-28 DIAGNOSIS — Z00.00 ENCOUNTER FOR MEDICARE ANNUAL WELLNESS EXAM: ICD-10-CM

## 2024-05-31 ENCOUNTER — EXTERNAL CHRONIC CARE MANAGEMENT (OUTPATIENT)
Dept: PRIMARY CARE CLINIC | Facility: CLINIC | Age: 74
End: 2024-05-31
Payer: MEDICARE

## 2024-05-31 PROCEDURE — 99490 CHRNC CARE MGMT STAFF 1ST 20: CPT | Mod: PBBFAC | Performed by: INTERNAL MEDICINE

## 2024-05-31 PROCEDURE — 99490 CHRNC CARE MGMT STAFF 1ST 20: CPT | Mod: S$PBB,,, | Performed by: INTERNAL MEDICINE

## 2024-06-03 ENCOUNTER — TELEPHONE (OUTPATIENT)
Dept: CARDIOLOGY | Facility: CLINIC | Age: 74
End: 2024-06-03
Payer: MEDICARE

## 2024-06-03 NOTE — TELEPHONE ENCOUNTER
Spoke with patient's wife, Marbella. States that Mr. Fermin has not been wearing compression. Discussed with her purchasing compression wear. States that he needs to be scheduled a lymphedema appointment. Transferred call to outpatient therapy for scheduling. Will follow up in 4 weeks to evaluate conservative therapy.

## 2024-06-05 ENCOUNTER — CLINICAL SUPPORT (OUTPATIENT)
Dept: REHABILITATION | Facility: HOSPITAL | Age: 74
End: 2024-06-05
Payer: MEDICARE

## 2024-06-05 DIAGNOSIS — R60.0 EDEMA OF BOTH LOWER EXTREMITIES: ICD-10-CM

## 2024-06-05 DIAGNOSIS — I89.0 LYMPHEDEMA OF BOTH LOWER EXTREMITIES: ICD-10-CM

## 2024-06-05 DIAGNOSIS — I87.2 VENOUS STASIS DERMATITIS OF BOTH LOWER EXTREMITIES: Primary | ICD-10-CM

## 2024-06-05 DIAGNOSIS — I83.893 VARICOSE VEINS OF BILATERAL LOWER EXTREMITIES WITH OTHER COMPLICATIONS: ICD-10-CM

## 2024-06-05 PROCEDURE — 97535 SELF CARE MNGMENT TRAINING: CPT

## 2024-06-05 PROCEDURE — 97162 PT EVAL MOD COMPLEX 30 MIN: CPT

## 2024-06-05 NOTE — PROGRESS NOTES
See evaluation in POC for goals and assessment     Eval Date: 06/30/2024    Jessie Latif, PT, DPT, CLT

## 2024-06-10 ENCOUNTER — PATIENT MESSAGE (OUTPATIENT)
Dept: INTERNAL MEDICINE | Facility: CLINIC | Age: 74
End: 2024-06-10
Payer: MEDICARE

## 2024-06-10 ENCOUNTER — OFFICE VISIT (OUTPATIENT)
Dept: DERMATOLOGY | Facility: CLINIC | Age: 74
End: 2024-06-10
Payer: MEDICARE

## 2024-06-10 DIAGNOSIS — D48.5 NEOPLASM OF UNCERTAIN BEHAVIOR OF SKIN: Primary | ICD-10-CM

## 2024-06-10 DIAGNOSIS — L57.0 AK (ACTINIC KERATOSIS): ICD-10-CM

## 2024-06-10 PROCEDURE — 11103 TANGNTL BX SKIN EA SEP/ADDL: CPT | Mod: S$PBB,,, | Performed by: DERMATOLOGY

## 2024-06-10 PROCEDURE — 11102 TANGNTL BX SKIN SINGLE LES: CPT | Mod: S$PBB,,, | Performed by: DERMATOLOGY

## 2024-06-10 PROCEDURE — 99999 PR PBB SHADOW E&M-EST. PATIENT-LVL III: CPT | Mod: PBBFAC,,, | Performed by: DERMATOLOGY

## 2024-06-10 PROCEDURE — 17003 DESTRUCT PREMALG LES 2-14: CPT | Mod: S$PBB,,, | Performed by: DERMATOLOGY

## 2024-06-10 PROCEDURE — 17000 DESTRUCT PREMALG LESION: CPT | Mod: S$PBB,XS,, | Performed by: DERMATOLOGY

## 2024-06-10 PROCEDURE — 88305 TISSUE EXAM BY PATHOLOGIST: CPT | Mod: 26,,, | Performed by: PATHOLOGY

## 2024-06-10 PROCEDURE — 17003 DESTRUCT PREMALG LES 2-14: CPT | Mod: 59,PBBFAC | Performed by: DERMATOLOGY

## 2024-06-10 PROCEDURE — 11102 TANGNTL BX SKIN SINGLE LES: CPT | Mod: PBBFAC | Performed by: DERMATOLOGY

## 2024-06-10 PROCEDURE — 99214 OFFICE O/P EST MOD 30 MIN: CPT | Mod: 25,S$PBB,, | Performed by: DERMATOLOGY

## 2024-06-10 PROCEDURE — 11103 TANGNTL BX SKIN EA SEP/ADDL: CPT | Mod: PBBFAC | Performed by: DERMATOLOGY

## 2024-06-10 PROCEDURE — 99213 OFFICE O/P EST LOW 20 MIN: CPT | Mod: PBBFAC | Performed by: DERMATOLOGY

## 2024-06-10 PROCEDURE — 17000 DESTRUCT PREMALG LESION: CPT | Mod: 59,PBBFAC | Performed by: DERMATOLOGY

## 2024-06-10 PROCEDURE — 88305 TISSUE EXAM BY PATHOLOGIST: CPT | Mod: 59 | Performed by: PATHOLOGY

## 2024-06-10 NOTE — PROGRESS NOTES
Subjective:      Patient ID:  Zander Kaur Sr. is a 73 y.o. male who presents for   Chief Complaint   Patient presents with    Follow-up     Bx site      History of Present Illness: The patient presents for follow up of skin check.    The patient was last seen on: 2/12/2024 for cryosurgery to actinic keratoses which have resolved and bx to anterior chin c/w SCC.  Pt had mohs surgery with SSW on 4/8/2024.  This is a high risk patient here to check for the development of new lesions.     Pt tx with Central upper forehead and post scalp with efudex/dovonex bid x 1 week.  Other skin complaints: no new concerns        Review of Systems   Skin:  Positive for daily sunscreen use, activity-related sunscreen use and wears hat (always). Negative for recent sunburn.   Hematologic/Lymphatic: Does not bruise/bleed easily.       Objective:   Physical Exam   Constitutional: He appears well-developed and well-nourished. No distress.   Neurological: He is alert and oriented to person, place, and time. He is not disoriented.   Psychiatric: He has a normal mood and affect.   Skin:   Areas Examined (abnormalities noted in diagram):   Scalp / Hair Palpated and Inspected  Head / Face Inspection Performed            Diagram Legend     Erythematous scaling macule/papule c/w actinic keratosis       Vascular papule c/w angioma      Pigmented verrucoid papule/plaque c/w seborrheic keratosis      Yellow umbilicated papule c/w sebaceous hyperplasia      Irregularly shaped tan macule c/w lentigo     1-2 mm smooth white papules consistent with Milia      Movable subcutaneous cyst with punctum c/w epidermal inclusion cyst      Subcutaneous movable cyst c/w pilar cyst      Firm pink to brown papule c/w dermatofibroma      Pedunculated fleshy papule(s) c/w skin tag(s)      Evenly pigmented macule c/w junctional nevus     Mildly variegated pigmented, slightly irregular-bordered macule c/w mildly atypical nevus      Flesh colored to evenly  pigmented papule c/w intradermal nevus       Pink pearly papule/plaque c/w basal cell carcinoma      Erythematous hyperkeratotic cursted plaque c/w SCC      Surgical scar with no sign of skin cancer recurrence      Open and closed comedones      Inflammatory papules and pustules      Verrucoid papule consistent consistent with wart     Erythematous eczematous patches and plaques     Dystrophic onycholytic nail with subungual debris c/w onychomycosis     Umbilicated papule    Erythematous-base heme-crusted tan verrucoid plaque consistent with inflamed seborrheic keratosis     Erythematous Silvery Scaling Plaque c/w Psoriasis     See annotation                Assessment / Plan:      Pathology Orders:       Normal Orders This Visit    Specimen to Pathology, Dermatology     Questions:    Procedure Type: Dermatology and skin neoplasms    Number of Specimens: 2    ------------------------: -------------------------    Spec 1 Procedure: Biopsy    Spec 1 Clinical Impression: r/o scc vs other    Spec 1 Source: right ear helix    ------------------------: -------------------------    Spec 2 Procedure: Biopsy    Spec 2 Clinical Impression: r/o superficial scc vs other    Spec 2 Source: right post neck    Release to patient: Immediate    Release to patient:           Neoplasm of uncertain behavior of skin  -     Specimen to Pathology, Dermatology      Shave biopsy procedure note:    Shave biopsy x 2 performed after verbal consent including risk of infection, scar, recurrence, need for additional treatment of site. Area prepped with alcohol, anesthetized with approximately 1.0cc of 1% lidocaine with epinephrine. Lesional tissue shaved with razor blade. Hemostasis achieved with application of aluminum chloride followed by hyfrecation. No complications. Dressing applied. Wound care explained.    If post neck biopsy positive for malignancy, will treat with efudex/dovonex cream bid x 3 weeks.    If ear helix biopsy positive,  schedule procedure for definitive excision.     AK (actinic keratosis)  Cryosurgery Procedure Note    Verbal consent from the patient is obtained including, but not limited to, risk of hypopigmentation/hyperpigmentation, scar, recurrence of lesion. The patient is aware of the precancerous quality and need for treatment of these lesions. Liquid nitrogen cryosurgery is applied to the 6 actinic keratoses, as detailed in the physical exam, to produce a freeze injury. The patient is aware that blisters may form and is instructed on wound care with gentle cleansing and use of vaseline ointment to keep moist until healed. The patient is supplied a handout on cryosurgery and is instructed to call if lesions do not completely resolve.    Cont wear hat always               Follow up in about 3 months (around 9/10/2024).

## 2024-06-10 NOTE — PATIENT INSTRUCTIONS
Shave Biopsy Wound Care    Your doctor has performed a shave biopsy today.  A band aid and vaseline ointment has been placed over the site.  This should remain in place for NO LONGER THAN 48 hours.  It is fine to remove the bandaid after 24 hours, if the area is no longer bleeding. It is recommended that you keep the area dry (do not wet)) for the first 24 hours.  After 24 hours, wash the area with warm soap and water and apply Vaseline jelly.  Many patients prefer to use Neosporin or Bacitracin ointment.  This is acceptable; however, know that you can develop an allergy to this medication even if you have used it safely for years.  It is important to keep the area moist.  Letting it dry out and get air slows healing time, and will worsen the scar.        If you notice increasing redness, tenderness, pain, or yellow drainage at the biopsy site, please notify your doctor.  These are signs of an infection.    If your biopsy site is bleeding, apply firm pressure for 15 minutes straight.  Repeat for another 15 minutes, if it is still bleeding.   If the surgical site continues to bleed, then please contact your doctor.      For [facility] users:   You will receive your biopsy results in [facility] as soon as they are available. Please be assured that your physician/provider will review your results and will then determine what further treatment, evaluation, or planning is required. You should be contacted by your physician's/provider's office within 5 business days of receiving your results; If not, please reach out to directly. This is one more way [facility] is putting you first.     [facility] [place] [address]/ [phone] FAX/ [email or web address]         CRYOSURGERY      Your doctor has used a method called cryosurgery to treat your skin condition. Cryosurgery refers to the use of very cold substances to treat a variety of skin conditions such as warts, pre-skin cancers, molluscum  contagiosum, sun spots, and several benign growths. The substance we use in cryosurgery is liquid nitrogen and is so cold (-195 degrees Celsius) that is burns when administered.     Following treatment in the office, the skin may immediately burn and become red. You may find the area around the lesion is affected as well. It is sometimes necessary to treat not only the lesion, but a small area of the surrounding normal skin to achieve a good response.     A blister, and even a blood filled blister, may form after treatment.   This is a normal response. If the blister is painful, it is acceptable to sterilize a needle and with rubbing alcohol and gently pop the blister. It is important that you gently wash the area with soap and warm water as the blister fluid may contain wart virus if a wart was treated. Do no remove the roof of the blister.     The area treated can take anywhere from 1-3 weeks to heal. Healing time depends on the kind skin lesion treated, the location, and how aggressively the lesion was treated. It is recommended that the areas treated are covered with Vaseline or bacitracin ointment and a band-aid. If a band-aid is not practical, just ointment applied several times per day will do. Keeping these areas moist will speed the healing time.    Treatment with liquid nitrogen can leave a scar. In dark skin, it may be a light or dark scar, in light skin it may be a white or pink scar. These will generally fade with time, but may never go away completely.     If you have any concerns after your treatment, please feel free to call the office.       [facility] [place] [address]/ [phone] FAX/ [email or web address]       Field Treatment for Actinic Keratoses (precancerous lesions)    5-Fluoruracil + Dovonex (calcipotriene) - This is a topical chemotherapy for your skin. This cream should never be applied without discussing with you dermatologist.    This treatment gets your  immune system involved in fighting precancers (actinic keratoses), even those we can't yet see.     HOW TO APPLY: Apply the combination cream to the affected area mid anterior scalp 2x/day x 14 days. Apply a fingertip length amount to the entire area. Wash your hands carefully after applying the creams and wipe glasses, BIPAP/CPAP machines, or anything else that comes in frequent contact with the creams.    WHAT TO EXPECT: Your skin will likely become red, crusted, sore, and tender during the treatment usually starting around day 3 and continuing for about 1 week after last application.     HOW TO HEAL FAST: To speed healing, wash with a gentle wash and apply Vaseline jelly especially to crusted open areas.    WE CAN HELP: Please contact us for any questions or problem shooting the application of these creams: [phone] or [email or web address]    GREAT NEWS: Newer studies suggest that the combination of these creams not only decrease the sun damage spots and precancers (actinic keratoses) but also decrease your risk of getting skin cancer the year following application.     IT WILL BE WORTH IT!!!

## 2024-06-12 LAB
FINAL PATHOLOGIC DIAGNOSIS: NORMAL
GROSS: NORMAL
Lab: NORMAL
MICROSCOPIC EXAM: NORMAL

## 2024-06-13 ENCOUNTER — LAB VISIT (OUTPATIENT)
Dept: LAB | Facility: HOSPITAL | Age: 74
End: 2024-06-13
Attending: INTERNAL MEDICINE
Payer: MEDICARE

## 2024-06-13 DIAGNOSIS — Z94.4 S/P LIVER TRANSPLANT: ICD-10-CM

## 2024-06-13 LAB
ALBUMIN SERPL BCP-MCNC: 3.7 G/DL (ref 3.5–5.2)
ALP SERPL-CCNC: 79 U/L (ref 55–135)
ALT SERPL W/O P-5'-P-CCNC: 42 U/L (ref 10–44)
ANION GAP SERPL CALC-SCNC: 13 MMOL/L (ref 8–16)
AST SERPL-CCNC: 36 U/L (ref 10–40)
BASOPHILS # BLD AUTO: 0.06 K/UL (ref 0–0.2)
BASOPHILS NFR BLD: 1.1 % (ref 0–1.9)
BILIRUB SERPL-MCNC: 0.9 MG/DL (ref 0.1–1)
BUN SERPL-MCNC: 23 MG/DL (ref 8–23)
CALCIUM SERPL-MCNC: 9.2 MG/DL (ref 8.7–10.5)
CHLORIDE SERPL-SCNC: 109 MMOL/L (ref 95–110)
CO2 SERPL-SCNC: 19 MMOL/L (ref 23–29)
CREAT SERPL-MCNC: 1.1 MG/DL (ref 0.5–1.4)
DIFFERENTIAL METHOD BLD: ABNORMAL
EOSINOPHIL # BLD AUTO: 0.4 K/UL (ref 0–0.5)
EOSINOPHIL NFR BLD: 6.5 % (ref 0–8)
ERYTHROCYTE [DISTWIDTH] IN BLOOD BY AUTOMATED COUNT: 12.6 % (ref 11.5–14.5)
EST. GFR  (NO RACE VARIABLE): >60 ML/MIN/1.73 M^2
GLUCOSE SERPL-MCNC: 145 MG/DL (ref 70–110)
HCT VFR BLD AUTO: 45.6 % (ref 40–54)
HGB BLD-MCNC: 15.5 G/DL (ref 14–18)
IMM GRANULOCYTES # BLD AUTO: 0.01 K/UL (ref 0–0.04)
IMM GRANULOCYTES NFR BLD AUTO: 0.2 % (ref 0–0.5)
LYMPHOCYTES # BLD AUTO: 2.2 K/UL (ref 1–4.8)
LYMPHOCYTES NFR BLD: 39.6 % (ref 18–48)
MCH RBC QN AUTO: 33.3 PG (ref 27–31)
MCHC RBC AUTO-ENTMCNC: 34 G/DL (ref 32–36)
MCV RBC AUTO: 98 FL (ref 82–98)
MONOCYTES # BLD AUTO: 0.4 K/UL (ref 0.3–1)
MONOCYTES NFR BLD: 8 % (ref 4–15)
NEUTROPHILS # BLD AUTO: 2.5 K/UL (ref 1.8–7.7)
NEUTROPHILS NFR BLD: 44.6 % (ref 38–73)
NRBC BLD-RTO: 0 /100 WBC
PLATELET # BLD AUTO: 169 K/UL (ref 150–450)
PMV BLD AUTO: 10.3 FL (ref 9.2–12.9)
POTASSIUM SERPL-SCNC: 4 MMOL/L (ref 3.5–5.1)
PROT SERPL-MCNC: 6.8 G/DL (ref 6–8.4)
RBC # BLD AUTO: 4.65 M/UL (ref 4.6–6.2)
SODIUM SERPL-SCNC: 141 MMOL/L (ref 136–145)
WBC # BLD AUTO: 5.53 K/UL (ref 3.9–12.7)

## 2024-06-13 PROCEDURE — 80197 ASSAY OF TACROLIMUS: CPT | Performed by: INTERNAL MEDICINE

## 2024-06-13 PROCEDURE — 80053 COMPREHEN METABOLIC PANEL: CPT | Performed by: INTERNAL MEDICINE

## 2024-06-13 PROCEDURE — 36415 COLL VENOUS BLD VENIPUNCTURE: CPT | Performed by: INTERNAL MEDICINE

## 2024-06-13 PROCEDURE — 85025 COMPLETE CBC W/AUTO DIFF WBC: CPT | Performed by: INTERNAL MEDICINE

## 2024-06-14 ENCOUNTER — TELEPHONE (OUTPATIENT)
Dept: TRANSPLANT | Facility: CLINIC | Age: 74
End: 2024-06-14
Payer: MEDICARE

## 2024-06-14 LAB — TACROLIMUS BLD-MCNC: 6.8 NG/ML (ref 5–15)

## 2024-06-14 NOTE — LETTER
June 14, 2024    Zander Kaur  [place] [address]          Dear Zander Kaur:  MRN: [id number]    This is a follow up to your recent labs, your lab results were stable.  There are no medicine changes.  Please have your labs drawn again on 9/9/24.      If you cannot have your labs drawn on the scheduled date, it is your responsibility to call the transplant department to reschedule.  Please call [phone] and ask to speak to Ann Marie Arguelles, Medical Assistant for all scheduling requests.     Sincerely,    Oksana  Your Liver Transplant Coordinator    [facility]  [address] [place] [address] [phone]

## 2024-06-14 NOTE — TELEPHONE ENCOUNTER
Letter sent, labs stable and no medication changes are needed. Repeat labs due 12/9/24 per protocol.  ----- Message from Carson Oswald MD sent at 6/14/2024  3:13 PM CDT -----  Results reviewed

## 2024-06-24 ENCOUNTER — CLINICAL SUPPORT (OUTPATIENT)
Dept: REHABILITATION | Facility: HOSPITAL | Age: 74
End: 2024-06-24
Payer: MEDICARE

## 2024-06-24 ENCOUNTER — PATIENT MESSAGE (OUTPATIENT)
Dept: CARDIOLOGY | Facility: CLINIC | Age: 74
End: 2024-06-24
Payer: MEDICARE

## 2024-06-24 DIAGNOSIS — I87.2 VENOUS STASIS DERMATITIS OF BOTH LOWER EXTREMITIES: ICD-10-CM

## 2024-06-24 DIAGNOSIS — I83.893 VARICOSE VEINS OF BILATERAL LOWER EXTREMITIES WITH OTHER COMPLICATIONS: ICD-10-CM

## 2024-06-24 DIAGNOSIS — R60.0 EDEMA OF BOTH LOWER EXTREMITIES: Primary | ICD-10-CM

## 2024-06-24 DIAGNOSIS — I89.0 LYMPHEDEMA OF BOTH LOWER EXTREMITIES: ICD-10-CM

## 2024-06-24 PROCEDURE — 97140 MANUAL THERAPY 1/> REGIONS: CPT

## 2024-06-25 ENCOUNTER — TELEPHONE (OUTPATIENT)
Dept: CARDIOLOGY | Facility: CLINIC | Age: 74
End: 2024-06-25
Payer: MEDICARE

## 2024-06-25 DIAGNOSIS — R60.0 EDEMA OF BOTH LOWER EXTREMITIES: Primary | ICD-10-CM

## 2024-06-25 DIAGNOSIS — I89.0 LYMPHEDEMA OF BOTH LOWER EXTREMITIES: ICD-10-CM

## 2024-06-25 NOTE — TELEPHONE ENCOUNTER
----- Message from Jessie Latif PT sent at 2024  9:02 AM CDT -----  Regardin-30mmHg thigh high compression orders request  Good Morning     I would like to request 20-30mmHg thigh high compression orders. Thank you!    Jessie Latif PT, DPT, CLT

## 2024-06-27 ENCOUNTER — CLINICAL SUPPORT (OUTPATIENT)
Dept: REHABILITATION | Facility: HOSPITAL | Age: 74
End: 2024-06-27
Payer: MEDICARE

## 2024-06-27 DIAGNOSIS — I89.0 LYMPHEDEMA OF BOTH LOWER EXTREMITIES: ICD-10-CM

## 2024-06-27 DIAGNOSIS — I87.2 VENOUS STASIS DERMATITIS OF BOTH LOWER EXTREMITIES: ICD-10-CM

## 2024-06-27 DIAGNOSIS — I83.893 VARICOSE VEINS OF BILATERAL LOWER EXTREMITIES WITH OTHER COMPLICATIONS: ICD-10-CM

## 2024-06-27 DIAGNOSIS — R60.0 EDEMA OF BOTH LOWER EXTREMITIES: Primary | ICD-10-CM

## 2024-06-27 PROCEDURE — 97140 MANUAL THERAPY 1/> REGIONS: CPT

## 2024-06-30 ENCOUNTER — EXTERNAL CHRONIC CARE MANAGEMENT (OUTPATIENT)
Dept: PRIMARY CARE CLINIC | Facility: CLINIC | Age: 74
End: 2024-06-30
Payer: MEDICARE

## 2024-06-30 PROCEDURE — 99490 CHRNC CARE MGMT STAFF 1ST 20: CPT | Mod: PBBFAC | Performed by: INTERNAL MEDICINE

## 2024-06-30 PROCEDURE — 99490 CHRNC CARE MGMT STAFF 1ST 20: CPT | Mod: S$PBB,,, | Performed by: INTERNAL MEDICINE

## 2024-07-01 ENCOUNTER — DOCUMENTATION ONLY (OUTPATIENT)
Dept: CARDIOLOGY | Facility: CLINIC | Age: 74
End: 2024-07-01
Payer: MEDICARE

## 2024-07-01 ENCOUNTER — PROCEDURE VISIT (OUTPATIENT)
Dept: DERMATOLOGY | Facility: CLINIC | Age: 74
End: 2024-07-01
Payer: MEDICARE

## 2024-07-01 ENCOUNTER — CLINICAL SUPPORT (OUTPATIENT)
Dept: REHABILITATION | Facility: HOSPITAL | Age: 74
End: 2024-07-01
Payer: MEDICARE

## 2024-07-01 DIAGNOSIS — I87.2 VENOUS STASIS DERMATITIS OF BOTH LOWER EXTREMITIES: ICD-10-CM

## 2024-07-01 DIAGNOSIS — I83.893 VARICOSE VEINS OF BILATERAL LOWER EXTREMITIES WITH OTHER COMPLICATIONS: ICD-10-CM

## 2024-07-01 DIAGNOSIS — R60.0 EDEMA OF BOTH LOWER EXTREMITIES: Primary | ICD-10-CM

## 2024-07-01 DIAGNOSIS — I89.0 LYMPHEDEMA OF BOTH LOWER EXTREMITIES: ICD-10-CM

## 2024-07-01 DIAGNOSIS — C44.222 SCC (SQUAMOUS CELL CARCINOMA), EAR, RIGHT: Primary | ICD-10-CM

## 2024-07-01 PROCEDURE — 97140 MANUAL THERAPY 1/> REGIONS: CPT

## 2024-07-01 PROCEDURE — 88305 TISSUE EXAM BY PATHOLOGIST: CPT | Performed by: PATHOLOGY

## 2024-07-01 PROCEDURE — 97016 VASOPNEUMATIC DEVICE THERAPY: CPT

## 2024-07-01 PROCEDURE — 14060 TIS TRNFR E/N/E/L 10 SQ CM/<: CPT | Mod: PBBFAC | Performed by: DERMATOLOGY

## 2024-07-01 PROCEDURE — 14060 TIS TRNFR E/N/E/L 10 SQ CM/<: CPT | Mod: S$PBB,,, | Performed by: DERMATOLOGY

## 2024-07-01 PROCEDURE — 99499 UNLISTED E&M SERVICE: CPT | Mod: S$PBB,,, | Performed by: DERMATOLOGY

## 2024-07-01 NOTE — PROGRESS NOTES
Patient has been compliant with compression, elevation and exercise for at least 4 weeks yet swelling persists. Hyperplasia is present with this edema.

## 2024-07-01 NOTE — PROGRESS NOTES
Physical Therapy Daily Treatment Note      Name: Zander Kaur .  Clinic Number: [id number]     Therapy Diagnosis:        Encounter Diagnoses   Name Primary?    Edema of both lower extremities Yes    Lymphedema of both lower extremities      Varicose veins of bilateral lower extremities with other complications      Venous stasis dermatitis of both lower extremities        Physician: Zander Mejia MD*     Visit Date: 6/27/2024     Physician: Zander Mejia MD*     Physician Orders: PT Eval and Treat - lymphedema  Medical Diagnosis from Referral: I89.0 (ICD-10-CM) - Lymphedema, not elsewhere classified  Evaluation Date: 6/5/2024  Authorization Period Expiration: 5/2/2025  Plan of Care Expiration: 8/5/2024  Visit # / Visits authorized: 4/20     Time In: 11:00am   Time Out: 12:00pm    Total Billable Time: 60 minutes     Precautions: Standard  Subjective      Pt reports: He has been doing ok so far, he will be going next week to get compression   He was compliant with home exercise program.  Response to previous treatment: fine, eval only   Functional change: none      Pain: 0/10  Location: bilateral lower legs      Objective         Treatment:   Zander received the following manual therapy techniques:- Manual Lymphatic Drainage were applied to the: LLE  for 60 minutes, including: MLD and short stretch compression bandaging         MANUAL LYMPHATIC DRAINAGE (MLD):    While supine with LEs elevated stimulation at terminus, along GI region, B inguinal regions, drainage of entire L LE heena lower leg, ankle, and foot with return proximally,  Use of Aquaphor due to dryness.   Educated in self massage to abdominal areas, B inguinal areas, thigh, and remaining LE within reach.    SEQUENTIAL COMPRESSION PUMP: full leg sleeve applied to L LE  BioTab with default setting with distal pressures starting at 45mmHg entire LE x 15 minutes        MULTILAYERED BANDAGING:  issued supplies and bandaged L LE with cotton  stockinette, komprex section dorsum of foot, 2 komprex wedges post malleoli, 2 cellona rolls ankle to above knee, 1-8cm and 2- 10cm Durelast rolls foot to knee, to leave intact 12-24 hrs as tolerated, discontinue with any problems, return rolled bandages next session. Wash and wear schedules confirmed.      Orders printed along with DME list for pt         Home Exercises Provided and Patient Education Provided      Education provided:    Remove bandages if painful   PATIENT/FAMILY Education: bandaging wear schedule,  HEP,  Beginning of self massage,  Self or assisted bandaging, compression options, and Risk reduction     Written Home Exercises Provided: Patient instructed to cont prior HEP.  Exercises were reviewed and Zander was able to demonstrate them prior to the end of the session.  Zander demonstrated good  understanding of the education provided.         Assessment      Patient presents with moderate swelling of the L Knee. Trailed pump for pt today which he tolerated well. Patient Has orders and will be getting compression later this week. Will continue to progress      Zander Is progressing well towards his goals.   Pt prognosis is Excellent.      Pt will continue to benefit from skilled outpatient physical therapy to address the deficits listed in the problem list box on initial evaluation, provide pt/family education and to maximize pt's level of independence in the home and community environment.      Pt's spiritual, cultural and educational needs considered and pt agreeable to plan of care and goals.     Anticipated barriers to physical therapy: none      Goals:      Short Term Goals: (6 weeks)  1. Patient will show decreased girth in LLE by up to 1 cm to allow for LE symmetry, shoe and clothing choice, and ability to apply needed compression.  (progressing, not met)   2. Patient will demonstrate 100% knowledge of lymphedema precautions and signs of infection to allow for reduced lymphedema risk,  infection risk, and/or exacerbation of condition.  (progressing, not met)  3. Patient or caregiver will perform self-bandaging techniques and/or wearing of compression garments to allow for lymphatic drainage support, skin elasticity, and reduction in shape and size of limb. (progressing, not met)  4. Patient will perform self lymph drainage techniques to areas within reach to enhance lymphatic drainage and skin condition.  (progressing, not met)  5. Patient will tolerate daily activities with multilayered bandaging to allow for lymphatic and venous support.  (progressing, not met)     Long Term Goals: (12  weeks)  1. Patient will show decreased girth in L LE by up to 2 cm  to allow for LE symmetry, shoe and clothing choice, and ability to apply needed compression daily.  (progressing, not met)  2. Patient will show reduction in density to mild or less with improved contour of limb to allow for cosmesis, LE symmetry, infection risk reduction, and clothing and compression choice.   (progressing, not met)  3. Patient to adri/doff compression garment with daily compliance to assist in lymphedema management, skin elasticity, and tissue density.  (progressing, not met)  4. Pt to show improved postural awareness and alignment.  (progressing, not met)  5. Pt to be I and compliant with HEP to allow for increased function in affected limb.   (progressing, not met)     Plan   Continue PT  2x   weekly for Complete Decongestive Therapy:  Manual lymphatic drainage, Multilayered short stretch bandaging, Pneumatic compression, Therapeutic exercises, Patient education as deemed necessary to achieve stated goals.        Jessie Latif, PT

## 2024-07-01 NOTE — PROGRESS NOTES
PROCEDURE: Elliptical excision with adjacent tissue transfer flap in order to decrease dead space, decrease tension, and close large gap.    ANESTHETIC: 4 cc 1% Xylocaine with Epinephrine 1:100,000, buffered    SURGEON:  Lima Campos M.D.    ASSISTANTS: Maryellen Calabrese MA    PREOPERATIVE DIAGNOSIS: Biopsy-proven Squamous Cell Carcinoma    POSTOPERATIVE DIAGNOSIS: Same as preoperative diagnosis    PATHOLOGIC DIAGNOSIS: Pending    LOCATION: right ear helix    INITIAL LESION SIZE: 0.5 x 0.4 cm    EXCISED DIAMETER: 1.3 cm    PREPARATION: The diagnosis, procedure, alternatives, benefits and risks, including but not limited to:  infection, bleeding/bruising, drug reactions, pain, scar or cosmetic defect, local sensation disturbances, wound dehiscence (separation of wound edges after sutures removed) and/or recurrence of present condition were explained to the patient. The patient elected to proceed.  Patient's identity was verified and the site was verified.    PROCEDURE: The location noted above was prepped, draped, and anesthetized in the usual sterile fashion per Randee Jacobsne LPN. Lesional tissue was carefully marked with at least 4 mm margins of clinically normal skin in all directions. A fusiform elliptical excision was done with #15 blade carried down completely through the dermis and subcutaneous tissues to the level of the perichondrium and dissection was carried out in the perichondrial plane. The wound was widely undermined to the retroauricular sulcus posteriorly. Electrocoagulation was used to obtain hemostasis. Blood loss was minimal. An advancement flap was fabricated from surrounding skin and advanced into the primary defect. It was secured in place with key sutures of 4.0 Prolene.     The patient tolerated the procedure well.    The area was cleaned and dressed appropriately and the patient was given wound care instructions, as well as an appointment for follow-up evaluation.    LENGTH OF  REPAIR: 3.0cm

## 2024-07-01 NOTE — PATIENT INSTRUCTIONS
Post- Operative Wound Care    Your doctor has performed local skin surgery today.  Vaseline ointment and a pressure bandage were placed after the surgery.  It is very important that you keep this bandage in place for 24 hours.  This will decrease the risk of post - operative infection and bleeding.  After 24 hours, you may remove the band aid and wash the area with warm soap and water and apply Vaseline ointment.  Many patients prefer to use Neosporin or Bacitracin ointment.  This is acceptable; however know that you can develop an allergy to this medication even if you have used it safely for years.  It is important to keep the area moist.  Letting it dry out and get air slows healing time, will worsen the scar, and make it more difficult to remove the stitches.  Band aid is optional after first 24 hours.    It is best NOT to use hydrogen peroxide or antibacterial soap to wash the operative site.       If you notice increasing redness, tenderness, pain, or yellow drainage at the biopsy or surgical site, please notify your doctor.  These are signs of an infection.    If your biopsy/surgical site is bleeding, apply firm pressure for 15 minutes straight.  Repeat for another 15 minutes, if it is still bleeding.   If the surgical site continues to bleed, then please contact your doctor.      For [facility] users:   You will receive your pathology results in [facility] as soon as they are available. Please be assured that your physician/provider will review your results and contact you should additional treatment be required. This is one more way [facility] is putting you first.       [facility] [facility] [address]/ [phone] FAX/ [email or web address]

## 2024-07-01 NOTE — TELEPHONE ENCOUNTER
Lymphedema Pump Progress:  [x] Order, clinical notes, and face sheet faxed to [facility] for home pneumatic compression.  [x] Updated progress note sent to [facility].  [] Patient pneumatic compression pump approved and shipped by [facility].

## 2024-07-01 NOTE — PATIENT INSTRUCTIONS
LYMPHEDEMA    What is Lymphedema  Swelling in an arm, leg, the neck, the face, genitals and/or abdomen caused by a blockage in the lymphatic system. This type of swelling can decrease the amount of oxygen that reaches tissues and can also promote infection by serving as a medium in which bacteria may grow. An example of an infection seen commonly in those with Lymphedema is Cellulitis.  It is important to know that lymphedema is progressive and can lead to long-term, chronic conditions. Therefore, early and careful management is needed to reduce symptoms.    The Lymphatic System  A network of tissues and organs that help rid the body of toxins, waste and other unwanted materials. The lymphatic system returns fluid and protein to the circulatory system from the spaces in the body's tissues. Tiny lymph vessels of the lymphatic system  materials and waste products, along with foreign materials (bacteria) and transport them to larger lymph vessels. This fluid inside the vessels is called lymph. Surrounding muscles help move the lymph along to the larger vessels until it is returned to the circulatory system. Lymph nodes help filter the lymph fluid and break down foreign substances and help fight infection.                                                          What Causes Lymphedema?  Some people are born with an underdeveloped lymph system. Swelling may present at birth or develop during adolescence or adulthood. This is known as primary lymphedema. Secondary lymphedema may occur after lymph tissue and lymph nodes are injured or removed. This swelling may begin immediately or may not occur for several months or years. A common cause of secondary lymphedema is radiation treatment. Other causes of secondary lymphedema are surgery (removal of lymph nodes), cancer, infection and trauma. While there is presently no cure for lymphedema, it can be managed with diligent care. The earlier the stage, the easier the  management of the affected area.    Stages of Lymphedema:  Stage 0 (pre-stage): A subclinical state where swelling is not obvious. You may have complaints such as heaviness in the limb or mild aching or tightness  Stage 1: Skin of the limb is typically soft with pitting edema. Elevation of the limb may improve the swelling  Stage 2: Skin of the limb is more fibrotic. The skin may no longer pit when pressed. Limb swelling does not improve with elevation.   Stage 3: Severe stage also known as elephantiasis. Skin is fibrotic with deep skin creases which are prone to infection. Trophic skin changes, such as papillomatosis and hyperkeratosis also occur in this stage.                                                                             Precautions:  It is important to prevent skin irritation, cuts, scrapes and needle sticks in the skin to decrease risk of infection  Wear gloves for gardening and housework  Use an electric razor rather than a blade razor  Have injections and blood draws from the uninvolved side  Use a thimble when sewing  Avoid rubbing, scrubbing, waxing of involved limb  Take care to avoid bites and scratches from bugs and pets  Avoid walking barefoot  Use extreme caution when peeling shrimp, crawfish, crabs      It is important to avoid extreme heat exposure. When you increase circulation to a swollen limb, the chance of additional fluid leaking into the spaces of your body's tissue increases, which causes more swelling.  Avoid prolonged exposure to sunlight  Use sunscreen when outdoors  Use oven mitts when reaching into an oven  Avoid hot tubs and saunas  Avoid spending prolonged time under a hot hair dryer  Look for clothing made of breathable fabrics for hot weather      It is important to avoid anything that will reduce circulation of blood flow, since reduction of blood flow can lead to a restriction of lymph flow.  Have blood pressure measured in the uninvolved limb  Avoid tight or  constricting clothing such as tight sleeves and waistbands  Avoid tight watches and jewelry  Avoid sitting with your legs crossed        In general, practice healthy living habits:  Avoid alcohol and smoking  Maintain your ideal weight  Keep sodium intake at a moderate level (less than 2,400 mg/day)  Eat moderate amount of protein to maintain tissue health. Contact your primary doctor for a referral to [facility]  When you travel by air, wear your compression garment during the flight  Wash hands frequently and dry thoroughly  Keep skin moisturized and free from cracking or peeling. For sensitive skin, Eucerin lotion is a good option.   Avoid hangnails and do not pull or bite cuticles  Take care of all scratches, cuts, bites, immediately with an antibiotic cream or ointment (Neosporin, Bacitracin, Triple ABX) and cover with a clean bandage.       Call your doctor as soon as you suspect infection. Be aware of the signs of infection:  Increased swelling  Redness (generalized or localized small, red dots)  Heat (arm/leg feels warm, or you have fever)  Pain       MANAGING YOUR BANDAGES:  Although your bandages are inconvenient, they are an important part of the lymphedema program. You will receive one set of bandages for participating in [facility]. It is important to take care of these bandages during your treatment. You should not get your bandages wet! For bandages on the arm, it is possible to protect the arm bandages with a plastic bag when in a tub. No Showers with bandages on arm or leg!          WHAT TO EXPECT DURING LYMPHEDEMA TREATMENT:  Wear loose short sleeves for arm treatment. Sleeveless tops work well too. Wear skirts or baggy shorts or loose fitting/baggy pants for leg treatment.  If your leg(s) are being bandaged, find the widest shoe(s) possible. Wide slip-on shoes like crocks usually work. If you do not have a shoe that will fit over the bandages, a  disposable shoe cover will be provided to you.  Treatment sessions will last 45 minutes to 60 minutes and will consist of Manual Lymphatic Drainage (MLD), MLD training, vasopneumatic compression of the extremity as needed and bandaging of the swollen extremity.   You may be asked to remove clothes so that treatments can be performed efficiently. You will be provided with a sheet to drape yourself.   Once the compression bandage is applied, you will be expected to wear the bandage until the next visit. The bandage will loosen as the lymph fluid is pushed out of the limb. Do not attempt to unwrap and re-wrap the limb unless you have been trained to do so. In the event that the bandages are so loose that they fall off, remove them and put everything in a bag and bring it to the next therapy session.  While wearing the bandages on the arm or leg, keep fingers and toes moving, bend your elbow/knee, wrist/ankle frequently. Elevate the limb above the heart to relieve any throbbing or discomfort.  If the discomfort is unbearable, you may try to remove the outermost bandage. If this does not work, remove all bandages and bring them with you to the next therapy session.   Depending on severity of the swelling, expect weekly treatment for 2-5 days per week x 4-6 weeks.  An exercise program will be created based on individual need.  You will be fitted for a permanent compression garment (sleeve for arm/stocking for leg) prior to discharge from therapy. This permanent compression garment will take the place of the bandages and need to be worn as prescribed by your therapist.    CARE OF YOUR COMPRESSION GARMENT  Wear the compression garment daily as prescribed by your therapist. You will not wear the compression garment when sleeping. Put on the garment soon after you wake up and remove it before going to bed. This will decrease the risk of the lymph fluid returning to the extremity.   Use donning gloves/garden gloves to  the  compression garment. This will decrease tearing the material and make it easier to  the material.  If you feel the garment is too tight, notify your therapist.  It is best to have a wear one, wash one garment if possible. Check with your insurance provided to see what they will cover for lymphedema supplies.  Compression garments can be expected to last 3-6 months before they need to be replaced. When the compression garment loses its compressive ability, swelling from lymph fluid can return in the limb even if you are wearing the garment daily.   Consult your therapist or compression representative in the event you need to be re-measured.     Exercise:    Your therapist will instruct you in an exercise program tailored to you. Muscle contractions help promote the flow of lymph out of the swollen limb.  To prevent an increase in swelling of the limb, it is always best practice to wear your compression garment on the affected extremity when exercising.    **IF YOUR ARM IS BANDAGED, OPEN/CLOSE YOUR HAND AND PERFORM WRIST CIRCLES THROUGHOUT THE DAY.    Perform all exercises below with good, erect posture. Stand with feet shoulder width apart, with your knees slightly bent. Repeat each exercise 10-20 reps up to 3x/week               Shoulder Blade Squeeze      Backwards Shoulder Roll  Neck Rotation    Neck Side Bends    Chest Press with Stick    Elbow Bends with Stick      Ankle Pumps (and wiggle toes; do frequently)    Hamstring Curls    Heel Raises  Knee Extension    Knee Bends    Lunges  Knee Raises    Trunk Rotation    The following are a list of resources that may be helpful for you.  Lymphnotes.com: online information source for those having or are at risk of developing Lymphedema. This site is also for family and friend's who care for those with lymphedema  NLN (National Lymphedema Network): an organization dedicated to promoting the awareness of lymphedema and empowering people with lymphedema to live life  to the fullest. Lymphnet.org  LEARN (Lymphedema Education and Research Network): answers to frequently asked questions about Lymphedema, Lipedema, and the lymphatic system. Lymphaticnetwork.org  How Bharti Got her Rack Back, A Breast Cancer Memoir by Yolis Perea - a book that gives a laugh out loud humor to her adventure, along with poignant moments of self-discovery as she blogs her way to good health. (available on Sis and paperback)  100 Questions and Answers About Lymphedema by Inge Duron, Basilia Marcus, and Miriam Lee - provides clear, straightforward answers to your questions about lymphedema. (available on paperback)  Podcasts: Lymphedema Podcast, Livedema Podcast, Lymph Logic to name a few      If you need further assistance or have questions concerning your treatment, please do not hesitate to call Jessie Latif (therapist) at  [phone] (phone number).

## 2024-07-01 NOTE — PLAN OF CARE
[facility]  Physical Therapy Initial Evaluation    Name: Zander Kaur   Clinic Number: [id number]    Therapy Diagnosis:   Encounter Diagnoses   Name Primary?    Lymphedema of both lower extremities     Venous stasis dermatitis of both lower extremities Yes    Varicose veins of bilateral lower extremities with other complications     Edema of both lower extremities      Physician: Zander Mejia MD*    Physician Orders: PT Eval and Treat - lymphedema  Medical Diagnosis from Referral: I89.0 (ICD-10-CM) - Lymphedema, not elsewhere classified  Evaluation Date: 6/5/2024  Authorization Period Expiration: 5/2/2025  Plan of Care Expiration: 8/5/2024  Visit # / Visits authorized: 1/ 1    Time In: 1:30pm   Time Out: 2:30pm   Total Billable Time: 60 minutes    Precautions: Standard    Subjective   Date of onset: Has had pain in the legs for years   History of current condition - Zander reports:     L knee- 10 years, he has had an extensive history of scopes and injections but nothing is working. He is considering getting a TKA at this point but has nothing schdeuled.     He has a hx of ankle injuries and surgeries.     Pt denies CHF, KF, DM and CA.        Medical History:   Past Medical History:   Diagnosis Date    Actinic keratosis     Allergy     seasonal    Arthritis     Basal cell carcinoma of right ala nasi 02/06/2023    Hypertension     Joint pain     Organ transplant     liver 2010    SCC (squamous cell carcinoma) 08/15/2022    Right cheek -SSW    SCC (squamous cell carcinoma) 03/27/2023    Right angle of mandible    SCC (squamous cell carcinoma) 03/27/2023    Right medial cheek    SCC (squamous cell carcinoma) 02/12/2024    anterior chin    Squamous Cell Carcinoma 08/28/2013    right temple    Squamous Cell Carcinoma 09/04/2013    left forehead    Stroke        Surgical History:   Zander CASTELLANOS Lord Gutierrez  has a past surgical history that includes Liver transplant; knee scope; Hernia  repair; Shoulder open rotator cuff repair; Facial reconstruction surgery; Ankle surgery; Closure of defect of Mohs procedure (Right, 2/9/2023); Revision of flap graft (Right, 3/3/2023); Revision of flap graft (Right, 8/1/2023); Colonoscopy (N/A, 12/18/2023); Prostate biopsy (N/A, 4/5/2024); Transrectal ultrasound examination (N/A, 4/5/2024); and Cystoscopy (N/A, 4/5/2024).    Medications:   Zander has a current medication list which includes the following prescription(s): atorvastatin, bumetanide, celecoxib, ciclopirox, doxazosin, fluorouracil, gabapentin, methocarbamol, metoprolol tartrate, multivit with minerals/lutein, oxycodone-acetaminophen, prograf, ropinirole, solifenacin, and vitamin d, and the following Facility-Administered Medications: sodium chloride 0.9%.    Allergies:   Review of patient's allergies indicates:   Allergen Reactions    Diphenhydramine hcl     Penicillins      Other reaction(s): Rash    Povidone-iodine      Other reaction(s): Itching  Other reaction(s): Itching        Imaging,       There is no evidence of a right lower extremity DVT.    The right greater saphenous vein has reflux.    There is no evidence of a left lower extremity DVT.      Normal resting ABIs bilaterally.    PVR waveforms are moderately dampened at the low thigh level bilaterally, and ankle level bilaterally.    PVR waveforms are mildly dampened at the calf level bilaterally.    Surgery: Ankle surgery   Radiation:  none   Chemotherapy: none    Previous Lymphedema Treatment: none   Prior Therapy: none  Social History: Lives with wife    Abuse/Neglect: Pt shows no visible signs of abuse/neglect   Nutritional status: Pt reports no nutritional needs    Educational needs: Pt reports no educational needs    Spiritual/Cultural: Pt reports no spiritual/ cultural needs     Fall risk: none    Occupation: [employer]   Prior Level of Function: independent   Current Level of Function: harder walking       Pts goals: unsure of what  the point of PT is    Objective       Amount of Swelling/Location of Swelling: moderate swelling of the L knee, moderate swelling of the B lower legs/ ankles    Skin Integrity: intact, no wounds    Palpation/Texture: 1+ pitting edema    + B Stemmer Sign  - B Melanie's Sign  Circulation: intact      Posture: FHP     Strength: functional screen of AROM against gravity and sit to stand transfers       Sensation:  intact to lt touch B LE    Girth Measurements (in centimeters)      CMS Impairment/Limitation/Restriction for FOTO  Survey  Limitation: not performed %    Therapist reviewed FOTO scores for Zander Kaur Sr. on 6/5/2024.   FOTO documents entered into [employer] - see Media section.       TREATMENT     Total Treatment time separate from Evaluation: 30 minutes    Self Care/Home Management training for 30 minutes including:    Pt was educated in potential compression needs.  Demo of products including socks, garments, and Inelastic Velcro wraps.   Discussed cost/coverage and authorization per insurance with Durable Medical Equipment(DME) provider.  Compression require orders from referring provider and coverage or purchase of products from DME or self order.      Discussed wear schedule, don/doff, wash and management of products.  Size and compression class and AM/PM needs.      Informed insurance coverage of compression is per DME provider and typically Medicare and Medicare group plans may not cover cost beyond pair of standard sized knee high garments.   Commitment to attendance as well as commitment to securing compression needs is critical to edema management.      Home Exercises and Patient Education Provided  Education provided:   - lymph booklet   - Pt was educated in lymphedema etiology and management plans.  Pt was provided with written risk reductions and precautions for managing lymphedema.      This patient is in agreement to participate in Lymphedema treatment.    Written Home Exercises Provided:  yes.  Exercises were reviewed and Zander was able to demonstrate them prior to the end of the session.  Zander demonstrated good  understanding of the education provided.     See EMR under Patient Instructions for exercises provided 6/5/2024.    Assessment   Zander is a 73 y.o. male referred to outpatient Physical Therapy with a medical diagnosis of I89.0 (ICD-10-CM) - Lymphedema, not elsewhere classified. This patient presents s/p HTN, arthritis  resulting in: lymphedema of the LLEs, increased pain, as well as difficulty performing walking , compression needs, and placing the pt at higher risk of infection.     Pt presents with moderate swelling of the L knee s/p arthritis. Pt is discussing options for knee surgery with Mds in the future and was educated on benefits of reducing knee swelling before surgery. Pt's skin is intact and swelling is 1+ pitting BLEs. Pt was educated on lymphedema causes and physiology, infection signs and symptoms, complete decongestive therapy and its components, and expectations for therapy. Pt was also educated on the need for some type of compression. Pt would benefit from therapy to decrease swelling, aid in finding compression, are preparing pt for home management.         Pt prognosis is Good.   Pt will benefit from skilled outpatient Physical Therapy to address the deficits stated above and in the chart below, provide pt/family education, and to maximize pt's level of independence.     Plan of care discussed with patient: Yes  Pt's spiritual, cultural and educational needs considered and patient is agreeable to the plan of care and goals as stated below:     Medical Necessity is demonstrated by the following  History  Co-morbidities and personal factors that may impact the plan of care [] LOW: no personal factors / co-morbidities  [x] MODERATE: 1-2 personal factors / co-morbidities  [] HIGH: 3+ personal factors / co-morbidities    Moderate / High Support Documentation: HTN      Examination  Body Structures and Functions, activity limitations and participation restrictions that may impact the plan of care [] LOW: addressing 1-2 elements  [x] MODERATE: 3+ elements  [] HIGH: 4+ elements (please support below)    Moderate / High Support Documentation: leg swelling, leg pain, difficulty waking      Clinical Presentation [] LOW: stable  [x] MODERATE: Evolving  [] HIGH: Unstable     Decision Making/ Complexity Score: moderate       Anticipated Barriers for therapy: none    The following goals were discussed with the patient and patient is in agreement with them as to be addressed in the treatment plan.     Short Term Goals: (6 weeks)  1. Patient will show decreased girth in LLE by up to 1 cm to allow for LE symmetry, shoe and clothing choice, and ability to apply needed compression.  (progressing, not met)   2. Patient will demonstrate 100% knowledge of lymphedema precautions and signs of infection to allow for reduced lymphedema risk, infection risk, and/or exacerbation of condition.  (progressing, not met)  3. Patient or caregiver will perform self-bandaging techniques and/or wearing of compression garments to allow for lymphatic drainage support, skin elasticity, and reduction in shape and size of limb. (progressing, not met)  4. Patient will perform self lymph drainage techniques to areas within reach to enhance lymphatic drainage and skin condition.  (progressing, not met)  5. Patient will tolerate daily activities with multilayered bandaging to allow for lymphatic and venous support.  (progressing, not met)    Long Term Goals: (12  weeks)  1. Patient will show decreased girth in L LE by up to 2 cm  to allow for LE symmetry, shoe and clothing choice, and ability to apply needed compression daily.  (progressing, not met)  2. Patient will show reduction in density to mild or less with improved contour of limb to allow for cosmesis, LE symmetry, infection risk reduction, and clothing and  compression choice.   (progressing, not met)  3. Patient to adri/doff compression garment with daily compliance to assist in lymphedema management, skin elasticity, and tissue density.  (progressing, not met)  4. Pt to show improved postural awareness and alignment.  (progressing, not met)  5. Pt to be I and compliant with HEP to allow for increased function in affected limb.   (progressing, not met)  Plan   Plan of care Certification: 6/5/2024 to 8/5/2024.    Outpatient Physical Therapy 2 times weekly for 8 weeks to include the following interventions: Gait Training, Manual Therapy, Neuromuscular Re-ed, Patient Education, Self Care, Therapeutic Activities, and Therapeutic Exercise. Complete Decongestive Therapy- compression and home equipment needs to be addressed and assisted.    Pt may be seen by a PTA as part of the Rehab treatment team.  Plan of Care was discussed with ESTRELLA Green, PT

## 2024-07-02 ENCOUNTER — TELEPHONE (OUTPATIENT)
Dept: BARIATRICS | Facility: CLINIC | Age: 74
End: 2024-07-02
Payer: MEDICARE

## 2024-07-03 LAB
FINAL PATHOLOGIC DIAGNOSIS: NORMAL
GROSS: NORMAL
Lab: NORMAL
MICROSCOPIC EXAM: NORMAL

## 2024-07-03 NOTE — PROGRESS NOTES
Physical Therapy Daily Treatment Note     Name: Zander Kaur .  Clinic Number: [id number]    Therapy Diagnosis:   Encounter Diagnoses   Name Primary?    Edema of both lower extremities Yes    Lymphedema of both lower extremities     Varicose veins of bilateral lower extremities with other complications     Venous stasis dermatitis of both lower extremities      Physician: Zander Mejia MD*    Visit Date: 6/24/2024    Physician: Zander Mejia MD*     Physician Orders: PT Eval and Treat - lymphedema  Medical Diagnosis from Referral: I89.0 (ICD-10-CM) - Lymphedema, not elsewhere classified  Evaluation Date: 6/5/2024  Authorization Period Expiration: 5/2/2025  Plan of Care Expiration: 8/5/2024  Visit # / Visits authorized: 2/20     Time In: 2:30pm   Time Out: 3:30pm   Total Billable Time: 60 minutes     Precautions: Standard  Subjective     Pt reports: He is doing ok.  He was compliant with home exercise program.  Response to previous treatment: fine, eval only   Functional change: none     Pain: 0/10  Location: bilateral lower legs     Objective       Treatment:   Zander received the following manual therapy techniques:- Manual Lymphatic Drainage were applied to the: LLE  for 60 minutes, including: MLD and short stretch compression bandaging       MANUAL LYMPHATIC DRAINAGE (MLD):    While supine with LEs elevated stimulation at terminus, along GI region, B inguinal regions, drainage of entire L LE heena lower leg, ankle, and foot with return proximally,  Use of Aquaphor due to dryness.   Educated in self massage to abdominal areas, B inguinal areas, thigh, and remaining LE within reach.      MULTILAYERED BANDAGING:  issued supplies and bandaged L LE with cotton stockinette, komprex section dorsum of foot, 2 komprex wedges post malleoli, 2 cellona rolls ankle to above knee, 1-8cm and 2- 10cm Durelast rolls foot to knee, to leave intact 12-24 hrs as tolerated, discontinue with any problems, return rolled  bandages next session. Wash and wear schedules confirmed.        Home Exercises Provided and Patient Education Provided     Education provided:    Remove bandages if painful   PATIENT/FAMILY Education: bandaging wear schedule,  HEP,  Beginning of self massage,  Self or assisted bandaging, compression options, and Risk reduction    Written Home Exercises Provided: Patient instructed to cont prior HEP.  Exercises were reviewed and Zander was able to demonstrate them prior to the end of the session.  Zander demonstrated good  understanding of the education provided.       Assessment     Patient presents with moderate swelling of the L Knee. Patient's LLE was massaged and bandaged to reduce swelling. Will continue to progress    Zander Is progressing well towards his goals.   Pt prognosis is Excellent.     Pt will continue to benefit from skilled outpatient physical therapy to address the deficits listed in the problem list box on initial evaluation, provide pt/family education and to maximize pt's level of independence in the home and community environment.     Pt's spiritual, cultural and educational needs considered and pt agreeable to plan of care and goals.     Anticipated barriers to physical therapy: none     Goals:     Short Term Goals: (6 weeks)  1. Patient will show decreased girth in LLE by up to 1 cm to allow for LE symmetry, shoe and clothing choice, and ability to apply needed compression.  (progressing, not met)   2. Patient will demonstrate 100% knowledge of lymphedema precautions and signs of infection to allow for reduced lymphedema risk, infection risk, and/or exacerbation of condition.  (progressing, not met)  3. Patient or caregiver will perform self-bandaging techniques and/or wearing of compression garments to allow for lymphatic drainage support, skin elasticity, and reduction in shape and size of limb. (progressing, not met)  4. Patient will perform self lymph drainage techniques to areas  within reach to enhance lymphatic drainage and skin condition.  (progressing, not met)  5. Patient will tolerate daily activities with multilayered bandaging to allow for lymphatic and venous support.  (progressing, not met)     Long Term Goals: (12  weeks)  1. Patient will show decreased girth in L LE by up to 2 cm  to allow for LE symmetry, shoe and clothing choice, and ability to apply needed compression daily.  (progressing, not met)  2. Patient will show reduction in density to mild or less with improved contour of limb to allow for cosmesis, LE symmetry, infection risk reduction, and clothing and compression choice.   (progressing, not met)  3. Patient to adri/doff compression garment with daily compliance to assist in lymphedema management, skin elasticity, and tissue density.  (progressing, not met)  4. Pt to show improved postural awareness and alignment.  (progressing, not met)  5. Pt to be I and compliant with HEP to allow for increased function in affected limb.   (progressing, not met)    Plan   Continue PT  2x   weekly for Complete Decongestive Therapy:  Manual lymphatic drainage, Multilayered short stretch bandaging, Pneumatic compression, Therapeutic exercises, Patient education as deemed necessary to achieve stated goals.      Jessie Latif, PT

## 2024-07-06 NOTE — PROGRESS NOTES
Physical Therapy Daily Treatment Note     Name: Zander Kaur .  Clinic Number: [id number]    Therapy Diagnosis:   Encounter Diagnoses   Name Primary?    Edema of both lower extremities Yes    Lymphedema of both lower extremities     Varicose veins of bilateral lower extremities with other complications     Venous stasis dermatitis of both lower extremities      Physician: Zander Mejia MD*    Visit Date: 6/27/2024    Physician: Zander Mejia MD*     Physician Orders: PT Eval and Treat - lymphedema  Medical Diagnosis from Referral: I89.0 (ICD-10-CM) - Lymphedema, not elsewhere classified  Evaluation Date: 6/5/2024  Authorization Period Expiration: 5/2/2025  Plan of Care Expiration: 8/5/2024  Visit # / Visits authorized: 3/20     Time In: 5:30pm    Time Out: 6:30pm   Total Billable Time: 60 minutes     Precautions: Standard  Subjective     Pt reports: did ok with bandages   He was compliant with home exercise program.  Response to previous treatment: fine, eval only   Functional change: none     Pain: 0/10  Location: bilateral lower legs     Objective       Treatment:   Zander received the following manual therapy techniques:- Manual Lymphatic Drainage were applied to the: LLE  for 60 minutes, including: MLD and short stretch compression bandaging       MANUAL LYMPHATIC DRAINAGE (MLD):    While supine with LEs elevated stimulation at terminus, along GI region, B inguinal regions, drainage of entire L LE heena lower leg, ankle, and foot with return proximally,  Use of Aquaphor due to dryness.   Educated in self massage to abdominal areas, B inguinal areas, thigh, and remaining LE within reach.      MULTILAYERED BANDAGING:  issued supplies and bandaged L LE with cotton stockinette, komprex section dorsum of foot, 2 komprex wedges post malleoli, 2 cellona rolls ankle to above knee, 1-8cm and 2- 10cm Durelast rolls foot to knee, to leave intact 12-24 hrs as tolerated, discontinue with any problems, return  rolled bandages next session. Wash and wear schedules confirmed.     Orders printed along with DME list for pt        Home Exercises Provided and Patient Education Provided     Education provided:    Remove bandages if painful   PATIENT/FAMILY Education: bandaging wear schedule,  HEP,  Beginning of self massage,  Self or assisted bandaging, compression options, and Risk reduction    Written Home Exercises Provided: Patient instructed to cont prior HEP.  Exercises were reviewed and Zander was able to demonstrate them prior to the end of the session.  Zander demonstrated good  understanding of the education provided.       Assessment     Patient presents with moderate swelling of the L Knee. Patient's LLE was massaged and bandaged to reduce swelling. Orders printed along with DME list for pt  Will continue to progress    Zander Is progressing well towards his goals.   Pt prognosis is Excellent.     Pt will continue to benefit from skilled outpatient physical therapy to address the deficits listed in the problem list box on initial evaluation, provide pt/family education and to maximize pt's level of independence in the home and community environment.     Pt's spiritual, cultural and educational needs considered and pt agreeable to plan of care and goals.     Anticipated barriers to physical therapy: none     Goals:     Short Term Goals: (6 weeks)  1. Patient will show decreased girth in LLE by up to 1 cm to allow for LE symmetry, shoe and clothing choice, and ability to apply needed compression.  (progressing, not met)   2. Patient will demonstrate 100% knowledge of lymphedema precautions and signs of infection to allow for reduced lymphedema risk, infection risk, and/or exacerbation of condition.  (progressing, not met)  3. Patient or caregiver will perform self-bandaging techniques and/or wearing of compression garments to allow for lymphatic drainage support, skin elasticity, and reduction in shape and size of  limb. (progressing, not met)  4. Patient will perform self lymph drainage techniques to areas within reach to enhance lymphatic drainage and skin condition.  (progressing, not met)  5. Patient will tolerate daily activities with multilayered bandaging to allow for lymphatic and venous support.  (progressing, not met)     Long Term Goals: (12  weeks)  1. Patient will show decreased girth in L LE by up to 2 cm  to allow for LE symmetry, shoe and clothing choice, and ability to apply needed compression daily.  (progressing, not met)  2. Patient will show reduction in density to mild or less with improved contour of limb to allow for cosmesis, LE symmetry, infection risk reduction, and clothing and compression choice.   (progressing, not met)  3. Patient to adri/doff compression garment with daily compliance to assist in lymphedema management, skin elasticity, and tissue density.  (progressing, not met)  4. Pt to show improved postural awareness and alignment.  (progressing, not met)  5. Pt to be I and compliant with HEP to allow for increased function in affected limb.   (progressing, not met)    Plan   Continue PT  2x   weekly for Complete Decongestive Therapy:  Manual lymphatic drainage, Multilayered short stretch bandaging, Pneumatic compression, Therapeutic exercises, Patient education as deemed necessary to achieve stated goals.      Jessie Latif, PT

## 2024-07-08 ENCOUNTER — CLINICAL SUPPORT (OUTPATIENT)
Dept: REHABILITATION | Facility: HOSPITAL | Age: 74
End: 2024-07-08
Payer: MEDICARE

## 2024-07-08 DIAGNOSIS — I89.0 LYMPHEDEMA OF BOTH LOWER EXTREMITIES: ICD-10-CM

## 2024-07-08 DIAGNOSIS — R60.0 EDEMA OF BOTH LOWER EXTREMITIES: Primary | ICD-10-CM

## 2024-07-08 DIAGNOSIS — I87.2 VENOUS STASIS DERMATITIS OF BOTH LOWER EXTREMITIES: ICD-10-CM

## 2024-07-08 DIAGNOSIS — I83.893 VARICOSE VEINS OF BILATERAL LOWER EXTREMITIES WITH OTHER COMPLICATIONS: ICD-10-CM

## 2024-07-08 PROCEDURE — 97016 VASOPNEUMATIC DEVICE THERAPY: CPT | Mod: KX

## 2024-07-08 PROCEDURE — 97140 MANUAL THERAPY 1/> REGIONS: CPT | Mod: KX

## 2024-07-08 NOTE — PROGRESS NOTES
Physical Therapy Daily Treatment Note/ Progress Note       Name: Zander Kaur .  Clinic Number: [id number]     Therapy Diagnosis:        Encounter Diagnoses   Name Primary?    Edema of both lower extremities Yes    Lymphedema of both lower extremities      Varicose veins of bilateral lower extremities with other complications      Venous stasis dermatitis of both lower extremities        Physician: Zander Mejia MD*     Visit Date: 6/27/2024     Physician: Zander Mejia MD*     Physician Orders: PT Eval and Treat - lymphedema  Medical Diagnosis from Referral: I89.0 (ICD-10-CM) - Lymphedema, not elsewhere classified  Evaluation Date: 6/5/2024  Authorization Period Expiration: 5/2/2025  Plan of Care Expiration: 8/5/2024  Visit # / Visits authorized: 4/20     Time In: 12:30pm   Time Out: 1:30pm   Total Billable Time: 60 minutes     Precautions: Standard  Subjective      Pt reports: He got his new compression socks, did not bring them today   He was compliant with home exercise program.  Response to previous treatment: fine, eval only   Functional change: none      Pain: 0/10  Location: bilateral lower legs      Objective          7/8/2024:            Treatment:   Zander received the following manual therapy techniques:- Manual Lymphatic Drainage were applied to the: LLE  for 60 minutes, including: MLD and short stretch compression bandaging         MANUAL LYMPHATIC DRAINAGE (MLD):    NP     SEQUENTIAL COMPRESSION PUMP: full leg sleeve applied to L LE  BioTab with default setting with distal pressures starting at 45mmHg entire LE x 15 minutes        MULTILAYERED BANDAGING:  issued supplies and bandaged L LE with cotton stockinette, komprex section dorsum of foot, 2 komprex wedges post malleoli, 2 cellona rolls ankle to above knee, 1-8cm and 2- 10cm Durelast rolls foot to knee, to leave intact 12-24 hrs as tolerated, discontinue with any problems, return rolled bandages next session. Wash and wear  schedules confirmed.            Home Exercises Provided and Patient Education Provided      Education provided:    Remove bandages if painful   PATIENT/FAMILY Education: bandaging wear schedule,  HEP,  Beginning of self massage,  Self or assisted bandaging, compression options, and Risk reduction     Written Home Exercises Provided: Patient instructed to cont prior HEP.  Exercises were reviewed and Zander was able to demonstrate them prior to the end of the session.  Zander demonstrated good  understanding of the education provided.         Assessment      Measurements taken today show excellent reductions in LLE swelling. Patient has newly acquired knee high compression. Pt's LLE was pumped and bandaged with good tolerance.  Will continue to progress      Zander Is progressing well towards his goals.   Pt prognosis is Excellent.      Pt will continue to benefit from skilled outpatient physical therapy to address the deficits listed in the problem list box on initial evaluation, provide pt/family education and to maximize pt's level of independence in the home and community environment.      Pt's spiritual, cultural and educational needs considered and pt agreeable to plan of care and goals.     Anticipated barriers to physical therapy: none      Goals:      Short Term Goals: (6 weeks)  1. Patient will show decreased girth in LLE by up to 1 cm to allow for LE symmetry, shoe and clothing choice, and ability to apply needed compression.  MET   2. Patient will demonstrate 100% knowledge of lymphedema precautions and signs of infection to allow for reduced lymphedema risk, infection risk, and/or exacerbation of condition.  (progressing, not met)  3. Patient or caregiver will perform self-bandaging techniques and/or wearing of compression garments to allow for lymphatic drainage support, skin elasticity, and reduction in shape and size of limb. (progressing, not met)  4. Patient will perform self lymph drainage  techniques to areas within reach to enhance lymphatic drainage and skin condition.  (progressing, not met)  5. Patient will tolerate daily activities with multilayered bandaging to allow for lymphatic and venous support.  (progressing, not met)     Long Term Goals: (12  weeks)  1. Patient will show decreased girth in L LE by up to 2 cm  to allow for LE symmetry, shoe and clothing choice, and ability to apply needed compression daily.  (progressing, not met)  2. Patient will show reduction in density to mild or less with improved contour of limb to allow for cosmesis, LE symmetry, infection risk reduction, and clothing and compression choice.   (progressing, not met)  3. Patient to adri/doff compression garment with daily compliance to assist in lymphedema management, skin elasticity, and tissue density.  (progressing, not met)  4. Pt to show improved postural awareness and alignment.  (progressing, not met)  5. Pt to be I and compliant with HEP to allow for increased function in affected limb.   (progressing, not met)     Plan   Continue PT  2x   weekly for Complete Decongestive Therapy:  Manual lymphatic drainage, Multilayered short stretch bandaging, Pneumatic compression, Therapeutic exercises, Patient education as deemed necessary to achieve stated goals.        Jessie Latif, PT

## 2024-07-11 ENCOUNTER — TELEPHONE (OUTPATIENT)
Dept: CARDIOLOGY | Facility: CLINIC | Age: 74
End: 2024-07-11
Payer: MEDICARE

## 2024-07-11 NOTE — TELEPHONE ENCOUNTER
Lymphedema Pump Progress:  [x] Order, clinical notes, and face sheet faxed to [facility] for home pneumatic compression.  [x] Reached out to patient for follow up. Wish to inquire about symptoms of lymphedema and if conservative therapy has been working.  [x] Updated progress note sent to [facility].  [x] Patient pneumatic compression pump approved and shipped by [facility].

## 2024-07-15 ENCOUNTER — CLINICAL SUPPORT (OUTPATIENT)
Dept: DERMATOLOGY | Facility: CLINIC | Age: 74
End: 2024-07-15
Payer: MEDICARE

## 2024-07-15 ENCOUNTER — CLINICAL SUPPORT (OUTPATIENT)
Dept: REHABILITATION | Facility: HOSPITAL | Age: 74
End: 2024-07-15
Payer: MEDICARE

## 2024-07-15 DIAGNOSIS — R60.0 EDEMA OF BOTH LOWER EXTREMITIES: Primary | ICD-10-CM

## 2024-07-15 DIAGNOSIS — Z48.02 VISIT FOR SUTURE REMOVAL: Primary | ICD-10-CM

## 2024-07-15 DIAGNOSIS — I87.2 VENOUS STASIS DERMATITIS OF BOTH LOWER EXTREMITIES: ICD-10-CM

## 2024-07-15 DIAGNOSIS — I89.0 LYMPHEDEMA OF BOTH LOWER EXTREMITIES: ICD-10-CM

## 2024-07-15 DIAGNOSIS — I83.893 VARICOSE VEINS OF BILATERAL LOWER EXTREMITIES WITH OTHER COMPLICATIONS: ICD-10-CM

## 2024-07-15 PROCEDURE — 99024 POSTOP FOLLOW-UP VISIT: CPT | Mod: POP,,, | Performed by: DERMATOLOGY

## 2024-07-15 PROCEDURE — 97016 VASOPNEUMATIC DEVICE THERAPY: CPT

## 2024-07-15 PROCEDURE — 97140 MANUAL THERAPY 1/> REGIONS: CPT

## 2024-07-15 NOTE — PROGRESS NOTES
Physical Therapy Daily Treatment Note/ Progress Note       Name: Zander Kaur .  Clinic Number: [id number]     Therapy Diagnosis:        Encounter Diagnoses   Name Primary?    Edema of both lower extremities Yes    Lymphedema of both lower extremities      Varicose veins of bilateral lower extremities with other complications      Venous stasis dermatitis of both lower extremities        Physician: Zander Mejia MD*     Visit Date: 6/27/2024     Physician: Zander Mejia MD*     Physician Orders: PT Eval and Treat - lymphedema  Medical Diagnosis from Referral: I89.0 (ICD-10-CM) - Lymphedema, not elsewhere classified  Evaluation Date: 6/5/2024  Authorization Period Expiration: 5/2/2025  Plan of Care Expiration: 8/5/2024  Visit # / Visits authorized: 5/20     Time In: 1:30pm  Time Out: 2:23pm  Total Billable Time: 53 minutes     Precautions: Standard  Subjective      Pt reports: He has been wearing his new compression socks   He was compliant with home exercise program.  Response to previous treatment: fine, eval only   Functional change: none      Pain: 0/10  Location: bilateral lower legs      Objective          7/8/2024:            Treatment:   Zander received the following manual therapy techniques:- Manual Lymphatic Drainage were applied to the: LLE  for 53 minutes, including: MLD and short stretch compression bandaging         MANUAL LYMPHATIC DRAINAGE (MLD):    While supine with LEs elevated stimulation at terminus, along GI region, B inguinal regions, drainage of entire L LE heena lower leg, ankle, and foot with return proximally,  Use of Aquaphor due to dryness.   Educated in self massage to abdominal areas, B inguinal areas, thigh, and remaining LE within reach.    SEQUENTIAL COMPRESSION PUMP: full leg sleeve applied to L LE  BioTab with default setting with distal pressures starting at 45mmHg entire LE x 15 minutes        MULTILAYERED BANDAGING:  issued supplies and bandaged L LE with cotton  stockinette, komprex section dorsum of foot, 2 komprex wedges post malleoli, 2 cellona rolls ankle to above knee, 1-8cm and 2- 10cm Durelast rolls foot to knee, to leave intact 12-24 hrs as tolerated, discontinue with any problems, return rolled bandages next session. Wash and wear schedules confirmed.           Home Exercises Provided and Patient Education Provided      Education provided:    Remove bandages if painful   PATIENT/FAMILY Education: bandaging wear schedule,  HEP,  Beginning of self massage,  Self or assisted bandaging, compression options, and Risk reduction     Written Home Exercises Provided: Patient instructed to cont prior HEP.  Exercises were reviewed and Zander was able to demonstrate them prior to the end of the session.  Zander demonstrated good  understanding of the education provided.         Assessment      Patient tolerated treatment well with no reports of pain. Patient presents with moderate L knee swelling consistent with degenerative changes/ chronic inflammation.  Pt's LLE was pumped and bandaged with good tolerance.  Will continue to progress      Zander Is progressing well towards his goals.   Pt prognosis is Excellent.      Pt will continue to benefit from skilled outpatient physical therapy to address the deficits listed in the problem list box on initial evaluation, provide pt/family education and to maximize pt's level of independence in the home and community environment.      Pt's spiritual, cultural and educational needs considered and pt agreeable to plan of care and goals.     Anticipated barriers to physical therapy: none      Goals:      Short Term Goals: (6 weeks)  1. Patient will show decreased girth in LLE by up to 1 cm to allow for LE symmetry, shoe and clothing choice, and ability to apply needed compression.  MET   2. Patient will demonstrate 100% knowledge of lymphedema precautions and signs of infection to allow for reduced lymphedema risk, infection risk,  and/or exacerbation of condition.  (progressing, not met)  3. Patient or caregiver will perform self-bandaging techniques and/or wearing of compression garments to allow for lymphatic drainage support, skin elasticity, and reduction in shape and size of limb. (progressing, not met)  4. Patient will perform self lymph drainage techniques to areas within reach to enhance lymphatic drainage and skin condition.  (progressing, not met)  5. Patient will tolerate daily activities with multilayered bandaging to allow for lymphatic and venous support.  (progressing, not met)     Long Term Goals: (12  weeks)  1. Patient will show decreased girth in L LE by up to 2 cm  to allow for LE symmetry, shoe and clothing choice, and ability to apply needed compression daily.  (progressing, not met)  2. Patient will show reduction in density to mild or less with improved contour of limb to allow for cosmesis, LE symmetry, infection risk reduction, and clothing and compression choice.   (progressing, not met)  3. Patient to adri/doff compression garment with daily compliance to assist in lymphedema management, skin elasticity, and tissue density.  (progressing, not met)  4. Pt to show improved postural awareness and alignment.  (progressing, not met)  5. Pt to be I and compliant with HEP to allow for increased function in affected limb.   (progressing, not met)     Plan   Continue PT  2x   weekly for Complete Decongestive Therapy:  Manual lymphatic drainage, Multilayered short stretch bandaging, Pneumatic compression, Therapeutic exercises, Patient education as deemed necessary to achieve stated goals.        Jessie Latif, PT

## 2024-07-15 NOTE — PROGRESS NOTES
Suture Removal note:  CC: 73 y.o. male patient is here for suture removal.         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  Patient reports no problems.    WOUND PE:  Sutures no longer present.  Wound healing well.  Good approximation of skin edges.  No signs or symptoms of infection.    IMPRESSION:      1. Skin, right ear helix, excision:  - RESIDUAL INVASIVE SQUAMOUS CELL CARCINOMA.  - MARGINS ARE NEGATIVE.  - DERMAL SCAR.    - margins clear.    PLAN:  Sutures removed today.  Continue wound care.    RTC: In 6 months.

## 2024-07-18 ENCOUNTER — CLINICAL SUPPORT (OUTPATIENT)
Dept: REHABILITATION | Facility: HOSPITAL | Age: 74
End: 2024-07-18
Payer: MEDICARE

## 2024-07-18 DIAGNOSIS — I87.2 VENOUS STASIS DERMATITIS OF BOTH LOWER EXTREMITIES: ICD-10-CM

## 2024-07-18 DIAGNOSIS — I83.893 VARICOSE VEINS OF BILATERAL LOWER EXTREMITIES WITH OTHER COMPLICATIONS: ICD-10-CM

## 2024-07-18 DIAGNOSIS — I89.0 LYMPHEDEMA OF BOTH LOWER EXTREMITIES: ICD-10-CM

## 2024-07-18 DIAGNOSIS — R60.0 EDEMA OF BOTH LOWER EXTREMITIES: Primary | ICD-10-CM

## 2024-07-18 PROCEDURE — 97140 MANUAL THERAPY 1/> REGIONS: CPT | Mod: KX

## 2024-07-18 NOTE — PROGRESS NOTES
Physical Therapy Daily Treatment Note/ Progress Note       Name: Zander Kaur .  Clinic Number: [id number]     Therapy Diagnosis:        Encounter Diagnoses   Name Primary?    Edema of both lower extremities Yes    Lymphedema of both lower extremities      Varicose veins of bilateral lower extremities with other complications      Venous stasis dermatitis of both lower extremities        Physician: Zander Mejia MD*     Visit Date: 6/27/2024     Physician: Zander Mejia MD*     Physician Orders: PT Eval and Treat - lymphedema  Medical Diagnosis from Referral: I89.0 (ICD-10-CM) - Lymphedema, not elsewhere classified  Evaluation Date: 6/5/2024  Authorization Period Expiration: 5/2/2025  Plan of Care Expiration: 8/5/2024  Visit # / Visits authorized: 6/20     Time In: 5:30pm   Time Out: 6:25pm   Total Billable Time: 55 minutes     Precautions: Standard  Subjective      Pt reports: He has been wearing his new compression socks, bandages are not very comfortable   He was compliant with home exercise program.  Response to previous treatment: fine, eval only   Functional change: none      Pain: 0/10  Location: bilateral lower legs      Objective          7/8/2024:            Treatment:   Zander received the following manual therapy techniques:- Manual Lymphatic Drainage were applied to the: LLE  for 55 minutes, including: MLD and short stretch compression bandaging         MANUAL LYMPHATIC DRAINAGE (MLD):    While supine with LEs elevated stimulation at terminus, along GI region, B inguinal regions, drainage of entire L LE heena lower leg, ankle, and foot with return proximally,  Use of Aquaphor due to dryness.   Educated in self massage to abdominal areas, B inguinal areas, thigh, and remaining LE within reach.    SEQUENTIAL COMPRESSION PUMP: full leg sleeve applied to L LE  BioTab with default setting with distal pressures starting at 45mmHg entire LE x 15 minutes        MULTILAYERED BANDAGING: NP, pt  declined           Home Exercises Provided and Patient Education Provided      Education provided:    Remove bandages if painful   PATIENT/FAMILY Education: bandaging wear schedule,  HEP,  Beginning of self massage,  Self or assisted bandaging, compression options, and Risk reduction     Written Home Exercises Provided: Patient instructed to cont prior HEP.  Exercises were reviewed and Zander was able to demonstrate them prior to the end of the session.  Zander demonstrated good  understanding of the education provided.         Assessment      Patient tolerated treatment well with no reports of pain. Patient presents with moderate L knee swelling consistent with degenerative changes/ chronic inflammation.  Pt's LLE was pumped and massaged  with good tolerance.  Will continue to progress      Zander Is progressing well towards his goals.   Pt prognosis is Excellent.      Pt will continue to benefit from skilled outpatient physical therapy to address the deficits listed in the problem list box on initial evaluation, provide pt/family education and to maximize pt's level of independence in the home and community environment.      Pt's spiritual, cultural and educational needs considered and pt agreeable to plan of care and goals.     Anticipated barriers to physical therapy: none      Goals:      Short Term Goals: (6 weeks)  1. Patient will show decreased girth in LLE by up to 1 cm to allow for LE symmetry, shoe and clothing choice, and ability to apply needed compression.  MET   2. Patient will demonstrate 100% knowledge of lymphedema precautions and signs of infection to allow for reduced lymphedema risk, infection risk, and/or exacerbation of condition.  (progressing, not met)  3. Patient or caregiver will perform self-bandaging techniques and/or wearing of compression garments to allow for lymphatic drainage support, skin elasticity, and reduction in shape and size of limb. (progressing, not met)  4. Patient  will perform self lymph drainage techniques to areas within reach to enhance lymphatic drainage and skin condition.  (progressing, not met)  5. Patient will tolerate daily activities with multilayered bandaging to allow for lymphatic and venous support.  (progressing, not met)     Long Term Goals: (12  weeks)  1. Patient will show decreased girth in L LE by up to 2 cm  to allow for LE symmetry, shoe and clothing choice, and ability to apply needed compression daily.  (progressing, not met)  2. Patient will show reduction in density to mild or less with improved contour of limb to allow for cosmesis, LE symmetry, infection risk reduction, and clothing and compression choice.   (progressing, not met)  3. Patient to adri/doff compression garment with daily compliance to assist in lymphedema management, skin elasticity, and tissue density.  (progressing, not met)  4. Pt to show improved postural awareness and alignment.  (progressing, not met)  5. Pt to be I and compliant with HEP to allow for increased function in affected limb.   (progressing, not met)     Plan   Continue PT  2x   weekly for Complete Decongestive Therapy:  Manual lymphatic drainage, Multilayered short stretch bandaging, Pneumatic compression, Therapeutic exercises, Patient education as deemed necessary to achieve stated goals.        Jessie Latif, PT

## 2024-07-24 RX ORDER — DOXAZOSIN 8 MG/1
TABLET ORAL
Qty: 90 TABLET | Refills: 0 | Status: SHIPPED | OUTPATIENT
Start: 2024-07-24

## 2024-07-24 RX ORDER — METOPROLOL TARTRATE 100 MG/1
100 TABLET ORAL 2 TIMES DAILY
Qty: 180 TABLET | Refills: 0 | Status: SHIPPED | OUTPATIENT
Start: 2024-07-24

## 2024-07-24 NOTE — TELEPHONE ENCOUNTER
Refill Routing Note   Medication(s) are not appropriate for processing by [facility] for the following reason(s):        Other    [facility] action(s):  Defer   Requires appointment : Yes      Medication Therapy Plan: PER LAST REFILL PT NEEDS AN APPT WITH PCP FOR ADDITIONAL REFILLS      Appointments  past 12m or future 3m with PCP    Date Provider   Last Visit   6/12/2023 Shai Davison MD   Next Visit   Visit date not found Shai Davison MD   ED visits in past 90 days: 0        Note composed:9:43 AM 07/24/2024

## 2024-07-24 NOTE — TELEPHONE ENCOUNTER
Care Due:                  Date            Visit Type   Department     Provider  --------------------------------------------------------------------------------                                EP -                              PRIMARY      [facility] INTER[facility]  Last Visit: 06-      CARE ([facility])   MEDICINE       Shai Davison  Next Visit: None Scheduled  None         None Found                                                            Last  Test          Frequency    Reason                     Performed    Due Date  --------------------------------------------------------------------------------    Office Visit  15 months..  doxazosin................  06- 09-    [facility] Embedded Care Due Messages. Reference number: [id number].   7/24/2024 4:18:40 AM CDT

## 2024-07-25 ENCOUNTER — CLINICAL SUPPORT (OUTPATIENT)
Dept: REHABILITATION | Facility: HOSPITAL | Age: 74
End: 2024-07-25
Payer: MEDICARE

## 2024-07-25 DIAGNOSIS — I83.893 VARICOSE VEINS OF BILATERAL LOWER EXTREMITIES WITH OTHER COMPLICATIONS: ICD-10-CM

## 2024-07-25 DIAGNOSIS — I87.2 VENOUS STASIS DERMATITIS OF BOTH LOWER EXTREMITIES: ICD-10-CM

## 2024-07-25 DIAGNOSIS — I89.0 LYMPHEDEMA OF BOTH LOWER EXTREMITIES: ICD-10-CM

## 2024-07-25 DIAGNOSIS — R60.0 EDEMA OF BOTH LOWER EXTREMITIES: Primary | ICD-10-CM

## 2024-07-25 PROCEDURE — 97535 SELF CARE MNGMENT TRAINING: CPT | Mod: KX

## 2024-07-31 ENCOUNTER — EXTERNAL CHRONIC CARE MANAGEMENT (OUTPATIENT)
Dept: PRIMARY CARE CLINIC | Facility: CLINIC | Age: 74
End: 2024-07-31
Payer: MEDICARE

## 2024-07-31 ENCOUNTER — PATIENT MESSAGE (OUTPATIENT)
Dept: DERMATOLOGY | Facility: CLINIC | Age: 74
End: 2024-07-31
Payer: MEDICARE

## 2024-07-31 PROCEDURE — 99490 CHRNC CARE MGMT STAFF 1ST 20: CPT | Mod: S$PBB,,, | Performed by: INTERNAL MEDICINE

## 2024-07-31 PROCEDURE — 99490 CHRNC CARE MGMT STAFF 1ST 20: CPT | Mod: PBBFAC | Performed by: INTERNAL MEDICINE

## 2024-07-31 NOTE — PROGRESS NOTES
Physical Therapy Daily Treatment Note/ Progress Note/ Discharge       Name: Zander Kaur   Clinic Number: [id number]     Therapy Diagnosis:        Encounter Diagnoses   Name Primary?    Edema of both lower extremities Yes    Lymphedema of both lower extremities      Varicose veins of bilateral lower extremities with other complications      Venous stasis dermatitis of both lower extremities        Physician: Zander Mejia MD*     Visit Date: 6/27/2024     Physician: Zander Mejia MD*     Physician Orders: PT Eval and Treat - lymphedema  Medical Diagnosis from Referral: I89.0 (ICD-10-CM) - Lymphedema, not elsewhere classified  Evaluation Date: 6/5/2024  Authorization Period Expiration: 5/2/2025  Plan of Care Expiration: 8/5/2024  Visit # / Visits authorized: 7/20     Time In: 5:30pm   Time Out: 6:15pm    Total Billable Time: 45 minutes     Precautions: Standard  Subjective      Pt reports: He has been wearing his new compression socks, bandages are not very comfortable, would like to not bandage   He was compliant with home exercise program.  Response to previous treatment: fine, eval only   Functional change: none      Pain: 0/10  Location: bilateral lower legs      Objective          7/8/2024:         7/25/2024:                Treatment:     ADLs: 45 minutes   Pt's BLE measured  Pt given measurements, educated on compression listings, where to buy, benefits of thigh high compression vs knee high compression, plan going forward with surgery, benefits of reducing edema before surgery    MULTILAYERED BANDAGING: NP, pt declined           Home Exercises Provided and Patient Education Provided      Education provided:    Remove bandages if painful   PATIENT/FAMILY Education: bandaging wear schedule,  HEP,  Beginning of self massage,  Self or assisted bandaging, compression options, and Risk reduction     Written Home Exercises Provided: Patient instructed to cont prior HEP.  Exercises were reviewed and  Zander was able to demonstrate them prior to the end of the session.  Zander demonstrated good  understanding of the education provided.         Assessment      Patient tolerated treatment well with no reports of pain. Patient has shown good reduction in edema to prepare pt for possible knee surgery. Pt reports decreased tolerance with bandages and now has compression. Now, pt is in agreement to continue with management of reduced edema and will reach out before surgery to make final reductions. Patient's leg was measured for thigh high compression and was educated on where to buy. Pt has met goals and is discharged at this time.      Zander Is progressing well towards his goals.   Pt prognosis is Excellent.      Pt will continue to benefit from skilled outpatient physical therapy to address the deficits listed in the problem list box on initial evaluation, provide pt/family education and to maximize pt's level of independence in the home and community environment.      Pt's spiritual, cultural and educational needs considered and pt agreeable to plan of care and goals.     Anticipated barriers to physical therapy: none      Goals:      Short Term Goals: (6 weeks)  1. Patient will show decreased girth in LLE by up to 1 cm to allow for LE symmetry, shoe and clothing choice, and ability to apply needed compression.  MET   2. Patient will demonstrate 100% knowledge of lymphedema precautions and signs of infection to allow for reduced lymphedema risk, infection risk, and/or exacerbation of condition. MET   3. Patient or caregiver will perform self-bandaging techniques and/or wearing of compression garments to allow for lymphatic drainage support, skin elasticity, and reduction in shape and size of limb.MET   4. Patient will perform self lymph drainage techniques to areas within reach to enhance lymphatic drainage and skin condition.  (progressing, not met)  5. Patient will tolerate daily activities with multilayered  bandaging to allow for lymphatic and venous support.  MET      Long Term Goals: (12  weeks)  1. Patient will show decreased girth in L LE by up to 2 cm  to allow for LE symmetry, shoe and clothing choice, and ability to apply needed compression daily. MET   2. Patient will show reduction in density to mild or less with improved contour of limb to allow for cosmesis, LE symmetry, infection risk reduction, and clothing and compression choice. MET   3. Patient to adri/doff compression garment with daily compliance to assist in lymphedema management, skin elasticity, and tissue density.  MET   4. Pt to show improved postural awareness and alignment.  MET   5. Pt to be I and compliant with HEP to allow for increased function in affected limb. MET      Plan   Pt discharged

## 2024-08-12 ENCOUNTER — TELEPHONE (OUTPATIENT)
Dept: BARIATRICS | Facility: CLINIC | Age: 74
End: 2024-08-12
Payer: MEDICARE

## 2024-08-12 NOTE — TELEPHONE ENCOUNTER
----- Message from Barbara Lott sent at 8/12/2024  3:29 PM CDT -----  Regarding: Appt Access  Contact: [phone]  Patient's wife Gerda is calling to reschedule appt missed on 7/29/24 for a medical consult. Please give wife a call to get pt scheduled.

## 2024-08-19 ENCOUNTER — OFFICE VISIT (OUTPATIENT)
Dept: TRANSPLANT | Facility: CLINIC | Age: 74
End: 2024-08-19
Payer: MEDICARE

## 2024-08-19 VITALS
RESPIRATION RATE: 18 BRPM | OXYGEN SATURATION: 98 % | SYSTOLIC BLOOD PRESSURE: 110 MMHG | HEIGHT: 68 IN | WEIGHT: 211.63 LBS | BODY MASS INDEX: 32.07 KG/M2 | HEART RATE: 56 BPM | TEMPERATURE: 97 F | DIASTOLIC BLOOD PRESSURE: 75 MMHG

## 2024-08-19 DIAGNOSIS — E66.01 SEVERE OBESITY (BMI 35.0-39.9) WITH COMORBIDITY: Primary | ICD-10-CM

## 2024-08-19 DIAGNOSIS — Z94.4 S/P LIVER TRANSPLANT: ICD-10-CM

## 2024-08-19 DIAGNOSIS — D84.9 IMMUNOSUPPRESSION: ICD-10-CM

## 2024-08-19 PROBLEM — E66.9 OBESITY (BMI 30-39.9): Status: RESOLVED | Noted: 2024-05-02 | Resolved: 2024-08-19

## 2024-08-19 PROBLEM — E66.811 OBESITY (BMI 30.0-34.9): Status: RESOLVED | Noted: 2020-11-11 | Resolved: 2024-08-19

## 2024-08-19 PROBLEM — E66.9 OBESITY (BMI 30.0-34.9): Status: RESOLVED | Noted: 2020-11-11 | Resolved: 2024-08-19

## 2024-08-19 PROCEDURE — G2211 COMPLEX E/M VISIT ADD ON: HCPCS | Mod: S$PBB,,, | Performed by: INTERNAL MEDICINE

## 2024-08-19 PROCEDURE — 99213 OFFICE O/P EST LOW 20 MIN: CPT | Mod: PBBFAC | Performed by: INTERNAL MEDICINE

## 2024-08-19 PROCEDURE — 99215 OFFICE O/P EST HI 40 MIN: CPT | Mod: S$PBB,,, | Performed by: INTERNAL MEDICINE

## 2024-08-19 PROCEDURE — 99999 PR PBB SHADOW E&M-EST. PATIENT-LVL III: CPT | Mod: PBBFAC,,, | Performed by: INTERNAL MEDICINE

## 2024-08-19 NOTE — Clinical Note
I'd like to switch him to sirolimus but he should be getting a knee replacement soon. Can we do the preliminary checks so we are ready for it after his surgery?

## 2024-08-19 NOTE — PROGRESS NOTES
Transplant Hepatology  Liver Transplant Recipient Follow-up    Transplant Date: 2010  UNOS Native Liver Dx: Alcoholic Cirrhosis    Zander is here for follow up of his liver transplant. He had a liver transplant in 2010 (14 years ago) for alcohol-related cirrhosis.  He remains well.  His main issue currently recurrent skin cancers.  Also recently diagnosed with prostate Ca but is not requiring treatment at the moment.  He continues to have left knee pain for which he will likely get a knee replacement in the near future.    Body mass index is 32.18 kg/m².    ORGAN: LIVER  Whole or Partial: whole liver  Donor Type:  - brain death  CDC High Risk: no  Donor CMV Status: positive  Donor HCV Status: negative  Donor HBcAb: negative  Biliary Anastomosis:   Arterial Anatomy:   IVC reconstruction:   Portal vein status:     He has had the following complications since transplant: biliary stricture. The noted complications are well controlled.      Subjective:     Interval History: .  He has normal liver and kidney function and is on tac monotherapy.    His only liver issue post-OLT was an anastomotic biliary stricture treated with ERCP and stenting but his biliary stents were removed in Dec 2011 and his LFTs have since remained normal.    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 Sees dermatology routinely. He works and enjoys fixing cars and racing. He has been followed by orthopedics for multiple joint and bone fractures.   3 x skin cancers in .    Normal liver and kidney function in Sep 22.  On Tac monotherapy at 1 mg BID.    Review of Systems   Constitutional:  Negative for fatigue, fever and unexpected weight change.   Eyes: Negative.    Respiratory: Negative.  Negative for chest tightness and shortness of breath.    Cardiovascular:  Negative for chest pain and leg swelling.   Gastrointestinal:  Negative for abdominal pain, blood in stool, constipation, nausea and  vomiting.   Genitourinary: Negative.    Musculoskeletal:  Positive for arthralgias (from previous injuries) and joint swelling.   Skin: Negative.  Negative for color change and rash.   Neurological: Negative.  Negative for dizziness and light-headedness.   Psychiatric/Behavioral: Negative.  Negative for confusion and suicidal ideas. The patient is not nervous/anxious.        Objective:     Physical Exam  Vitals and nursing note reviewed.   Constitutional:       Appearance: He is well-developed.   HENT:      Head: Normocephalic and atraumatic.   Eyes:      Conjunctiva/sclera: Conjunctivae normal.      Pupils: Pupils are equal, round, and reactive to light.   Neck:      Thyroid: No thyromegaly.   Cardiovascular:      Rate and Rhythm: Normal rate and regular rhythm.      Heart sounds: Normal heart sounds.   Pulmonary:      Effort: Pulmonary effort is normal.      Breath sounds: Normal breath sounds.   Abdominal:      General: Bowel sounds are normal. There is no distension.      Palpations: Abdomen is soft.      Tenderness: There is no abdominal tenderness.       Musculoskeletal:         General: No tenderness.      Cervical back: Normal range of motion and neck supple.   Skin:     General: Skin is warm and dry.   Neurological:      Mental Status: He is alert and oriented to person, place, and time.   Psychiatric:         Behavior: Behavior normal.         Thought Content: Thought content normal.         Judgment: Judgment normal.       Lab Results   Component Value Date    BILITOT 0.9 06/13/2024    AST 36 06/13/2024    ALT 42 06/13/2024    ALKPHOS 79 06/13/2024    CREATININE 1.1 06/13/2024    ALBUMIN 3.7 06/13/2024     Lab Results   Component Value Date    WBC 5.53 06/13/2024    HGB 15.5 06/13/2024    HCT 45.6 06/13/2024     06/13/2024     Lab Results   Component Value Date    TACROLIMUS 6.8 06/13/2024       Assessment/Plan:     1. Severe obesity (BMI 35.0-39.9) with comorbidity    2. Immunosuppression    3.  S/P liver transplant        1. S/p liver transplant 6/15/2010 for Alcoholic cirrhosis. Normal LFTs. Chronic immunosuppressive medications for rejection prophylaxis at target. On tacrolimus   Plan: no adjustment needed.Continue monitoring symptoms, labs and drug levels for drug-related toxicity and side effects  2. Squamous cell skin cancer + BCC. Advised to continue dermatology follow up- will switch to sirolimus once he gets his knee replacement  3. Hypertension- controlled with medication- has previously enrolled in digital medicine program here  4. Osteoarthritis and multiple joint injuries. Continue follow up with orthopedics. On pain meds.   5. Uses alcohol infrequently- asked to minimize this if not able to stop completely.  6. Weight loss recommended.  7. Colonoscopy appointment to be made by patient    Cleared for knee replacement.  Will plan to change to sirolimus AFTER he heals from his knee surgery.    Clinic in 1 year.    MD PURVI Irving Patient Status  Functional Status: 100% - Normal, no complaints, no evidence of disease  Physical Capacity: No Limitations

## 2024-08-19 NOTE — LETTER
August 19, 2024        Shai Davison  [address]  [facility] [address]  Phone: [phone]  Fax: [phone]             [facility]  [address]  [facility] [address]  Phone: [phone]   Patient: Zander Kaur Sr.   MR Number: [id number]   YOB: 1950   Date of Visit: 8/19/2024       Dear Dr. Shai Davison    Thank you for referring Zander Kaur to me for evaluation. Attached you will find relevant portions of my assessment and plan of care.    If you have questions, please do not hesitate to call me. I look forward to following Zander Kaur along with you.    Sincerely,    Carson Oswald MD    Enclosure    If you would like to receive this communication electronically, please contact [email or web address] or [phone] to request [employer] Link access.    [employer] Link is a tool which provides read-only access to select patient information with whom you have a relationship. Its easy to use and provides real time access to review your patients record including encounter summaries, notes, results, and demographic information.    If you feel you have received this communication in error or would no longer like to receive these types of communications, please e-mail [email or web address]

## 2024-08-20 ENCOUNTER — PATIENT MESSAGE (OUTPATIENT)
Dept: TRANSPLANT | Facility: CLINIC | Age: 74
End: 2024-08-20
Payer: MEDICARE

## 2024-08-20 DIAGNOSIS — Z94.4 S/P LIVER TRANSPLANT: Primary | ICD-10-CM

## 2024-08-31 ENCOUNTER — EXTERNAL CHRONIC CARE MANAGEMENT (OUTPATIENT)
Dept: PRIMARY CARE CLINIC | Facility: CLINIC | Age: 74
End: 2024-08-31
Payer: MEDICARE

## 2024-08-31 PROCEDURE — 99490 CHRNC CARE MGMT STAFF 1ST 20: CPT | Mod: PBBFAC | Performed by: INTERNAL MEDICINE

## 2024-08-31 PROCEDURE — 99490 CHRNC CARE MGMT STAFF 1ST 20: CPT | Mod: S$PBB,,, | Performed by: INTERNAL MEDICINE

## 2024-09-09 ENCOUNTER — OFFICE VISIT (OUTPATIENT)
Dept: DERMATOLOGY | Facility: CLINIC | Age: 74
End: 2024-09-09
Payer: MEDICARE

## 2024-09-09 DIAGNOSIS — D48.5 NEOPLASM OF UNCERTAIN BEHAVIOR OF SKIN: Primary | ICD-10-CM

## 2024-09-09 DIAGNOSIS — L57.0 AK (ACTINIC KERATOSIS): ICD-10-CM

## 2024-09-09 DIAGNOSIS — Z85.828 HISTORY OF SKIN CANCER: ICD-10-CM

## 2024-09-09 DIAGNOSIS — D04.9 BOWEN'S DISEASE: ICD-10-CM

## 2024-09-09 PROCEDURE — 99999 PR PBB SHADOW E&M-EST. PATIENT-LVL III: CPT | Mod: PBBFAC,,, | Performed by: DERMATOLOGY

## 2024-09-09 PROCEDURE — 17000 DESTRUCT PREMALG LESION: CPT | Mod: S$PBB,XS,79, | Performed by: DERMATOLOGY

## 2024-09-09 PROCEDURE — 17272 DSTR MAL LES S/N/H/F/G 1.1-2: CPT | Mod: PBBFAC | Performed by: DERMATOLOGY

## 2024-09-09 PROCEDURE — 17003 DESTRUCT PREMALG LES 2-14: CPT | Mod: PBBFAC | Performed by: DERMATOLOGY

## 2024-09-09 PROCEDURE — 99213 OFFICE O/P EST LOW 20 MIN: CPT | Mod: PBBFAC,25 | Performed by: DERMATOLOGY

## 2024-09-09 PROCEDURE — 99212 OFFICE O/P EST SF 10 MIN: CPT | Mod: 25,S$PBB,, | Performed by: DERMATOLOGY

## 2024-09-09 PROCEDURE — 17003 DESTRUCT PREMALG LES 2-14: CPT | Mod: S$PBB,,, | Performed by: DERMATOLOGY

## 2024-09-09 PROCEDURE — 11102 TANGNTL BX SKIN SINGLE LES: CPT | Mod: S$PBB,XS,79, | Performed by: DERMATOLOGY

## 2024-09-09 PROCEDURE — 17272 DSTR MAL LES S/N/H/F/G 1.1-2: CPT | Mod: S$PBB,79,, | Performed by: DERMATOLOGY

## 2024-09-09 PROCEDURE — 11102 TANGNTL BX SKIN SINGLE LES: CPT | Mod: 59,PBBFAC | Performed by: DERMATOLOGY

## 2024-09-09 PROCEDURE — 17000 DESTRUCT PREMALG LESION: CPT | Mod: 59,PBBFAC | Performed by: DERMATOLOGY

## 2024-09-09 NOTE — PROGRESS NOTES
Subjective:      Patient ID:  Zander Kaur Sr. is a 73 y.o. male who presents for   Chief Complaint   Patient presents with    Skin Check     Ubse     History of Present Illness: The patient presents for follow up of skin check.    The patient was last seen on: 2/12/2024 for cryosurgery to actinic keratoses which have resolved and bx x2 R ear helix-SCC excised by CAIT and R post neck- SCCIS rechecking area today.  This is a high risk patient here to check for the development of new lesions.     Other skin complaints: no new concerns        Review of Systems   Skin:  Positive for daily sunscreen use, activity-related sunscreen use and wears hat (always). Negative for recent sunburn.   Hematologic/Lymphatic: Does not bruise/bleed easily.       Objective:   Physical Exam   Constitutional: He appears well-developed and well-nourished. No distress.   Neurological: He is alert and oriented to person, place, and time. He is not disoriented.   Psychiatric: He has a normal mood and affect.   Skin:   Areas Examined (abnormalities noted in diagram):   Scalp / Hair Palpated and Inspected  Head / Face Inspection Performed            Diagram Legend     Erythematous scaling macule/papule c/w actinic keratosis       Vascular papule c/w angioma      Pigmented verrucoid papule/plaque c/w seborrheic keratosis      Yellow umbilicated papule c/w sebaceous hyperplasia      Irregularly shaped tan macule c/w lentigo     1-2 mm smooth white papules consistent with Milia      Movable subcutaneous cyst with punctum c/w epidermal inclusion cyst      Subcutaneous movable cyst c/w pilar cyst      Firm pink to brown papule c/w dermatofibroma      Pedunculated fleshy papule(s) c/w skin tag(s)      Evenly pigmented macule c/w junctional nevus     Mildly variegated pigmented, slightly irregular-bordered macule c/w mildly atypical nevus      Flesh colored to evenly pigmented papule c/w intradermal nevus       Pink pearly papule/plaque c/w basal  cell carcinoma      Erythematous hyperkeratotic cursted plaque c/w SCC      Surgical scar with no sign of skin cancer recurrence      Open and closed comedones      Inflammatory papules and pustules      Verrucoid papule consistent consistent with wart     Erythematous eczematous patches and plaques     Dystrophic onycholytic nail with subungual debris c/w onychomycosis     Umbilicated papule    Erythematous-base heme-crusted tan verrucoid plaque consistent with inflamed seborrheic keratosis     Erythematous Silvery Scaling Plaque c/w Psoriasis     See annotation                Assessment / Plan:      Pathology Orders:       Normal Orders This Visit    Specimen to Pathology, Dermatology     Questions:    Procedure Type: Dermatology and skin neoplasms    Number of Specimens: 1    ------------------------: -------------------------    Spec 1 Procedure: Biopsy    Spec 1 Clinical Impression: r/o scc    Spec 1 Source: right ear conchal bowl    Release to patient: Immediate    Release to patient:           Neoplasm of uncertain behavior of skin  -     Specimen to Pathology, Dermatology    Shave biopsy procedure note:    Shave biopsy performed after verbal consent including risk of infection, scar, recurrence, need for additional treatment of site. Area prepped with alcohol, anesthetized with approximately 1.0cc of 1% lidocaine with epinephrine. Lesional tissue shaved with razor blade. Hemostasis achieved with application of aluminum chloride followed by hyfrecation. No complications. Dressing applied. Wound care explained.    If biopsy positive for malignancy, refer to Dr. Gil for Mohs surgery consultation.      Bowen's disease - right post neck; bx 6/10/24 c/w ana    Electrodessication and Curettage Procedure note:    Verbal consent obtained. Lesional tissue marked and prepped with alcohol. Lesion anesthetized with 1% lidocaine with epinephrine. Curettage and Desiccation x 3 cycles to base. Aluminum chloride for  hemostasis. Lesion size after primary curettage: 1.2 cm    Area bandaged and wound care explained.      AK (actinic keratosis)  Cryosurgery Procedure Note    Verbal consent from the patient is obtained including, but not limited to, risk of hypopigmentation/hyperpigmentation, scar, recurrence of lesion. The patient is aware of the precancerous quality and need for treatment of these lesions. Liquid nitrogen cryosurgery is applied to the 7 actinic keratoses, as detailed in the physical exam, to produce a freeze injury. The patient is aware that blisters may form and is instructed on wound care with gentle cleansing and use of vaseline ointment to keep moist until healed. The patient is supplied a handout on cryosurgery and is instructed to call if lesions do not completely resolve.    Cont wear hat always      History of skin cancer  Area(s) of previous NMSC evaluated with no signs of recurrence.    Upper body skin examination performed today including at least 6 points as noted in physical examination. Suspicious lesions noted.    Recommend daily sun protection/avoidance and use of at least SPF 30, broad spectrum sunscreen (OTC drug).     Pt had BCC right nasal ala bx'ed 11/22 and sent to [facility] for mohs 2/2023 then to ENT for repair (Dr. Brandon then Dr. Augustine). Pt not happy with cosmetic result.                Follow up in about 3 months (around 12/9/2024).

## 2024-09-09 NOTE — PATIENT INSTRUCTIONS
Wound Care    A band aid and vaseline ointment has been placed over the site.  This should remain in place for 24 hours.  It is recommended that you keep the area dry for the first 24 hours.  After 24 hours, you may remove the band aid and wash the area with warm soap and water and apply Vaseline jelly.  Many patients prefer to use Neosporin or Bacitracin ointment.  This is acceptable; however, know that you can develop an allergy to this medication even if you have used it safely for years.  It is important to keep the area moist.  Letting it dry out and get air slows healing time, and will worsen the scar.  Band aid is optional after first 24 hours.      If you notice increasing redness, tenderness, pain, or yellow drainage at the site, please notify your doctor.  These are signs of an infection.    If your site is bleeding, apply firm pressure for 15 minutes straight.  Repeat for another 15 minutes, if it is still bleeding.   If the surgical site continues to bleed, then please contact your doctor.      [facility] [place] [address]/ [phone] FAX/ [email or web address]           Shave Biopsy Wound Care    Your doctor has performed a shave biopsy today.  A band aid and vaseline ointment has been placed over the site.  This should remain in place for NO LONGER THAN 48 hours.  It is fine to remove the bandaid after 24 hours, if the area is no longer bleeding. It is recommended that you keep the area dry (do not wet)) for the first 24 hours.  After 24 hours, wash the area with warm soap and water and apply Vaseline jelly.  Many patients prefer to use Neosporin or Bacitracin ointment.  This is acceptable; however, know that you can develop an allergy to this medication even if you have used it safely for years.  It is important to keep the area moist.  Letting it dry out and get air slows healing time, and will worsen the scar.        If you notice increasing redness, tenderness, pain,  or yellow drainage at the biopsy site, please notify your doctor.  These are signs of an infection.    If your biopsy site is bleeding, apply firm pressure for 15 minutes straight.  Repeat for another 15 minutes, if it is still bleeding.   If the surgical site continues to bleed, then please contact your doctor.      For [facility] users:   You will receive your biopsy results in [facility] as soon as they are available. Please be assured that your physician/provider will review your results and will then determine what further treatment, evaluation, or planning is required. You should be contacted by your physician's/provider's office within 5 business days of receiving your results; If not, please reach out to directly. This is one more way [facility] is putting you first.     [facility] [place] [address]/ [phone] FAX/ [email or web address]         CRYOSURGERY      Your doctor has used a method called cryosurgery to treat your skin condition. Cryosurgery refers to the use of very cold substances to treat a variety of skin conditions such as warts, pre-skin cancers, molluscum contagiosum, sun spots, and several benign growths. The substance we use in cryosurgery is liquid nitrogen and is so cold (-195 degrees Celsius) that is burns when administered.     Following treatment in the office, the skin may immediately burn and become red. You may find the area around the lesion is affected as well. It is sometimes necessary to treat not only the lesion, but a small area of the surrounding normal skin to achieve a good response.     A blister, and even a blood filled blister, may form after treatment.   This is a normal response. If the blister is painful, it is acceptable to sterilize a needle and with rubbing alcohol and gently pop the blister. It is important that you gently wash the area with soap and warm water as the blister fluid may contain wart virus if a wart was treated. Do no remove  the roof of the blister.     The area treated can take anywhere from 1-3 weeks to heal. Healing time depends on the kind skin lesion treated, the location, and how aggressively the lesion was treated. It is recommended that the areas treated are covered with Vaseline or bacitracin ointment and a band-aid. If a band-aid is not practical, just ointment applied several times per day will do. Keeping these areas moist will speed the healing time.    Treatment with liquid nitrogen can leave a scar. In dark skin, it may be a light or dark scar, in light skin it may be a white or pink scar. These will generally fade with time, but may never go away completely.     If you have any concerns after your treatment, please feel free to call the office.       [facility] [place] [address]/ [phone] FAX/ [email or web address]

## 2024-09-11 ENCOUNTER — PATIENT MESSAGE (OUTPATIENT)
Dept: TRANSPLANT | Facility: CLINIC | Age: 74
End: 2024-09-11
Payer: MEDICARE

## 2024-09-12 DIAGNOSIS — Z94.4 LIVER REPLACED BY TRANSPLANT: ICD-10-CM

## 2024-09-12 RX ORDER — TACROLIMUS 1 MG/1
1 CAPSULE, GELATIN COATED ORAL EVERY 12 HOURS
Qty: 180 CAPSULE | Refills: 3 | Status: SHIPPED | OUTPATIENT
Start: 2024-09-12 | End: 2025-09-12

## 2024-09-12 NOTE — TELEPHONE ENCOUNTER
----- Message from Noemi Avila sent at 9/12/2024  9:01 AM CDT -----  Regarding: Refill  Contact: [place]  [phone]              Rx Name:  PROGRAF 1 mg Cap      Preferred Pharmacy with number:    [facility] [address]  [facility] [address]  Phone: [phone] Fax: [phone]    Ordering Provider:  Carson Oswald MD      Contact preference: [phone]      Comments/Notes: Requesting refill

## 2024-09-16 ENCOUNTER — LAB VISIT (OUTPATIENT)
Dept: LAB | Facility: HOSPITAL | Age: 74
End: 2024-09-16
Attending: INTERNAL MEDICINE
Payer: MEDICARE

## 2024-09-16 DIAGNOSIS — Z94.4 S/P LIVER TRANSPLANT: ICD-10-CM

## 2024-09-16 DIAGNOSIS — I70.0 AORTIC ATHEROSCLEROSIS: ICD-10-CM

## 2024-09-16 LAB
CHOLEST SERPL-MCNC: 89 MG/DL (ref 120–199)
CHOLEST/HDLC SERPL: 2.5 {RATIO} (ref 2–5)
CREAT UR-MCNC: 350 MG/DL (ref 23–375)
HDLC SERPL-MCNC: 36 MG/DL (ref 40–75)
HDLC SERPL: 40.4 % (ref 20–50)
LDLC SERPL CALC-MCNC: 39.8 MG/DL (ref 63–159)
NONHDLC SERPL-MCNC: 53 MG/DL
PROT UR-MCNC: 31 MG/DL (ref 0–15)
PROT/CREAT UR: 0.09 MG/G{CREAT} (ref 0–0.2)
TRIGL SERPL-MCNC: 66 MG/DL (ref 30–150)

## 2024-09-16 PROCEDURE — 80061 LIPID PANEL: CPT | Performed by: INTERNAL MEDICINE

## 2024-09-16 PROCEDURE — 84156 ASSAY OF PROTEIN URINE: CPT | Performed by: INTERNAL MEDICINE

## 2024-09-16 PROCEDURE — 82570 ASSAY OF URINE CREATININE: CPT | Performed by: INTERNAL MEDICINE

## 2024-09-16 PROCEDURE — 36415 COLL VENOUS BLD VENIPUNCTURE: CPT | Performed by: INTERNAL MEDICINE

## 2024-09-18 ENCOUNTER — TELEPHONE (OUTPATIENT)
Dept: ORTHOPEDICS | Facility: CLINIC | Age: 74
End: 2024-09-18
Payer: MEDICARE

## 2024-09-18 DIAGNOSIS — M17.10 ARTHRITIS OF KNEE: ICD-10-CM

## 2024-09-18 DIAGNOSIS — M25.562 LEFT KNEE PAIN, UNSPECIFIED CHRONICITY: Primary | ICD-10-CM

## 2024-09-18 NOTE — TELEPHONE ENCOUNTER
Called pt and scheduled him to see Dr. Lock on 10/23 at 10:00 AM and bilat knee x-rays at 9:00 AM the day of visit. Pt wants L knee replacement, both knees bother him however. Pt is having knee drained regularly. Pt has lost weight, down to about 200 pounds.

## 2024-09-19 ENCOUNTER — PATIENT MESSAGE (OUTPATIENT)
Dept: ADMINISTRATIVE | Facility: CLINIC | Age: 74
End: 2024-09-19
Payer: MEDICARE

## 2024-09-20 ENCOUNTER — TELEPHONE (OUTPATIENT)
Dept: ADMINISTRATIVE | Facility: CLINIC | Age: 74
End: 2024-09-20
Payer: MEDICARE

## 2024-09-20 NOTE — TELEPHONE ENCOUNTER
Called pt; no answer; left message informing patient I was calling to confirm his virtual EAWV on 9/23/24 at 9:00am and to see if he needed any help with setting up for virtual appt or e-pre check and to login 10 minutes prior to scheduled appt; left my name & number for pt to return my call if he has any questions or concerns; sent message through portal

## 2024-09-23 ENCOUNTER — OFFICE VISIT (OUTPATIENT)
Dept: CARDIOLOGY | Facility: CLINIC | Age: 74
End: 2024-09-23
Payer: MEDICARE

## 2024-09-23 ENCOUNTER — OFFICE VISIT (OUTPATIENT)
Dept: FAMILY MEDICINE | Facility: CLINIC | Age: 74
End: 2024-09-23
Payer: MEDICARE

## 2024-09-23 VITALS
HEIGHT: 68 IN | DIASTOLIC BLOOD PRESSURE: 60 MMHG | BODY MASS INDEX: 30.78 KG/M2 | SYSTOLIC BLOOD PRESSURE: 88 MMHG | HEART RATE: 58 BPM | WEIGHT: 203.06 LBS

## 2024-09-23 VITALS — BODY MASS INDEX: 30.01 KG/M2 | WEIGHT: 198 LBS | HEIGHT: 68 IN

## 2024-09-23 DIAGNOSIS — M25.562 LEFT KNEE PAIN, UNSPECIFIED CHRONICITY: ICD-10-CM

## 2024-09-23 DIAGNOSIS — E66.09 CLASS 1 OBESITY DUE TO EXCESS CALORIES WITH SERIOUS COMORBIDITY AND BODY MASS INDEX (BMI) OF 30.0 TO 30.9 IN ADULT: ICD-10-CM

## 2024-09-23 DIAGNOSIS — Z85.828 HISTORY OF SKIN CANCER: ICD-10-CM

## 2024-09-23 DIAGNOSIS — I70.0 AORTIC ATHEROSCLEROSIS: ICD-10-CM

## 2024-09-23 DIAGNOSIS — R60.0 EDEMA OF BOTH LOWER EXTREMITIES: ICD-10-CM

## 2024-09-23 DIAGNOSIS — Z94.4 S/P LIVER TRANSPLANT: ICD-10-CM

## 2024-09-23 DIAGNOSIS — I89.0 LYMPHEDEMA OF BOTH LOWER EXTREMITIES: ICD-10-CM

## 2024-09-23 DIAGNOSIS — Z86.73 HISTORY OF STROKE: Primary | ICD-10-CM

## 2024-09-23 DIAGNOSIS — I15.8 HYPERTENSION ASSOCIATED WITH TRANSPLANTATION: ICD-10-CM

## 2024-09-23 DIAGNOSIS — D69.6 THROMBOCYTOPENIA: ICD-10-CM

## 2024-09-23 DIAGNOSIS — C41.1 MALIGNANT NEOPLASM OF MANDIBLE: ICD-10-CM

## 2024-09-23 DIAGNOSIS — Z94.9 HYPERTENSION ASSOCIATED WITH TRANSPLANTATION: ICD-10-CM

## 2024-09-23 DIAGNOSIS — L98.8 MOHS DEFECT: ICD-10-CM

## 2024-09-23 DIAGNOSIS — F11.20 UNCOMPLICATED OPIOID DEPENDENCE: ICD-10-CM

## 2024-09-23 DIAGNOSIS — Z01.810 PREOPERATIVE CARDIOVASCULAR EXAMINATION: ICD-10-CM

## 2024-09-23 DIAGNOSIS — K74.69 OTHER CIRRHOSIS OF LIVER: ICD-10-CM

## 2024-09-23 DIAGNOSIS — I83.893 VARICOSE VEINS OF BILATERAL LOWER EXTREMITIES WITH OTHER COMPLICATIONS: ICD-10-CM

## 2024-09-23 DIAGNOSIS — I10 ESSENTIAL HYPERTENSION: ICD-10-CM

## 2024-09-23 DIAGNOSIS — Z00.00 ENCOUNTER FOR MEDICARE ANNUAL WELLNESS EXAM: Primary | ICD-10-CM

## 2024-09-23 DIAGNOSIS — Z98.890 MOHS DEFECT: ICD-10-CM

## 2024-09-23 DIAGNOSIS — Z99.89 DEPENDENCE ON OTHER ENABLING MACHINES AND DEVICES: ICD-10-CM

## 2024-09-23 DIAGNOSIS — L57.0 AK (ACTINIC KERATOSIS): ICD-10-CM

## 2024-09-23 DIAGNOSIS — M17.12 ARTHRITIS OF LEFT KNEE: ICD-10-CM

## 2024-09-23 DIAGNOSIS — M19.90 ARTHRITIS: ICD-10-CM

## 2024-09-23 DIAGNOSIS — I87.2 VENOUS STASIS DERMATITIS OF BOTH LOWER EXTREMITIES: ICD-10-CM

## 2024-09-23 DIAGNOSIS — Z00.00 ENCOUNTER FOR PREVENTIVE HEALTH EXAMINATION: ICD-10-CM

## 2024-09-23 DIAGNOSIS — D84.9 IMMUNOSUPPRESSION: ICD-10-CM

## 2024-09-23 PROBLEM — E66.811 CLASS 1 OBESITY DUE TO EXCESS CALORIES WITH SERIOUS COMORBIDITY AND BODY MASS INDEX (BMI) OF 30.0 TO 30.9 IN ADULT: Status: ACTIVE | Noted: 2023-05-08

## 2024-09-23 PROCEDURE — 99214 OFFICE O/P EST MOD 30 MIN: CPT | Mod: S$PBB,,, | Performed by: INTERNAL MEDICINE

## 2024-09-23 PROCEDURE — 99213 OFFICE O/P EST LOW 20 MIN: CPT | Mod: PBBFAC,PO | Performed by: INTERNAL MEDICINE

## 2024-09-23 PROCEDURE — 99999 PR PBB SHADOW E&M-EST. PATIENT-LVL III: CPT | Mod: PBBFAC,,, | Performed by: INTERNAL MEDICINE

## 2024-09-23 NOTE — PATIENT INSTRUCTIONS
Counseling and Referral of Other Preventative  (Italic type indicates deductible and co-insurance are waived)    Patient Name: Zander Kaur  Today's Date: 9/23/2024    Health Maintenance       Date Due Completion Date    RSV Vaccine (Age 60+ and Pregnant patients) (1 - 1-dose 60+ series) Never done ---    Abdominal Aortic Aneurysm Screening 12/07/2016 12/5/2011    Shingles Vaccine (2 of 2) 12/01/2020 10/6/2020    Pneumococcal Vaccines (Age 65+) (3 of 3 - PPSV23 or PCV20) 12/01/2020 10/6/2020    Influenza Vaccine (1) 09/01/2024 12/13/2021    COVID-19 Vaccine (4 - 2023-24 season) 09/01/2024 1/5/2022    TETANUS VACCINE 03/19/2025 3/19/2015    DEXA Scan 06/12/2025 6/12/2023    Colorectal Cancer Screening 12/18/2028 12/18/2023    Override on 8/5/2015: Done    Lipid Panel 09/16/2029 9/16/2024        No orders of the defined types were placed in this encounter.      The following information is provided to all patients.  This information is to help you find resources for any of the problems found today that may be affecting your health:                  Living healthy guide: www.[facility].[place].gov      Understanding Diabetes: www.diabetes.org      Eating healthy: www.cdc.gov/healthyweight      CDC home safety checklist: www.cdc.gov/steadi/patient.html      Agency on Aging: www.goea.[place].gov      Alcoholics anonymous (AA): www.aa.org      Physical Activity: www.[place].nih.gov/te6rjqn      Tobacco use: www.quitwithus[place].org

## 2024-09-23 NOTE — PATIENT INSTRUCTIONS
Assessment/Plan:  Zander Kaur . is a 73 y.o. male with HTN, s/p liver transplant (2010), obesity, history of stroke, who presents for a follow up appointment.    BLE Edema- Due to BLE lymphedema and venous insufficiency.  Now significantly improved and stable. Recommend wearing graduated compression hose.  Limit sodium intake to 2000 mg daily.  Limit volume intake to 1.5 L daily.  Elevate legs when resting.    2. HTN- Continue current medications.    3. Obesity- Encourage diet, exercise, and weight loss.  Refer to Bariatric Medicine for assistance with weight loss.    4. Preoperative Cardiac Risk Stratification Prior to Left TKA- Mr. Kaur has no chest pain, no heart failure symptoms and no arrhythmia.  He can perform greater than 4 METS with no difficulty.  EKG on 5/2/2024 shows sinus bradycardia.  He is at intermediate risk for a perioperative major adverse cardiac event during this intermediate risk procedure.  Revised Cardiac Risk Index of 6%. No further testing indicated. Proceed with surgery.     5. Aortic atherosclerosis/history of stroke- Start atorvastatin 40 mg daily.     Follow up in 4 months with lipids prior

## 2024-09-23 NOTE — PROGRESS NOTES
The patient location is: [place]  The chief complaint leading to consultation is: Medicare AWV     Visit type: audiovisual    Face to Face time with patient: 35  60 minutes of total time spent on the encounter, which includes face to face time and non-face to face time preparing to see the patient (eg, review of tests), Obtaining and/or reviewing separately obtained history, Documenting clinical information in the electronic or other health record, Independently interpreting results (not separately reported) and communicating results to the patient/family/caregiver, or Care coordination (not separately reported).         Each patient to whom he or she provides medical services by telemedicine is:  (1) informed of the relationship between the physician and patient and the respective role of any other health care provider with respect to management of the patient; and (2) notified that he or she may decline to receive medical services by telemedicine and may withdraw from such care at any time.    Notes:       Zander Kaur presented for a  Medicare AWV and comprehensive Health Risk Assessment today. The following components were reviewed and updated:    Medical history  Family History  Social history  Allergies and Current Medications  Health Risk Assessment  Health Maintenance  Care Team         ** See Completed Assessments for Annual Wellness Visit within the encounter summary.**         The following assessments were completed:  Living Situation  CAGE  Depression Screening  Fall Risk Assessment (MACH 10)  Hearing Assessment(HHI)  Cognitive Function Screening  Nutrition Screening  ADL Screening  PAQ Screening    Opioid documentation:      Patient does have a current opioid prescription.      Patient accepted further discussion regarding opioid medication use.      Patient is currently taking oxycodone narcotic for knee pain.        Pain level today is > 10/10.       In addition to narcotic pain medications,  "patient is also using NSAIDs for pain control.       Patient is followed by a specialist currently for their pain and will not be referred today.       Patient's opioid risk potential based on ORT-OUD tool:       Chang each box that applies   No   Yes     Family history of substance abuse   Alcohol [x] []   Illegal drugs [x] []   Rx drugs [x] []     Personal history of substance abuse   Alcohol [x] []   Illegal drugs [x] []   Rx drugs [x] []     Age between 16-45 years   [x]   []     Patient with ADD, OCD, Bipolar disorder, schizoprenia   [x]   []     Patient with depression   [x]   []                         Scoring total                                    0                             Non-opioid treatment options have been discussed today and added to the patient's after visit summary.        Vitals:    09/23/24 0903   Weight: 89.8 kg (198 lb)   Height: 5' 8" (1.727 m)     Body mass index is 30.11 kg/m².  Physical Exam  Constitutional:       General: He is not in acute distress.     Appearance: Normal appearance. He is well-developed and well-groomed.   Skin:     Coloration: Skin is not pale.   Neurological:      Mental Status: He is alert and oriented to person, place, and time.   Psychiatric:         Mood and Affect: Mood normal.         Speech: Speech normal.         Behavior: Behavior normal. Behavior is cooperative.         Thought Content: Thought content normal.               Diagnoses and health risks identified today and associated recommendations/orders:    1. Encounter for Medicare annual wellness exam  - Ambulatory Referral/Consult to Enhanced Annual Wellness Visit (eAWV)    2. Encounter for preventive health examination  Screenings performed, as noted above. Personal preventative testing needs reviewed.     3. Uncomplicated opioid dependence  Chronic; stable on percocet. Followed by Pain Medicine.     4. Malignant neoplasm of mandible  S/p Electrodessication and Curettage Procedure note - right medial " cheek 2023. Followed by Dermatology.       5. Aortic atherosclerosis  Chronic; stable on statin. Followed by Cardiology.    6. Thrombocytopenia  Chronic. Stable on labs 06/24. Followed by PCP.     7. Other cirrhosis of liver  8. Immunosuppression  9. S/P liver transplant  S/p liver transplant > 14 years ago. Stable with prograf. Followed by Transplant.     10. Hypertension associated with transplantation  Chronic; stable on metoprolol. Followed by PCP.    11. Mohs defect  12. AK (actinic keratosis)  13. History of skin cancer  Stable. Followed by Dermatology.     14. Arthritis  15. Left knee pain, unspecified chronicity  16. Arthritis of left knee  Chronic. Stable with methocarbamol, percocet, celebrex prn. Followed by Orthopedics/Pain Medicine.     17. Dependence on other enabling machines and devices  Continue to use cane as needed. Followed by PCP/Orthopedics.     18. Class 1 obesity due to excess calories with serious comorbidity and body mass index (BMI) of 30.0 to 30.9 in adult  Work on diet modification and increase in physical activity as tolerated.   Followed by PCP.       Provided Zander with a 5-10 year written screening schedule and personal prevention plan. Recommendations were developed using the USPSTF age appropriate recommendations. Education, counseling, and referrals were provided as needed. After Visit Summary printed and given to patient which includes a list of additional screenings\tests needed.    Follow up in about 1 year (around 9/23/2025) for your next annual wellness visit.    Petra Stewart NP      Advance Care Planning     I offered to discuss advanced care planning, including how to pick a person who would make decisions for you if you were unable to make them for yourself, called a health care power of , and what kind of decisions you might make such as use of life sustaining treatments such as ventilators and tube feeding when faced with a life limiting illness recorded on  a living will that they will need to know. (How you want to be cared for as you near the end of your natural life)     X  Patient has advanced directives written and agrees to provide copies to the institution.

## 2024-09-23 NOTE — PROGRESS NOTES
[facility]      Chief Complaint   Patient presents with    Edema of both lower extremities       Patient ID: Zander Kaur Sr. is a 73 y.o. male with HTN, s/p liver transplant (2010), obesity, who presents for a follow up appointment.  Pertinent history/events are as follows:     -Pt kindly referred by Dr. Lock for evaluation of leg swelling.     -At our initial clinic visit on 5/2/2024, Mr. Kaur reports leg swelling for 20 years.  He has no claudication or tissue loss.  Plan:  BLE Edema- Due to BLE lymphedema and possible venous insufficiency.  Start bumex 0.5 mg daily.  Check cmp today and in 1 week.  Refer to lymphedema clinic.  Recommend wearing graduated compression hose.  Limit sodium intake to 2000 mg daily.  Limit volume intake to 1.5 L daily.  Elevate legs when resting.  HTN- Continue current medications.  Obesity- Encourage diet, exercise, and weight loss.  Refer to Bariatric Medicine for assistance with weight loss.  Preoperative Cardiac Risk Stratification- Mr. Kaur has no chest pain, no heart failure symptoms and no arrhythmia.  He can perform greater than 4 METS with no difficulty.  EKG today shows  Aortic atherosclerosis/history of stroke- Start atorvastatin 40 mg daily.    HPI:  Mr. Kaur reports significant improvement in BLE edema.  He has completed lymphedema clinic therapy.  ANDREA Study on 5/13/2024 revealed normal resting ABIs bilaterally. BLE Venous Reflux Study on 5/13/2024 demonstrated right GSV reflux and no DVT.      Past Medical History:   Diagnosis Date    Actinic keratosis     Allergy     seasonal    Arthritis     Basal cell carcinoma of right ala nasi 02/06/2023    Hypertension     Joint pain     Organ transplant     liver 2010    SCC (squamous cell carcinoma) 08/15/2022    Right cheek -SSW    SCC (squamous cell carcinoma) 03/27/2023    Right angle of mandible    SCC (squamous cell carcinoma) 03/27/2023    Right medial cheek    SCC (squamous cell carcinoma) 02/12/2024     anterior chin    SCC (squamous cell carcinoma) 06/10/2024    R ear helix-excised by CAIT    SCC (squamous cell carcinoma) 06/10/2024    SCCIS-R post neck    Squamous Cell Carcinoma 08/28/2013    right temple    Squamous Cell Carcinoma 09/04/2013    left forehead    Stroke      Past Surgical History:   Procedure Laterality Date    ANKLE SURGERY      multipole surgeries    CLOSURE OF DEFECT OF MOHS PROCEDURE Right 02/09/2023    Procedure: CLOSURE, MOHS PROCEDURE DEFECT;  Surgeon: Uzair Brandon MD;  Location: [facility] OR 2ND FLR;  Service: ENT;  Laterality: Right;    COLONOSCOPY N/A 12/18/2023    Procedure: COLONOSCOPY;  Surgeon: Leonardo Penny MD;  Location: [facility] ENDOSCOPY;  Service: Endoscopy;  Laterality: N/A;  Referral: RENÉE HODGE / PEG / [facility] - LW  12/11- pre call complete.  DBM  12.15 precall complete; pt has instr.; all questions answered; AP    CYSTOSCOPY N/A 04/05/2024    Procedure: CYSTOSCOPY;  Surgeon: Galileo Ramos MD;  [facility];  Service: Urology;  Laterality: N/A;    FACIAL RECONSTRUCTION SURGERY      HERNIA REPAIR      knee scope      bob, multiple    LIVER TRANSPLANT      2010    PROSTATE BIOPSY N/A 04/05/2024    Procedure: BIOPSY, PROSTATE;  Surgeon: Galileo Ramos MD;  [facility];  Service: Urology;  Laterality: N/A;  TransPERINEAL biopsy    REVISION OF FLAP GRAFT Right 03/03/2023    Procedure: REVISION, PROCEDURE INVOLVING FLAP GRAFT;  Surgeon: Uzair Brandon MD;  Location: [facility] OR 2ND FLR;  Service: ENT;  Laterality: Right;    REVISION OF FLAP GRAFT Right 08/01/2023    Procedure: REVISION, PROCEDURE INVOLVING FLAP GRAFT;  Surgeon: Consuelo Augustine MD;  Location: [facility] OR;  Service: ENT;  Laterality: Right;    SHOULDER OPEN ROTATOR CUFF REPAIR      left    TRANSRECTAL ULTRASOUND EXAMINATION N/A 04/05/2024    Procedure: ULTRASOUND, RECTAL APPROACH;  Surgeon: Galileo Ramos MD;  [facility];  Service: Urology;  Laterality: N/A;      Social History     Socioeconomic History    Marital status:    Occupational History    Occupation: Retired   Tobacco Use    Smoking status: Former     Current packs/day: 0.00     Types: Cigarettes     Quit date: 1969     Years since quittin.7    Smokeless tobacco: Never    Tobacco comments:     SMOKED FOR 1 YEAR, [employer] MADE HIM STOP   Substance and Sexual Activity    Alcohol use: Not Currently    Drug use: Never    Sexual activity: Yes     Partners: Female     Comment: wife had tubal ligation     Social Determinants of Health     Financial Resource Strain: Low Risk  (2024)    Overall Financial Resource Strain (CARDIA)     Difficulty of Paying Living Expenses: Not hard at all   Food Insecurity: No Food Insecurity (2024)    Hunger Vital Sign     Worried About Running Out of Food in the Last Year: Never true     Ran Out of Food in the Last Year: Never true   Transportation Needs: No Transportation Needs (2024)    PRAPARE - Transportation     Lack of Transportation (Medical): No     Lack of Transportation (Non-Medical): No   Physical Activity: Inactive (2024)    Exercise Vital Sign     Days of Exercise per Week: 0 days     Minutes of Exercise per Session: 0 min   Stress: No Stress Concern Present (2024)    [employer] [place] [employer] - Occupational Stress Questionnaire     Feeling of Stress : Not at all   Housing Stability: Low Risk  (2024)    Housing Stability Vital Sign     Unable to Pay for Housing in the Last Year: No     Homeless in the Last Year: No     Family History   Problem Relation Name Age of Onset    Hearing loss Mother VIVI LANDRUM     Thyroid disease Sister      No Known Problems Son      No Known Problems Son      Melanoma Neg Hx      Psoriasis Neg Hx      Eczema Neg Hx      Lupus Neg Hx         Review of patient's allergies indicates:   Allergen Reactions    Diphenhydramine hcl     Penicillins      Other reaction(s): Rash     Povidone-iodine      Other reaction(s): Itching  Other reaction(s): Itching       Medication List with Changes/Refills   Current Medications    ATORVASTATIN (LIPITOR) 40 MG TABLET    Take 1 tablet (40 mg total) by mouth once daily.    BUMETANIDE (BUMEX) 0.5 MG TAB    Take 1 tablet (0.5 mg total) by mouth once daily.    CELECOXIB (CELEBREX) 200 MG CAPSULE    Take 200 mg by mouth 2 (two) times daily.    DOXAZOSIN (CARDURA) 8 MG TAB    Take 1 tablet by mouth once daily. Please schedule an appointment with primary care provider for further refills.    FLUOROURACIL (EFUDEX) 5 % CREAM    Compound fluorouracil 5% + calcipotriene 0.005% cream. Apply a pea-sized amount to entire ant scalp BID x 7 days.    GABAPENTIN (NEURONTIN) 300 MG CAPSULE    Take 300 mg by mouth 3 (three) times daily.    METHOCARBAMOL (ROBAXIN) 750 MG TAB    Take 750 mg by mouth 3 (three) times daily. Reports taking 1/2 pill 6 times per day    METOPROLOL TARTRATE (LOPRESSOR) 100 MG TABLET    Take 1 tablet by mouth twice daily.    MULTIVIT WITH MINERALS/LUTEIN (MULTIVITAMIN 50 PLUS ORAL)    Take 1 tablet by mouth once daily.    OXYCODONE-ACETAMINOPHEN (PERCOCET)  MG PER TABLET    Take 1 tablet by mouth 3 (three) times daily as needed. Reports he takes 1/2 pill 6 times per day along with Robaxin    PROGRAF 1 MG CAP    Take 1 capsule (1 mg total) by mouth every 12 (twelve) hours.    ROPINIROLE (REQUIP) 0.5 MG TABLET    Take 0.5 mg by mouth every evening.    SOLIFENACIN (VESICARE) 5 MG TABLET    Take 1 tablet (5 mg total) by mouth once daily.    VITAMIN D 1000 UNITS TAB    Take 1,000 Units by mouth once daily.       Review of Systems  Constitution: Denies chills, fever, and sweats.  HENT: Denies headaches or blurry vision.  Cardiovascular: Denies chest pain or irregular heart beat.  Respiratory: Denies cough or shortness of breath.  Gastrointestinal: Denies abdominal pain, nausea, or vomiting.  Musculoskeletal: Positive for leg swelling.  Neurological:  "Denies dizziness or focal weakness.  Psychiatric/Behavioral: Normal mental status.  Hematologic/Lymphatic: Denies bleeding problem or easy bruising/bleeding.  Skin: Denies rash or suspicious lesions    Physical Examination  BP (!) 88/60   Pulse (!) 58   Ht 5' 8" (1.727 m)   Wt 92.1 kg (203 lb 0.7 oz)   BMI 30.87 kg/m²     Constitutional: No acute distress, conversant  HEENT: Sclera anicteric, Pupils equal, round and reactive to light, extraocular motions intact, Oropharynx clear  Neck: No JVD, no carotid bruits  Cardiovascular: regular rate and rhythm, no murmur, rubs or gallops, normal S1/S2  Pulmonary: Clear to auscultation bilaterally  Abdominal: Abdomen soft, nontender, nondistended, positive bowel sounds  Extremities: BLE's with 1+ pitting edema, venous stasis dermatitis, and changes consistent with lymphedema   Pulses:  Carotid pulses are 2+ on the right side, and 2+ on the left side.  Radial pulses are 2+ on the right side, and 2+ on the left side.   Femoral pulses are 2+ on the right side, and 2+ on the left side.  Popliteal pulses are 2+ on the right side, and 2+ on the left side.   Dorsalis pedis pulses are 2+ on the right side, and 2+ on the left side.   Posterior tibial pulses are 2+ on the right side, and 2+ on the left side.    Skin: No ecchymosis, erythema, or ulcers  Psych: Alert and oriented x 3, appropriate affect  Neuro: CNII-XII intact, no focal deficits    Labs:  Most Recent Data  CBC:   Lab Results   Component Value Date    WBC 5.53 06/13/2024    HGB 15.5 06/13/2024    HCT 45.6 06/13/2024     06/13/2024    MCV 98 06/13/2024    RDW 12.6 06/13/2024     BMP:   Lab Results   Component Value Date     06/13/2024    K 4.0 06/13/2024     06/13/2024    CO2 19 (L) 06/13/2024    BUN 23 06/13/2024    CREATININE 1.1 06/13/2024     (H) 06/13/2024    CALCIUM 9.2 06/13/2024    MG 2.2 06/29/2016    PHOS 2.8 11/26/2010     LFTS;   Lab Results   Component Value Date    PROT 6.8 " "06/13/2024    ALBUMIN 3.7 06/13/2024    BILITOT 0.9 06/13/2024    AST 36 06/13/2024    ALKPHOS 79 06/13/2024    ALT 42 06/13/2024    GGT 19 10/23/2012     COAGS:   Lab Results   Component Value Date    INR 0.9 03/04/2021     FLP:   Lab Results   Component Value Date    CHOL 89 (L) 09/16/2024    HDL 36 (L) 09/16/2024    LDLCALC 39.8 (L) 09/16/2024    TRIG 66 09/16/2024    CHOLHDL 40.4 09/16/2024     CARDIAC: No results found for: "TROPONINI", "CKTOTAL", "CKMB", "BNP"    Imaging:    EKG 5/2/2024:  Sinus bradycardia   Otherwise normal ECG    ANDREA Study 5/13/2024:    Normal resting ABIs bilaterally.    PVR waveforms are moderately dampened at the low thigh level bilaterally, and ankle level bilaterally.    PVR waveforms are mildly dampened at the calf level bilaterally.    BLE Venous Reflux Study 5/13/2024:    There is no evidence of a right lower extremity DVT.    The right greater saphenous vein has reflux.    There is no evidence of a left lower extremity DVT.    Assessment/Plan:  Zander Kaur  is a 73 y.o. male with HTN, s/p liver transplant (2010), obesity, history of stroke, who presents for a follow up appointment.    BLE Edema- Due to BLE lymphedema and venous insufficiency.  Now significantly improved and stable. Recommend wearing graduated compression hose.  Limit sodium intake to 2000 mg daily.  Limit volume intake to 1.5 L daily.  Elevate legs when resting.    2. HTN- Continue current medications.    3. Obesity- Encourage diet, exercise, and weight loss.  Refer to Bariatric Medicine for assistance with weight loss.    4. Preoperative Cardiac Risk Stratification Prior to Left TKA- Mr. Kaur has no chest pain, no heart failure symptoms and no arrhythmia.  He can perform greater than 4 METS with no difficulty.  EKG on 5/2/2024 shows sinus bradycardia.  He is at intermediate risk for a perioperative major adverse cardiac event during this intermediate risk procedure.  Revised Cardiac Risk Index of 6%. No " further testing indicated. Proceed with surgery.     5. Aortic atherosclerosis/history of stroke- Start atorvastatin 40 mg daily.     Follow up in 4 months with lipids prior    Total duration of face to face visit time 30 minutes.  Total time spent counseling greater than fifty percent of total visit time.  Counseling included discussion regarding imaging findings, diagnosis, possibilities, treatment options, risks and benefits.  The patient had many questions regarding the options and long-term effects.    Zander Mejia MD, PhD  Interventional Cardiology

## 2024-09-30 ENCOUNTER — EXTERNAL CHRONIC CARE MANAGEMENT (OUTPATIENT)
Dept: PRIMARY CARE CLINIC | Facility: CLINIC | Age: 74
End: 2024-09-30
Payer: MEDICARE

## 2024-09-30 PROCEDURE — 99490 CHRNC CARE MGMT STAFF 1ST 20: CPT | Mod: PBBFAC | Performed by: INTERNAL MEDICINE

## 2024-09-30 PROCEDURE — 99490 CHRNC CARE MGMT STAFF 1ST 20: CPT | Mod: S$PBB,,, | Performed by: INTERNAL MEDICINE

## 2024-10-07 ENCOUNTER — PATIENT MESSAGE (OUTPATIENT)
Dept: OTOLARYNGOLOGY | Facility: CLINIC | Age: 74
End: 2024-10-07
Payer: MEDICARE

## 2024-10-07 ENCOUNTER — PROCEDURE VISIT (OUTPATIENT)
Dept: DERMATOLOGY | Facility: CLINIC | Age: 74
End: 2024-10-07
Payer: MEDICARE

## 2024-10-07 VITALS
SYSTOLIC BLOOD PRESSURE: 155 MMHG | HEART RATE: 50 BPM | BODY MASS INDEX: 30.78 KG/M2 | WEIGHT: 203.06 LBS | HEIGHT: 68 IN | DIASTOLIC BLOOD PRESSURE: 92 MMHG

## 2024-10-07 DIAGNOSIS — Z98.890 MOHS DEFECT: ICD-10-CM

## 2024-10-07 DIAGNOSIS — C44.222 SQUAMOUS CELL CARCINOMA OF SKIN OF RIGHT EAR: Primary | ICD-10-CM

## 2024-10-07 DIAGNOSIS — M95.0 MOHS DEFECT OF RIGHT NOSE: Primary | ICD-10-CM

## 2024-10-07 DIAGNOSIS — H61.111 MOHS DEFECT OF AURICLE OF RIGHT EAR: ICD-10-CM

## 2024-10-07 DIAGNOSIS — L98.8 MOHS DEFECT: ICD-10-CM

## 2024-10-07 PROCEDURE — 17312 MOHS ADDL STAGE: CPT | Mod: S$PBB,,, | Performed by: DERMATOLOGY

## 2024-10-07 PROCEDURE — 17311 MOHS 1 STAGE H/N/HF/G: CPT | Mod: S$PBB,,, | Performed by: DERMATOLOGY

## 2024-10-07 PROCEDURE — 17311 MOHS 1 STAGE H/N/HF/G: CPT | Mod: PBBFAC | Performed by: DERMATOLOGY

## 2024-10-07 PROCEDURE — 17312 MOHS ADDL STAGE: CPT | Mod: PBBFAC | Performed by: DERMATOLOGY

## 2024-10-07 PROCEDURE — 99499 UNLISTED E&M SERVICE: CPT | Mod: S$PBB,,, | Performed by: DERMATOLOGY

## 2024-10-07 RX ORDER — SODIUM CHLORIDE 0.9 % (FLUSH) 0.9 %
10 SYRINGE (ML) INJECTION EVERY 6 HOURS PRN
Status: CANCELLED | OUTPATIENT
Start: 2024-10-07

## 2024-10-07 RX ORDER — LIDOCAINE HYDROCHLORIDE 10 MG/ML
1 INJECTION, SOLUTION EPIDURAL; INFILTRATION; INTRACAUDAL; PERINEURAL ONCE
Status: CANCELLED | OUTPATIENT
Start: 2024-10-07 | End: 2024-10-07

## 2024-10-07 NOTE — PROGRESS NOTES
PROCEDURE: Mohs' Micrographic Surgery    INDICATION: Location in mask areas of face including central face, nose, eyelids, eyebrows, lips, chin, preauricular, temple, and ear. Biopsy-proven skin cancer of cosmetically and functionally important areas, including head, neck, genital, hand, foot, or areas known for having difficulty in healing, such as the lower anterior legs. Tumor with ill-defined borders. Tumors in immunosuppressed patients.    REFERRING PROVIDER: Lima Campos M.D.    CASE NUMBER:     ANESTHETIC: 3 cc 0.5% Lidocaine with Epi 1:200,000 mixed 1:1 with 0.5% Bupivacaine and 3 cc 1% Lidocaine with Epinephrine 1:100,000    SURGICAL PREP: Betadine  NOTE: Pt with listed iodine allergy - reaction was itching/rash.  Disc topical prep solutions in detail with patient and wife: betadine with risk of rash vs hibiclens with risk of ototoxicity.  Pt and wife preferred to use betadine to limit damage to inner ear with hibiclens.  Area was prepped with betadine and then washed off as soon as Mohs layer was taken.  This was done twice, for each stage of Mohs.  Pt advised to take Benadryl when he got home to limit any furhther reaction.  Pt left Mohs clinic without any evidence of allergic reaction to betadine.     SURGEON: Jc Gil MD    ASSISTANTS: Bernadette Cao PA-C, Yesenia Ortega MA, and Allie Loya, [facility] Tech    PREOPERATIVE DIAGNOSIS: squamous cell carcinoma- well differentiated    POSTOPERATIVE DIAGNOSIS: squamous cell carcinoma- well to moderately differentiated, invasive    PATHOLOGIC DIAGNOSIS: squamous cell carcinoma- well to moderately differentiated, invasive    HISTOLOGY OF SPECIMENS IN FIRST STAGE:   Debulking tumor confirms invasive, well to moderately differentiated squamous cell carcinoma.  Tumor Type: Tumor seen. Invasive squamous cell carcinoma: Proliferation of squamous cells exhibiting atypia and infiltrating within the dermis.  Well-differentiated squamous cell carcinoma:  "Proliferation of squamous cells exhibiting atypia and infiltrating within the dermis.  Moderately-differentiated squamous cell carcinoma: Proliferation of squamous cells exhibiting atypia of moderate differentiation and infiltrating within the dermis.   Depth of Invasion: epidermis, dermis, subcutaneous tissue, and cartilage  Perineural Invasion: No    HISTOLOGY OF SPECIMENS IN SUBSEQUENT STAGES:  Tumor Type: No tumor seen. Actinic keratosis: Keratinocyte atypia along the basal layer.    STAGES OF MOHS' SURGERY PERFORMED: 2    TUMOR-FREE PLANE ACHIEVED: Yes - with removal of underlying cartilage    HEMOSTASIS: electrocoagulation     SPECIMENS: 7 (4 in stage A and 3 in stage B)    LOCATION: right ear conchal bowl. Location verified with Dr. Campos's clinical photograph. Patient also verified location by looking at photo taken prior to procedure.     INITIAL LESION SIZE: 0.5 x 0.8 cm    FINAL DEFECT SIZE: 1.2 x 1.8 cm    WOUND REPAIR/DISPOSITION: The patient tolerated Mohs' Micrographic Surgery for a squamous cell carcinoma very well. When the tumor was completely removed, the patient was referred to  Dr. Randee Brown in ENT  for repair of the surgical defect, per patient request, and given close proximity to [facility].    Vitals:    10/07/24 0749 10/07/24 1050   BP: (!) 159/112 (!) 155/92   BP Location: Left arm    Patient Position: Sitting    Pulse: 63 (!) 50   Weight: 92.1 kg (203 lb 0.7 oz)    Height: 5' 8" (1.727 m)          "

## 2024-10-08 ENCOUNTER — HOSPITAL ENCOUNTER (OUTPATIENT)
Facility: HOSPITAL | Age: 74
Discharge: HOME | End: 2024-10-08
Attending: STUDENT IN AN ORGANIZED HEALTH CARE EDUCATION/TRAINING PROGRAM | Admitting: STUDENT IN AN ORGANIZED HEALTH CARE EDUCATION/TRAINING PROGRAM
Payer: MEDICARE

## 2024-10-08 ENCOUNTER — ANESTHESIA EVENT (OUTPATIENT)
Dept: SURGERY | Facility: HOSPITAL | Age: 74
End: 2024-10-08
Payer: MEDICARE

## 2024-10-08 ENCOUNTER — ANESTHESIA (OUTPATIENT)
Dept: SURGERY | Facility: HOSPITAL | Age: 74
End: 2024-10-08
Payer: MEDICARE

## 2024-10-08 VITALS
SYSTOLIC BLOOD PRESSURE: 148 MMHG | OXYGEN SATURATION: 94 % | RESPIRATION RATE: 16 BRPM | DIASTOLIC BLOOD PRESSURE: 84 MMHG | HEIGHT: 68 IN | WEIGHT: 203 LBS | TEMPERATURE: 97 F | HEART RATE: 56 BPM | BODY MASS INDEX: 30.77 KG/M2

## 2024-10-08 DIAGNOSIS — H61.111 MOHS DEFECT OF AURICLE OF RIGHT EAR: ICD-10-CM

## 2024-10-08 DIAGNOSIS — L98.8 MOHS DEFECT: ICD-10-CM

## 2024-10-08 DIAGNOSIS — Z98.890 MOHS DEFECT: ICD-10-CM

## 2024-10-08 PROCEDURE — 63600175 PHARM REV CODE 636 W HCPCS: Mod: JZ,JG | Performed by: NURSE ANESTHETIST, CERTIFIED REGISTERED

## 2024-10-08 PROCEDURE — 71000045 HC DOSC ROUTINE RECOVERY EA ADD'L HR: Performed by: STUDENT IN AN ORGANIZED HEALTH CARE EDUCATION/TRAINING PROGRAM

## 2024-10-08 PROCEDURE — 71000015 HC POSTOP RECOV 1ST HR: Performed by: STUDENT IN AN ORGANIZED HEALTH CARE EDUCATION/TRAINING PROGRAM

## 2024-10-08 PROCEDURE — 71000044 HC DOSC ROUTINE RECOVERY FIRST HOUR: Performed by: STUDENT IN AN ORGANIZED HEALTH CARE EDUCATION/TRAINING PROGRAM

## 2024-10-08 PROCEDURE — 63600175 PHARM REV CODE 636 W HCPCS: Performed by: ANESTHESIOLOGY

## 2024-10-08 PROCEDURE — 37000009 HC ANESTHESIA EA ADD 15 MINS: Performed by: STUDENT IN AN ORGANIZED HEALTH CARE EDUCATION/TRAINING PROGRAM

## 2024-10-08 PROCEDURE — 15260 FTH/GFT FR N/E/E/L 20 SQCM/<: CPT | Mod: ,,, | Performed by: STUDENT IN AN ORGANIZED HEALTH CARE EDUCATION/TRAINING PROGRAM

## 2024-10-08 PROCEDURE — 63600175 PHARM REV CODE 636 W HCPCS: Performed by: STUDENT IN AN ORGANIZED HEALTH CARE EDUCATION/TRAINING PROGRAM

## 2024-10-08 PROCEDURE — 25000003 PHARM REV CODE 250: Performed by: ANESTHESIOLOGY

## 2024-10-08 PROCEDURE — 36000706: Performed by: STUDENT IN AN ORGANIZED HEALTH CARE EDUCATION/TRAINING PROGRAM

## 2024-10-08 PROCEDURE — 25000003 PHARM REV CODE 250: Performed by: NURSE ANESTHETIST, CERTIFIED REGISTERED

## 2024-10-08 PROCEDURE — 36000707: Performed by: STUDENT IN AN ORGANIZED HEALTH CARE EDUCATION/TRAINING PROGRAM

## 2024-10-08 PROCEDURE — 63600175 PHARM REV CODE 636 W HCPCS: Mod: JZ,JG | Performed by: STUDENT IN AN ORGANIZED HEALTH CARE EDUCATION/TRAINING PROGRAM

## 2024-10-08 PROCEDURE — 37000008 HC ANESTHESIA 1ST 15 MINUTES: Performed by: STUDENT IN AN ORGANIZED HEALTH CARE EDUCATION/TRAINING PROGRAM

## 2024-10-08 RX ORDER — FENTANYL CITRATE 50 UG/ML
25 INJECTION, SOLUTION INTRAMUSCULAR; INTRAVENOUS EVERY 5 MIN PRN
Status: DISCONTINUED | OUTPATIENT
Start: 2024-10-08 | End: 2024-10-08 | Stop reason: HOSPADM

## 2024-10-08 RX ORDER — LIDOCAINE HYDROCHLORIDE 20 MG/ML
INJECTION INTRAVENOUS
Status: DISCONTINUED | OUTPATIENT
Start: 2024-10-08 | End: 2024-10-08

## 2024-10-08 RX ORDER — ONDANSETRON HYDROCHLORIDE 2 MG/ML
4 INJECTION, SOLUTION INTRAVENOUS DAILY PRN
Status: DISCONTINUED | OUTPATIENT
Start: 2024-10-08 | End: 2024-10-08 | Stop reason: HOSPADM

## 2024-10-08 RX ORDER — LIDOCAINE HYDROCHLORIDE AND EPINEPHRINE 10; 10 MG/ML; UG/ML
INJECTION, SOLUTION INFILTRATION; PERINEURAL
Status: DISCONTINUED | OUTPATIENT
Start: 2024-10-08 | End: 2024-10-08 | Stop reason: HOSPADM

## 2024-10-08 RX ORDER — ACETAMINOPHEN 10 MG/ML
INJECTION, SOLUTION INTRAVENOUS
Status: DISCONTINUED | OUTPATIENT
Start: 2024-10-08 | End: 2024-10-08

## 2024-10-08 RX ORDER — DEXAMETHASONE SODIUM PHOSPHATE 4 MG/ML
INJECTION, SOLUTION INTRA-ARTICULAR; INTRALESIONAL; INTRAMUSCULAR; INTRAVENOUS; SOFT TISSUE
Status: DISCONTINUED | OUTPATIENT
Start: 2024-10-08 | End: 2024-10-08

## 2024-10-08 RX ORDER — OXYCODONE HYDROCHLORIDE 5 MG/1
5 TABLET ORAL ONCE
Status: COMPLETED | OUTPATIENT
Start: 2024-10-08 | End: 2024-10-08

## 2024-10-08 RX ORDER — CLINDAMYCIN PHOSPHATE 900 MG/50ML
900 INJECTION, SOLUTION INTRAVENOUS
Status: COMPLETED | OUTPATIENT
Start: 2024-10-08 | End: 2024-10-08

## 2024-10-08 RX ORDER — ONDANSETRON HYDROCHLORIDE 2 MG/ML
INJECTION, SOLUTION INTRAVENOUS
Status: DISCONTINUED | OUTPATIENT
Start: 2024-10-08 | End: 2024-10-08

## 2024-10-08 RX ORDER — LIDOCAINE HYDROCHLORIDE 10 MG/ML
1 INJECTION, SOLUTION EPIDURAL; INFILTRATION; INTRACAUDAL; PERINEURAL ONCE
Status: DISCONTINUED | OUTPATIENT
Start: 2024-10-08 | End: 2024-10-08 | Stop reason: HOSPADM

## 2024-10-08 RX ORDER — MIDAZOLAM HYDROCHLORIDE 1 MG/ML
INJECTION INTRAMUSCULAR; INTRAVENOUS
Status: DISCONTINUED | OUTPATIENT
Start: 2024-10-08 | End: 2024-10-08

## 2024-10-08 RX ORDER — ROCURONIUM BROMIDE 10 MG/ML
INJECTION, SOLUTION INTRAVENOUS
Status: DISCONTINUED | OUTPATIENT
Start: 2024-10-08 | End: 2024-10-08

## 2024-10-08 RX ORDER — PROPOFOL 10 MG/ML
VIAL (ML) INTRAVENOUS
Status: DISCONTINUED | OUTPATIENT
Start: 2024-10-08 | End: 2024-10-08

## 2024-10-08 RX ORDER — FENTANYL CITRATE 50 UG/ML
INJECTION, SOLUTION INTRAMUSCULAR; INTRAVENOUS
Status: DISCONTINUED | OUTPATIENT
Start: 2024-10-08 | End: 2024-10-08

## 2024-10-08 RX ORDER — FENTANYL CITRATE 50 UG/ML
INJECTION, SOLUTION INTRAMUSCULAR; INTRAVENOUS
Status: DISCONTINUED
Start: 2024-10-08 | End: 2024-10-08 | Stop reason: HOSPADM

## 2024-10-08 RX ORDER — OXYCODONE AND ACETAMINOPHEN 10; 325 MG/1; MG/1
1 TABLET ORAL EVERY 4 HOURS PRN
Qty: 8 TABLET | Refills: 0 | Status: SHIPPED | OUTPATIENT
Start: 2024-10-08

## 2024-10-08 RX ORDER — OXYCODONE AND ACETAMINOPHEN 7.5; 325 MG/1; MG/1
1 TABLET ORAL EVERY 4 HOURS PRN
Qty: 8 TABLET | Refills: 0 | Status: SHIPPED | OUTPATIENT
Start: 2024-10-08

## 2024-10-08 RX ORDER — EPHEDRINE SULFATE 50 MG/ML
INJECTION, SOLUTION INTRAVENOUS
Status: DISCONTINUED | OUTPATIENT
Start: 2024-10-08 | End: 2024-10-08

## 2024-10-08 RX ORDER — OXYCODONE AND ACETAMINOPHEN 10; 325 MG/1; MG/1
1 TABLET ORAL EVERY 4 HOURS PRN
Qty: 8 TABLET | Refills: 0 | Status: SHIPPED | OUTPATIENT
Start: 2024-10-08 | End: 2024-10-08

## 2024-10-08 RX ORDER — GLUCAGON 1 MG
1 KIT INJECTION
Status: DISCONTINUED | OUTPATIENT
Start: 2024-10-08 | End: 2024-10-08 | Stop reason: HOSPADM

## 2024-10-08 RX ORDER — BACITRACIN 500 [USP'U]/G
OINTMENT TOPICAL 2 TIMES DAILY
Start: 2024-10-08 | End: 2024-10-10

## 2024-10-08 RX ORDER — SODIUM CHLORIDE 0.9 % (FLUSH) 0.9 %
10 SYRINGE (ML) INJECTION EVERY 6 HOURS PRN
Status: DISCONTINUED | OUTPATIENT
Start: 2024-10-08 | End: 2024-10-08 | Stop reason: HOSPADM

## 2024-10-08 RX ADMIN — FENTANYL CITRATE 25 MCG: 50 INJECTION INTRAMUSCULAR; INTRAVENOUS at 02:10

## 2024-10-08 RX ADMIN — DEXAMETHASONE SODIUM PHOSPHATE 4 MG: 4 INJECTION, SOLUTION INTRAMUSCULAR; INTRAVENOUS at 01:10

## 2024-10-08 RX ADMIN — PROPOFOL 20 MG: 10 INJECTION, EMULSION INTRAVENOUS at 01:10

## 2024-10-08 RX ADMIN — EPHEDRINE SULFATE 25 MG: 50 INJECTION INTRAVENOUS at 01:10

## 2024-10-08 RX ADMIN — ONDANSETRON 4 MG: 2 INJECTION INTRAMUSCULAR; INTRAVENOUS at 01:10

## 2024-10-08 RX ADMIN — ACETAMINOPHEN 1000 MG: 10 INJECTION INTRAVENOUS at 01:10

## 2024-10-08 RX ADMIN — FENTANYL CITRATE 50 MCG: 50 INJECTION, SOLUTION INTRAMUSCULAR; INTRAVENOUS at 01:10

## 2024-10-08 RX ADMIN — SUGAMMADEX 200 MG: 100 INJECTION, SOLUTION INTRAVENOUS at 02:10

## 2024-10-08 RX ADMIN — OXYCODONE 5 MG: 5 TABLET ORAL at 02:10

## 2024-10-08 RX ADMIN — GLYCOPYRROLATE 0.2 MG: 0.2 INJECTION, SOLUTION INTRAMUSCULAR; INTRAVENOUS at 01:10

## 2024-10-08 RX ADMIN — PROPOFOL 120 MG: 10 INJECTION, EMULSION INTRAVENOUS at 01:10

## 2024-10-08 RX ADMIN — SODIUM CHLORIDE: 0.9 INJECTION, SOLUTION INTRAVENOUS at 01:10

## 2024-10-08 RX ADMIN — SODIUM CHLORIDE: 0.9 INJECTION, SOLUTION INTRAVENOUS at 12:10

## 2024-10-08 RX ADMIN — ROCURONIUM BROMIDE 50 MG: 10 INJECTION, SOLUTION INTRAVENOUS at 01:10

## 2024-10-08 RX ADMIN — EPHEDRINE SULFATE 5 MG: 50 INJECTION INTRAVENOUS at 01:10

## 2024-10-08 RX ADMIN — MIDAZOLAM HYDROCHLORIDE 2 MG: 1 INJECTION, SOLUTION INTRAMUSCULAR; INTRAVENOUS at 12:10

## 2024-10-08 RX ADMIN — LIDOCAINE HYDROCHLORIDE 75 MG: 20 INJECTION INTRAVENOUS at 01:10

## 2024-10-08 RX ADMIN — CLINDAMYCIN IN 5 PERCENT DEXTROSE 900 MG: 18 INJECTION, SOLUTION INTRAVENOUS at 01:10

## 2024-10-08 NOTE — TRANSFER OF CARE
"Anesthesia Transfer of Care Note    Patient: Zander Kaur    Procedure(s) Performed: Procedure(s) (LRB):  RECONSTRUCTION, EAR (Right)  APPLICATION, GRAFT, SKIN, FULL-THICKNESS (Right)    Patient location: [facility]    Anesthesia Type: general    Transport from OR: Transported from OR on 6-10 L/min O2 by face mask with adequate spontaneous ventilation    Post pain: adequate analgesia    Post assessment: no apparent anesthetic complications and tolerated procedure well    Post vital signs: stable    Level of consciousness: awake and alert    Nausea/Vomiting: no nausea/vomiting    Complications: none    Transfer of care protocol was followed      Last vitals: Visit Vitals  BP (!) 152/83   Pulse 60   Temp 36.4 °C (97.5 °F) (Skin)   Resp 16   Ht 5' 8" (1.727 m)   Wt 92.1 kg (203 lb)   SpO2 99%   BMI 30.87 kg/m²     "

## 2024-10-08 NOTE — H&P
[facility] ([facility])  Otorhinolaryngology-Head & Neck Surgery  History & Physical    Patient Name: Zander Kaur  MRN: [id number]  Admission Date: 10/8/2024    Subjective:     History of Present Illness:  73 y.o. male presents with right ear Mohs defect. Referred by Dr. Gil, performed Mohs excision 10/7. Interested in repair.    10/8/2024: Presents today for scheduled surgery. See prior clinical notes for further details. Patient/family questions addressed. Patient wishes to proceed with surgery.     Current Facility-Administered Medications on File Prior to Encounter   Medication    sodium chloride 0.9% flush 10 mL     Current Outpatient Medications on File Prior to Encounter   Medication Sig    atorvastatin (LIPITOR) 40 MG tablet Take 1 tablet (40 mg total) by mouth once daily.    celecoxib (CELEBREX) 200 MG capsule Take 200 mg by mouth 2 (two) times daily.    doxazosin (CARDURA) 8 MG Tab Take 1 tablet by mouth once daily. Please schedule an appointment with primary care provider for further refills.    gabapentin (NEURONTIN) 300 MG capsule Take 300 mg by mouth 3 (three) times daily.    methocarbamoL (ROBAXIN) 750 MG Tab Take 750 mg by mouth 3 (three) times daily. Reports taking 1/2 pill 6 times per day    metoprolol tartrate (LOPRESSOR) 100 MG tablet Take 1 tablet by mouth twice daily.    multivit with minerals/lutein (MULTIVITAMIN 50 PLUS ORAL) Take 1 tablet by mouth once daily.    oxyCODONE-acetaminophen (PERCOCET)  mg per tablet Take 1 tablet by mouth 3 (three) times daily as needed. Reports he takes 1/2 pill 6 times per day along with Robaxin    PROGRAF 1 mg Cap Take 1 capsule (1 mg total) by mouth every 12 (twelve) hours.    ropinirole (REQUIP) 0.5 MG tablet Take 0.5 mg by mouth every evening.    vitamin D 1000 units Tab Take 1,000 Units by mouth once daily.    bumetanide (BUMEX) 0.5 MG Tab Take 1 tablet (0.5 mg total) by mouth once daily.    fluorouraciL (EFUDEX) 5 % cream Compound fluorouracil  5% + calcipotriene 0.005% cream. Apply a pea-sized amount to entire ant scalp BID x 7 days. (Patient taking differently: as needed. Compound fluorouracil 5% + calcipotriene 0.005% cream. Apply a pea-sized amount to entire ant scalp BID x 7 days.)    solifenacin (VESICARE) 5 MG tablet Take 1 tablet (5 mg total) by mouth once daily.       Review of patient's allergies indicates:   Allergen Reactions    Diphenhydramine hcl     Penicillins      Other reaction(s): Rash    Povidone-iodine      Other reaction(s): Itching  Other reaction(s): Itching       Past Medical History:   Diagnosis Date    Actinic keratosis     Allergy     seasonal    Arthritis     Basal cell carcinoma of right ala nasi 02/06/2023    Hypertension     Joint pain     Organ transplant     liver 2010    SCC (squamous cell carcinoma) 08/15/2022    Right cheek -SSW    SCC (squamous cell carcinoma) 03/27/2023    Right angle of mandible    SCC (squamous cell carcinoma) 03/27/2023    Right medial cheek    SCC (squamous cell carcinoma) 02/12/2024    anterior chin    SCC (squamous cell carcinoma) 06/10/2024    R ear helix-excised by JAM    SCC (squamous cell carcinoma) 06/10/2024    SCCIS-R post neck    SCC (squamous cell carcinoma), ear, 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    Squamous Cell Carcinoma 08/28/2013    right temple    Squamous Cell Carcinoma 09/04/2013    left forehead    Stroke      Past Surgical History:   Procedure Laterality Date    ANKLE SURGERY      multipole surgeries    CLOSURE OF DEFECT OF MOHS PROCEDURE Right 02/09/2023    Procedure: CLOSURE, MOHS PROCEDURE DEFECT;  Surgeon: Uzair Brandon MD;  Location: [facility] OR [address] [place] [address];  Service: ENT;  Laterality: Right;    COLONOSCOPY N/A 12/18/2023    Procedure: COLONOSCOPY;  Surgeon: Leonardo Penny MD;  Location: [facility] ENDOSCOPY;  Service: Endoscopy;  Laterality: N/A;  Referral: RENÉE HODGE / PEG / [facility] -   12/11- pre call complete.  DBM  12.15 precall complete; pt has  instr.; all questions answered; AP    CYSTOSCOPY N/A 2024    Procedure: CYSTOSCOPY;  Surgeon: Galileo Ramos MD;  Location: [facility] OR 1ST FLR;  Service: Urology;  Laterality: N/A;    FACIAL RECONSTRUCTION SURGERY      HERNIA REPAIR      knee scope      bob, multiple    LIVER TRANSPLANT      2010    PROSTATE BIOPSY N/A 2024    Procedure: BIOPSY, PROSTATE;  Surgeon: Galileo Ramos MD;  Location: [facility] OR 1ST FLR;  Service: Urology;  Laterality: N/A;  TransPERINEAL biopsy    REVISION OF FLAP GRAFT Right 2023    Procedure: REVISION, PROCEDURE INVOLVING FLAP GRAFT;  Surgeon: Uzair Brandon MD;  Location: [facility] OR 2ND FLR;  Service: ENT;  Laterality: Right;    REVISION OF FLAP GRAFT Right 2023    Procedure: REVISION, PROCEDURE INVOLVING FLAP GRAFT;  Surgeon: Consuelo Augustine MD;  Location: [facility] OR;  Service: ENT;  Laterality: Right;    SHOULDER OPEN ROTATOR CUFF REPAIR      left    TRANSRECTAL ULTRASOUND EXAMINATION N/A 2024    Procedure: ULTRASOUND, RECTAL APPROACH;  Surgeon: Galileo Ramos MD;  Location: [facility] OR 1ST FLR;  Service: Urology;  Laterality: N/A;     Family History       Problem Relation (Age of Onset)    Hearing loss Mother    No Known Problems Son, Son    Thyroid disease Sister          Tobacco Use    Smoking status: Former     Current packs/day: 0.00     Types: Cigarettes     Quit date:      Years since quittin.8    Smokeless tobacco: Never    Tobacco comments:     SMOKED FOR 1 YEAR, [employer] MADE HIM STOP   Substance and Sexual Activity    Alcohol use: Not Currently    Drug use: Never    Sexual activity: Yes     Partners: Female     Comment: wife had tubal ligation     Review of Systems  ENT-focused review of systems performed with pertinent positives as noted in HPI  Objective:     Vital Signs (Most Recent):  Temp: 97.5 °F (36.4 °C) (10/08/24 113)  Pulse: (!) 58 (10/08/24 1139)  Resp: 20 (10/08/24 1139)  BP: 115/75 (10/08/24 1139)  SpO2: 95 % (10/08/24  1139) Vital Signs (24h Range):  Temp:  [97.5 °F (36.4 °C)] 97.5 °F (36.4 °C)  Pulse:  [57-58] 58  Resp:  [20] 20  SpO2:  [95 %] 95 %  BP: (115)/(75) 115/75     Weight: 92.1 kg (203 lb)  Body mass index is 30.87 kg/m².      NAD  Awake and alert  Right ear with conchal defect   Nose w/ prior Mohs reconstruction  Neck soft, not TTP, normal ROM, no LAD  Normal WOB, no stridor or stertor      Assessment/Plan:   Zander Kaur is a 73 y.o. male with:  Right conchal defect     The patient has been examined and the H&P has been reviewed. I concur with the findings as noted above and no significant changes have occurred since most recent clinical visit.  Surgery risks, benefits and alternative options discussed and understood by patient/family.    Proceed to OR for surgery RECONSTRUCTION, EAR (Right), APPLICATION, GRAFT, SKIN, FULL-THICKNESS (Right)     - Consent obtained, pt marked  - Postoperative instructions/medications reviewed  - Follow-up will be coordinated with ENT clinic       Luis Miguel Aden MD  Otorhinolaryngology-Head & Neck Surgery  [facility] (2nd Fl)     Biopsy Method: Personna blade

## 2024-10-08 NOTE — DISCHARGE INSTRUCTIONS
Postop Mohs repair     Your follow up appointment will be in one week.    1.Wound care    Clean around yellow gauze dressing as needed with half strength hydrogen peroxide and water  Apply ointment to skin and yellow gauze dressing  Yellow gauze dressing will be removed at first post-op visit or at time of surgery  If more comfortable, can cover area with gauze or other dressing    Apply ointment to sutures 2-3 times/day    2. Take Tylenol or Motrin as needed for pain. You also have some oxycodone sent to your pharmacy if you need it. These medicines work in different ways and can be taken together - just space them out so you can always take something.    3. You can eat and drink whatever you like, but it is very common to be nauseous the first day after surgery.    4. Wait 24 hours before getting the incisions behind the ear wet. Try to keep the yellow dressing dry until you are seen in clinic, although you can clean around the area gently with soap and water.    5. Call the office if you experience worsening pain, redness, swelling around the area. Go to the emergency room if you have trouble breathing or are unable to drink anything.

## 2024-10-08 NOTE — PLAN OF CARE
Pt's anesthesia consent did not transfer to [facility], placed call to Dr Watson, no answer, Sent message via secure chat.

## 2024-10-08 NOTE — ANESTHESIA PREPROCEDURE EVALUATION
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  No notes on file    Hospital LOS: 0 days  ICU LOS: Patient does not have an ICU stay during this admission.    he has a current medication list which includes the following long-term medication(s): atorvastatin, bumetanide, doxazosin, gabapentin, metoprolol tartrate, prograf, ropinirole, and solifenacin.   Current Outpatient Medications   Medication Instructions    atorvastatin (LIPITOR) 40 mg, Oral, Daily    bumetanide (BUMEX) 0.5 mg, Oral, Daily    celecoxib (CELEBREX) 200 mg, 2 times daily    doxazosin (CARDURA) 8 MG Tab Take 1 tablet by mouth once daily. Please schedule an appointment with primary care provider for further  refills.    fluorouraciL (EFUDEX) 5 % cream Compound fluorouracil 5% + calcipotriene 0.005% cream. Apply a pea-sized amount to entire ant scalp BID x 7 days.    gabapentin (NEURONTIN) 300 mg, 3 times daily    methocarbamoL (ROBAXIN) 750 mg, 3 times daily    metoprolol tartrate (LOPRESSOR) 100 mg, Oral, 2 times daily    multivit with minerals/lutein (MULTIVITAMIN 50 PLUS ORAL) 1 tablet, Daily    oxyCODONE-acetaminophen (PERCOCET)  mg per tablet 1 tablet, 3 times daily PRN    PROGRAF 1 mg, Oral, Every 12 hours    rOPINIRole (REQUIP) 0.5 mg, Nightly    solifenacin (VESICARE) 5 mg, Oral, Daily    vitamin D (VITAMIN D3) 1,000 Units, Daily     ALLERGIES:     Review of patient's allergies indicates:   Allergen Reactions    Diphenhydramine hcl     Penicillins      Other reaction(s): Rash    Povidone-iodine      Other reaction(s): Itching  Other reaction(s): Itching     LDA:   AIRWAY:         * No LDAs found *      Lines/Drains/Airways       None                  Anesthesia Evaluation      Airway   Mallampati: III  TM distance: Normal  Neck ROM: Normal ROM  Dental    (+) Intact    Pulmonary    Cardiovascular   (+) hypertension    Neuro/Psych    (+) CVA    GI/Hepatic/Renal    (+) liver disease    Endo/Other    Abdominal                    MEDICATIONS:     Current Outpatient Medications on File Prior to Encounter   Medication Sig Dispense Refill Last Dose/Taking    atorvastatin (LIPITOR) 40 MG tablet Take 1 tablet (40 mg total) by mouth once daily. 90 tablet 3     bumetanide (BUMEX) 0.5 MG Tab Take 1 tablet (0.5 mg total) by mouth once daily. 90 tablet 3     celecoxib (CELEBREX) 200 MG capsule Take 200 mg by mouth 2 (two) times daily.       doxazosin (CARDURA) 8 MG Tab Take 1 tablet by mouth once daily. Please schedule an appointment with primary care provider for further refills. 90 tablet 0     fluorouraciL (EFUDEX) 5 % cream Compound fluorouracil 5% + calcipotriene 0.005% cream. Apply a pea-sized amount to entire ant  scalp BID x 7 days. (Patient taking differently: as needed. Compound fluorouracil 5% + calcipotriene 0.005% cream. Apply a pea-sized amount to entire ant scalp BID x 7 days.) 30 g 0     gabapentin (NEURONTIN) 300 MG capsule Take 300 mg by mouth 3 (three) times daily.       methocarbamoL (ROBAXIN) 750 MG Tab Take 750 mg by mouth 3 (three) times daily. Reports taking 1/2 pill 6 times per day       metoprolol tartrate (LOPRESSOR) 100 MG tablet Take 1 tablet by mouth twice daily. 180 tablet 0     multivit with minerals/lutein (MULTIVITAMIN 50 PLUS ORAL) Take 1 tablet by mouth once daily.       oxyCODONE-acetaminophen (PERCOCET)  mg per tablet Take 1 tablet by mouth 3 (three) times daily as needed. Reports he takes 1/2 pill 6 times per day along with Robaxin       PROGRAF 1 mg Cap Take 1 capsule (1 mg total) by mouth every 12 (twelve) hours. 180 capsule 3     ropinirole (REQUIP) 0.5 MG tablet Take 0.5 mg by mouth every evening.       solifenacin (VESICARE) 5 MG tablet Take 1 tablet (5 mg total) by mouth once daily. 30 tablet 11     vitamin D 1000 units Tab Take 1,000 Units by mouth once daily.         Inpatient Medications:  Antibiotics (From admission, onward)      None          VTE Risk Mitigation (From admission, onward)      None              No current facility-administered medications for this encounter.     Facility-Administered Medications Ordered in Other Encounters   Medication Dose Route Frequency Provider Last Rate Last Admin    sodium chloride 0.9% flush 10 mL  10 mL Intravenous PRN Shai Mills MD              History:   There are no hospital problems to display for this patient.    Surgical History:    has a past surgical history that includes Liver transplant; knee scope; Hernia repair; Shoulder open rotator cuff repair; Facial reconstruction surgery; Ankle surgery; Closure of defect of Mohs procedure (Right, 02/09/2023); Revision of flap graft (Right, 03/03/2023); Revision of flap graft (Right,  "08/01/2023); Colonoscopy (N/A, 12/18/2023); Prostate biopsy (N/A, 04/05/2024); Transrectal ultrasound examination (N/A, 04/05/2024); and Cystoscopy (N/A, 04/05/2024).   Social History:    reports being sexually active and has had partner(s) who are female.  reports that he quit smoking about 55 years ago. His smoking use included cigarettes. He has never used smokeless tobacco. He reports that he does not currently use alcohol. He reports that he does not use drugs.    There were no vitals filed for this visit.  Vital Signs Range (Last 24H):       There is no height or weight on file to calculate BMI.  Wt Readings from Last 4 Encounters:   10/07/24 92.1 kg (203 lb 0.7 oz)   09/23/24 92.1 kg (203 lb 0.7 oz)   09/23/24 89.8 kg (198 lb)   08/19/24 96 kg (211 lb 10.3 oz)        Intake/Output - Last 3 Shifts       None          Lab Results   Component Value Date    WBC 5.53 06/13/2024    HGB 15.5 06/13/2024    HCT 45.6 06/13/2024     06/13/2024     06/13/2024    K 4.0 06/13/2024     06/13/2024    CREATININE 1.1 06/13/2024    BUN 23 06/13/2024    CO2 19 (L) 06/13/2024     (H) 06/13/2024    CALCIUM 9.2 06/13/2024    MG 2.2 06/29/2016    PHOS 2.8 11/26/2010    ALKPHOS 79 06/13/2024    ALT 42 06/13/2024    AST 36 06/13/2024    ALBUMIN 3.7 06/13/2024    INR 0.9 03/04/2021    APTT 23.8 03/04/2021    HGBA1C 5.9 (H) 06/13/2023    LACTATE 2.0 11/25/2010     (H) 11/03/2006     No results found for this or any previous visit (from the past 12 hours).  No results for input(s): "WBC", "HGB", "HCT", "PLT", "NA", "K", "CREATININE", "GLU", "INR", "LACTATE", "5HIAAPLASMA", "5HIAAURINT", "5HIAA", "2LBQG46LU" in the last 168 hours.  No LMP for male patient.    EKG:   Results for orders placed or performed in visit on 05/02/24   IN OFFICE EKG 12-LEAD (to [place])    Collection Time: 05/02/24  9:58 AM   Result Value Ref Range    QRS Duration 90 ms    [facility] QTC Calculation 400 ms    Narrative    Test Reason : " Z01.810,    Vent. Rate : 048 BPM     Atrial Rate : 048 BPM     P-R Int : 178 ms          QRS Dur : 090 ms      QT Int : 448 ms       P-R-T Axes : 062 002 052 degrees     QTc Int : 400 ms    Sinus bradycardia  Otherwise normal ECG  When compared with ECG of 19-MAR-2015 16:51,  No significant change was found  Confirmed by Angi Aldana MD ([id number]) on 5/2/2024 1:31:12 PM    Referred By: NAKUL VALERA           Confirmed By:Angi Aldana MD     TTE:  No results found for this or any previous visit.      Pre-op Assessment          Review of Systems  Cardiovascular:     Hypertension                                  Hypertension         Hepatic/GI:      Liver Disease,         Liver Disease        Neurological:   CVA                       CVA - Cerebrovasular Accident                     Physical Exam  General: Well nourished    Airway:  Mallampati: III / II  Mouth Opening: Normal  TM Distance: Normal  Tongue: Normal  Neck ROM: Normal ROM    Dental:  Intact        Anesthesia Plan  Type of Anesthesia, risks & benefits discussed:    Anesthesia Type: Gen ETT, Gen Natural Airway, MAC  Intra-op Monitoring Plan: Standard ASA Monitors  Post Op Pain Control Plan: multimodal analgesia and IV/PO Opioids PRN  Induction:  IV  Airway Plan: Direct and Video, Post-Induction  Informed Consent: Informed consent signed with the Patient and all parties understand the risks and agree with anesthesia plan.  All questions answered.   ASA Score: 3  Day of Surgery Review of History & Physical: H&P completed by Anesthesiologist.  Anesthesia Plan Notes: Chart reviewed, patient interviewed and examined.  The anesthetic plan was explained.  Risks, benefits, and alternatives were discussed. Questions were answered and the consent was signed.        KAILEE Watson M.D.         Ready For Surgery From Anesthesia Perspective.     .       Patient

## 2024-10-08 NOTE — BRIEF OP NOTE
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 Discussed with patient/family preop.    Discharge Procedure Orders   Diet Adult Regular   Order Comments: As tolerated; start with clear liquids and progress as tolerated     Lifting restrictions   Order Comments: Nothing >10lbs for 2 weeks following surgery     Other restrictions (specify):   Order Comments: Do not lift >10lbs for 2 weeks following surgery    Wound care - if applicable/incision present  - OK to shower (if going home with drain see below), but do not abrasively rub your incision site  - Avoid swimming or soaking (including submerging in bathtub) your incision  - If skin glue is present over your incision, it  will begin to peel off on it's own in the next week or so    If you are going home with a drain, please note the following:  - Sponge bathe until your drain is removed. Ok to bathe below level of drain (below neck), rinse hair/head in sink.     No driving until:   Order Comments: No driving while on narcotic pain medication if prescribed or at least 24h following anesthesia (if applicable)     Leave dressing on - Keep it clean, dry, and intact until clinic visit   Order Comments: Apply ointment to wound as instructed     Notify your health care provider if you experience any of the following:  temperature >100.4     Notify your health care provider if you experience any of the following:  redness, tenderness, or signs of infection (pain, swelling, redness, odor or green/yellow discharge around incision site)     Notify your health care provider if you experience any of the following:  difficulty breathing or increased cough   Order Comments: Swelling around neck causing difficulty breathing     Notify your health care provider if you experience any of the following:   Order Comments: Any persistent bleeding from nose or mouth, should report to nearest ED  If applicable, any persistent bleeding from trach or stoma site, report to nearest ED/call ambulance       TIME SPENT ON DISCHARGE: 35 minutes

## 2024-10-08 NOTE — PLAN OF CARE
Dr Brown has rounded for assessment and to discuss surgery with pt and spouse, Dr Watson now at bedside, conversing with pt and spouse.  Pre op prep paused

## 2024-10-08 NOTE — ANESTHESIA PROCEDURE NOTES
Intubation    Date/Time: 10/8/2024 1:11 PM    Performed by: Stefani Paul CRNA  Authorized by: Bhupinder Calix MD    Intubation:     Induction:  Intravenous    Intubated:  Postinduction    Mask Ventilation:  Easy mask    Attempts:  1    Attempted By:  CRNA    Method of Intubation:  Video laryngoscopy    Blade:  Gillespie 3    Laryngeal View Grade: Grade I - full view of cords      Difficult Airway Encountered?: No      Complications:  None    Airway Device:  Oral endotracheal tube    Airway Device Size:  7.5    Style/Cuff Inflation:  Cuffed    Inflation Amount (mL):  6    Tube secured:  24    Secured at:  The lips    Placement Verified By:  Capnometry    Complicating Factors:  None    Findings Post-Intubation:  BS equal bilateral and atraumatic/condition of teeth unchanged

## 2024-10-09 NOTE — ANESTHESIA POSTPROCEDURE EVALUATION
Anesthesia Post Evaluation    Patient: Zander Kaur    Procedure(s) Performed: Procedure(s) (LRB):  RECONSTRUCTION, EAR (Right)  APPLICATION, GRAFT, SKIN, FULL-THICKNESS (Right)    Final Anesthesia Type: general      Patient location during evaluation: PACU  Patient participation: Yes- Able to Participate  Level of consciousness: awake and alert  Post-procedure vital signs: reviewed and stable  Pain management: adequate  Airway patency: patent    PONV status at discharge: No PONV  Anesthetic complications: no      Cardiovascular status: blood pressure returned to baseline  Respiratory status: unassisted  Hydration status: euvolemic  Follow-up not needed.              Vitals Value Taken Time   /84 10/08/24 1600   Temp 36.3 °C (97.4 °F) 10/08/24 1600   Pulse 61 10/08/24 1609   Resp 16 10/08/24 1600   SpO2 94 % 10/08/24 1609   Vitals shown include unfiled device data.      No case tracking events are documented in the log.      Pain/Jose Score: Pain Rating Prior to Med Admin: 6 (10/8/2024  2:59 PM)  Jose Score: 10 (10/8/2024  4:16 PM)

## 2024-10-09 NOTE — OP NOTE
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 male who presented with right ear defect after Mohs surgery for removal of skin cancer. After discussion of risks, benefits, and alternatives, it was decided to recommend the patient proceed with reconstruction with a full thickness skin graft (FTSG). The patient agreed, and informed written consent was obtained.     Procedure in detail:  The patient was brought into the operating room and placed supine on the operating room table. The correct patient, correct procedure, and correct side were confirmed by all present and surgical timeout was performed. The patient was successfully intubated by anesthesia, and the ETT was secured in place. The bed was turned 90 degrees, and the patient was prepped and draped in the usual fashion.     The donor site for the FTSG was planned out in the right postauricular area. A 2cm x 3cm elliptical incision was planned, utilizing an existing skin crease. Skin incision was made with a 15-blade. The graft was harvested with a 15-blade and toothed forceps. The graft was placed aside  in saline. Hemostasis was achieved with bipolar cautery. The tissue was undermined and the donor site was closed primarily with 3-0 vicryl deep sutures and 4-0 Monocryl suture superficially under minimal tension.      The skin graft was de-fatted using sharp iris scissors. Wound edges were debrided and freshened using a fresh 15-blade. The graft was inset into the right melody and ear canal using 4-0 chromic sutures with combination of interrupted and running. Excess graft was trimmed with iris scissors. A Xeroform bolster dressing was applied within the canal and surface, secured with 2-0 silk sutures in a horizontal mattress fashion. Hemostasis was confirmed. At this point the procedure was deemed to be complete and the patient was returned to the care of anesthesia for emergence and extubation.     Complications: No    Estimated Blood Loss (EBL): 10cc           Implants: * No implants in log *    Specimens:   Specimen (24h ago, onward)      None                    Condition: Good    Disposition: PACU - hemodynamically stable.    Attestation: I was present and scrubbed for the entire procedure.

## 2024-10-15 ENCOUNTER — OFFICE VISIT (OUTPATIENT)
Dept: OTOLARYNGOLOGY | Facility: CLINIC | Age: 74
End: 2024-10-15
Payer: MEDICARE

## 2024-10-15 VITALS — SYSTOLIC BLOOD PRESSURE: 116 MMHG | HEART RATE: 68 BPM | DIASTOLIC BLOOD PRESSURE: 75 MMHG

## 2024-10-15 DIAGNOSIS — Z48.89 ENCOUNTER FOR POSTOPERATIVE WOUND CHECK: ICD-10-CM

## 2024-10-15 DIAGNOSIS — H61.111 MOHS DEFECT OF AURICLE OF RIGHT EAR: Primary | ICD-10-CM

## 2024-10-15 PROCEDURE — 99024 POSTOP FOLLOW-UP VISIT: CPT | Mod: POP,,, | Performed by: PHYSICIAN ASSISTANT

## 2024-10-15 PROCEDURE — 99999 PR PBB SHADOW E&M-EST. PATIENT-LVL IV: CPT | Mod: PBBFAC,,, | Performed by: PHYSICIAN ASSISTANT

## 2024-10-15 PROCEDURE — 99214 OFFICE O/P EST MOD 30 MIN: CPT | Mod: PBBFAC | Performed by: PHYSICIAN ASSISTANT

## 2024-10-15 NOTE — PROGRESS NOTES
HEAD AND NECK SURGICAL ONCOLOGY CLINIC NOTE    CC: Follow-up for bolster removal    TREATMENT HISTORY:  1. RECONSTRUCTION, EAR (Right), APPLICATION, GRAFT, SKIN, FULL-THICKNESS (Right), October 8, 2024  - Dr Brown  2. REVISION, PROCEDURE INVOLVING FLAP GRAFT (Right) - August 1, 2023 - Dr Augustine  3. REVISION, PROCEDURE INVOLVING FLAP GRAFT (Right) - March 3, 2023 - Dr Brandon  4. CLOSURE, MOHS PROCEDURE DEFECT (Right) - February 9, 2023 - Dr Brandon    INTERVAL HISTORY:  Zander Kaur returns to clinic today without complaints. He has some pain to the R ear, improved with prescribed pain medication. He does have a tendency to sleep on his R side. Denies bleeding, drainage, erythema, warmth, swelling.      PHYSICAL EXAM:  Bolster in placed to the R ear (removed today). No erythema, warmth, swelling. Incision to the posterior auricular region healing well with no signs of infection. No tenderness.             PROCEDURE:  Bolster removed without difficulty, tolerated well.      PLAN:  Follow up in clinic for post op appt 2 weeks for wound check.   Wound care discussed - aquaphor to incisions.   All questions answered.

## 2024-10-17 RX ORDER — DOXAZOSIN 8 MG/1
TABLET ORAL
Qty: 90 TABLET | Refills: 0 | Status: SHIPPED | OUTPATIENT
Start: 2024-10-17

## 2024-10-17 NOTE — TELEPHONE ENCOUNTER
Care Due:                  Date            Visit Type   Department     Provider  --------------------------------------------------------------------------------                                EP -                              PRIMARY      [facility]  Last Visit: 06-      CARE ([facility])   MEDICINE       Shai Davison  Next Visit: None Scheduled  None         None Found                                                            Last  Test          Frequency    Reason                     Performed    Due Date  --------------------------------------------------------------------------------    Office Visit  15 months..  doxazosin................  06- 09-    [facility] Embedded Care Due Messages. Reference number: [id number].   10/17/2024 4:26:48 AM CDT

## 2024-10-17 NOTE — TELEPHONE ENCOUNTER
"[facility]  Interventional Cardiology  Consult Note    Patient Name: Adela Garay  MRN: [id number]  Admission Date: 10/30/2017  Hospital Length of Stay: 1 days  Code Status: Full Code   Attending Provider: Justice Alexander MD   Consulting Provider: Seymour Yoo NP  Primary Care Physician: Torrie Farrell MD  Principal Problem:Acute on chronic diastolic heart failure    Patient information was obtained from patient and past medical records.     Inpatient consult to Interventional Cardiology  Consult performed by: SEYMOUR YOO  Consult ordered by: ABILIO EMERSON        Subjective:     Chief Complaint:  SOB/LE Edema     HPI: Ms. Garay is a 89 year old  woman with past medical history of HTN, HLD, DM, aFib (on coumadin), HFpEF, and most recently s/p TAVR (RTF [address]) 10/17/2017 with CHB following procedure (pre-existing RBBB) requiring PPM placement who presents to the ED with complaints of leg swelling. She states that she has been having "issues" since she went home after the surgery. She endorses orthopnea, ESTRADA, new cough, leg swelling, and weight gain. She denies chest pain. She has not been taking Lasix as prescribed as it "is not working". She has been sleeping in her recliner due to back spasms/pain when lying supine. Patient and family have called [facility] many times since discharge regarding a myriad of complaints. Pt called our office 10/30 with the above complaints and was advised to present to ED for further evaluation and treatment.      While in ED, chemistry was unremarkable, BNP elevated at 918, troponin negative and CBC stable. CXR with bilateral pleural effusion larger on the right side and atelectatic changes at the lung bases; EKG with atrial fibrillation with paced ventricular rhythm. The patient was admitted to the Hospital Medicine Service for further evaluation and management.     NYHA Class II sx, CCS Class I    Past Medical History:   Diagnosis Date    " Refill Routing Note   Medication(s) are not appropriate for processing by [facility] for the following reason(s):        Non-participating provider  Responsible provider unclear    [facility] action(s):  Route             Appointments  past 12m or future 3m with PCP    Date Provider   Last Visit   Visit date not found Inez Gonzalez PA-C   Next Visit   Visit date not found Inez Gonzalez PA-C   ED visits in past 90 days: 0        Note composed:10:38 AM 10/17/2024                           Anticoagulant long-term use     Anticoagulated on Coumadin     Arthritis     Atrial fibrillation     Atrial fibrillation     Carotid artery occlusion     Chronic rhinosinusitis     Coronary artery disease     Granulomatous lung disease     Heart failure     Hyperlipidemia     Hypertension     Hypertensive heart disease without CHF (congestive heart failure)     Mitral valve prolapse     PN (peripheral neuropathy)     hereditary?(sister has it also)/idiopathic?/patient thinks from [place]    Severe aortic stenosis by prior echocardiogram     TIA (transient ischemic attack)     Type 2 diabetes mellitus     Type II or unspecified type diabetes mellitus without mention of complication, not stated as uncontrolled        Past Surgical History:   Procedure Laterality Date    SINUS SURGERY      tonsillectomy      TONSILLECTOMY         Review of patient's allergies indicates:   Allergen Reactions    Levaquin [levofloxacin]     Doxycycline hyclate Other (See Comments)     Unknown to patient    Iodine and iodide containing products     Neurontin  [gabapentin]      Other reaction(s): Unknown    Norpace  [disopyramide]      Other reaction(s): Hives    Phenytoin sodium extended      Other reaction(s): Muscle pain    Sulfamethoxazole-trimethoprim      Other reaction(s): Muscle cramps       No current facility-administered medications on file prior to encounter.      Current Outpatient Prescriptions on File Prior to Encounter   Medication Sig    acetaminophen (TYLENOL) 325 MG tablet Take 325 mg by mouth every 6 (six) hours as needed for Pain.    alpha lipoic acid 600 mg Cap Take 600 mg by mouth Daily. (Patient taking differently: Take 100 mg by mouth Daily. )    aspirin 81 mg Tab Take 81 mg by mouth once daily.     blood sugar diagnostic (BLOOD GLUCOSE TEST) Strp 1 strip by Misc.(Non-Drug; Combo Route) route once daily. Currently using Nova max plus meter.    CINNAMON BARK (CINNAMON ORAL) Take 1,000  mg by mouth 2 (two) times daily.    digoxin (LANOXIN) 250 mcg tablet TAKE 1 TABLET ONE TIME DAILY AND TAKE AN ADDITIONAL 1/2 TABLET ONCE EACH WEEK    docusate sodium (COLACE) 100 MG capsule Take 100 mg by mouth. 1 Capsule Oral Every day    furosemide (LASIX) 20 MG tablet Take 1 tablet (20 mg total) by mouth 2 (two) times daily as needed (leg swelling). (Patient taking differently: Take 20 mg by mouth once daily. )    metoprolol tartrate (LOPRESSOR) 50 MG tablet TAKE 1 TABLET TWICE DAILY    simvastatin (ZOCOR) 20 MG tablet TAKE 1 TABLET EVERY EVENING    traZODone (DESYREL) 50 MG tablet Take 1 tablet (50 mg total) by mouth nightly as needed for Insomnia.    triamterene-hydrochlorothiazide 37.5-25 mg (DYAZIDE) 37.5-25 mg per capsule Take 1 capsule by mouth once daily.    warfarin (COUMADIN) 2 MG tablet Take 2 pills by mouth daily or as directed per the [facility]    benzonatate (TESSALON) 100 MG capsule Take 1 capsule (100 mg total) by mouth 3 (three) times daily as needed for Cough.    mupirocin (BACTROBAN) 2 % ointment Apply topically as needed.    triamcinolone acetonide 0.025% (KENALOG) 0.025 % cream Use bid as needed.     Family History     Problem Relation (Age of Onset)    Atrial fibrillation Sister, Sister, Sister    Heart disease Father, Mother    Lymphoma Sister (29)    Thyroid disease Sister        Social History Main Topics    Smoking status: Never Smoker    Smokeless tobacco: Never Used    Alcohol use 0.0 oz/week      Comment: rare    Drug use: No    Sexual activity: No     Review of Systems   Constitution: Negative for chills, diaphoresis, fever, weakness, weight gain and weight loss.   HENT: Negative for sore throat.    Eyes: Negative for blurred vision, vision loss in left eye, vision loss in right eye and visual disturbance.   Cardiovascular: Positive for dyspnea on exertion and leg swelling. Negative for chest pain, claudication, near-syncope, orthopnea, palpitations, paroxysmal  nocturnal dyspnea and syncope.   Respiratory: Positive for shortness of breath. Negative for cough, hemoptysis, sputum production and wheezing.    Endocrine: Negative for cold intolerance and heat intolerance.   Hematologic/Lymphatic: Negative for adenopathy. Does not bruise/bleed easily.   Skin: Negative for rash.   Musculoskeletal: Negative for falls, muscle weakness and myalgias.   Gastrointestinal: Negative for abdominal pain, change in bowel habit, constipation, diarrhea, melena and nausea.   Genitourinary: Negative for bladder incontinence.   Neurological: Negative for dizziness, focal weakness, headaches, light-headedness and numbness.   Psychiatric/Behavioral: Negative for altered mental status.     Objective:     Vital Signs (Most Recent):  Temp: 98.1 °F (36.7 °C) (10/31/17 0851)  Pulse: 78 (10/31/17 0851)  Resp: 18 (10/31/17 0851)  BP: (!) 144/65 (10/31/17 0851)  SpO2: (!) 92 % (10/31/17 0851) Vital Signs (24h Range):  Temp:  [97.8 °F (36.6 °C)-98.1 °F (36.7 °C)] 98.1 °F (36.7 °C)  Pulse:  [58-78] 78  Resp:  [12-28] 18  SpO2:  [92 %-99 %] 92 %  BP: (119-173)/(55-75) 144/65     Weight: 67.8 kg (149 lb 9.3 oz)  Body mass index is 24.89 kg/m².    SpO2: (!) 92 %  O2 Device (Oxygen Therapy): room air      Intake/Output Summary (Last 24 hours) at 10/31/17 0909  Last data filed at 10/31/17 0400   Gross per 24 hour   Intake                0 ml   Output              600 ml   Net             -600 ml       Lines/Drains/Airways     Drain                 Drain/Device  11/19/16 0927 Right knee collapsible closed device 345 days          Peripheral Intravenous Line                 Peripheral IV - Single Lumen 10/30/17 1211 Left Antecubital less than 1 day                Physical Exam   Constitutional: She is oriented to person, place, and time. She appears well-developed and well-nourished. No distress.   HENT:   Head: Normocephalic and atraumatic.   Mouth/Throat: Oropharynx is clear and moist.   Eyes: Conjunctivae and  EOM are normal. Pupils are equal, round, and reactive to light. No scleral icterus.   Neck: Neck supple. JVD present. No tracheal deviation present.   Cardiovascular: Exam reveals no gallop and no friction rub.    No murmur heard.  Irregularly irregular rhythm    Pulmonary/Chest: Effort normal. No respiratory distress. She has no wheezes. She has no rales. She exhibits no tenderness.   Diminished on R   Abdominal: Soft. Bowel sounds are normal. She exhibits no distension. There is no hepatosplenomegaly. There is no tenderness.   Musculoskeletal: She exhibits edema. She exhibits no tenderness.   Neurological: She is alert and oriented to person, place, and time.   Skin: Skin is warm and dry. No rash noted. No erythema.   Psychiatric: She has a normal mood and affect. Her behavior is normal.       Significant Labs:   CMP   Recent Labs  Lab 10/30/17  1225 10/31/17  0421    138   K 4.0 3.7   CL 98 99   CO2 26 31*   * 88   BUN 15 17   CREATININE 0.8 0.8   CALCIUM 9.4 8.9   PROT 7.5 6.4   ALBUMIN 3.5 3.2*   BILITOT 1.2* 0.9   ALKPHOS 85 69   AST 29 22   ALT 13 12   ANIONGAP 12 8   ESTGFRAFRICA >60.0 >60.0   EGFRNONAA >60.0 >60.0   , CBC   Recent Labs  Lab 10/30/17  1225 10/31/17  0421   WBC 10.56 8.52   HGB 11.3* 10.1*   HCT 36.2* 32.7*    167    and INR   Recent Labs  Lab 10/30/17 10/30/17  1634 10/31/17  0421   INR 2.7 2.4* 2.5*       Significant Imaging: X-Ray: CXR: X-Ray Chest PA and Lateral (CXR):   Results for orders placed or performed during the hospital encounter of 10/30/17   X-Ray Chest PA And Lateral    Narrative    2 views    Pacemaker identified as before.  Postsurgical changes similar to the previous study  Cardiomegaly.  Bilateral pleural effusion larger on the right side.  Atelectatic changes at the lung bases.  Upper lung fields are clear.  Previous described lung nodule is not well identified due to the large amount of pleural effusion    Impression     See above      Electronically  signed by: Can Francis MD  Date:     10/30/17  Time:    14:00      Assessment and Plan:     Active Diagnoses:    Diagnosis Date Noted POA    PRINCIPAL PROBLEM:  Acute on chronic diastolic heart failure [I50.33] 10/30/2017 Unknown    Non-productive cough [R05] 10/30/2017 Unknown    Complete heart block, post-surgical [I44.2, Z98.890] 10/30/2017 Unknown    S/P TAVR (transcatheter aortic valve replacement) [Z95.2] 10/17/2017 Not Applicable    Coronary artery disease involving native coronary artery of native heart without angina pectoris [I25.10] 08/22/2017 Yes    Mild major depression [F32.0] 08/18/2014 Yes    DDD (degenerative disc disease), cervical [M50.30] 11/21/2013 Yes    Controlled type 2 diabetes mellitus with microalbuminuria [E11.29, R80.9]  Yes    Benign hypertensive heart disease with congestive heart failure [I11.0, I50.9] 11/01/2012 Yes    Long-term (current) use of anticoagulants, INR goal 2.0-3.0 [Z79.01] 09/19/2012 Not Applicable    Severe aortic stenosis by prior echocardiogram [I35.0]  Yes    Hyperlipidemia [E78.5]  Yes    Hx-TIA (transient ischemic attack) [Z86.73]  Not Applicable      Problems Resolved During this Admission:    Diagnosis Date Noted Date Resolved POA       VTE Risk Mitigation         Ordered     warfarin (COUMADIN) tablet 4 mg  Daily     Route:  Oral        10/30/17 2157     Medium Risk of VTE  Once      10/30/17 2102     Place JONO hose  Until discontinued      10/30/17 2102        Agree with current regimen, continue diuresis   Low Na diet, fluid restriction, daily weights   Will f/u ECHO today    Thank you for your consult. I will sign off. Please contact us if you have any additional questions.    Aparna Yoo NP  Interventional Cardiology   [facility]

## 2024-10-17 NOTE — TELEPHONE ENCOUNTER
Refill Routing Note   Medication(s) are not appropriate for processing by [facility] for the following reason(s):        Patient not seen by provider within 15 months    [facility] action(s):  Defer   Requires appointment : Yes             Appointments  past 12m or future 3m with PCP    Date Provider   Last Visit   6/12/2023 Shai Davison MD   Next Visit   Visit date not found Shai Davison MD   ED visits in past 90 days: 0        Note composed:10:37 AM 10/17/2024

## 2024-10-18 RX ORDER — METOPROLOL TARTRATE 100 MG/1
100 TABLET ORAL 2 TIMES DAILY
Qty: 180 TABLET | Refills: 0 | Status: SHIPPED | OUTPATIENT
Start: 2024-10-18

## 2024-10-23 ENCOUNTER — HOSPITAL ENCOUNTER (OUTPATIENT)
Dept: RADIOLOGY | Facility: HOSPITAL | Age: 74
Discharge: HOME | End: 2024-10-23
Attending: ORTHOPAEDIC SURGERY
Payer: MEDICARE

## 2024-10-23 ENCOUNTER — OFFICE VISIT (OUTPATIENT)
Dept: ORTHOPEDICS | Facility: CLINIC | Age: 74
End: 2024-10-23
Payer: MEDICARE

## 2024-10-23 VITALS — HEIGHT: 68 IN | BODY MASS INDEX: 31.07 KG/M2 | WEIGHT: 205 LBS

## 2024-10-23 DIAGNOSIS — M25.562 LEFT KNEE PAIN, UNSPECIFIED CHRONICITY: ICD-10-CM

## 2024-10-23 DIAGNOSIS — M17.10 ARTHRITIS OF KNEE: ICD-10-CM

## 2024-10-23 DIAGNOSIS — M17.12 PRIMARY OSTEOARTHRITIS OF LEFT KNEE: Primary | ICD-10-CM

## 2024-10-23 PROCEDURE — 99999 PR PBB SHADOW E&M-EST. PATIENT-LVL III: CPT | Mod: PBBFAC,,, | Performed by: ORTHOPAEDIC SURGERY

## 2024-10-23 PROCEDURE — 99213 OFFICE O/P EST LOW 20 MIN: CPT | Mod: S$PBB,,, | Performed by: ORTHOPAEDIC SURGERY

## 2024-10-23 PROCEDURE — 99213 OFFICE O/P EST LOW 20 MIN: CPT | Mod: PBBFAC,25 | Performed by: ORTHOPAEDIC SURGERY

## 2024-10-23 PROCEDURE — 73562 X-RAY EXAM OF KNEE 3: CPT | Mod: 26,50,, | Performed by: RADIOLOGY

## 2024-10-23 PROCEDURE — 73562 X-RAY EXAM OF KNEE 3: CPT | Mod: TC,50

## 2024-10-23 NOTE — PROGRESS NOTES
"Subjective:      Patient ID: Zander Kaur is a 73 y.o. male.    Chief Complaint: Pain of the Left Knee and Pain of the Right Knee    HPI  Zander Kaur has bilateral knee pain.  Left is worse than right.  [place] [facility] injected/aspirated it about two weeks ago. The symptoms have worsened to the point where it is interfering with his activities of daily living.  He has difficulty with stairs, getting dressed and getting in an out of a car. He had excellent results with the [facility].         Objective:      Body mass index is 31.17 kg/m².  Vitals:    10/23/24 1007   Weight: 93 kg (205 lb 0.4 oz)   Height: 5' 8" (1.727 m)           General    Constitutional: He is oriented to person, place, and time. He appears well-developed and well-nourished.   HENT:   Head: Normocephalic and atraumatic.   Eyes: EOM are normal.   Cardiovascular:  Normal rate.            Pulmonary/Chest: Effort normal.   Neurological: He is alert and oriented to person, place, and time.   Psychiatric: He has a normal mood and affect. His behavior is normal.             Left Knee Exam     Inspection   Scars: present  Swelling: present  Deformity: present (varus)  Bruising: absent    Tenderness   The patient tender to palpation of the medial joint line.    Crepitus   The patient has crepitus of the patella.    Range of Motion   Extension:  0   Flexion:  120     Tests   Stability   Lachman: normal (-1 to 2mm)   MCL - Valgus: normal (0 to 2mm)  LCL - Varus: normal (0 to 2mm)    Muscle Strength   Left Lower Extremity   Hip Abduction: 5/5   Quadriceps:  5/5   Hamstrin/5               Assessment:       Encounter Diagnosis   Name Primary?    Primary osteoarthritis of left knee Yes          Plan:       Zander was seen today for pain and pain.    Diagnoses and all orders for this visit:    Primary osteoarthritis of left knee        This point I think we can proceed with knee replacement.  Unfortunately we need to wait 3 months from the most " recent aspiration/injection.  This will give us time for clearance.  He will need to be cleared from a medical and hepatology standpoint.  I will see him back prior to the surgery to discuss in detail.

## 2024-10-28 ENCOUNTER — OFFICE VISIT (OUTPATIENT)
Dept: OTOLARYNGOLOGY | Facility: CLINIC | Age: 74
End: 2024-10-28
Payer: MEDICARE

## 2024-10-28 VITALS
SYSTOLIC BLOOD PRESSURE: 130 MMHG | WEIGHT: 205.69 LBS | BODY MASS INDEX: 31.17 KG/M2 | HEART RATE: 61 BPM | DIASTOLIC BLOOD PRESSURE: 81 MMHG | HEIGHT: 68 IN

## 2024-10-28 DIAGNOSIS — H61.111 MOHS DEFECT OF AURICLE OF RIGHT EAR: ICD-10-CM

## 2024-10-28 DIAGNOSIS — Z48.89 ENCOUNTER FOR POSTOPERATIVE WOUND CHECK: Primary | ICD-10-CM

## 2024-10-28 PROCEDURE — 99214 OFFICE O/P EST MOD 30 MIN: CPT | Mod: PBBFAC | Performed by: PHYSICIAN ASSISTANT

## 2024-10-28 PROCEDURE — 99024 POSTOP FOLLOW-UP VISIT: CPT | Mod: POP,,, | Performed by: PHYSICIAN ASSISTANT

## 2024-10-28 PROCEDURE — 99999 PR PBB SHADOW E&M-EST. PATIENT-LVL IV: CPT | Mod: PBBFAC,,, | Performed by: PHYSICIAN ASSISTANT

## 2024-10-31 ENCOUNTER — EXTERNAL CHRONIC CARE MANAGEMENT (OUTPATIENT)
Dept: PRIMARY CARE CLINIC | Facility: CLINIC | Age: 74
End: 2024-10-31
Payer: MEDICARE

## 2024-10-31 PROCEDURE — 99490 CHRNC CARE MGMT STAFF 1ST 20: CPT | Mod: PBBFAC | Performed by: INTERNAL MEDICINE

## 2024-10-31 PROCEDURE — 99439 CHRNC CARE MGMT STAF EA ADDL: CPT | Mod: PBBFAC | Performed by: INTERNAL MEDICINE

## 2024-10-31 PROCEDURE — 99439 CHRNC CARE MGMT STAF EA ADDL: CPT | Mod: S$PBB,,, | Performed by: INTERNAL MEDICINE

## 2024-10-31 PROCEDURE — 99490 CHRNC CARE MGMT STAFF 1ST 20: CPT | Mod: S$PBB,,, | Performed by: INTERNAL MEDICINE

## 2024-11-14 ENCOUNTER — TELEPHONE (OUTPATIENT)
Dept: ORTHOPEDICS | Facility: CLINIC | Age: 74
End: 2024-11-14
Payer: MEDICARE

## 2024-11-14 DIAGNOSIS — M25.562 CHRONIC PAIN OF LEFT KNEE: ICD-10-CM

## 2024-11-14 DIAGNOSIS — M17.12 PRIMARY OSTEOARTHRITIS OF LEFT KNEE: Primary | ICD-10-CM

## 2024-11-14 DIAGNOSIS — G89.29 CHRONIC PAIN OF LEFT KNEE: ICD-10-CM

## 2024-11-14 NOTE — TELEPHONE ENCOUNTER
Called pt and spoke to pt's spouse at length (30+min) about what to expect from a total joint and the process that patients go through for getting a knee replacement. 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 Spouse was very appreciative and spouse verbalized understanding and has no further questions.

## 2024-11-14 NOTE — ADDENDUM NOTE
Addended by: BLAIR TIRADO on: 11/14/2024 11:58 AM     Modules accepted: Orders    
(4) walks frequently

## 2024-11-30 ENCOUNTER — EXTERNAL CHRONIC CARE MANAGEMENT (OUTPATIENT)
Dept: PRIMARY CARE CLINIC | Facility: CLINIC | Age: 74
End: 2024-11-30
Payer: MEDICARE

## 2024-11-30 PROCEDURE — 99439 CHRNC CARE MGMT STAF EA ADDL: CPT | Mod: S$PBB,,, | Performed by: INTERNAL MEDICINE

## 2024-11-30 PROCEDURE — 99439 CHRNC CARE MGMT STAF EA ADDL: CPT | Mod: PBBFAC | Performed by: INTERNAL MEDICINE

## 2024-11-30 PROCEDURE — 99490 CHRNC CARE MGMT STAFF 1ST 20: CPT | Mod: PBBFAC | Performed by: INTERNAL MEDICINE

## 2024-11-30 PROCEDURE — 99490 CHRNC CARE MGMT STAFF 1ST 20: CPT | Mod: S$PBB,,, | Performed by: INTERNAL MEDICINE

## 2024-12-09 ENCOUNTER — OFFICE VISIT (OUTPATIENT)
Dept: DERMATOLOGY | Facility: CLINIC | Age: 74
End: 2024-12-09
Payer: MEDICARE

## 2024-12-09 ENCOUNTER — LAB VISIT (OUTPATIENT)
Dept: LAB | Facility: HOSPITAL | Age: 74
End: 2024-12-09
Attending: INTERNAL MEDICINE
Payer: MEDICARE

## 2024-12-09 DIAGNOSIS — L57.0 AK (ACTINIC KERATOSIS): Primary | ICD-10-CM

## 2024-12-09 DIAGNOSIS — Z85.828 HISTORY OF SKIN CANCER: ICD-10-CM

## 2024-12-09 DIAGNOSIS — Z94.4 S/P LIVER TRANSPLANT: ICD-10-CM

## 2024-12-09 LAB
ALBUMIN SERPL BCP-MCNC: 3.8 G/DL (ref 3.5–5.2)
ALP SERPL-CCNC: 83 U/L (ref 40–150)
ALT SERPL W/O P-5'-P-CCNC: 32 U/L (ref 10–44)
ANION GAP SERPL CALC-SCNC: 11 MMOL/L (ref 8–16)
AST SERPL-CCNC: 26 U/L (ref 10–40)
BASOPHILS # BLD AUTO: 0.07 K/UL (ref 0–0.2)
BASOPHILS NFR BLD: 0.9 % (ref 0–1.9)
BILIRUB SERPL-MCNC: 1.4 MG/DL (ref 0.1–1)
BUN SERPL-MCNC: 14 MG/DL (ref 8–23)
CALCIUM SERPL-MCNC: 9.3 MG/DL (ref 8.7–10.5)
CHLORIDE SERPL-SCNC: 106 MMOL/L (ref 95–110)
CO2 SERPL-SCNC: 23 MMOL/L (ref 23–29)
CREAT SERPL-MCNC: 0.9 MG/DL (ref 0.5–1.4)
DIFFERENTIAL METHOD BLD: ABNORMAL
EOSINOPHIL # BLD AUTO: 0.5 K/UL (ref 0–0.5)
EOSINOPHIL NFR BLD: 5.8 % (ref 0–8)
ERYTHROCYTE [DISTWIDTH] IN BLOOD BY AUTOMATED COUNT: 13.2 % (ref 11.5–14.5)
EST. GFR  (NO RACE VARIABLE): >60 ML/MIN/1.73 M^2
GLUCOSE SERPL-MCNC: 136 MG/DL (ref 70–110)
HCT VFR BLD AUTO: 46 % (ref 40–54)
HGB BLD-MCNC: 15.5 G/DL (ref 14–18)
IMM GRANULOCYTES # BLD AUTO: 0.02 K/UL (ref 0–0.04)
IMM GRANULOCYTES NFR BLD AUTO: 0.3 % (ref 0–0.5)
LYMPHOCYTES # BLD AUTO: 2.8 K/UL (ref 1–4.8)
LYMPHOCYTES NFR BLD: 36.3 % (ref 18–48)
MCH RBC QN AUTO: 32.3 PG (ref 27–31)
MCHC RBC AUTO-ENTMCNC: 33.7 G/DL (ref 32–36)
MCV RBC AUTO: 96 FL (ref 82–98)
MONOCYTES # BLD AUTO: 0.7 K/UL (ref 0.3–1)
MONOCYTES NFR BLD: 8.5 % (ref 4–15)
NEUTROPHILS # BLD AUTO: 3.7 K/UL (ref 1.8–7.7)
NEUTROPHILS NFR BLD: 48.2 % (ref 38–73)
NRBC BLD-RTO: 0 /100 WBC
PLATELET # BLD AUTO: 163 K/UL (ref 150–450)
PMV BLD AUTO: 9.6 FL (ref 9.2–12.9)
POTASSIUM SERPL-SCNC: 4.2 MMOL/L (ref 3.5–5.1)
PROT SERPL-MCNC: 7 G/DL (ref 6–8.4)
RBC # BLD AUTO: 4.8 M/UL (ref 4.6–6.2)
SODIUM SERPL-SCNC: 140 MMOL/L (ref 136–145)
TACROLIMUS BLD-MCNC: 5.2 NG/ML (ref 5–15)
WBC # BLD AUTO: 7.75 K/UL (ref 3.9–12.7)

## 2024-12-09 PROCEDURE — 36415 COLL VENOUS BLD VENIPUNCTURE: CPT | Performed by: INTERNAL MEDICINE

## 2024-12-09 PROCEDURE — 99213 OFFICE O/P EST LOW 20 MIN: CPT | Mod: PBBFAC | Performed by: DERMATOLOGY

## 2024-12-09 PROCEDURE — 80053 COMPREHEN METABOLIC PANEL: CPT | Performed by: INTERNAL MEDICINE

## 2024-12-09 PROCEDURE — 80197 ASSAY OF TACROLIMUS: CPT | Performed by: INTERNAL MEDICINE

## 2024-12-09 PROCEDURE — 99214 OFFICE O/P EST MOD 30 MIN: CPT | Mod: 25,S$PBB,, | Performed by: DERMATOLOGY

## 2024-12-09 PROCEDURE — 85025 COMPLETE CBC W/AUTO DIFF WBC: CPT | Performed by: INTERNAL MEDICINE

## 2024-12-09 PROCEDURE — 99999 PR PBB SHADOW E&M-EST. PATIENT-LVL III: CPT | Mod: PBBFAC,,, | Performed by: DERMATOLOGY

## 2024-12-09 RX ORDER — FLUOROURACIL 50 MG/G
CREAM TOPICAL
Qty: 30 G | Refills: 0 | Status: SHIPPED | OUTPATIENT
Start: 2024-12-09

## 2024-12-09 NOTE — PATIENT INSTRUCTIONS
Field Treatment for Actinic Keratoses (precancerous lesions)    5-Fluoruracil + Dovonex (calcipotriene) - This is a topical chemotherapy for your skin. This cream should never be applied without discussing with you dermatologist.    This treatment gets your immune system involved in fighting precancers (actinic keratoses), even those we can't yet see.     HOW TO APPLY: Apply the combination cream to the affected area scalp (do in quadrants) 2x/day x 7 days. Apply a fingertip length amount to the entire area. Wash your hands carefully after applying the creams and wipe glasses, BIPAP/CPAP machines, or anything else that comes in frequent contact with the creams.    WHAT TO EXPECT: Your skin will likely become red, crusted, sore, and tender during the treatment usually starting around day 3 and continuing for about 1 week after last application.     HOW TO HEAL FAST: To speed healing, wash with a gentle wash and apply Vaseline jelly especially to crusted open areas.    WE CAN HELP: Please contact us for any questions or problem shooting the application of these creams: [phone] or [email or web address] ([employer])    GREAT NEWS: Newer studies suggest that the combination of these creams not only decrease the sun damage spots and precancers (actinic keratoses) but also decrease your risk of getting skin cancer the year following application.     IT WILL BE WORTH IT!!!        Your prescription has been sent to [place] [facility].  The phone number is [phone].  Their location is:  [facility]. [place], [place], [address] [place] [address] Pharmacy should call you. You can opt to pick the Rx up or have the Rx mailed to you.     Hours of operation:   Monday- Friday:  8 a.m. to 6:00 p.m.  Saturday:  8 a.m. to 1:00 p.m.  Sunday:  Closed       CRYOSURGERY      Your doctor has used a method called cryosurgery to treat your skin condition. Cryosurgery refers to the use of very cold substances to treat a variety of skin conditions such as  warts, pre-skin cancers, molluscum contagiosum, sun spots, and several benign growths. The substance we use in cryosurgery is liquid nitrogen and is so cold (-195 degrees Celsius) that is burns when administered.     Following treatment in the office, the skin may immediately burn and become red. You may find the area around the lesion is affected as well. It is sometimes necessary to treat not only the lesion, but a small area of the surrounding normal skin to achieve a good response.     A blister, and even a blood filled blister, may form after treatment.   This is a normal response. If the blister is painful, it is acceptable to sterilize a needle and with rubbing alcohol and gently pop the blister. It is important that you gently wash the area with soap and warm water as the blister fluid may contain wart virus if a wart was treated. Do no remove the roof of the blister.     The area treated can take anywhere from 1-3 weeks to heal. Healing time depends on the kind skin lesion treated, the location, and how aggressively the lesion was treated. It is recommended that the areas treated are covered with Vaseline or bacitracin ointment and a band-aid. If a band-aid is not practical, just ointment applied several times per day will do. Keeping these areas moist will speed the healing time.    Treatment with liquid nitrogen can leave a scar. In dark skin, it may be a light or dark scar, in light skin it may be a white or pink scar. These will generally fade with time, but may never go away completely.     If you have any concerns after your treatment, please feel free to call the office.       [facility] [place] [address]/ [phone] FAX/ [email or web address]

## 2024-12-09 NOTE — PROGRESS NOTES
Subjective:      Patient ID:  Zander Kaur is a 74 y.o. male who presents for   Chief Complaint   Patient presents with    Skin Check     Ubse      History of Present Illness: The patient presents for follow up of skin check.    The patient was last seen on: 9/9/24 for cryosurgery to actinic keratoses which have resolved and ED&C to right post neck which pt states has resolved, also bx to right ear conchal bowl c/w SCC- pt had mohs on 10/7/24 w/ SSW.     This is a high risk patient here to check for the development of new lesions.      Other skin complaints: no new concerns        Review of Systems   Skin:  Positive for daily sunscreen use, activity-related sunscreen use and wears hat (always). Negative for recent sunburn.   Hematologic/Lymphatic: Does not bruise/bleed easily.       Objective:   Physical Exam   Constitutional: He appears well-developed and well-nourished. No distress.   Neurological: He is alert and oriented to person, place, and time. He is not disoriented.   Psychiatric: He has a normal mood and affect.   Skin:   Areas Examined (abnormalities noted in diagram):   Scalp / Hair Palpated and Inspected  Head / Face Inspection Performed  Neck Inspection Performed  Chest / Axilla Inspection Performed  Back Inspection Performed  RUE Inspected  LUE Inspection Performed                Diagram Legend     Erythematous scaling macule/papule c/w actinic keratosis       Vascular papule c/w angioma      Pigmented verrucoid papule/plaque c/w seborrheic keratosis      Yellow umbilicated papule c/w sebaceous hyperplasia      Irregularly shaped tan macule c/w lentigo     1-2 mm smooth white papules consistent with Milia      Movable subcutaneous cyst with punctum c/w epidermal inclusion cyst      Subcutaneous movable cyst c/w pilar cyst      Firm pink to brown papule c/w dermatofibroma      Pedunculated fleshy papule(s) c/w skin tag(s)      Evenly pigmented macule c/w junctional nevus     Mildly variegated  pigmented, slightly irregular-bordered macule c/w mildly atypical nevus      Flesh colored to evenly pigmented papule c/w intradermal nevus       Pink pearly papule/plaque c/w basal cell carcinoma      Erythematous hyperkeratotic cursted plaque c/w SCC      Surgical scar with no sign of skin cancer recurrence      Open and closed comedones      Inflammatory papules and pustules      Verrucoid papule consistent consistent with wart     Erythematous eczematous patches and plaques     Dystrophic onycholytic nail with subungual debris c/w onychomycosis     Umbilicated papule    Erythematous-base heme-crusted tan verrucoid plaque consistent with inflamed seborrheic keratosis     Erythematous Silvery Scaling Plaque c/w Psoriasis     See annotation      Assessment / Plan:        AK (actinic keratosis)  -     fluorouraciL (EFUDEX) 5 % cream; Compound fluorouracil 5% + calcipotriene 0.005% cream. Apply a pea-sized amount to entire scalp (in quadrants) BID x 7 days.  Dispense: 30 g; Refill: 0    And    Cryosurgery Procedure Note    Verbal consent from the patient is obtained including, but not limited to, risk of hypopigmentation/hyperpigmentation, scar, recurrence of lesion. The patient is aware of the precancerous quality and need for treatment of these lesions. Liquid nitrogen cryosurgery is applied to the 8 actinic keratoses, as detailed in the physical exam, to produce a freeze injury. The patient is aware that blisters may form and is instructed on wound care with gentle cleansing and use of vaseline ointment to keep moist until healed. The patient is supplied a handout on cryosurgery and is instructed to call if lesions do not completely resolve.    Wear hat always      History of skin cancer  Area(s) of previous NMSC evaluated with no signs of recurrence.    Upper body skin examination performed today including at least 6 points as noted in physical examination. No lesions suspicious for malignancy noted.    Recommend  daily sun protection/avoidance and use of at least SPF 30, broad spectrum sunscreen (OTC drug).                No follow-ups on file.

## 2024-12-12 ENCOUNTER — TELEPHONE (OUTPATIENT)
Dept: TRANSPLANT | Facility: CLINIC | Age: 74
End: 2024-12-12
Payer: MEDICARE

## 2024-12-12 NOTE — TELEPHONE ENCOUNTER
Letter sent, labs stable and no medication changes are needed. Repeat labs due 6/10/25 per protocol.  ----- Message from Carson Oswald MD sent at 12/12/2024  2:33 PM CST -----  Results reviewed

## 2024-12-12 NOTE — LETTER
December 12, 2024    Zander Kaur  [place] [address]          Dear Zander :  MRN: [id number]    This is a follow up to your recent labs, your lab results were stable.  There are no medicine changes.  Please have your labs drawn again on 6/10/25.      If you cannot have your labs drawn on the scheduled date, it is your responsibility to call the transplant department to reschedule.  Please call [phone] and ask to speak to [facility]  for all scheduling requests.     Happy Holidays and Happy New Year to you and your family!    Oksana  Your Liver Transplant Coordinator    [facility]  [facility] [place] [address]  [phone]

## 2024-12-29 PROBLEM — M17.12 PRIMARY OSTEOARTHRITIS OF LEFT KNEE: Status: ACTIVE | Noted: 2024-12-29

## 2024-12-29 NOTE — PROGRESS NOTES
[facility]  Progress Note    Patient Name: Zander Kaur  MRN: [id number]  Date of Evaluation- 12/31/2024  PCP- Shai Davison MD        HPI:  This is a 74 y.o. male  who presents today with wife for a preoperative evaluation in preparation for left knee arthroplasty.  Surgery is indicated for osteoarthritis of left knee.   Patient is new to me.  The history has been obtained by speaking with the patient and reviewing the electronic medical record and/or outside health information. Significant health conditions for the perioperative period are discussed below in assessment and plan.   Patient reports current health status to be Good.  Denies any new symptoms before surgery.       Subjective/ Objective:     Chief Complaint: Preoperative evaulation, perioperative medical management, and complication reduction plan.     Functional Capacity: no regular exercise regimen; walked around hospital today without assistance and without CP/SOB.       Anesthesia issues: None    Difficulty mouth opening: No    Steroid use in the last 12 months:  No    Dental Issues: None    Family anesthesia difficulty: None     Family Hx of Thrombosis: None    Past Medical History:   Diagnosis Date    Actinic keratosis     Allergy     seasonal    Arthritis     Basal cell carcinoma of right ala nasi 02/06/2023    Hypertension     Joint pain     Organ transplant     liver 2010    SCC (squamous cell carcinoma) 08/15/2022    Right cheek -SSW    SCC (squamous cell carcinoma) 03/27/2023    Right angle of mandible    SCC (squamous cell carcinoma) 03/27/2023    Right medial cheek    SCC (squamous cell carcinoma) 02/12/2024    anterior chin    SCC (squamous cell carcinoma) 06/10/2024    R ear helix-excised by JAM    SCC (squamous cell carcinoma) 06/10/2024    SCCIS-R post neck    SCC (squamous cell carcinoma), ear, 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    Squamous Cell Carcinoma 08/28/2013    right temple    Squamous Cell Carcinoma  09/04/2013    left forehead    Stroke          Past Medical History Pertinent Negatives:   Diagnosis Date Noted    Anxiety 12/30/2024    Asthma 12/30/2024    COPD (chronic obstructive pulmonary disease) 12/30/2024    Coronary artery disease 12/30/2024    Deep vein thrombosis 12/30/2024    Depression 12/30/2024    Diabetes mellitus, type 2 12/30/2024    Disorder of kidney and ureter 12/30/2024    GERD (gastroesophageal reflux disease) 12/30/2024    Myocardial infarction 11/11/2020    Pneumonia 11/11/2020    Seizures 11/11/2020    Type 2 diabetes mellitus 11/11/2020         Past Surgical History:   Procedure Laterality Date    ANKLE SURGERY      multipole surgeries    CLOSURE OF DEFECT OF MOHS PROCEDURE Right 02/09/2023    Procedure: CLOSURE, MOHS PROCEDURE DEFECT;  Surgeon: Uzair Brandon MD;  Location: [facility] OR [address] [place] [address];  Service: ENT;  Laterality: Right;    COLONOSCOPY N/A 12/18/2023    Procedure: COLONOSCOPY;  Surgeon: Leonardo ePnny MD;  Location: [facility] ENDOSCOPY;  Service: Endoscopy;  Laterality: N/A;  Referral: RENÉE HODGE / PEG / Inst portal -   12/11- pre call complete.  DBM  12.15 precall complete; pt has instr.; all questions answered; AP    CYSTOSCOPY N/A 04/05/2024    Procedure: CYSTOSCOPY;  Surgeon: Galileo Ramos MD;  [facility];  Service: Urology;  Laterality: N/A;    EAR RECONSTRUCTION Right 10/8/2024    Procedure: RECONSTRUCTION, EAR;  Surgeon: Randee Brown MD;  [facility];  Service: ENT;  Laterality: Right;    FACIAL RECONSTRUCTION SURGERY      HERNIA REPAIR      knee scope      bob, multiple    LIVER TRANSPLANT      2010    PROSTATE BIOPSY N/A 04/05/2024    Procedure: BIOPSY, PROSTATE;  Surgeon: Galileo Ramos MD;  [facility];  Service: Urology;  Laterality: N/A;  TransPERINEAL biopsy    REVISION OF FLAP GRAFT Right 03/03/2023    Procedure: REVISION, PROCEDURE INVOLVING FLAP GRAFT;  Surgeon: Uzair Brandon MD;  Location: [facility] OR  "2ND FLR;  Service: ENT;  Laterality: Right;    REVISION OF FLAP GRAFT Right 08/01/2023    Procedure: REVISION, PROCEDURE INVOLVING FLAP GRAFT;  Surgeon: Consuelo Augustine MD;  Location: [facility] OR;  Service: ENT;  Laterality: Right;    SHOULDER OPEN ROTATOR CUFF REPAIR      left    SKIN FULL THICKNESS GRAFT Right 10/8/2024    Procedure: APPLICATION, GRAFT, SKIN, FULL-THICKNESS;  Surgeon: Randee Brown MD;  Location: [facility] OR 2ND FLR;  Service: ENT;  Laterality: Right;    TRANSRECTAL ULTRASOUND EXAMINATION N/A 04/05/2024    Procedure: ULTRASOUND, RECTAL APPROACH;  Surgeon: Galileo Ramos MD;  Location: [facility] OR 1ST FLR;  Service: Urology;  Laterality: N/A;       Review of Systems   Constitutional:  Negative for chills, fatigue, fever and unexpected weight change.   HENT:  Negative for congestion, hearing loss, rhinorrhea, sore throat, tinnitus and trouble swallowing.    Eyes:  Negative for visual disturbance.   Respiratory:  Negative for cough, chest tightness, shortness of breath and wheezing.         STOP BANG risk factors:  Snoring  HTN    Male sex   Cardiovascular:  Negative for chest pain, palpitations and leg swelling.   Gastrointestinal:  Negative for constipation and diarrhea.   Genitourinary:  Negative for decreased urine volume, difficulty urinating, dysuria, frequency, hematuria and urgency.        Nocturia   Musculoskeletal:  Positive for arthralgias (left knee). Negative for back pain, neck pain and neck stiffness.   Skin:  Negative for rash and wound.   Neurological:  Negative for dizziness, syncope, weakness, numbness and headaches.   Hematological:  Bruises/bleeds easily.   Psychiatric/Behavioral:  Negative for sleep disturbance and suicidal ideas.               VITALS  Visit Vitals  /78 (BP Location: Left arm, Patient Position: Sitting)   Pulse (!) 55   Temp 98.2 °F (36.8 °C) (Oral)   Ht 5' 7" (1.702 m)   Wt 92 kg (202 lb 13.2 oz)   SpO2 97%   BMI 31.77 kg/m²          Physical Exam  Vitals reviewed. "   Constitutional:       General: He is not in acute distress.     Appearance: He is well-developed. He is obese.   HENT:      Head: Normocephalic.      Nose: Nose normal.      Mouth/Throat:      Pharynx: No oropharyngeal exudate.   Eyes:      General:         Right eye: No discharge.         Left eye: No discharge.      Conjunctiva/sclera: Conjunctivae normal.      Pupils: Pupils are equal, round, and reactive to light.   Neck:      Thyroid: No thyromegaly.      Vascular: No carotid bruit or JVD.      Trachea: No tracheal deviation.   Cardiovascular:      Rate and Rhythm: Regular rhythm. Bradycardia present.      Pulses:           Carotid pulses are 2+ on the right side and 2+ on the left side.       Dorsalis pedis pulses are 2+ on the right side and 2+ on the left side.        Posterior tibial pulses are 2+ on the right side and 2+ on the left side.      Heart sounds: Normal heart sounds. No murmur heard.  Pulmonary:      Effort: Pulmonary effort is normal. No respiratory distress.      Breath sounds: Normal breath sounds. No stridor. No wheezing, rhonchi or rales.   Abdominal:      General: Bowel sounds are normal. There is no distension.      Palpations: Abdomen is soft.      Tenderness: There is no abdominal tenderness. There is no guarding.   Musculoskeletal:      Cervical back: Normal range of motion.      Right lower le+ Edema present.      Left lower leg: Edema (trace) present.   Lymphadenopathy:      Cervical: No cervical adenopathy.   Skin:     General: Skin is warm and dry.      Capillary Refill: Capillary refill takes less than 2 seconds.      Findings: No erythema or rash.   Neurological:      Mental Status: He is alert and oriented to person, place, and time.   Psychiatric:         Behavior: Behavior normal. Behavior is cooperative.          Significant Labs:  Lab Results   Component Value Date    WBC 7.75 2024    HGB 15.5 2024    HCT 46.0 2024     2024    CHOL 89 (L)  09/16/2024    TRIG 66 09/16/2024    HDL 36 (L) 09/16/2024    ALT 32 12/09/2024    AST 26 12/09/2024     12/09/2024    K 4.2 12/09/2024     12/09/2024    CREATININE 0.9 12/09/2024    BUN 14 12/09/2024    CO2 23 12/09/2024    TSH 2.577 06/13/2023    PSA 5.1 (H) 03/17/2022    INR 1.0 12/30/2024    HGBA1C 5.9 (H) 12/30/2024           EKG:   Results for orders placed or performed during the hospital encounter of 12/30/24   EKG 12-lead    Collection Time: 12/30/24  2:12 PM   Result Value Ref Range    QRS Duration 94 ms    OHS QTC Calculation 424 ms    Narrative    Test Reason : I10,    Vent. Rate :  58 BPM     Atrial Rate :  58 BPM     P-R Int : 178 ms          QRS Dur :  94 ms      QT Int : 432 ms       P-R-T Axes :  53   6  24 degrees    QTcB Int : 424 ms    Sinus bradycardia  Otherwise normal ECG  When compared with ECG of 02-May-2024 09:58,  No significant change was found  Confirmed by Imtiaz Moraes ([id number]) on 12/30/2024 4:13:25 PM    Referred By: WINTER PAZ           Confirmed By: Imtiaz Moraes       2D ECHO:  TTE:  No results found for this or any previous visit.                Active Cardiac Conditions: None      Revised Cardiac Risk Index   High -Risk Surgery  Intraperitoneal; Intrathoracic; suprainguinal vascular Yes- + 1 No- 0   History of Ischemic Heart Disease   (Hx of MI/positive exercise test/current chest pain due to ischemia/use of nitrate therapy/EKG with pathological Q waves) Yes- + 1 No- 0   History of CHF  (Pulmonary edema/bilateral rales or S3 gallop/PND/CXR showing pulmonary vascular redistribution) Yes- + 1 No- 0   History of CVA   (Prior stroke or TIA) Yes- + 1 No- 0   Pre-operative treatment with insulin Yes- + 1 No- 0   Pre-operative creatinine > 2mg/dl Yes- + 1 No- 0   Total: 1      Risk Status:  Estimated risk of cardiac complications after non-cardiac surgery using the Revised Cardiac Risk Index for Preoperative risk is 6.0 %      ARISCAT (Canet) risk index: Low: 1.6% risk of  post-op pulmonary complications; Intermediate: 13.3% risk of post-op pulmonary complications if surgery is > 3 hours.     American Society of Anesthesiologists Physical Status classification (ASA): 3                   Orders Placed This Encounter    Protime-INR    Hemoglobin A1C    Ambulatory referral/consult to Sleep Disorders    EKG 12-lead           Assessment/Plan:     Primary osteoarthritis of left knee  Scheduled with Dr. Lock on 1/28/25 for left knee arthroplasty.    History of stroke  One episode 30 years ago; brain bleed due to elevated BP   Denies residual deficits  No carotid studies available    Uncomplicated opioid dependence  Taking oxycodone-acetaminophen for chronic right ankle pain- multiple infections/surgeries  Managed per [facility]    Patient may have opioid tolerance due to chronic continuous opioid use. I recommend monitoring for opioid tolerance during the postoperative period and plan pain control that will assist the patient in the hospital as well as through the discharge process.     Essential hypertension  Current BP  controlled today.    Taking: metoprolol/doxasosin     Lifestyle changes to reduce systolic BP:   exercise 30 minutes per day,  5 days per week or 150 minutes weekly; sodium reduction and avoidance of high salt foods such as processed meats, frozen meals and  fast foods.   Keeping a healthy weight/BMI can help with better BP control    BP acceptable for surgery. I recommend monitoring BP during perioperative period as uncontrolled pain can elevate blood pressure.         Aortic atherosclerosis  Taking statin; stable.    Varicose veins of bilateral lower extremities with other complications  Recommend compression stockings to reduce leg fatigue, swelling, or pain.   Has SCD machine at home and works for 45 mins a day  Increased risk of thrombosis.  Followed per Dr. Mejia in Cardiology    ANDREA 5/13/24    Normal resting ABIs bilaterally.    PVR waveforms are moderately  "dampened at the low thigh level bilaterally, and ankle level bilaterally.    PVR waveforms are mildly dampened at the calf level bilaterally.      US BLE 5/13/24   There is no evidence of a right lower extremity DVT.    The right greater saphenous vein has reflux.    There is no evidence of a left lower extremity DVT.    Elevated PSA  S/P prostate biopsy 4/5/24; per Urology note- " low volume Favorable Intermediate risk Prostate cancer."  Followed per Urology; on active surveillance  Taking Vesicare/doxazosin for BPH  LUTS: nocturia x 1    Immunosuppression  On Prograf for history of liver transplant  Last level was normal 12/9/24    S/P liver transplant  Surgery 6/2010   Followed per Transplant Clinic; last seen 8/19/24  Optimized for knee surgery in August 2024- before surgery was delayed  INR is 1.0        Lymphedema of both lower extremities  Followed per Cardiology  See varicose veins    Snoring  Denies NICOLA. Possible sleep apnea: recommend caution with sedating medication in the perioperative period.   Sleep Study referral placed    Prediabetes  A1c is 5.9  I recommend monitoring patient's glucose level during the perioperative period. Glucose may be elevated from stress hyperglycemia and may require insulin.      Discussion/Management of Perioperative Care    Thromboembolic prophylaxis (VTE) Care: Risk factors for thrombosis include: age, obesity, previous history of thrombosis, and varicose veins.  I recommend prophylaxis of thromboembolism with the use of compression stockings/pneumatic devices, and/or pharmacologic agents. The benefits should outweigh the risks for pharmacologic prophylaxis in the perioperative period. I also encourage early ambulation if not contraindicated during the post-operative period.    Risk factors for post-operative pulmonary complications include:age > 65 years and HTN. To reduce the risk of pulmonary complications, prophylactic recommendations include: early ambulation and pain " control.    Risk factors for renal complications include: age and HTN. To reduce the risk of postoperative renal complications, I recommend the patient maintain adequate hydration.  Avoid/reduce NSAIDS and ALTAMIRANO-2 inhibitors use as well as IV contrast for renal protection.    I recommend the use of appropriate prophylactic antibiotics to reduce the risk of surgical site infections.    Delirium risk factors include advanced age. I recommend to avoid/reduce use of benzodiazepine use (not for patients who take on a regular basis), anticholinergics, Benadryl,  and agents that may cause postoperative serotonin syndrome.  Controlled pain can decrease the risk for postop delirium and since opioids are used for postoperative pain control, I suggest using the lowest dose for the shortest amount of time necessary for pain management.     The patient is at an increased risk for urinary retention due to : possible regional anesthesia and advanced age. I recommend to avoid/decrease the use of benzodiazepines, anticholinergics, and Benadryl in the perioperative period. I also recommend using opioids for the shortest period of time if possible.          This visit was focused on Preoperative evaluation, Perioperative Medical management, complication reduction plans. I suggest that the patient follows up with primary care or relevant sub specialists for ongoing health care.    I appreciate the opportunity to be involved in this patients care. Please feel free to contact me if there were any questions about this consultation.        This includes face to face time and non-face to face time preparing to see the patient (e.g., review of tests), obtaining and/or reviewing separately obtained history, documenting clinical information in the electronic or other health record, independently interpreting results and communicating results to the patient/family/caregiver, or care coordinator.       I spent a total of 48 minutes on the day of  the visit.  This includes face to face time and non-face to face time preparing to see the patient (e.g., review of tests), obtaining and/or reviewing separately obtained history, documenting clinical information in the electronic or other health record, independently interpreting results and communicating results to the patient/family/caregiver, or care coordinator.         Patient is optimized for surgery.      Rosa Elena Meza NP  Perioperative Medicine  [facility]

## 2024-12-29 NOTE — ASSESSMENT & PLAN NOTE
Taking oxycodone-acetaminophen for chronic right ankle pain- multiple infections/surgeries  Managed per [facility]    Patient may have opioid tolerance due to chronic continuous opioid use. I recommend monitoring for opioid tolerance during the postoperative period and plan pain control that will assist the patient in the hospital as well as through the discharge process.

## 2024-12-29 NOTE — ASSESSMENT & PLAN NOTE
Surgery 6/2010   Followed per Transplant Clinic; last seen 8/19/24  Optimized for knee surgery in August 2024- before surgery was delayed  INR is 1.0

## 2024-12-29 NOTE — ASSESSMENT & PLAN NOTE
Recommend compression stockings to reduce leg fatigue, swelling, or pain.   Has SCD machine at home and works for 45 mins a day  Increased risk of thrombosis.  Followed per Dr. Mejia in Cardiology    ANDREA 5/13/24    Normal resting ABIs bilaterally.    PVR waveforms are moderately dampened at the low thigh level bilaterally, and ankle level bilaterally.    PVR waveforms are mildly dampened at the calf level bilaterally.      US BLE 5/13/24   There is no evidence of a right lower extremity DVT.    The right greater saphenous vein has reflux.    There is no evidence of a left lower extremity DVT.

## 2024-12-29 NOTE — HPI
This is a 74 y.o. male  who presents today with wife for a preoperative evaluation in preparation for left knee arthroplasty.  Surgery is indicated for osteoarthritis of left knee.   Patient is new to me.  The history has been obtained by speaking with the patient and reviewing the electronic medical record and/or outside health information. Significant health conditions for the perioperative period are discussed below in assessment and plan.   Patient reports current health status to be Good.  Denies any new symptoms before surgery.

## 2024-12-29 NOTE — ASSESSMENT & PLAN NOTE
"S/P prostate biopsy 4/5/24; per Urology note- " low volume Favorable Intermediate risk Prostate cancer."  Followed per Urology; on active surveillance  Taking Vesicare/doxazosin for BPH  LUTS: nocturia x 1  "

## 2024-12-29 NOTE — ASSESSMENT & PLAN NOTE
One episode 30 years ago; brain bleed due to elevated BP   Denies residual deficits  No carotid studies available

## 2024-12-29 NOTE — OUTPATIENT SUBJECTIVE & OBJECTIVE
Outpatient Subjective & Objective      Chief Complaint: Preoperative evaulation, perioperative medical management, and complication reduction plan.     Functional Capacity: no regular exercise regimen; walked around hospital today without assistance and without CP/SOB.       Anesthesia issues: None    Difficulty mouth opening: No    Steroid use in the last 12 months:  No    Dental Issues: None    Family anesthesia difficulty: None     Family Hx of Thrombosis: None    Past Medical History:   Diagnosis Date    Actinic keratosis     Allergy     seasonal    Arthritis     Basal cell carcinoma of right ala nasi 02/06/2023    Hypertension     Joint pain     Organ transplant     liver 2010    SCC (squamous cell carcinoma) 08/15/2022    Right cheek -SSW    SCC (squamous cell carcinoma) 03/27/2023    Right angle of mandible    SCC (squamous cell carcinoma) 03/27/2023    Right medial cheek    SCC (squamous cell carcinoma) 02/12/2024    anterior chin    SCC (squamous cell carcinoma) 06/10/2024    R ear helix-excised by JAM    SCC (squamous cell carcinoma) 06/10/2024    SCCIS-R post neck    SCC (squamous cell carcinoma), ear, 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    Squamous Cell Carcinoma 08/28/2013    right temple    Squamous Cell Carcinoma 09/04/2013    left forehead    Stroke          Past Medical History Pertinent Negatives:   Diagnosis Date Noted    Anxiety 12/30/2024    Asthma 12/30/2024    COPD (chronic obstructive pulmonary disease) 12/30/2024    Coronary artery disease 12/30/2024    Deep vein thrombosis 12/30/2024    Depression 12/30/2024    Diabetes mellitus, type 2 12/30/2024    Disorder of kidney and ureter 12/30/2024    GERD (gastroesophageal reflux disease) 12/30/2024    Myocardial infarction 11/11/2020    Pneumonia 11/11/2020    Seizures 11/11/2020    Type 2 diabetes mellitus 11/11/2020         Past Surgical History:   Procedure Laterality Date    ANKLE SURGERY      multipole surgeries    CLOSURE OF DEFECT OF  MOHS PROCEDURE Right 02/09/2023    Procedure: CLOSURE, MOHS PROCEDURE DEFECT;  Surgeon: Uzair Brandon MD;  Location: [facility] OR 2ND FLR;  Service: ENT;  Laterality: Right;    COLONOSCOPY N/A 12/18/2023    Procedure: COLONOSCOPY;  Surgeon: Leonardo Penny MD;  Location: [facility] ENDOSCOPY;  Service: Endoscopy;  Laterality: N/A;  Referral: RENÉE HODGE / PEG / Inst portal - LW  12/11- pre call complete.  DBM  12.15 precall complete; pt has instr.; all questions answered; AP    CYSTOSCOPY N/A 04/05/2024    Procedure: CYSTOSCOPY;  Surgeon: Galileo Ramos MD;  Location: [facility] OR 1ST FLR;  Service: Urology;  Laterality: N/A;    EAR RECONSTRUCTION Right 10/8/2024    Procedure: RECONSTRUCTION, EAR;  Surgeon: Randee Brown MD;  Location: [facility] OR [facility] FLR;  Service: ENT;  Laterality: Right;    FACIAL RECONSTRUCTION SURGERY      HERNIA REPAIR      knee scope      bob, multiple    LIVER TRANSPLANT      2010    PROSTATE BIOPSY N/A 04/05/2024    Procedure: BIOPSY, PROSTATE;  Surgeon: Galileo Ramos MD;  [facility];  Service: Urology;  Laterality: N/A;  TransPERINEAL biopsy    REVISION OF FLAP GRAFT Right 03/03/2023    Procedure: REVISION, PROCEDURE INVOLVING FLAP GRAFT;  Surgeon: Uzair Brandon MD;  Location: [facility] OR 2ND FLR;  Service: ENT;  Laterality: Right;    REVISION OF FLAP GRAFT Right 08/01/2023    Procedure: REVISION, PROCEDURE INVOLVING FLAP GRAFT;  Surgeon: Consuelo Augustine MD;  Location: [facility] OR;  Service: ENT;  Laterality: Right;    SHOULDER OPEN ROTATOR CUFF REPAIR      left    SKIN FULL THICKNESS GRAFT Right 10/8/2024    Procedure: APPLICATION, GRAFT, SKIN, FULL-THICKNESS;  Surgeon: Randee Brown MD;  Location: [facility] OR 2ND FLR;  Service: ENT;  Laterality: Right;    TRANSRECTAL ULTRASOUND EXAMINATION N/A 04/05/2024    Procedure: ULTRASOUND, RECTAL APPROACH;  Surgeon: Galileo Ramos MD;  Location: [facility] OR 1ST FLR;  Service: Urology;  Laterality: N/A;       Review of Systems   Constitutional:   "Negative for chills, fatigue, fever and unexpected weight change.   HENT:  Negative for congestion, hearing loss, rhinorrhea, sore throat, tinnitus and trouble swallowing.    Eyes:  Negative for visual disturbance.   Respiratory:  Negative for cough, chest tightness, shortness of breath and wheezing.         STOP BANG risk factors:  Snoring  HTN    Male sex   Cardiovascular:  Negative for chest pain, palpitations and leg swelling.   Gastrointestinal:  Negative for constipation and diarrhea.   Genitourinary:  Negative for decreased urine volume, difficulty urinating, dysuria, frequency, hematuria and urgency.        Nocturia   Musculoskeletal:  Positive for arthralgias (left knee). Negative for back pain, neck pain and neck stiffness.   Skin:  Negative for rash and wound.   Neurological:  Negative for dizziness, syncope, weakness, numbness and headaches.   Hematological:  Bruises/bleeds easily.   Psychiatric/Behavioral:  Negative for sleep disturbance and suicidal ideas.               VITALS  Visit Vitals  /78 (BP Location: Left arm, Patient Position: Sitting)   Pulse (!) 55   Temp 98.2 °F (36.8 °C) (Oral)   Ht 5' 7" (1.702 m)   Wt 92 kg (202 lb 13.2 oz)   SpO2 97%   BMI 31.77 kg/m²          Physical Exam  Vitals reviewed.   Constitutional:       General: He is not in acute distress.     Appearance: He is well-developed. He is obese.   HENT:      Head: Normocephalic.      Nose: Nose normal.      Mouth/Throat:      Pharynx: No oropharyngeal exudate.   Eyes:      General:         Right eye: No discharge.         Left eye: No discharge.      Conjunctiva/sclera: Conjunctivae normal.      Pupils: Pupils are equal, round, and reactive to light.   Neck:      Thyroid: No thyromegaly.      Vascular: No carotid bruit or JVD.      Trachea: No tracheal deviation.   Cardiovascular:      Rate and Rhythm: Regular rhythm. Bradycardia present.      Pulses:           Carotid pulses are 2+ on the right side and 2+ on the left " side.       Dorsalis pedis pulses are 2+ on the right side and 2+ on the left side.        Posterior tibial pulses are 2+ on the right side and 2+ on the left side.      Heart sounds: Normal heart sounds. No murmur heard.  Pulmonary:      Effort: Pulmonary effort is normal. No respiratory distress.      Breath sounds: Normal breath sounds. No stridor. No wheezing, rhonchi or rales.   Abdominal:      General: Bowel sounds are normal. There is no distension.      Palpations: Abdomen is soft.      Tenderness: There is no abdominal tenderness. There is no guarding.   Musculoskeletal:      Cervical back: Normal range of motion.      Right lower le+ Edema present.      Left lower leg: Edema (trace) present.   Lymphadenopathy:      Cervical: No cervical adenopathy.   Skin:     General: Skin is warm and dry.      Capillary Refill: Capillary refill takes less than 2 seconds.      Findings: No erythema or rash.   Neurological:      Mental Status: He is alert and oriented to person, place, and time.   Psychiatric:         Behavior: Behavior normal. Behavior is cooperative.          Significant Labs:  Lab Results   Component Value Date    WBC 7.75 2024    HGB 15.5 2024    HCT 46.0 2024     2024    CHOL 89 (L) 2024    TRIG 66 2024    HDL 36 (L) 2024    ALT 32 2024    AST 26 2024     2024    K 4.2 2024     2024    CREATININE 0.9 2024    BUN 14 2024    CO2 23 2024    TSH 2.577 2023    PSA 5.1 (H) 2022    INR 1.0 2024    HGBA1C 5.9 (H) 2024           EKG:   Results for orders placed or performed during the hospital encounter of 24   EKG 12-lead    Collection Time: 24  2:12 PM   Result Value Ref Range    QRS Duration 94 ms    OHS QTC Calculation 424 ms    Narrative    Test Reason : I10,    Vent. Rate :  58 BPM     Atrial Rate :  58 BPM     P-R Int : 178 ms          QRS Dur :  94 ms       QT Int : 432 ms       P-R-T Axes :  53   6  24 degrees    QTcB Int : 424 ms    Sinus bradycardia  Otherwise normal ECG  When compared with ECG of 02-May-2024 09:58,  No significant change was found  Confirmed by Imtiaz Moraes ([id number]) on 12/30/2024 4:13:25 PM    Referred By: WINTER PAZ           Confirmed By: Imtiaz Moraes       2D ECHO:  TTE:  No results found for this or any previous visit.                Active Cardiac Conditions: None      Revised Cardiac Risk Index   High -Risk Surgery  Intraperitoneal; Intrathoracic; suprainguinal vascular Yes- + 1 No- 0   History of Ischemic Heart Disease   (Hx of MI/positive exercise test/current chest pain due to ischemia/use of nitrate therapy/EKG with pathological Q waves) Yes- + 1 No- 0   History of CHF  (Pulmonary edema/bilateral rales or S3 gallop/PND/CXR showing pulmonary vascular redistribution) Yes- + 1 No- 0   History of CVA   (Prior stroke or TIA) Yes- + 1 No- 0   Pre-operative treatment with insulin Yes- + 1 No- 0   Pre-operative creatinine > 2mg/dl Yes- + 1 No- 0   Total: 1      Risk Status:  Estimated risk of cardiac complications after non-cardiac surgery using the Revised Cardiac Risk Index for Preoperative risk is 6.0 %      ARISCAT (Canet) risk index: Low: 1.6% risk of post-op pulmonary complications; Intermediate: 13.3% risk of post-op pulmonary complications if surgery is > 3 hours.     American Society of Anesthesiologists Physical Status classification (ASA): 3             Outpatient Subjective & Objective

## 2024-12-29 NOTE — ASSESSMENT & PLAN NOTE
Current BP  controlled today.    Taking: metoprolol/doxasosin     Lifestyle changes to reduce systolic BP:   exercise 30 minutes per day,  5 days per week or 150 minutes weekly; sodium reduction and avoidance of high salt foods such as processed meats, frozen meals and  fast foods.   Keeping a healthy weight/BMI can help with better BP control    BP acceptable for surgery. I recommend monitoring BP during perioperative period as uncontrolled pain can elevate blood pressure.

## 2024-12-30 ENCOUNTER — OFFICE VISIT (OUTPATIENT)
Dept: ORTHOPEDICS | Facility: CLINIC | Age: 74
End: 2024-12-30
Payer: MEDICARE

## 2024-12-30 ENCOUNTER — HOSPITAL ENCOUNTER (OUTPATIENT)
Dept: RADIOLOGY | Facility: HOSPITAL | Age: 74
Discharge: HOME | End: 2024-12-30
Attending: ORTHOPAEDIC SURGERY
Payer: MEDICARE

## 2024-12-30 ENCOUNTER — HOSPITAL ENCOUNTER (OUTPATIENT)
Dept: CARDIOLOGY | Facility: CLINIC | Age: 74
Discharge: HOME | End: 2024-12-30
Payer: MEDICARE

## 2024-12-30 ENCOUNTER — OFFICE VISIT (OUTPATIENT)
Dept: INTERNAL MEDICINE | Facility: CLINIC | Age: 74
End: 2024-12-30
Payer: MEDICARE

## 2024-12-30 VITALS
OXYGEN SATURATION: 97 % | WEIGHT: 202.81 LBS | SYSTOLIC BLOOD PRESSURE: 132 MMHG | BODY MASS INDEX: 31.83 KG/M2 | HEIGHT: 67 IN | TEMPERATURE: 98 F | HEART RATE: 55 BPM | DIASTOLIC BLOOD PRESSURE: 78 MMHG

## 2024-12-30 VITALS — WEIGHT: 204.38 LBS | BODY MASS INDEX: 30.98 KG/M2 | HEIGHT: 68 IN

## 2024-12-30 DIAGNOSIS — M17.12 PRIMARY OSTEOARTHRITIS OF LEFT KNEE: ICD-10-CM

## 2024-12-30 DIAGNOSIS — M17.12 PRIMARY OSTEOARTHRITIS OF LEFT KNEE: Primary | ICD-10-CM

## 2024-12-30 DIAGNOSIS — Z94.4 S/P LIVER TRANSPLANT: ICD-10-CM

## 2024-12-30 DIAGNOSIS — Z86.73 HISTORY OF STROKE: ICD-10-CM

## 2024-12-30 DIAGNOSIS — I89.0 LYMPHEDEMA OF BOTH LOWER EXTREMITIES: ICD-10-CM

## 2024-12-30 DIAGNOSIS — R06.83 SNORING: ICD-10-CM

## 2024-12-30 DIAGNOSIS — D84.9 IMMUNOSUPPRESSION: ICD-10-CM

## 2024-12-30 DIAGNOSIS — Z01.818 PREOPERATIVE EXAMINATION: Primary | ICD-10-CM

## 2024-12-30 DIAGNOSIS — M79.605 PAIN OF LEFT LOWER EXTREMITY: ICD-10-CM

## 2024-12-30 DIAGNOSIS — I70.0 AORTIC ATHEROSCLEROSIS: ICD-10-CM

## 2024-12-30 DIAGNOSIS — R73.03 PREDIABETES: ICD-10-CM

## 2024-12-30 DIAGNOSIS — I10 ESSENTIAL HYPERTENSION: ICD-10-CM

## 2024-12-30 DIAGNOSIS — R97.20 ELEVATED PSA: ICD-10-CM

## 2024-12-30 DIAGNOSIS — F11.20 UNCOMPLICATED OPIOID DEPENDENCE: ICD-10-CM

## 2024-12-30 DIAGNOSIS — I83.893 VARICOSE VEINS OF BILATERAL LOWER EXTREMITIES WITH OTHER COMPLICATIONS: ICD-10-CM

## 2024-12-30 DIAGNOSIS — R73.09 ELEVATED GLUCOSE: ICD-10-CM

## 2024-12-30 LAB
OHS QRS DURATION: 94 MS
OHS QTC CALCULATION: 424 MS

## 2024-12-30 PROCEDURE — 99999 PR PBB SHADOW E&M-EST. PATIENT-LVL III: CPT | Mod: PBBFAC,,, | Performed by: ORTHOPAEDIC SURGERY

## 2024-12-30 PROCEDURE — 93005 ELECTROCARDIOGRAM TRACING: CPT | Mod: PBBFAC | Performed by: INTERNAL MEDICINE

## 2024-12-30 PROCEDURE — 99215 OFFICE O/P EST HI 40 MIN: CPT | Mod: S$PBB,,, | Performed by: NURSE PRACTITIONER

## 2024-12-30 PROCEDURE — 99999 PR PBB SHADOW E&M-EST. PATIENT-LVL IV: CPT | Mod: PBBFAC,,, | Performed by: NURSE PRACTITIONER

## 2024-12-30 PROCEDURE — 99214 OFFICE O/P EST MOD 30 MIN: CPT | Mod: S$PBB,,, | Performed by: NURSE PRACTITIONER

## 2024-12-30 PROCEDURE — 73700 CT LOWER EXTREMITY W/O DYE: CPT | Mod: 26,LT,, | Performed by: RADIOLOGY

## 2024-12-30 PROCEDURE — 99213 OFFICE O/P EST LOW 20 MIN: CPT | Mod: PBBFAC,27,25 | Performed by: NURSE PRACTITIONER

## 2024-12-30 PROCEDURE — 99213 OFFICE O/P EST LOW 20 MIN: CPT | Mod: PBBFAC | Performed by: ORTHOPAEDIC SURGERY

## 2024-12-30 PROCEDURE — 93010 ELECTROCARDIOGRAM REPORT: CPT | Mod: S$PBB,,, | Performed by: INTERNAL MEDICINE

## 2024-12-30 PROCEDURE — 73700 CT LOWER EXTREMITY W/O DYE: CPT | Mod: TC,LT

## 2024-12-30 PROCEDURE — 99999 PR PBB SHADOW E&M-EST. PATIENT-LVL III: CPT | Mod: PBBFAC,,, | Performed by: NURSE PRACTITIONER

## 2024-12-30 PROCEDURE — 99214 OFFICE O/P EST MOD 30 MIN: CPT | Mod: S$PBB,,, | Performed by: ORTHOPAEDIC SURGERY

## 2024-12-30 PROCEDURE — 99214 OFFICE O/P EST MOD 30 MIN: CPT | Mod: PBBFAC,25,27 | Performed by: NURSE PRACTITIONER

## 2024-12-30 RX ORDER — SODIUM CHLORIDE 9 MG/ML
INJECTION, SOLUTION INTRAVENOUS
OUTPATIENT
Start: 2024-12-30

## 2024-12-30 RX ORDER — FAMOTIDINE 20 MG/1
20 TABLET, FILM COATED ORAL 2 TIMES DAILY
OUTPATIENT
Start: 2024-12-30

## 2024-12-30 RX ORDER — BISACODYL 10 MG/1
10 SUPPOSITORY RECTAL EVERY 12 HOURS PRN
OUTPATIENT
Start: 2024-12-30

## 2024-12-30 RX ORDER — FENTANYL CITRATE 50 UG/ML
25 INJECTION, SOLUTION INTRAMUSCULAR; INTRAVENOUS EVERY 5 MIN PRN
OUTPATIENT
Start: 2024-12-30

## 2024-12-30 RX ORDER — SODIUM CHLORIDE 9 MG/ML
INJECTION, SOLUTION INTRAVENOUS CONTINUOUS
OUTPATIENT
Start: 2024-12-30 | End: 2024-12-31

## 2024-12-30 RX ORDER — TALC
6 POWDER (GRAM) TOPICAL NIGHTLY PRN
OUTPATIENT
Start: 2024-12-30

## 2024-12-30 RX ORDER — MORPHINE SULFATE 2 MG/ML
2 INJECTION, SOLUTION INTRAMUSCULAR; INTRAVENOUS
OUTPATIENT
Start: 2024-12-30

## 2024-12-30 RX ORDER — ASPIRIN 81 MG/1
81 TABLET ORAL 2 TIMES DAILY
OUTPATIENT
Start: 2024-12-30

## 2024-12-30 RX ORDER — AMOXICILLIN 250 MG
1 CAPSULE ORAL 2 TIMES DAILY
OUTPATIENT
Start: 2024-12-30

## 2024-12-30 RX ORDER — PROCHLORPERAZINE EDISYLATE 5 MG/ML
5 INJECTION INTRAMUSCULAR; INTRAVENOUS EVERY 6 HOURS PRN
OUTPATIENT
Start: 2024-12-30

## 2024-12-30 RX ORDER — ONDANSETRON HYDROCHLORIDE 2 MG/ML
4 INJECTION, SOLUTION INTRAVENOUS EVERY 8 HOURS PRN
OUTPATIENT
Start: 2024-12-30

## 2024-12-30 RX ORDER — NALOXONE HCL 0.4 MG/ML
0.02 VIAL (ML) INJECTION
OUTPATIENT
Start: 2024-12-30

## 2024-12-30 RX ORDER — MUPIROCIN 20 MG/G
1 OINTMENT TOPICAL
OUTPATIENT
Start: 2024-12-30

## 2024-12-30 RX ORDER — PREGABALIN 75 MG/1
75 CAPSULE ORAL
OUTPATIENT
Start: 2024-12-30

## 2024-12-30 RX ORDER — CELECOXIB 200 MG/1
400 CAPSULE ORAL ONCE
OUTPATIENT
Start: 2024-12-30 | End: 2024-12-30

## 2024-12-30 RX ORDER — MUPIROCIN 20 MG/G
1 OINTMENT TOPICAL 2 TIMES DAILY
OUTPATIENT
Start: 2024-12-30 | End: 2025-01-04

## 2024-12-30 RX ORDER — SODIUM CHLORIDE 0.9 % (FLUSH) 0.9 %
10 SYRINGE (ML) INJECTION
OUTPATIENT
Start: 2024-12-30

## 2024-12-30 RX ORDER — OXYCODONE HYDROCHLORIDE 5 MG/1
5 TABLET ORAL
OUTPATIENT
Start: 2024-12-30

## 2024-12-30 RX ORDER — FENTANYL CITRATE 50 UG/ML
100 INJECTION, SOLUTION INTRAMUSCULAR; INTRAVENOUS
OUTPATIENT
Start: 2024-12-30 | End: 2024-12-31

## 2024-12-30 RX ORDER — ACETAMINOPHEN 500 MG
1000 TABLET ORAL EVERY 6 HOURS
OUTPATIENT
Start: 2024-12-30

## 2024-12-30 RX ORDER — CELECOXIB 200 MG/1
200 CAPSULE ORAL DAILY
OUTPATIENT
Start: 2024-12-30

## 2024-12-30 RX ORDER — PREGABALIN 75 MG/1
75 CAPSULE ORAL NIGHTLY
OUTPATIENT
Start: 2024-12-30

## 2024-12-30 RX ORDER — POLYETHYLENE GLYCOL 3350 17 G/17G
17 POWDER, FOR SOLUTION ORAL DAILY
OUTPATIENT
Start: 2024-12-30

## 2024-12-30 RX ORDER — ACETAMINOPHEN 500 MG
1000 TABLET ORAL
OUTPATIENT
Start: 2024-12-30

## 2024-12-30 RX ORDER — METHOCARBAMOL 750 MG/1
750 TABLET, FILM COATED ORAL 3 TIMES DAILY
OUTPATIENT
Start: 2024-12-30

## 2024-12-30 RX ORDER — MIDAZOLAM HYDROCHLORIDE 1 MG/ML
4 INJECTION, SOLUTION INTRAMUSCULAR; INTRAVENOUS
OUTPATIENT
Start: 2024-12-30 | End: 2024-12-31

## 2024-12-30 RX ORDER — OXYCODONE HYDROCHLORIDE 5 MG/1
10 TABLET ORAL
OUTPATIENT
Start: 2024-12-30

## 2024-12-30 RX ORDER — LIDOCAINE HYDROCHLORIDE 10 MG/ML
1 INJECTION, SOLUTION EPIDURAL; INFILTRATION; INTRACAUDAL; PERINEURAL
OUTPATIENT
Start: 2024-12-30

## 2024-12-30 NOTE — H&P
CC:  Left knee pain    Zander Kaur is a 74 y.o. male with history of Left knee pain. Pain is worse with activity and weight bearing.  Patient has experienced interference of activities of daily living due to decreased range of motion and an increase in joint pain and swelling.  Patient has failed non-operative treatment including NSAIDs, corticosteroid injections, viscosupplement injections, and activity modification.  Zander Kaur currently ambulates using assistive device .     Relevant medical conditions of significance in perioperative period:  H/o liver transplant 12 years ago- managed by liver tx service  HTN- on medication managed by pcp  Urinary urgency- on medication managed by pcp  HLD- on medication managed by pcp  H/o CVA 30 yrs ago  H/o right ankle fracture repaired at outside hospital     Given documented medical comorbidities including those listed above and based off of CMS criteria patient would meet inpatient admission status guidelines. This case has been approved as inpatient.     Past Medical History:   Diagnosis Date    Actinic keratosis     Allergy     seasonal    Arthritis     Basal cell carcinoma of right ala nasi 02/06/2023    Hypertension     Joint pain     Organ transplant     liver 2010    SCC (squamous cell carcinoma) 08/15/2022    Right cheek -SSW    SCC (squamous cell carcinoma) 03/27/2023    Right angle of mandible    SCC (squamous cell carcinoma) 03/27/2023    Right medial cheek    SCC (squamous cell carcinoma) 02/12/2024    anterior chin    SCC (squamous cell carcinoma) 06/10/2024    R ear helix-excised by JAM    SCC (squamous cell carcinoma) 06/10/2024    SCCIS-R post neck    SCC (squamous cell carcinoma), ear, 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 OR 2ND FLR;  Service: ENT;  Laterality: Right;    COLONOSCOPY N/A 12/18/2023    Procedure: COLONOSCOPY;  Surgeon: Leonardo Penny MD;  Location: [facility] ENDOSCOPY;  Service: Endoscopy;  Laterality: N/A;  Referral: RENÉE HODGE / PEG / Inst portal - LW  12/11- pre call complete.  DBM  12.15 precall complete; pt has instr.; all questions answered; AP    CYSTOSCOPY N/A 04/05/2024    Procedure: CYSTOSCOPY;  Surgeon: Galileo Ramos MD;  Location: [facility] OR 1ST FLR;  Service: Urology;  Laterality: N/A;    EAR RECONSTRUCTION Right 10/8/2024    Procedure: RECONSTRUCTION, EAR;  Surgeon: Randee Brown MD;  Location: [facility] OR [facility] FLR;  Service: ENT;  Laterality: Right;    FACIAL RECONSTRUCTION SURGERY      HERNIA REPAIR      knee scope      bob, multiple    LIVER TRANSPLANT      2010    PROSTATE BIOPSY N/A 04/05/2024    Procedure: BIOPSY, PROSTATE;  Surgeon: Galileo Ramos MD;  [facility];  Service: Urology;  Laterality: N/A;  TransPERINEAL biopsy    REVISION OF FLAP GRAFT Right 03/03/2023    Procedure: REVISION, PROCEDURE INVOLVING FLAP GRAFT;  Surgeon: Uzair Brandon MD;  [facility];  Service: ENT;  Laterality: Right;    REVISION OF FLAP GRAFT Right 08/01/2023    Procedure: REVISION, PROCEDURE INVOLVING FLAP GRAFT;  Surgeon: Consuelo Augustine MD;  Location: [facility] OR;  Service: ENT;  Laterality: Right;    SHOULDER OPEN ROTATOR CUFF REPAIR      left    SKIN FULL THICKNESS GRAFT Right 10/8/2024    Procedure: APPLICATION, GRAFT, SKIN, FULL-THICKNESS;  Surgeon: Randee Brown MD;  Location: [facility] OR 2ND FLR;  Service: ENT;  Laterality: Right;    TRANSRECTAL ULTRASOUND EXAMINATION N/A 04/05/2024    Procedure: ULTRASOUND, RECTAL APPROACH;  Surgeon: Galileo Ramos MD;  Location: [facility] OR 1ST FLR;  Service: Urology;  Laterality: N/A;       Family History   Problem  Relation Name Age of Onset    Hearing loss Mother VIVI LANDRUM     Thyroid disease Sister      No Known Problems Son      No Known Problems Son      Melanoma Neg Hx      Psoriasis Neg Hx      Eczema Neg Hx      Lupus Neg Hx         Review of patient's allergies indicates:   Allergen Reactions    Diphenhydramine hcl     Penicillins      Other reaction(s): Rash    Povidone-iodine      Other reaction(s): Itching  Other reaction(s): Itching         Current Outpatient Medications:     atorvastatin (LIPITOR) 40 MG tablet, Take 1 tablet (40 mg total) by mouth once daily., Disp: 90 tablet, Rfl: 3    bumetanide (BUMEX) 0.5 MG Tab, Take 1 tablet (0.5 mg total) by mouth once daily., Disp: 90 tablet, Rfl: 3    celecoxib (CELEBREX) 200 MG capsule, Take 200 mg by mouth 2 (two) times daily., Disp: , Rfl:     doxazosin (CARDURA) 8 MG Tab, Take 1 tablet by mouth once daily. Please schedule an appointment with primary care provider for further refills., Disp: 90 tablet, Rfl: 0    fluorouraciL (EFUDEX) 5 % cream, Compound fluorouracil 5% + calcipotriene 0.005% cream. Apply a pea-sized amount to entire scalp BID x 7 days., Disp: 30 g, Rfl: 0    gabapentin (NEURONTIN) 300 MG capsule, Take 300 mg by mouth 3 (three) times daily., Disp: , Rfl:     methocarbamoL (ROBAXIN) 750 MG Tab, Take 750 mg by mouth 3 (three) times daily. Reports taking 1/2 pill 6 times per day, Disp: , Rfl:     metoprolol tartrate (LOPRESSOR) 100 MG tablet, Take 1 tablet by mouth twice daily., Disp: 180 tablet, Rfl: 0    multivit with minerals/lutein (MULTIVITAMIN 50 PLUS ORAL), Take 1 tablet by mouth once daily., Disp: , Rfl:     oxyCODONE-acetaminophen (PERCOCET)  mg per tablet, Take 1 tablet by mouth 3 (three) times daily as needed. Reports he takes 1/2 pill 6 times per day along with Robaxin, Disp: , Rfl:     oxyCODONE-acetaminophen (PERCOCET)  mg per tablet, Take 1 tablet by mouth every 4 (four) hours as needed for Pain., Disp: 8 tablet, Rfl: 0     oxyCODONE-acetaminophen (PERCOCET) 7.5-325 mg per tablet, Take 1 tablet by mouth every 4 (four) hours as needed for Pain. Do not exceed >3000mg tylenol total in 24hr period, Disp: 8 tablet, Rfl: 0    PROGRAF 1 mg Cap, Take 1 capsule (1 mg total) by mouth every 12 (twelve) hours., Disp: 180 capsule, Rfl: 3    ropinirole (REQUIP) 0.5 MG tablet, Take 0.5 mg by mouth every evening., Disp: , Rfl:     solifenacin (VESICARE) 5 MG tablet, Take 1 tablet (5 mg total) by mouth once daily., Disp: 30 tablet, Rfl: 11    vitamin D 1000 units Tab, Take 1,000 Units by mouth once daily., Disp: , Rfl:   No current facility-administered medications for this visit.    Facility-Administered Medications Ordered in Other Visits:     sodium chloride 0.9% flush 10 mL, 10 mL, Intravenous, PRN, Shai Mills MD    Review of Systems:  Constitutional: no fever or chills  Eyes: no visual changes  ENT: no nasal congestion or sore throat  Respiratory: no cough or shortness of breath  Cardiovascular: no chest pain or palpitations  Gastrointestinal: no nausea or vomiting, tolerating diet  Genitourinary: no hematuria or dysuria  Integument/Breast: no rash or pruritis  Hematologic/Lymphatic: no easy bruising or lymphadenopathy  Musculoskeletal: positive for knee pain  Neurological: no seizures or tremors  Behavioral/Psych: no auditory or visual hallucinations  Endocrine: no heat or cold intolerance    PE:  There were no vitals taken for this visit.  General: Pleasant, cooperative, NAD   Gait: antalgic  HEENT: NCAT, sclera nonicteric   Lungs: Respirations clear bilaterally; equal and unlabored.   CV: S1S2; 2+ bilateral upper and lower extremity pulses.   Skin: Intact throughout with no rashes, erythema, or lesions  Extremities: No LE edema,  no erythema or warmth of the skin in either lower extremity.    Left knee exam:  Knee Range of Motion:limited by pain, pain with passive range of motion  Effusion:none  Condition of skin:intact  Location of  tenderness:Medial joint line   Strength:limited by pain  Stability: stable to testing    Hip Examination: painless PROM of hip     Radiographs: Radiographs reveal advanced degenerative changes including subchondral cyst formation, subchondral sclerosis, osteophyte formation, joint space narrowing.     Knee Alignment: normal    Diagnosis: Primary osteoarthritis Left knee    Plan: Left total knee arthroplasty    Due to the serious nature of total joint infection and the high prevalence of community acquired MRSA, vancomycin will be used perioperatively.

## 2024-12-30 NOTE — PROGRESS NOTES
Subjective:      Patient ID: Zander Kaur is a 74 y.o. male.    Chief Complaint: Pain of the Left Knee    HPI  Zander Kaur has left knee pain.  He has severe left knee pain which inhibits his activities of daily living.  He has not responded to nonoperative treatment measures.  Surgery was contemplated this fall but a recent injection caused us to wait until after the new year.  Past Medical History:   Diagnosis Date    Actinic keratosis     Allergy     seasonal    Arthritis     Basal cell carcinoma of right ala nasi 02/06/2023    Hypertension     Joint pain     Organ transplant     liver 2010    SCC (squamous cell carcinoma) 08/15/2022    Right cheek -SSW    SCC (squamous cell carcinoma) 03/27/2023    Right angle of mandible    SCC (squamous cell carcinoma) 03/27/2023    Right medial cheek    SCC (squamous cell carcinoma) 02/12/2024    anterior chin    SCC (squamous cell carcinoma) 06/10/2024    R ear helix-excised by JAM    SCC (squamous cell carcinoma) 06/10/2024    SCCIS-R post neck    SCC (squamous cell carcinoma), ear, 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 90 tablet 3    bumetanide (BUMEX) 0.5 MG Tab Take 1 tablet (0.5 mg total) by mouth once daily. 90 tablet 3    celecoxib (CELEBREX) 200 MG capsule Take 200 mg by mouth 2 (two) times daily.      doxazosin (CARDURA) 8 MG Tab Take 1 tablet by mouth once daily. Please schedule an appointment with primary care provider for further refills. 90 tablet 0    fluorouraciL (EFUDEX) 5 % cream Compound fluorouracil 5% + calcipotriene 0.005% cream. Apply a pea-sized amount to entire scalp BID x 7 days. 30 g 0    gabapentin (NEURONTIN) 300 MG capsule Take 300 mg by mouth 3  (three) times daily.      methocarbamoL (ROBAXIN) 750 MG Tab Take 750 mg by mouth 3 (three) times daily. Reports taking 1/2 pill 6 times per day      metoprolol tartrate (LOPRESSOR) 100 MG tablet Take 1 tablet by mouth twice daily. 180 tablet 0    multivit with minerals/lutein (MULTIVITAMIN 50 PLUS ORAL) Take 1 tablet by mouth once daily.      oxyCODONE-acetaminophen (PERCOCET)  mg per tablet Take 1 tablet by mouth 3 (three) times daily as needed. Reports he takes 1/2 pill 6 times per day along with Robaxin      oxyCODONE-acetaminophen (PERCOCET)  mg per tablet Take 1 tablet by mouth every 4 (four) hours as needed for Pain. 8 tablet 0    oxyCODONE-acetaminophen (PERCOCET) 7.5-325 mg per tablet Take 1 tablet by mouth every 4 (four) hours as needed for Pain. Do not exceed >3000mg tylenol total in 24hr period 8 tablet 0    PROGRAF 1 mg Cap Take 1 capsule (1 mg total) by mouth every 12 (twelve) hours. 180 capsule 3    ropinirole (REQUIP) 0.5 MG tablet Take 0.5 mg by mouth every evening.      solifenacin (VESICARE) 5 MG tablet Take 1 tablet (5 mg total) by mouth once daily. 30 tablet 11    vitamin D 1000 units Tab Take 1,000 Units by mouth once daily.       Current Facility-Administered Medications on File Prior to Visit   Medication Dose Route Frequency Provider Last Rate Last Admin    sodium chloride 0.9% flush 10 mL  10 mL Intravenous PRN Shai Mills MD           Review of Systems   Constitutional: Negative for chills, fever and night sweats.   HENT:  Negative for hearing loss.    Eyes:  Negative for blurred vision and double vision.   Cardiovascular:  Negative for chest pain, claudication and leg swelling.   Respiratory:  Negative for shortness of breath.    Endocrine: Negative for polydipsia, polyphagia and polyuria.   Hematologic/Lymphatic: Negative for adenopathy and bleeding problem. Does not bruise/bleed easily.   Skin:  Negative for poor wound healing.   Gastrointestinal:  Negative for diarrhea  "and heartburn.   Genitourinary:  Negative for bladder incontinence.   Neurological:  Negative for focal weakness, headaches, numbness, paresthesias and sensory change.   Psychiatric/Behavioral:  The patient is not nervous/anxious.    Allergic/Immunologic:        Immunocompromised         Objective:      Body mass index is 31.07 kg/m².  Vitals:    24 1050   Weight: 92.7 kg (204 lb 5.9 oz)   Height: 5' 8" (1.727 m)           General    Constitutional: He is oriented to person, place, and time. He appears well-developed and well-nourished.   HENT:   Head: Normocephalic and atraumatic.   Eyes: EOM are normal.   Cardiovascular:  Normal rate.            Pulmonary/Chest: Effort normal.   Neurological: He is alert and oriented to person, place, and time.   Psychiatric: He has a normal mood and affect. His behavior is normal.     General Musculoskeletal Exam   Gait: abnormal and antalgic         Left Knee Exam     Inspection   Erythema: absent  Scars: present  Swelling: present  Effusion: present  Deformity: present (varus)  Bruising: absent    Tenderness   The patient tender to palpation of the medial joint line and lateral joint line.    Range of Motion   Extension:  10     Tests   Stability   Lachman: normal (-1 to 2mm)   MCL - Valgus: normal (0 to 2mm)  LCL - Varus: normal (0 to 2mm)    Other   Popliteal (Baker's) Cyst: present    Muscle Strength   Left Lower Extremity   Hip Abduction: 5/5   Quadriceps:  5/5   Hamstrin/5     Vascular Exam       Edema  Left Lower Leg: absent      Radiographs obtained previously demonstrate severe left knee arthritis.  Kellgren Ramiro 4.  There is medial erosion.  Of the tibia.  Varus deformity.  Changes tricompartmental.        Assessment:       Encounter Diagnosis   Name Primary?    Primary osteoarthritis of left knee Yes          Plan:       Zander was seen today for pain.    Diagnoses and all orders for this visit:    Primary osteoarthritis of left knee      Treatment " options were discussed. The surgical process of robotically assisted left knee replacement was discussed in detail with the patient including a detailed discussion of the procedure itself (including visual model, x-ray review, and literature review). We discussed options including implant type and why I believe the implant selected is a good match for the patient's needs. The patient expressed understanding and agrees to proceed with the planned surgeryThe typical perioperative and post-operative course was discussed and perioperative risks were discussed to the patient's satisfaction.  Risks and complications discussed included but were not limited to the risks of anesthetic complications, infection, bleeding, wound healing complications, fracture through the pin sights, stiffness, aseptic loosening, instability, limb length inequality, neurologic dysfunction including numbness and weakness, additional surgery,  DVT, pulmonary embolism, perioperative medical risks (cardiac, pulmonary, renal, neurologic), and death and the patient elects to proceed.  The patient should get medically cleared and attend the joint seminar.      ASA for DVT prophylaxis    Inpatient-transplant status      Extended po antibiotics

## 2024-12-30 NOTE — ASSESSMENT & PLAN NOTE
Denies NICOLA. Possible sleep apnea: recommend caution with sedating medication in the perioperative period.   Sleep Study referral placed

## 2024-12-30 NOTE — PROGRESS NOTES
Zander Kaur is a 74 y.o. year old here today for pre surgery optimization visit  in preparation for a Left total knee arthroplasty to be performed by Dr. Lock on 1/28/2025.  he was last seen and treated in the clinic on 12/30/2024. he will be medically optimized by the pre op center. There has been no significant change in medical status since last visit. No fever, chills, malaise, or unexplained weight change.      Allergies, Medications, past medical and surgical history reviewed.    Focused examination performed.    Patient saw surgeon in clinic today. All questions answered. Patient encouraged to call with questions. Contact information given.     Pre, danial, and post operative procedures and expectations discussed. Goals of successful surgery reviewed and include manageable pain levels, surgical site free of infection, medication management, and ambulation with PT/OT assistance. Healthy weight management discussed with patient and caregiver who were receptive to eduction of healthy diet and activity. No other necessary lifestyle changes identified. Educated patient about signs and symptoms of infection, medication management, anticoagulation therapy, risk of tobacco and alcohol use, and self-care to promote healing. Surgical guide given for future reference. Hibiclens given to patient with instructions. All questions were answered.     Zander Kaur verbalized an understanding to the education and goals. Patient has displayed readiness to engage in care and is ready to proceed with surgery.  Patient reports his wife, Marbella Kaur, is able and ready to provide assistance at home after discharge.    Surgical and blood consents signed.    DME will be arranged. The mobility limitation cannot be sufficiently resolved by the use of a cane. Patient's functional mobility deficit can be sufficiently resolved with the use of a (Rolling Walker or Walker). Patient's mobility limitation significantly impairs their  "ability to participate in one of more activities of daily living. The use of a (Rolling Walker or Walker) will significantly improve the patient's ability to participate in MRADLS and the patient will use it on regular basis in the home."     Zander Kaur will contact us if there are any questions, concerns, or changes in medical status prior to surgery.       Joint class: 12/5    Patient has discussed discharge planning with surgeon. Patient will be discharged to home following surgery.   patient will be scheduled with [facility]. PT will be done at the [place] location. They were taken to see Ms. Cardona for scheduling after their appt.    30 minutes of time was spent on patient education, review of records, templating, H&P, , appointment scheduling and optimizing patient for surgery.      "

## 2024-12-30 NOTE — H&P (VIEW-ONLY)
CC:  Left knee pain    Zander Kaur is a 74 y.o. male with history of Left knee pain. Pain is worse with activity and weight bearing.  Patient has experienced interference of activities of daily living due to decreased range of motion and an increase in joint pain and swelling.  Patient has failed non-operative treatment including NSAIDs, corticosteroid injections, viscosupplement injections, and activity modification.  Zander Kaur currently ambulates using assistive device .     Relevant medical conditions of significance in perioperative period:  H/o liver transplant 12 years ago- managed by liver tx service  HTN- on medication managed by pcp  Urinary urgency- on medication managed by pcp  HLD- on medication managed by pcp  H/o CVA 30 yrs ago  H/o right ankle fracture repaired at outside hospital     Given documented medical comorbidities including those listed above and based off of CMS criteria patient would meet inpatient admission status guidelines. This case has been approved as inpatient.     Past Medical History:   Diagnosis Date    Actinic keratosis     Allergy     seasonal    Arthritis     Basal cell carcinoma of right ala nasi 02/06/2023    Hypertension     Joint pain     Organ transplant     liver 2010    SCC (squamous cell carcinoma) 08/15/2022    Right cheek -SSW    SCC (squamous cell carcinoma) 03/27/2023    Right angle of mandible    SCC (squamous cell carcinoma) 03/27/2023    Right medial cheek    SCC (squamous cell carcinoma) 02/12/2024    anterior chin    SCC (squamous cell carcinoma) 06/10/2024    R ear helix-excised by JAM    SCC (squamous cell carcinoma) 06/10/2024    SCCIS-R post neck    SCC (squamous cell carcinoma), ear, 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    Squamous Cell Carcinoma 08/28/2013    right temple    Squamous Cell Carcinoma 09/04/2013    left forehead    Stroke        Past Surgical History:   Procedure Laterality Date    ANKLE SURGERY      multipole surgeries    CLOSURE  OF DEFECT OF MOHS PROCEDURE Right 02/09/2023    Procedure: CLOSURE, MOHS PROCEDURE DEFECT;  Surgeon: Uzair Brandon MD;  Location: [facility] OR 2ND FLR;  Service: ENT;  Laterality: Right;    COLONOSCOPY N/A 12/18/2023    Procedure: COLONOSCOPY;  Surgeon: Leonardo Penny MD;  Location: [facility] ENDOSCOPY;  Service: Endoscopy;  Laterality: N/A;  Referral: RENÉE HODGE / PEG / Inst portal - LW  12/11- pre call complete.  DBM  12.15 precall complete; pt has instr.; all questions answered; AP    CYSTOSCOPY N/A 04/05/2024    Procedure: CYSTOSCOPY;  Surgeon: Galileo Ramos MD;  Location: [facility] OR 1ST FLR;  Service: Urology;  Laterality: N/A;    EAR RECONSTRUCTION Right 10/8/2024    Procedure: RECONSTRUCTION, EAR;  Surgeon: Randee Brown MD;  Location: [facility] OR [facility] FLR;  Service: ENT;  Laterality: Right;    FACIAL RECONSTRUCTION SURGERY      HERNIA REPAIR      knee scope      bob, multiple    LIVER TRANSPLANT      2010    PROSTATE BIOPSY N/A 04/05/2024    Procedure: BIOPSY, PROSTATE;  Surgeon: Galileo Ramos MD;  [facility];  Service: Urology;  Laterality: N/A;  TransPERINEAL biopsy    REVISION OF FLAP GRAFT Right 03/03/2023    Procedure: REVISION, PROCEDURE INVOLVING FLAP GRAFT;  Surgeon: Uzair Brandon MD;  [facility];  Service: ENT;  Laterality: Right;    REVISION OF FLAP GRAFT Right 08/01/2023    Procedure: REVISION, PROCEDURE INVOLVING FLAP GRAFT;  Surgeon: Consuelo Augustine MD;  Location: [facility] OR;  Service: ENT;  Laterality: Right;    SHOULDER OPEN ROTATOR CUFF REPAIR      left    SKIN FULL THICKNESS GRAFT Right 10/8/2024    Procedure: APPLICATION, GRAFT, SKIN, FULL-THICKNESS;  Surgeon: Randee Brown MD;  Location: [facility] OR 2ND FLR;  Service: ENT;  Laterality: Right;    TRANSRECTAL ULTRASOUND EXAMINATION N/A 04/05/2024    Procedure: ULTRASOUND, RECTAL APPROACH;  Surgeon: Galileo Ramos MD;  Location: [facility] OR 1ST FLR;  Service: Urology;  Laterality: N/A;       Family History   Problem  Relation Name Age of Onset    Hearing loss Mother VIVI LANDRUM     Thyroid disease Sister      No Known Problems Son      No Known Problems Son      Melanoma Neg Hx      Psoriasis Neg Hx      Eczema Neg Hx      Lupus Neg Hx         Review of patient's allergies indicates:   Allergen Reactions    Diphenhydramine hcl     Penicillins      Other reaction(s): Rash    Povidone-iodine      Other reaction(s): Itching  Other reaction(s): Itching         Current Outpatient Medications:     atorvastatin (LIPITOR) 40 MG tablet, Take 1 tablet (40 mg total) by mouth once daily., Disp: 90 tablet, Rfl: 3    bumetanide (BUMEX) 0.5 MG Tab, Take 1 tablet (0.5 mg total) by mouth once daily., Disp: 90 tablet, Rfl: 3    celecoxib (CELEBREX) 200 MG capsule, Take 200 mg by mouth 2 (two) times daily., Disp: , Rfl:     doxazosin (CARDURA) 8 MG Tab, Take 1 tablet by mouth once daily. Please schedule an appointment with primary care provider for further refills., Disp: 90 tablet, Rfl: 0    fluorouraciL (EFUDEX) 5 % cream, Compound fluorouracil 5% + calcipotriene 0.005% cream. Apply a pea-sized amount to entire scalp BID x 7 days., Disp: 30 g, Rfl: 0    gabapentin (NEURONTIN) 300 MG capsule, Take 300 mg by mouth 3 (three) times daily., Disp: , Rfl:     methocarbamoL (ROBAXIN) 750 MG Tab, Take 750 mg by mouth 3 (three) times daily. Reports taking 1/2 pill 6 times per day, Disp: , Rfl:     metoprolol tartrate (LOPRESSOR) 100 MG tablet, Take 1 tablet by mouth twice daily., Disp: 180 tablet, Rfl: 0    multivit with minerals/lutein (MULTIVITAMIN 50 PLUS ORAL), Take 1 tablet by mouth once daily., Disp: , Rfl:     oxyCODONE-acetaminophen (PERCOCET)  mg per tablet, Take 1 tablet by mouth 3 (three) times daily as needed. Reports he takes 1/2 pill 6 times per day along with Robaxin, Disp: , Rfl:     oxyCODONE-acetaminophen (PERCOCET)  mg per tablet, Take 1 tablet by mouth every 4 (four) hours as needed for Pain., Disp: 8 tablet, Rfl: 0     oxyCODONE-acetaminophen (PERCOCET) 7.5-325 mg per tablet, Take 1 tablet by mouth every 4 (four) hours as needed for Pain. Do not exceed >3000mg tylenol total in 24hr period, Disp: 8 tablet, Rfl: 0    PROGRAF 1 mg Cap, Take 1 capsule (1 mg total) by mouth every 12 (twelve) hours., Disp: 180 capsule, Rfl: 3    ropinirole (REQUIP) 0.5 MG tablet, Take 0.5 mg by mouth every evening., Disp: , Rfl:     solifenacin (VESICARE) 5 MG tablet, Take 1 tablet (5 mg total) by mouth once daily., Disp: 30 tablet, Rfl: 11    vitamin D 1000 units Tab, Take 1,000 Units by mouth once daily., Disp: , Rfl:   No current facility-administered medications for this visit.    Facility-Administered Medications Ordered in Other Visits:     sodium chloride 0.9% flush 10 mL, 10 mL, Intravenous, PRN, Shai Mills MD    Review of Systems:  Constitutional: no fever or chills  Eyes: no visual changes  ENT: no nasal congestion or sore throat  Respiratory: no cough or shortness of breath  Cardiovascular: no chest pain or palpitations  Gastrointestinal: no nausea or vomiting, tolerating diet  Genitourinary: no hematuria or dysuria  Integument/Breast: no rash or pruritis  Hematologic/Lymphatic: no easy bruising or lymphadenopathy  Musculoskeletal: positive for knee pain  Neurological: no seizures or tremors  Behavioral/Psych: no auditory or visual hallucinations  Endocrine: no heat or cold intolerance    PE:  There were no vitals taken for this visit.  General: Pleasant, cooperative, NAD   Gait: antalgic  HEENT: NCAT, sclera nonicteric   Lungs: Respirations clear bilaterally; equal and unlabored.   CV: S1S2; 2+ bilateral upper and lower extremity pulses.   Skin: Intact throughout with no rashes, erythema, or lesions  Extremities: No LE edema,  no erythema or warmth of the skin in either lower extremity.    Left knee exam:  Knee Range of Motion:limited by pain, pain with passive range of motion  Effusion:none  Condition of skin:intact  Location of  tenderness:Medial joint line   Strength:limited by pain  Stability: stable to testing    Hip Examination: painless PROM of hip     Radiographs: Radiographs reveal advanced degenerative changes including subchondral cyst formation, subchondral sclerosis, osteophyte formation, joint space narrowing.     Knee Alignment: normal    Diagnosis: Primary osteoarthritis Left knee    Plan: Left total knee arthroplasty    Due to the serious nature of total joint infection and the high prevalence of community acquired MRSA, vancomycin will be used perioperatively.

## 2024-12-30 NOTE — DISCHARGE INSTRUCTIONS
Your surgery has been scheduled for:_______1/28/2025___________________________________    [facility]:  [facility] located on the [place] side of the first floor of the           [facility] ([phone])  ____[facility], located on the [facility] side of the            Second floor of the [facility] ([phone])  ____3rd Floor [facility] located on the [facility] side of the [facility] [phone]  ___X_[facility]: located at [address] [place] [address]. [address].     Please Note   Tell your doctor if you take Aspirin, products containing Aspirin, herbal medications  or blood thinners, such as Coumadin, Ticlid, or Plavix.  (Consult your provider regarding holding or stopping before surgery).  Arrange for someone to drive you home following surgery.  You will not be allowed to leave the surgical facility alone or drive yourself home following sedation and anesthesia.    Before Surgery  Stop taking all herbal medications, vitamins, and supplements 7 days prior to surgery  No Motrin/Advil (Ibuprofen) 7 days before surgery  No Aleve (Naproxen) 7 days before surgery   No Goody's/BC Powder 7 days before surgery  Refrain from drinking alcoholic beverages for 24 hours before and after surgery  Stop or limit smoking at least 24 hours prior to surgery  You may take Tylenol for pain    Night before Surgery  Do not eat or drink after midnight  Take a shower or bath (shower is recommended).  Bathe with Hibiclens soap or an antibacterial soap from the neck down.  If not supplied by your surgeon, hibiclens soap will need to be purchased over the counter in pharmacy.  Rinse soap off thoroughly.  Shampoo your hair with your regular shampoo    The Day of Surgery  Take another bath or shower with hibiclens or any antibacterial soap, to reduce the chance of infection.  Take heart  and blood pressure medications with a small sip of water, as advised by the perioperative team.  Do not take fluid pills  You may brush your teeth and rinse your mouth, but do not swallow any additional water.   Do not apply perfumes, powder, body lotions or deodorant on the day of surgery.  Nail polish should be removed.  Do not wear makeup or moisturizer  Wear comfortable clothes, such as a button front shirt and loose fitting pants.  Leave all jewelry, including body piercings, and valuables at home.    Bring any devices you will need after surgery such as crutches or canes.  If you have sleep apnea, please bring your CPAP machine  In the event that your physical condition changes including the onset of a cold or respiratory illness, or if you have to delay or cancel your surgery, please notify your surgeon.

## 2024-12-31 ENCOUNTER — EXTERNAL CHRONIC CARE MANAGEMENT (OUTPATIENT)
Dept: PRIMARY CARE CLINIC | Facility: CLINIC | Age: 74
End: 2024-12-31
Payer: MEDICARE

## 2024-12-31 PROBLEM — R73.03 PREDIABETES: Status: ACTIVE | Noted: 2024-12-31

## 2024-12-31 PROCEDURE — 99490 CHRNC CARE MGMT STAFF 1ST 20: CPT | Mod: PBBFAC | Performed by: INTERNAL MEDICINE

## 2024-12-31 NOTE — ASSESSMENT & PLAN NOTE
A1c is 5.9  I recommend monitoring patient's glucose level during the perioperative period. Glucose may be elevated from stress hyperglycemia and may require insulin.

## 2025-01-13 ENCOUNTER — CLINICAL SUPPORT (OUTPATIENT)
Dept: REHABILITATION | Facility: HOSPITAL | Age: 75
End: 2025-01-13
Payer: MEDICARE

## 2025-01-13 DIAGNOSIS — M25.562 CHRONIC PAIN OF LEFT KNEE: Primary | ICD-10-CM

## 2025-01-13 DIAGNOSIS — G89.29 CHRONIC PAIN OF LEFT KNEE: Primary | ICD-10-CM

## 2025-01-13 DIAGNOSIS — M17.12 PRIMARY OSTEOARTHRITIS OF LEFT KNEE: ICD-10-CM

## 2025-01-13 PROCEDURE — 97161 PT EVAL LOW COMPLEX 20 MIN: CPT

## 2025-01-13 PROCEDURE — 97112 NEUROMUSCULAR REEDUCATION: CPT

## 2025-01-14 ENCOUNTER — TELEPHONE (OUTPATIENT)
Dept: ORTHOPEDICS | Facility: CLINIC | Age: 75
End: 2025-01-14
Payer: MEDICARE

## 2025-01-14 ENCOUNTER — TELEPHONE (OUTPATIENT)
Dept: SPORTS MEDICINE | Facility: CLINIC | Age: 75
End: 2025-01-14
Payer: MEDICARE

## 2025-01-14 NOTE — TELEPHONE ENCOUNTER
Called and spoke to patient.  He is scheduled for virtual visit on 1/16/25 for [place] scheduled on 1/17/25.

## 2025-01-14 NOTE — TELEPHONE ENCOUNTER
Called Pt and completed PIQ outstanding task questions with patient via phone. PT verbalized understanding and has no further questions.

## 2025-01-15 RX ORDER — DOXAZOSIN 8 MG/1
TABLET ORAL
Qty: 90 TABLET | Refills: 0 | Status: SHIPPED | OUTPATIENT
Start: 2025-01-15

## 2025-01-15 RX ORDER — METOPROLOL TARTRATE 100 MG/1
100 TABLET ORAL 2 TIMES DAILY
Qty: 180 TABLET | Refills: 0 | Status: SHIPPED | OUTPATIENT
Start: 2025-01-15

## 2025-01-15 NOTE — PROGRESS NOTES
Telemedicine/Virtual Visit Documentation:     The patient location is: home    The chief complaint leading to consultation is: see HPI    VISIT TYPE X   Virtual visit with synchronous audio and video X   Telephone E/M service      Total time spent with patient: see X nolan on chart below.   More than half of the time was spent counseling or coordinating care including prognosis, differential diagnosis, risks and benefits of treatment, instructions, compliance risk reductions     EST MINUTES X   [id number] 5    [id number] 10    31488 15    18745 25    [id number] 40    NEW     64858 10    46258 20    93789 30    94989 45 X   [id number] 60    PHONE      5-10    45385 11-20    64689 21-30      H&P  Orthopaedics      SUBJECTIVE:     History of Present Illness:  Patient is a 74 y.o. male with chronic left knee pain who presents today for Iovera consultation.     Review of patient's allergies indicates:   Allergen Reactions    Diphenhydramine hcl     Penicillins      Other reaction(s): Rash    Povidone-iodine      Other reaction(s): Itching  Other reaction(s): Itching       Past Medical History:   Diagnosis Date    Actinic keratosis     Allergy     seasonal    Arthritis     Basal cell carcinoma of right ala nasi 02/06/2023    Hypertension     Joint pain     Organ transplant     liver 2010    SCC (squamous cell carcinoma) 08/15/2022    Right cheek -SSW    SCC (squamous cell carcinoma) 03/27/2023    Right angle of mandible    SCC (squamous cell carcinoma) 03/27/2023    Right medial cheek    SCC (squamous cell carcinoma) 02/12/2024    anterior chin    SCC (squamous cell carcinoma) 06/10/2024    R ear helix-excised by JAM    SCC (squamous cell carcinoma) 06/10/2024    SCCIS-R post neck    SCC (squamous cell carcinoma), ear, 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 OR 2ND FLR;  Service: ENT;  Laterality: Right;    COLONOSCOPY N/A 12/18/2023    Procedure: COLONOSCOPY;  Surgeon: Leonardo Penny MD;  Location: [facility] ENDOSCOPY;  Service: Endoscopy;  Laterality: N/A;  Referral: RENÉE HODGE / PEG / Inst portal - LW  12/11- pre call complete.  DBM  12.15 precall complete; pt has instr.; all questions answered; AP    CYSTOSCOPY N/A 04/05/2024    Procedure: CYSTOSCOPY;  Surgeon: Galileo Ramos MD;  Location: [facility] OR 1ST FLR;  Service: Urology;  Laterality: N/A;    EAR RECONSTRUCTION Right 10/8/2024    Procedure: RECONSTRUCTION, EAR;  Surgeon: Randee Brown MD;  [facility];  Service: ENT;  Laterality: Right;    FACIAL RECONSTRUCTION SURGERY      HERNIA REPAIR      knee scope      bob, multiple    LIVER TRANSPLANT      2010    PROSTATE BIOPSY N/A 04/05/2024    Procedure: BIOPSY, PROSTATE;  Surgeon: Galileo Ramos MD;  [facility];  Service: Urology;  Laterality: N/A;  TransPERINEAL biopsy    REVISION OF FLAP GRAFT Right 03/03/2023    Procedure: REVISION, PROCEDURE INVOLVING FLAP GRAFT;  Surgeon: Uzair Brandon MD;  [facility];  Service: ENT;  Laterality: Right;    REVISION OF FLAP GRAFT Right 08/01/2023    Procedure: REVISION, PROCEDURE INVOLVING FLAP GRAFT;  Surgeon: Consuelo Augustine MD;  Location: [facility] OR;  Service: ENT;  Laterality: Right;    SHOULDER OPEN ROTATOR CUFF REPAIR      left    SKIN FULL THICKNESS GRAFT Right 10/8/2024    Procedure: APPLICATION, GRAFT, SKIN, FULL-THICKNESS;  Surgeon: Randee Brown MD;  Location: [facility] OR 2ND FLR;  Service: ENT;  Laterality: Right;    TRANSRECTAL ULTRASOUND EXAMINATION N/A 04/05/2024    Procedure: ULTRASOUND, RECTAL APPROACH;  Surgeon: Galileo Ramos MD;  [facility];  Service: Urology;   Laterality: N/A;     Family History   Problem Relation Name Age of Onset    Hearing loss Mother VIVI LANDRUM     Thyroid disease Sister      No Known Problems Son      No Known Problems Son      Melanoma Neg Hx      Psoriasis Neg Hx      Eczema Neg Hx      Lupus Neg Hx       Social History     Tobacco Use    Smoking status: Former     Current packs/day: 0.00     Types: Cigarettes     Quit date:      Years since quittin.0    Smokeless tobacco: Never    Tobacco comments:     SMOKED FOR 1 YEAR, [employer] MADE HIM STOP   Substance Use Topics    Alcohol use: Not Currently    Drug use: Never        Review of Systems:  Patient denies constitutional symptoms, cardiac symptoms, respiratory symptoms, GI symptoms.  The remainder of the musculoskeletal ROS is included in the HPI.      OBJECTIVE:     Physical Exam:  Physical exam limited as this was a virtual visit, but it is apparent that the individual is in no acute distress, well groomed, well kept.  Speech is normal.  Patient is alert and oriented x3.  Mood and affect appear normal.     IMAGIN. CT ordered due to left knee pain, taken on 24.  2. CT images were reviewed personally by me and then directly with patient.  3. FINDINGS: HIP: No acute fractures.  Mild left femoroacetabular osteoarthritis.  No joint effusion.  Visualized musculature demonstrates normal bulk and attenuation.  Fat containing left inguinal hernia.  Partially visualized right inguinal hernia that appears to contain a portion of the bladder.     KNEE: No acute fractures.  No suspicious lytic or blastic lesions.  Advanced tricompartmental osteoarthritis most pronounced at the tibiofemoral compartments noting severe cartilage space narrowing with associated subchondral sclerosis.  No subluxation or dislocation.  Large complex knee joint effusion with synovitis.  Chondrocalcinosis.  Visualized musculature demonstrates normal bulk and attenuation. Atherosclerotic calcification.      ANKLE: No acute displaced fractures.  No suspicious lytic or blastic lesions.  Visualized musculature demonstrates normal bulk and attenuation.  Visualized tendons appear grossly unremarkable.  Nonspecific subcutaneous edema.    4. IMPRESSION: 1. Examination performed for preoperative planning according to the YAKOV protocol.  2. Advanced tricompartmental osteoarthritis of the left knee most pronounced at the lateral compartment.  3. Large complex knee joint effusion with synovitis.      ASSESSMENT/PLAN:     A/P: Zander Kaur is a 74 y.o. Presenting for preop Iovera. Time was spent discussing the cryoanalgesia procedure Iovera with the patient.  Risks and benefits were also discussed with patient.  X-rays were reviewed to use as a diagram to explain procedure. A detailed description of landmarks and placement of anesthetic used during procedure were also explained to patient.  Contraindications for procedure were discussed with patient.

## 2025-01-15 NOTE — TELEPHONE ENCOUNTER
Refill Routing Note   Medication(s) are not appropriate for processing by [facility] for the following reason(s):        Patient not seen by provider within 15 months    [facility] action(s):  Route             Appointments  past 12m or future 3m with PCP    Date Provider   Last Visit   6/12/2023 Shai Davison MD   Next Visit   Visit date not found Shai Davison MD   ED visits in past 90 days: 0        Note composed:3:43 PM 01/15/2025

## 2025-01-15 NOTE — TELEPHONE ENCOUNTER
Unable to retrieve patient chart and identify care due.  [facility] Embedded Care Due Messages. Reference number: [id number].   1/15/2025 4:30:17 AM CST

## 2025-01-16 ENCOUNTER — OFFICE VISIT (OUTPATIENT)
Dept: SPORTS MEDICINE | Facility: CLINIC | Age: 75
End: 2025-01-16
Payer: MEDICARE

## 2025-01-16 DIAGNOSIS — G89.29 CHRONIC PAIN OF LEFT KNEE: Primary | ICD-10-CM

## 2025-01-16 DIAGNOSIS — M25.562 CHRONIC PAIN OF LEFT KNEE: Primary | ICD-10-CM

## 2025-01-16 PROCEDURE — 98007 SYNCH AUDIO-VIDEO EST HI 40: CPT | Mod: 95,,, | Performed by: STUDENT IN AN ORGANIZED HEALTH CARE EDUCATION/TRAINING PROGRAM

## 2025-01-17 ENCOUNTER — PROCEDURE VISIT (OUTPATIENT)
Dept: SPORTS MEDICINE | Facility: CLINIC | Age: 75
End: 2025-01-17
Payer: MEDICARE

## 2025-01-17 VITALS
DIASTOLIC BLOOD PRESSURE: 91 MMHG | HEART RATE: 53 BPM | SYSTOLIC BLOOD PRESSURE: 150 MMHG | WEIGHT: 206.13 LBS | BODY MASS INDEX: 32.28 KG/M2

## 2025-01-17 DIAGNOSIS — M25.562 CHRONIC PAIN OF LEFT KNEE: Primary | ICD-10-CM

## 2025-01-17 DIAGNOSIS — I10 ELEVATED BLOOD PRESSURE READING IN OFFICE WITH DIAGNOSIS OF HYPERTENSION: ICD-10-CM

## 2025-01-17 DIAGNOSIS — G89.29 CHRONIC PAIN OF LEFT KNEE: Primary | ICD-10-CM

## 2025-01-17 PROCEDURE — 99499 UNLISTED E&M SERVICE: CPT | Mod: S$PBB,,, | Performed by: STUDENT IN AN ORGANIZED HEALTH CARE EDUCATION/TRAINING PROGRAM

## 2025-01-17 PROCEDURE — 64640 INJECTION TREATMENT OF NERVE: CPT | Mod: S$PBB,LT,, | Performed by: STUDENT IN AN ORGANIZED HEALTH CARE EDUCATION/TRAINING PROGRAM

## 2025-01-17 PROCEDURE — 64640 INJECTION TREATMENT OF NERVE: CPT | Mod: PBBFAC | Performed by: STUDENT IN AN ORGANIZED HEALTH CARE EDUCATION/TRAINING PROGRAM

## 2025-01-17 NOTE — PROCEDURES
CC: left knee pain    74 y.o. Male presents today for Iovera procedure for left knee pain.    INFORMED CONSENT: I verify that I personally obtained Zander Kaur's consent which was signed and uploaded into [employer].     PAIN SCORE:  Pre-procedure:  10/10  Gait: mildly antalgic    Inspection/Palpation:   +Skin warm and dry without open wounds or erythema  -Rubor   -Calor    Procedure Note Iovera:    DATE OF PROCEDURE:  01/17/2025    PREOPERATIVE DIAGNOSIS: left knee pain.     POSTOPERATIVE DIAGNOSIS: left knee pain.     PROCEDURE: Iovera treatment of anterior femoral cutaneous nerve, Lateral femoral cut nerve, and superior and inferior branches of infrapatellar saphenous nerve using at least 4+ different punctures to treat all 4 nerves. (cpt 64640 x4)    COMPLICATIONS: None.     IMPLANTS:  None    ESTIMATED BLOOD LOSS:  < 5cc    SPECIMENS REMOVED:  None    ANESTHESIA: 20cc Local xylocaine with epinephrine    INDICATIONS FOR PROCEDURE: This is a 74 y.o. male with longstanding knee pain. They have failed non operative management including injections.I discussed a new treatment therapy called Iovera, which is cryotherapy, to provide symptomatic relief along the sensory distribution of the infrapatellar tendon branch of the saphenous nerve  and AFCN. The patient elected to move forward with this  We did discuss the fact that this is a fairly novel procedure and there is very limited scientific data around this.  However, it FDA approved.  The patient was given patient information and literature to review prior to the procedure as well.  Based on this, the patient agreed to move forward with doing the procedure.    Time was spent discussing the cryoanalgesia procedure Iovera with the patient.  Risks and benefits were also discussed with patient.  X-rays were reviewed to use as a diagram to explain procedure. A detailed description of landmarks and placement of anesthetic used during procedure were also explained to patient.   Contraindications for procedure were discussed with patient.    CONSENT:  Verbal and written consent were obtained from the patient.     PROCEDURE:    A surgical time out was performed, including verification of patient ID, procedure to be performed, site and side, availability of information and equipment, review of safety issues, and the patients agreement and consent.  The patient was placed supine on the exam table and the proximal medial aspect of the left tibia and anterior aspect of distal femur was prepped with sterile Chlorhexidine and alcohol.  A line was drawn extending approximately 5 cm medial to inferior pole of the patella distally to a point approximately 5 cm medial to the tibial tubercle.  A second line was drawn in a medial to lateral direction the width of the patella approximately 7 cm proximal to the patella. We then infiltrated the skin with lidocaine with epinephrine along both lines using a 25g needle. We then introduced the Iovera device along these lines and this device penetrated the skin, creating cryotherapy to the superior and inferior branches of the infrapatellar saphenous nerve, a third treatment to the anterior femoral cutaneous nerve, and fourth LFCN. 7 punctures of the skin were made to treat the superior and inferior branches of the ISN and another 7 punctures were made to treat the AFCN and LFCN. There were a total of 4 nerves treated with iovera. The patient tolerated the procedure well with no problems.    PAIN SCORES:  Pre-procedure:  10/10  Post-procedure:  improved  Gait: unchanged      ASSESSMENT:      ICD-10-CM ICD-9-CM   1. Chronic pain of left knee  M25.562 719.46    G89.29 338.29   2. Elevated blood pressure reading in office with diagnosis of hypertension  I10 401.9         PLAN:    All questions were answered to the best of my ability and all concerns were addressed at this time.    This note is dictated using the M*Modal Fluency Direct word recognition program.  There are word recognition mistakes that are occasionally missed on review.

## 2025-01-24 DIAGNOSIS — Z96.659 STATUS POST KNEE REPLACEMENT, UNSPECIFIED LATERALITY: Primary | ICD-10-CM

## 2025-01-24 RX ORDER — CEFADROXIL 500 MG/1
500 CAPSULE ORAL EVERY 12 HOURS
Qty: 14 CAPSULE | Refills: 0 | Status: SHIPPED | OUTPATIENT
Start: 2025-01-24

## 2025-01-24 RX ORDER — DEXTROMETHORPHAN HYDROBROMIDE, GUAIFENESIN 5; 100 MG/5ML; MG/5ML
650 LIQUID ORAL EVERY 8 HOURS
Qty: 120 TABLET | Refills: 0 | Status: SHIPPED | OUTPATIENT
Start: 2025-01-24

## 2025-01-24 RX ORDER — PANTOPRAZOLE SODIUM 40 MG/1
40 TABLET, DELAYED RELEASE ORAL DAILY
Qty: 30 TABLET | Refills: 0 | Status: SHIPPED | OUTPATIENT
Start: 2025-01-24 | End: 2025-02-28

## 2025-01-24 RX ORDER — OXYCODONE HYDROCHLORIDE 5 MG/1
TABLET ORAL
Qty: 50 TABLET | Refills: 0 | Status: SHIPPED | OUTPATIENT
Start: 2025-01-24 | End: 2025-02-03 | Stop reason: ALTCHOICE

## 2025-01-24 RX ORDER — ASPIRIN 81 MG/1
81 TABLET ORAL 2 TIMES DAILY
Qty: 60 TABLET | Refills: 0 | Status: SHIPPED | OUTPATIENT
Start: 2025-01-24 | End: 2025-02-28

## 2025-01-24 RX ORDER — CELECOXIB 200 MG/1
200 CAPSULE ORAL DAILY
Qty: 30 CAPSULE | Refills: 0 | Status: SHIPPED | OUTPATIENT
Start: 2025-01-24

## 2025-01-24 RX ORDER — METHOCARBAMOL 750 MG/1
750 TABLET, FILM COATED ORAL 4 TIMES DAILY PRN
Qty: 40 TABLET | Refills: 0 | Status: SHIPPED | OUTPATIENT
Start: 2025-01-24 | End: 2025-02-03

## 2025-01-24 RX ORDER — AMOXICILLIN 250 MG
1 CAPSULE ORAL DAILY
Qty: 30 TABLET | Refills: 0 | Status: SHIPPED | OUTPATIENT
Start: 2025-01-24

## 2025-01-27 ENCOUNTER — TELEPHONE (OUTPATIENT)
Dept: ORTHOPEDICS | Facility: CLINIC | Age: 75
End: 2025-01-27
Payer: MEDICARE

## 2025-01-27 ENCOUNTER — ANESTHESIA EVENT (OUTPATIENT)
Dept: SURGERY | Facility: HOSPITAL | Age: 75
DRG: 470 | End: 2025-01-27
Payer: MEDICARE

## 2025-01-27 NOTE — TELEPHONE ENCOUNTER
I called the patient today regarding surgery on 1/28/2025 with Dr. Carson Lock. I informed the patient that his surgery will be at  [facility] ([facility] [place] [address]). I informed the patient they must arrive at 7:30am and their surgery will start at 9:30am.     Per the [facility] COVID-19 Pre-Procedural Testing Policy (administered 10/26/2022), patients do not need tested for COVID-19 regardless of vaccination status.    I reminded the patient that they cannot eat or drink after midnight, the night before surgery.      I reminded the patient to be careful of their skin over the next few days to make sure they do not get any cuts, scratches or scrapes.    The patient verbalized that they have received their walker, bedside commode and shower chair from [facility].    The patient verbalized understanding and has no further questions.

## 2025-01-28 ENCOUNTER — HOSPITAL ENCOUNTER (INPATIENT)
Facility: HOSPITAL | Age: 75
LOS: 1 days | Discharge: HOME | DRG: 470 | End: 2025-01-29
Attending: ORTHOPAEDIC SURGERY | Admitting: ORTHOPAEDIC SURGERY
Payer: MEDICARE

## 2025-01-28 ENCOUNTER — ANESTHESIA (OUTPATIENT)
Dept: SURGERY | Facility: HOSPITAL | Age: 75
DRG: 470 | End: 2025-01-28
Payer: MEDICARE

## 2025-01-28 DIAGNOSIS — M17.12 PRIMARY OSTEOARTHRITIS OF LEFT KNEE: ICD-10-CM

## 2025-01-28 LAB
GRAM STN SPEC: NORMAL
GRAM STN SPEC: NORMAL

## 2025-01-28 PROCEDURE — 87070 CULTURE OTHR SPECIMN AEROBIC: CPT | Performed by: ORTHOPAEDIC SURGERY

## 2025-01-28 PROCEDURE — 71000039 HC RECOVERY, EACH ADD'L HOUR: Performed by: ORTHOPAEDIC SURGERY

## 2025-01-28 PROCEDURE — C1713 ANCHOR/SCREW BN/BN,TIS/BN: HCPCS | Performed by: ORTHOPAEDIC SURGERY

## 2025-01-28 PROCEDURE — 27201423 OPTIME MED/SURG SUP & DEVICES STERILE SUPPLY: Performed by: ORTHOPAEDIC SURGERY

## 2025-01-28 PROCEDURE — 64448 NJX AA&/STRD FEM NRV NFS IMG: CPT | Performed by: SURGERY

## 2025-01-28 PROCEDURE — 97165 OT EVAL LOW COMPLEX 30 MIN: CPT

## 2025-01-28 PROCEDURE — 97162 PT EVAL MOD COMPLEX 30 MIN: CPT

## 2025-01-28 PROCEDURE — 87116 MYCOBACTERIA CULTURE: CPT | Performed by: ORTHOPAEDIC SURGERY

## 2025-01-28 PROCEDURE — 25000003 PHARM REV CODE 250: Performed by: ANESTHESIOLOGY

## 2025-01-28 PROCEDURE — 97116 GAIT TRAINING THERAPY: CPT

## 2025-01-28 PROCEDURE — 63600175 PHARM REV CODE 636 W HCPCS: Performed by: SURGERY

## 2025-01-28 PROCEDURE — 25000003 PHARM REV CODE 250: Performed by: NURSE ANESTHETIST, CERTIFIED REGISTERED

## 2025-01-28 PROCEDURE — 97535 SELF CARE MNGMENT TRAINING: CPT

## 2025-01-28 PROCEDURE — 37000008 HC ANESTHESIA 1ST 15 MINUTES: Performed by: ORTHOPAEDIC SURGERY

## 2025-01-28 PROCEDURE — 36000711: Performed by: ORTHOPAEDIC SURGERY

## 2025-01-28 PROCEDURE — 0SBD0ZZ EXCISION OF LEFT KNEE JOINT, OPEN APPROACH: ICD-10-PCS | Performed by: ORTHOPAEDIC SURGERY

## 2025-01-28 PROCEDURE — 8E0Y0CZ ROBOTIC ASSISTED PROCEDURE OF LOWER EXTREMITY, OPEN APPROACH: ICD-10-PCS | Performed by: ORTHOPAEDIC SURGERY

## 2025-01-28 PROCEDURE — 63600175 PHARM REV CODE 636 W HCPCS: Performed by: STUDENT IN AN ORGANIZED HEALTH CARE EDUCATION/TRAINING PROGRAM

## 2025-01-28 PROCEDURE — 63600175 PHARM REV CODE 636 W HCPCS: Performed by: NURSE ANESTHETIST, CERTIFIED REGISTERED

## 2025-01-28 PROCEDURE — 27447 TOTAL KNEE ARTHROPLASTY: CPT | Mod: LT,,, | Performed by: ORTHOPAEDIC SURGERY

## 2025-01-28 PROCEDURE — 27100025 HC TUBING, SET FLUID WARMER: Performed by: NURSE ANESTHETIST, CERTIFIED REGISTERED

## 2025-01-28 PROCEDURE — 87206 SMEAR FLUORESCENT/ACID STAI: CPT | Performed by: ORTHOPAEDIC SURGERY

## 2025-01-28 PROCEDURE — 63600175 PHARM REV CODE 636 W HCPCS: Performed by: NURSE PRACTITIONER

## 2025-01-28 PROCEDURE — 71000033 HC RECOVERY, INTIAL HOUR: Performed by: ORTHOPAEDIC SURGERY

## 2025-01-28 PROCEDURE — 94761 N-INVAS EAR/PLS OXIMETRY MLT: CPT

## 2025-01-28 PROCEDURE — 87102 FUNGUS ISOLATION CULTURE: CPT | Performed by: ORTHOPAEDIC SURGERY

## 2025-01-28 PROCEDURE — 25000003 PHARM REV CODE 250: Performed by: STUDENT IN AN ORGANIZED HEALTH CARE EDUCATION/TRAINING PROGRAM

## 2025-01-28 PROCEDURE — 0055T BONE SRGRY CMPTR CT/MRI IMAG: CPT | Mod: ,,, | Performed by: ORTHOPAEDIC SURGERY

## 2025-01-28 PROCEDURE — 87205 SMEAR GRAM STAIN: CPT | Performed by: ORTHOPAEDIC SURGERY

## 2025-01-28 PROCEDURE — 25000003 PHARM REV CODE 250: Performed by: NURSE PRACTITIONER

## 2025-01-28 PROCEDURE — 36000710: Performed by: ORTHOPAEDIC SURGERY

## 2025-01-28 PROCEDURE — 0SRB0J9 REPLACEMENT OF LEFT HIP JOINT WITH SYNTHETIC SUBSTITUTE, CEMENTED, OPEN APPROACH: ICD-10-PCS | Performed by: ORTHOPAEDIC SURGERY

## 2025-01-28 PROCEDURE — 37000009 HC ANESTHESIA EA ADD 15 MINS: Performed by: ORTHOPAEDIC SURGERY

## 2025-01-28 PROCEDURE — 87075 CULTR BACTERIA EXCEPT BLOOD: CPT | Performed by: ORTHOPAEDIC SURGERY

## 2025-01-28 PROCEDURE — 11000001 HC ACUTE MED/SURG PRIVATE ROOM

## 2025-01-28 PROCEDURE — 87176 TISSUE HOMOGENIZATION CULTR: CPT | Performed by: ORTHOPAEDIC SURGERY

## 2025-01-28 PROCEDURE — 63600175 PHARM REV CODE 636 W HCPCS: Performed by: PHYSICIAN ASSISTANT

## 2025-01-28 PROCEDURE — C1776 JOINT DEVICE (IMPLANTABLE): HCPCS | Performed by: ORTHOPAEDIC SURGERY

## 2025-01-28 PROCEDURE — D9220A PRA ANESTHESIA: Mod: ANES,,, | Performed by: SURGERY

## 2025-01-28 PROCEDURE — D9220A PRA ANESTHESIA: Mod: CRNA,,, | Performed by: NURSE ANESTHETIST, CERTIFIED REGISTERED

## 2025-01-28 PROCEDURE — 99900035 HC TECH TIME PER 15 MIN (STAT)

## 2025-01-28 PROCEDURE — 88305 TISSUE EXAM BY PATHOLOGIST: CPT | Mod: 59 | Performed by: PATHOLOGY

## 2025-01-28 DEVICE — TIBIAL BEARING INSERT - CS
Type: IMPLANTABLE DEVICE | Site: KNEE | Status: FUNCTIONAL
Brand: TRIATHLON

## 2025-01-28 DEVICE — PATELLA
Type: IMPLANTABLE DEVICE | Site: KNEE | Status: FUNCTIONAL
Brand: TRIATHLON

## 2025-01-28 DEVICE — FULL DOSE BONE CEMENT, 10 PACK CATALOG NUMBER IS 6191-1-010
Type: IMPLANTABLE DEVICE | Site: KNEE | Status: FUNCTIONAL
Brand: SIMPLEX

## 2025-01-28 DEVICE — PRIMARY TIBIAL BASEPLATE
Type: IMPLANTABLE DEVICE | Site: KNEE | Status: FUNCTIONAL
Brand: TRIATHLON

## 2025-01-28 DEVICE — CRUCIATE RETAINING FEMORAL
Type: IMPLANTABLE DEVICE | Site: KNEE | Status: FUNCTIONAL
Brand: TRIATHLON

## 2025-01-28 RX ORDER — HYDRALAZINE HYDROCHLORIDE 20 MG/ML
10 INJECTION INTRAMUSCULAR; INTRAVENOUS ONCE AS NEEDED
Status: COMPLETED | OUTPATIENT
Start: 2025-01-28 | End: 2025-01-28

## 2025-01-28 RX ORDER — MUPIROCIN 20 MG/G
1 OINTMENT TOPICAL 2 TIMES DAILY
Status: DISCONTINUED | OUTPATIENT
Start: 2025-01-28 | End: 2025-01-29 | Stop reason: HOSPADM

## 2025-01-28 RX ORDER — GABAPENTIN 300 MG/1
300 CAPSULE ORAL 3 TIMES DAILY
Status: DISCONTINUED | OUTPATIENT
Start: 2025-01-28 | End: 2025-01-29 | Stop reason: HOSPADM

## 2025-01-28 RX ORDER — ONDANSETRON HYDROCHLORIDE 2 MG/ML
4 INJECTION, SOLUTION INTRAVENOUS EVERY 8 HOURS PRN
Status: DISCONTINUED | OUTPATIENT
Start: 2025-01-28 | End: 2025-01-29 | Stop reason: HOSPADM

## 2025-01-28 RX ORDER — DOCUSATE SODIUM 100 MG
400 CAPSULE ORAL
Status: DISCONTINUED | OUTPATIENT
Start: 2025-01-28 | End: 2025-01-29 | Stop reason: HOSPADM

## 2025-01-28 RX ORDER — PREGABALIN 75 MG/1
75 CAPSULE ORAL
Status: COMPLETED | OUTPATIENT
Start: 2025-01-28 | End: 2025-01-28

## 2025-01-28 RX ORDER — CEFAZOLIN 2 G/1
2 INJECTION, POWDER, FOR SOLUTION INTRAMUSCULAR; INTRAVENOUS
Status: COMPLETED | OUTPATIENT
Start: 2025-01-28 | End: 2025-01-29

## 2025-01-28 RX ORDER — BISACODYL 10 MG/1
10 SUPPOSITORY RECTAL EVERY 12 HOURS PRN
Status: DISCONTINUED | OUTPATIENT
Start: 2025-01-28 | End: 2025-01-29 | Stop reason: HOSPADM

## 2025-01-28 RX ORDER — NALOXONE HCL 0.4 MG/ML
0.02 VIAL (ML) INJECTION
Status: DISCONTINUED | OUTPATIENT
Start: 2025-01-28 | End: 2025-01-29 | Stop reason: HOSPADM

## 2025-01-28 RX ORDER — LIDOCAINE HYDROCHLORIDE 10 MG/ML
1 INJECTION, SOLUTION EPIDURAL; INFILTRATION; INTRACAUDAL; PERINEURAL
Status: DISCONTINUED | OUTPATIENT
Start: 2025-01-28 | End: 2025-01-28 | Stop reason: HOSPADM

## 2025-01-28 RX ORDER — ROPIVACAINE HYDROCHLORIDE 2 MG/ML
INJECTION, SOLUTION EPIDURAL; INFILTRATION; PERINEURAL CONTINUOUS
Status: DISCONTINUED | OUTPATIENT
Start: 2025-01-28 | End: 2025-01-29 | Stop reason: HOSPADM

## 2025-01-28 RX ORDER — ACETAMINOPHEN 500 MG
1000 TABLET ORAL
Status: COMPLETED | OUTPATIENT
Start: 2025-01-28 | End: 2025-01-28

## 2025-01-28 RX ORDER — DOXAZOSIN 4 MG/1
8 TABLET ORAL DAILY
Status: DISCONTINUED | OUTPATIENT
Start: 2025-01-29 | End: 2025-01-29 | Stop reason: HOSPADM

## 2025-01-28 RX ORDER — MORPHINE SULFATE 2 MG/ML
2 INJECTION, SOLUTION INTRAMUSCULAR; INTRAVENOUS
Status: DISCONTINUED | OUTPATIENT
Start: 2025-01-28 | End: 2025-01-29 | Stop reason: HOSPADM

## 2025-01-28 RX ORDER — PROCHLORPERAZINE EDISYLATE 5 MG/ML
5 INJECTION INTRAMUSCULAR; INTRAVENOUS EVERY 6 HOURS PRN
Status: DISCONTINUED | OUTPATIENT
Start: 2025-01-28 | End: 2025-01-29 | Stop reason: HOSPADM

## 2025-01-28 RX ORDER — MUPIROCIN 20 MG/G
1 OINTMENT TOPICAL
Status: COMPLETED | OUTPATIENT
Start: 2025-01-28 | End: 2025-01-28

## 2025-01-28 RX ORDER — METOPROLOL TARTRATE 25 MG/1
100 TABLET, FILM COATED ORAL 2 TIMES DAILY
Status: DISCONTINUED | OUTPATIENT
Start: 2025-01-28 | End: 2025-01-29 | Stop reason: HOSPADM

## 2025-01-28 RX ORDER — SODIUM CHLORIDE 9 MG/ML
INJECTION, SOLUTION INTRAVENOUS CONTINUOUS
Status: DISCONTINUED | OUTPATIENT
Start: 2025-01-28 | End: 2025-01-28

## 2025-01-28 RX ORDER — CEFAZOLIN 2 G/1
2 INJECTION, POWDER, FOR SOLUTION INTRAMUSCULAR; INTRAVENOUS
Status: COMPLETED | OUTPATIENT
Start: 2025-01-28 | End: 2025-01-28

## 2025-01-28 RX ORDER — FENTANYL CITRATE 50 UG/ML
100 INJECTION, SOLUTION INTRAMUSCULAR; INTRAVENOUS
Status: DISCONTINUED | OUTPATIENT
Start: 2025-01-28 | End: 2025-01-28 | Stop reason: HOSPADM

## 2025-01-28 RX ORDER — DEXAMETHASONE SODIUM PHOSPHATE 4 MG/ML
INJECTION, SOLUTION INTRA-ARTICULAR; INTRALESIONAL; INTRAMUSCULAR; INTRAVENOUS; SOFT TISSUE
Status: DISCONTINUED | OUTPATIENT
Start: 2025-01-28 | End: 2025-01-28

## 2025-01-28 RX ORDER — FENTANYL CITRATE 50 UG/ML
INJECTION, SOLUTION INTRAMUSCULAR; INTRAVENOUS
Status: DISCONTINUED | OUTPATIENT
Start: 2025-01-28 | End: 2025-01-28

## 2025-01-28 RX ORDER — EPHEDRINE SULFATE 50 MG/ML
INJECTION, SOLUTION INTRAVENOUS
Status: DISCONTINUED | OUTPATIENT
Start: 2025-01-28 | End: 2025-01-28

## 2025-01-28 RX ORDER — PROPOFOL 10 MG/ML
VIAL (ML) INTRAVENOUS CONTINUOUS PRN
Status: DISCONTINUED | OUTPATIENT
Start: 2025-01-28 | End: 2025-01-28

## 2025-01-28 RX ORDER — METHOCARBAMOL 750 MG/1
750 TABLET, FILM COATED ORAL 3 TIMES DAILY
Status: DISCONTINUED | OUTPATIENT
Start: 2025-01-28 | End: 2025-01-29 | Stop reason: HOSPADM

## 2025-01-28 RX ORDER — FAMOTIDINE 20 MG/1
20 TABLET, FILM COATED ORAL 2 TIMES DAILY
Status: DISCONTINUED | OUTPATIENT
Start: 2025-01-28 | End: 2025-01-29 | Stop reason: HOSPADM

## 2025-01-28 RX ORDER — LIDOCAINE HYDROCHLORIDE 20 MG/ML
INJECTION INTRAVENOUS
Status: DISCONTINUED | OUTPATIENT
Start: 2025-01-28 | End: 2025-01-28

## 2025-01-28 RX ORDER — ONDANSETRON HYDROCHLORIDE 2 MG/ML
INJECTION, SOLUTION INTRAVENOUS
Status: DISCONTINUED | OUTPATIENT
Start: 2025-01-28 | End: 2025-01-28

## 2025-01-28 RX ORDER — TACROLIMUS 1 MG/1
1 CAPSULE ORAL 2 TIMES DAILY
Status: DISCONTINUED | OUTPATIENT
Start: 2025-01-28 | End: 2025-01-29 | Stop reason: HOSPADM

## 2025-01-28 RX ORDER — PREGABALIN 75 MG/1
75 CAPSULE ORAL NIGHTLY
Status: DISCONTINUED | OUTPATIENT
Start: 2025-01-28 | End: 2025-01-28

## 2025-01-28 RX ORDER — FENTANYL CITRATE 50 UG/ML
25 INJECTION, SOLUTION INTRAMUSCULAR; INTRAVENOUS EVERY 5 MIN PRN
Status: COMPLETED | OUTPATIENT
Start: 2025-01-28 | End: 2025-01-28

## 2025-01-28 RX ORDER — ASPIRIN 81 MG/1
81 TABLET ORAL 2 TIMES DAILY
Status: DISCONTINUED | OUTPATIENT
Start: 2025-01-28 | End: 2025-01-28

## 2025-01-28 RX ORDER — KETAMINE HCL IN 0.9 % NACL 50 MG/5 ML
SYRINGE (ML) INTRAVENOUS
Status: DISCONTINUED | OUTPATIENT
Start: 2025-01-28 | End: 2025-01-28

## 2025-01-28 RX ORDER — CELECOXIB 200 MG/1
400 CAPSULE ORAL ONCE
Status: COMPLETED | OUTPATIENT
Start: 2025-01-28 | End: 2025-01-28

## 2025-01-28 RX ORDER — OXYCODONE HYDROCHLORIDE 10 MG/1
10 TABLET ORAL
Status: DISCONTINUED | OUTPATIENT
Start: 2025-01-28 | End: 2025-01-29 | Stop reason: HOSPADM

## 2025-01-28 RX ORDER — AMOXICILLIN 250 MG
1 CAPSULE ORAL 2 TIMES DAILY
Status: DISCONTINUED | OUTPATIENT
Start: 2025-01-28 | End: 2025-01-29 | Stop reason: HOSPADM

## 2025-01-28 RX ORDER — FAMOTIDINE 10 MG/ML
INJECTION INTRAVENOUS
Status: DISCONTINUED | OUTPATIENT
Start: 2025-01-28 | End: 2025-01-28

## 2025-01-28 RX ORDER — OXYCODONE HYDROCHLORIDE 5 MG/1
5 TABLET ORAL
Status: DISCONTINUED | OUTPATIENT
Start: 2025-01-28 | End: 2025-01-29 | Stop reason: HOSPADM

## 2025-01-28 RX ORDER — PROPOFOL 10 MG/ML
VIAL (ML) INTRAVENOUS
Status: DISCONTINUED | OUTPATIENT
Start: 2025-01-28 | End: 2025-01-28

## 2025-01-28 RX ORDER — MIDAZOLAM HYDROCHLORIDE 1 MG/ML
4 INJECTION, SOLUTION INTRAMUSCULAR; INTRAVENOUS
Status: DISCONTINUED | OUTPATIENT
Start: 2025-01-28 | End: 2025-01-28 | Stop reason: HOSPADM

## 2025-01-28 RX ORDER — HYDROMORPHONE HYDROCHLORIDE 1 MG/ML
0.2 INJECTION, SOLUTION INTRAMUSCULAR; INTRAVENOUS; SUBCUTANEOUS EVERY 5 MIN PRN
Status: DISCONTINUED | OUTPATIENT
Start: 2025-01-28 | End: 2025-01-28 | Stop reason: HOSPADM

## 2025-01-28 RX ORDER — ACETAMINOPHEN 500 MG
1000 TABLET ORAL EVERY 6 HOURS
Status: DISCONTINUED | OUTPATIENT
Start: 2025-01-28 | End: 2025-01-29 | Stop reason: HOSPADM

## 2025-01-28 RX ORDER — ROPIVACAINE HYDROCHLORIDE 5 MG/ML
INJECTION, SOLUTION EPIDURAL; INFILTRATION; PERINEURAL
Status: COMPLETED | OUTPATIENT
Start: 2025-01-28 | End: 2025-01-28

## 2025-01-28 RX ORDER — SODIUM CHLORIDE 0.9 % (FLUSH) 0.9 %
10 SYRINGE (ML) INJECTION
Status: DISCONTINUED | OUTPATIENT
Start: 2025-01-28 | End: 2025-01-28 | Stop reason: HOSPADM

## 2025-01-28 RX ORDER — SODIUM CHLORIDE 9 MG/ML
INJECTION, SOLUTION INTRAVENOUS
Status: DISCONTINUED | OUTPATIENT
Start: 2025-01-28 | End: 2025-01-28 | Stop reason: HOSPADM

## 2025-01-28 RX ORDER — POLYETHYLENE GLYCOL 3350 17 G/17G
17 POWDER, FOR SOLUTION ORAL DAILY
Status: DISCONTINUED | OUTPATIENT
Start: 2025-01-28 | End: 2025-01-29 | Stop reason: HOSPADM

## 2025-01-28 RX ORDER — ASPIRIN 81 MG/1
81 TABLET ORAL 2 TIMES DAILY
Status: DISCONTINUED | OUTPATIENT
Start: 2025-01-28 | End: 2025-01-29 | Stop reason: HOSPADM

## 2025-01-28 RX ORDER — ROPINIROLE 0.5 MG/1
0.5 TABLET, FILM COATED ORAL NIGHTLY
Status: DISCONTINUED | OUTPATIENT
Start: 2025-01-28 | End: 2025-01-29 | Stop reason: HOSPADM

## 2025-01-28 RX ORDER — TALC
6 POWDER (GRAM) TOPICAL NIGHTLY PRN
Status: DISCONTINUED | OUTPATIENT
Start: 2025-01-28 | End: 2025-01-28 | Stop reason: HOSPADM

## 2025-01-28 RX ADMIN — GABAPENTIN 300 MG: 300 CAPSULE ORAL at 04:01

## 2025-01-28 RX ADMIN — FENTANYL CITRATE 25 MCG: 50 INJECTION INTRAMUSCULAR; INTRAVENOUS at 12:01

## 2025-01-28 RX ADMIN — EPHEDRINE SULFATE 5 MG: 50 INJECTION INTRAVENOUS at 10:01

## 2025-01-28 RX ADMIN — HYDRALAZINE HYDROCHLORIDE 10 MG: 20 INJECTION INTRAMUSCULAR; INTRAVENOUS at 02:01

## 2025-01-28 RX ADMIN — OXYCODONE 5 MG: 5 TABLET ORAL at 12:01

## 2025-01-28 RX ADMIN — PROPOFOL 85 MCG/KG/MIN: 10 INJECTION, EMULSION INTRAVENOUS at 10:01

## 2025-01-28 RX ADMIN — FENTANYL CITRATE 100 MCG: 50 INJECTION INTRAMUSCULAR; INTRAVENOUS at 09:01

## 2025-01-28 RX ADMIN — PROPOFOL 30 MG: 10 INJECTION, EMULSION INTRAVENOUS at 10:01

## 2025-01-28 RX ADMIN — MEPIVACAINE HYDROCHLORIDE 3.2 ML: 15 INJECTION, SOLUTION EPIDURAL; INFILTRATION at 10:01

## 2025-01-28 RX ADMIN — PREGABALIN 75 MG: 75 CAPSULE ORAL at 08:01

## 2025-01-28 RX ADMIN — Medication 20 MG: at 10:01

## 2025-01-28 RX ADMIN — ACETAMINOPHEN 1000 MG: 500 TABLET ORAL at 07:01

## 2025-01-28 RX ADMIN — TRANEXAMIC ACID 1000 MG: 100 INJECTION INTRAVENOUS at 10:01

## 2025-01-28 RX ADMIN — FENTANYL CITRATE 50 MCG: 50 INJECTION, SOLUTION INTRAMUSCULAR; INTRAVENOUS at 11:01

## 2025-01-28 RX ADMIN — HYDROMORPHONE HYDROCHLORIDE 0.2 MG: 1 INJECTION, SOLUTION INTRAMUSCULAR; INTRAVENOUS; SUBCUTANEOUS at 01:01

## 2025-01-28 RX ADMIN — DEXAMETHASONE SODIUM PHOSPHATE 8 MG: 4 INJECTION, SOLUTION INTRAMUSCULAR; INTRAVENOUS at 10:01

## 2025-01-28 RX ADMIN — TACROLIMUS 1 MG: 1 CAPSULE ORAL at 08:01

## 2025-01-28 RX ADMIN — ROPINIROLE HYDROCHLORIDE 0.5 MG: 0.5 TABLET, FILM COATED ORAL at 08:01

## 2025-01-28 RX ADMIN — MIDAZOLAM 2 MG: 1 INJECTION INTRAMUSCULAR; INTRAVENOUS at 09:01

## 2025-01-28 RX ADMIN — METHOCARBAMOL 750 MG: 750 TABLET ORAL at 08:01

## 2025-01-28 RX ADMIN — Medication 10 MG: at 11:01

## 2025-01-28 RX ADMIN — CEFAZOLIN 2 G: 2 INJECTION, POWDER, FOR SOLUTION INTRAMUSCULAR; INTRAVENOUS at 10:01

## 2025-01-28 RX ADMIN — ONDANSETRON 4 MG: 2 INJECTION INTRAMUSCULAR; INTRAVENOUS at 11:01

## 2025-01-28 RX ADMIN — Medication 400 ML: at 08:01

## 2025-01-28 RX ADMIN — ACETAMINOPHEN 1000 MG: 500 TABLET ORAL at 01:01

## 2025-01-28 RX ADMIN — HYDROMORPHONE HYDROCHLORIDE 0.2 MG: 1 INJECTION, SOLUTION INTRAMUSCULAR; INTRAVENOUS; SUBCUTANEOUS at 02:01

## 2025-01-28 RX ADMIN — MUPIROCIN 1 G: 20 OINTMENT TOPICAL at 08:01

## 2025-01-28 RX ADMIN — MORPHINE SULFATE 2 MG: 2 INJECTION, SOLUTION INTRAMUSCULAR; INTRAVENOUS at 10:01

## 2025-01-28 RX ADMIN — CEFAZOLIN 2 G: 2 INJECTION, POWDER, FOR SOLUTION INTRAMUSCULAR; INTRAVENOUS at 06:01

## 2025-01-28 RX ADMIN — OXYCODONE HYDROCHLORIDE 10 MG: 10 TABLET ORAL at 07:01

## 2025-01-28 RX ADMIN — FAMOTIDINE 20 MG: 20 TABLET ORAL at 08:01

## 2025-01-28 RX ADMIN — Medication: at 12:01

## 2025-01-28 RX ADMIN — SODIUM CHLORIDE, SODIUM GLUCONATE, SODIUM ACETATE, POTASSIUM CHLORIDE, MAGNESIUM CHLORIDE, SODIUM PHOSPHATE, DIBASIC, AND POTASSIUM PHOSPHATE: .53; .5; .37; .037; .03; .012; .00082 INJECTION, SOLUTION INTRAVENOUS at 11:01

## 2025-01-28 RX ADMIN — FAMOTIDINE 20 MG: 10 INJECTION, SOLUTION INTRAVENOUS at 10:01

## 2025-01-28 RX ADMIN — LIDOCAINE HYDROCHLORIDE 50 MG: 20 INJECTION INTRAVENOUS at 10:01

## 2025-01-28 RX ADMIN — Medication 400 ML: at 04:01

## 2025-01-28 RX ADMIN — ACETAMINOPHEN 1000 MG: 500 TABLET ORAL at 08:01

## 2025-01-28 RX ADMIN — CELECOXIB 400 MG: 200 CAPSULE ORAL at 08:01

## 2025-01-28 RX ADMIN — METHOCARBAMOL 750 MG: 750 TABLET ORAL at 01:01

## 2025-01-28 RX ADMIN — OXYCODONE HYDROCHLORIDE 10 MG: 10 TABLET ORAL at 11:01

## 2025-01-28 RX ADMIN — OXYCODONE HYDROCHLORIDE 10 MG: 10 TABLET ORAL at 04:01

## 2025-01-28 RX ADMIN — ASPIRIN 81 MG: 81 TABLET, COATED ORAL at 08:01

## 2025-01-28 RX ADMIN — GABAPENTIN 300 MG: 300 CAPSULE ORAL at 08:01

## 2025-01-28 RX ADMIN — ROPIVACAINE HYDROCHLORIDE 7.5 ML: 5 INJECTION EPIDURAL; INFILTRATION; PERINEURAL at 09:01

## 2025-01-28 RX ADMIN — SODIUM CHLORIDE: 0.9 INJECTION, SOLUTION INTRAVENOUS at 09:01

## 2025-01-28 RX ADMIN — VANCOMYCIN HYDROCHLORIDE 1500 MG: 1.5 INJECTION, POWDER, LYOPHILIZED, FOR SOLUTION INTRAVENOUS at 08:01

## 2025-01-28 RX ADMIN — MUPIROCIN 1 G: 20 OINTMENT TOPICAL at 09:01

## 2025-01-28 RX ADMIN — METOPROLOL TARTRATE 100 MG: 25 TABLET, FILM COATED ORAL at 08:01

## 2025-01-28 RX ADMIN — TRANEXAMIC ACID 1000 MG: 100 INJECTION INTRAVENOUS at 11:01

## 2025-01-28 RX ADMIN — SENNOSIDES AND DOCUSATE SODIUM 1 TABLET: 50; 8.6 TABLET ORAL at 08:01

## 2025-01-28 NOTE — TRANSFER OF CARE
"Anesthesia Transfer of Care Note    Patient: Zander Kaur    Procedure(s) Performed: Procedure(s) (LRB):  ARTHROPLASTY, KNEE, TOTAL, YAKOV COMPUTER-ASSISTED NAVIGATION: LEFT (Left)    Patient location: PACU    Anesthesia Type: spinal    Transport from OR: Transported from OR on 6-10 L/min O2 by face mask with adequate spontaneous ventilation    Post pain: adequate analgesia    Post assessment: no apparent anesthetic complications and tolerated procedure well    Post vital signs: stable    Level of consciousness: awake    Nausea/Vomiting: no nausea/vomiting    Complications: none    Transfer of care protocol was followed      Last vitals: Visit Vitals  BP (!) 153/97 (BP Location: Right arm, Patient Position: Lying)   Pulse (!) 56   Temp 36.3 °C (97.3 °F) (Temporal)   Resp 19   Ht 5' 7" (1.702 m)   Wt 92.5 kg (204 lb)   SpO2 97%   BMI 31.95 kg/m²     "

## 2025-01-28 NOTE — PROGRESS NOTES
Pre op completed.  Patient belongings given to spouse. Bed in lowest position. Call light within reach. Family at beside. DME needed. Bear hugger refused.

## 2025-01-28 NOTE — OP NOTE
DATE OF PROCEDURE:  1/28/25.     PREOPERATIVE DIAGNOSIS:  Arthritis, left knee./ Mass Left Knee     POSTOPERATIVE DIAGNOSIS:  Arthritis, left knee/ Mass Left Knee     PROCEDURES PERFORMED:  Robotically-assisted left total knee arthroplasty.   Excision Mass Left Knee  SURGEON:  Carson Lock M.D.     ASSISTANT:  RONALD Griffin     ANESTHESIA:  Regional.     COMPLICATIONS:  None.     COUNTS:  Correct.     DISPOSITION:  Recovery Room, stable.     SPECIMENS:  Bone and cartilage.     FINDINGS:  Arthritis.     FLUIDS:  2000 mL.     ESTIMATED BLOOD LOSS:  <50cc     IMPLANTS:  Iker Triathlon size 5 left  Triathlon cruciate retaining femoral component and a size 5 primary tibial baseplate, 33 mm patella and a size 5, 12 mm   CS tibial insert.     INDICATIONS FOR PROCEDURE:   Zander Kaur  is a 74-year-old male who is   having symptoms of left knee pain.  Physical examination and imaging studies   were consistent with arthritis.Treatment options were explained and it was decided   to proceed with robotically-assisted left total knee arthroplasty.  He was   aware of reasonable treatment options as well as risks and benefits.       PROCEDURE IN DETAIL:  After appropriate consent was obtained, the patient   brought in the Operating Room, anesthesia was administered.  He received   antibiotic prophylaxis.  Cast padding and tourniquet was applied to the proximal  left thigh.  left lower extremity was then prepped and draped in usual sterile   fashion.  The leg chery was applied.  Timeout was called.  Limb was elevated   and tourniquet was inflated.  The knee was flexed.  An incision was made from   the tibial tubercle just proximal to the superior pole of the patella.  It was   taken down through the skin and retinacular.  A medial parapatellar arthrotomy   was performed followed by a medial release.  There was a large amount of synovitis and this was resected.  It was sent for pathology and culture.  Following this ACL was  resected, fat pad   was excised.  There was a cyst like mass laterally rising from the area of anterior joint line and this was excised and sent to pathology.  The patella was everted.  It was measured and found to be 24 mm,  9 mm of bone removed and the 3 peg holes were drilled for the 33 mm patella.    Following this, 2 stab incisions were made 4 fingerbreadths below the tibial   tubercle on the medial aspect of the tibial crest.  The 2 guide pins were placed   along with the fixation device through these.  The array was applied and   tightened to the clamp. We checked the security of the array and it was fine. Following this, we placed the femoral pins 1 cm superior and anterior to the MCL insertion.  The check point was placed in between the pins. The guide clamp and array were secured.  Once this was accomplished, the hip center was obtained, the   medial and lateral malleoli were demarcated.  The femoral check point and tibial   check point were placed and the femur and tibia were then registered.    Acceptable registration was obtained.  Osteophytes were removed. We then captured poses in extension,   And approximately 90 degrees of flexion.  Initial alignment was 8 degrees varus and 6  Degrees extension  .  The  initial gaps were   then obtained. The extension gaps were 11.5 medially and 16.5 laterally.  The flexion gaps were 11 medially and 17 laterally.  Component   position was adjusted.  We were very satisfied with the component position and   sizing.  We had a size 5 femur and a 5 tibia.  Once this was accomplished, the   robotic arm was brought in and our cuts were made.  The tibia was cut 1st.  We then cut the anterior femur anterior chamfer and posterior femur.  The saw blade was then switched and we made our distal and posterior chamfer cut.  We were very satisfied all the cuts.  Bone fragments were removed.  Lamina  was used to open up posteriorly and we removed any remaining osteophytes  and meniscal remnants. The PCL was intact and   robust.  We placed the tibial trial.  We had good coverage with this.  We used   the medial one-third of the tibial tubercle to guide rotation and the trial was pinned in   place.  A 12 mm insert was placed and then the femoral component was placed.    This was centered on the femur and the knee was brought through a range of   motion.  He was very well balanced in flexion with 18.5 mm gaps medially and   laterally.  In extension, there was an 18.5 mm gap medially 18.5 mm gap laterally.    Clinically,there was excellent balance and stability.  Final alignment was 4 degrees flexion and 4 degrees varus. Trial   patellar button was placed.  Patella tracked well.  Therefore, at this point, we   were satisfied with knee range of motion and stability, component position,   sizing and alignment as well as patella tracking.  It should be noted that he  had full extension and he had at least 130 degrees of flexion and there was no   instability at full extension, midflexion, or deep flexion.  Trial components   were removed.  Arrays and femoral and tibial pins were removed. The bone was then prepared for cementing for pulsatile lavage and   drying. Components were then cemented into   place. Tibia followed by femur.  Cement was applied to the tibial keel as   well.  Excess cement was removed.  The tibial insert was firmly seated.  The knee was inspected.  There was no loose body, foreign body, or soft tissue interposition.  It was   then reduced.  Patella button was cemented in place.  Once the cement was dry,   the knee was irrigated with Betadine solution.  Following this, it was irrigated   with pulsatile lavage.  This was periodically done throughout the closure.  The   incision was then closed.  The arthrotomy was closed with #1 Vicryl.  Once the   arthrotomy was closed, the knee was brought through range of motion and was   stable as previously described and the patella  tracked well.  The remainder of   the incision was closed with 0 Vicryl, 2-0 Vicryl, Monocryl, and Dermabond.    The stab incisions were closed with Vicryl and Dermabond.  It should be noted   that all check points were removed as well as the femoral and tibial pins.    Sterile dressing was applied.  He was transferred from the Operating Room table   to a stretcher, brought to Recovery Room in stable condition.  He tolerated the   procedure well and there were no known complications. One gram of IV tranexamic acid was given prior to incision and at closure.

## 2025-01-28 NOTE — PLAN OF CARE
"  Problem: Physical Therapy  Goal: Physical Therapy Goal  Description: Goals to be met by: 25     Patient will increase functional independence with mobility by performin. Supine to sit with Supervision  2. Sit to stand transfer with Supervision  3. Gait  x 150 feet with Stand-by Assistance using Rolling Walker.   4. Ascend/descend 4 stair with bilateral Handrails Contact Guard Assistance using No Assistive Device.   5. Ascend/descend 6" curb step with Contact Guard Assistance using rolling walker  6. Lower extremity TKA home  exercise program x 10 reps per handout, with supervision    Outcome: Progressing     "

## 2025-01-28 NOTE — NURSING TRANSFER
Nursing Transfer Note      1/28/2025   2:45 PM    Nurse giving handoff: Angela CALVERT  Nurse receiving handoff: Francie CALVERT    Reason patient is being transferred: overnight stay    Transfer From: PACU    Transfer via bed    Patient belongings transferred with patient: Yes    Chart send with patient: Yes    Notified: spouse    VSS. Pt able to tolerate oral liquids. Pt reports tolerable pain level for transfer. Ice pack and dressing intact. FCDs intact. Nimbus infusion pump infusing. Pt stable for transfer. No distress noted.

## 2025-01-28 NOTE — NURSING
Pt requested to use own Voltaren Topical Gel on his shoulder.  Per Dr. Gamino, pt not to use at this time.  Pt informed and verbalized understanding.

## 2025-01-28 NOTE — INTERVAL H&P NOTE
Zander Kaur was interviewed, examined and the H and P reviewed.  There has been no interval change in his History and Physical.

## 2025-01-28 NOTE — ANESTHESIA PROCEDURE NOTES
Left Adductor Canal Catheter    Patient location during procedure: pre-op   Block not for primary anesthetic.  Reason for block: at surgeon's request and post-op pain management   Post-op Pain Location: left knee   Start time: 1/28/2025 9:30 AM  Timeout: 1/28/2025 9:30 AM   End time: 1/28/2025 9:40 AM    Staffing  Authorizing Provider: Darrick Mata MD  Performing Provider: Darrick Mata MD    Staffing  Performed by: Darrick Mata MD  Authorized by: Darrick Mata MD    Preanesthetic Checklist  Completed: patient identified, IV checked, site marked, risks and benefits discussed, surgical consent, monitors and equipment checked, pre-op evaluation and timeout performed  Peripheral Block  Patient position: supine  Prep: ChloraPrep and site prepped and draped  Patient monitoring: heart rate, cardiac monitor, continuous pulse ox, continuous capnometry and frequent blood pressure checks  Block type: adductor canal  Laterality: left  Injection technique: continuous  Needle  Needle type: Tuohy   Needle gauge: 17 G  Needle length: 3.5 in  Needle localization: anatomical landmarks and ultrasound guidance  Catheter type: spring wound  Catheter size: 19 G  Test dose: lidocaine 1.5% with Epi 1-to-200,000 and negative   -ultrasound image captured on disc.  Assessment  Injection assessment: negative aspiration, negative parasthesia and local visualized surrounding nerve  Paresthesia pain: none  Heart rate change: no  Slow fractionated injection: yes  Pain Tolerance: comfortable throughout block and no complaints  Medications:    Medications: ropivacaine (NAROPIN) injection 0.5% - Perineural   7.5 mL - 1/28/2025 9:35:00 AM    Additional Notes  VSS.  [facility] RN monitoring vitals throughout procedure.  Patient tolerated procedure well.  15 cc of 0.25% ropivacaine with epi 1:300k administered for the block.

## 2025-01-28 NOTE — ANESTHESIA PREPROCEDURE EVALUATION
Pre-operative evaluation for Procedure(s) (LRB):  ARTHROPLASTY, KNEE, TOTAL, YAKOV COMPUTER-ASSISTED NAVIGATION: LEFT (Left)    Zander Kaur is a 74 y.o. male     Patient Active Problem List   Diagnosis    AK (actinic keratosis)    Essential hypertension    S/P liver transplant    Hepatic cirrhosis    History of skin cancer    Joint injury    Screen for colon cancer    Arthritis    Immunosuppression    Hypertension associated with transplantation    Aortic atherosclerosis    Thrombocytopenia    Mohs defect    Class 1 obesity due to excess calories with serious comorbidity and body mass index (BMI) of 30.0 to 30.9 in adult    Elevated PSA    Preoperative cardiovascular examination    History of stroke    Edema of both lower extremities    Lymphedema of both lower extremities    Varicose veins of bilateral lower extremities with other complications    Venous stasis dermatitis of both lower extremities    Malignant neoplasm of mandible    Uncomplicated opioid dependence    Primary osteoarthritis of left knee    Snoring    Prediabetes    Left knee pain       Review of patient's allergies indicates:   Allergen Reactions    Diphenhydramine hcl     Penicillins      Other reaction(s): Rash    Povidone-iodine      Other reaction(s): Itching  Other reaction(s): Itching       Current Facility-Administered Medications on File Prior to Encounter   Medication Dose Route Frequency Provider Last Rate Last Admin    sodium chloride 0.9% flush 10 mL  10 mL Intravenous PRN Shai Mills MD         Current Outpatient Medications on File Prior to Encounter   Medication Sig Dispense Refill    atorvastatin (LIPITOR) 40 MG tablet Take 1 tablet (40 mg total) by mouth once daily. 90 tablet 3    celecoxib (CELEBREX) 200 MG capsule Take 200 mg by mouth 2 (two) times daily.      gabapentin (NEURONTIN) 300 MG capsule Take 300 mg by mouth 3 (three) times daily.      methocarbamoL (ROBAXIN) 750 MG Tab Take 750 mg by mouth 3 (three) times daily.  Reports taking 1/2 pill 6 times per day      multivit with minerals/lutein (MULTIVITAMIN 50 PLUS ORAL) Take 1 tablet by mouth once daily.      oxyCODONE-acetaminophen (PERCOCET)  mg per tablet Take 1 tablet by mouth every 4 (four) hours as needed for Pain. 8 tablet 0    PROGRAF 1 mg Cap Take 1 capsule (1 mg total) by mouth every 12 (twelve) hours. 180 capsule 3    vitamin D 1000 units Tab Take 1,000 Units by mouth once daily.      fluorouraciL (EFUDEX) 5 % cream Compound fluorouracil 5% + calcipotriene 0.005% cream. Apply a pea-sized amount to entire scalp BID x 7 days. 30 g 0    oxyCODONE-acetaminophen (PERCOCET)  mg per tablet Take 1 tablet by mouth 3 (three) times daily as needed. Reports he takes 1/2 pill 6 times per day along with Robaxin      ropinirole (REQUIP) 0.5 MG tablet Take 0.5 mg by mouth every evening.      solifenacin (VESICARE) 5 MG tablet Take 1 tablet (5 mg total) by mouth once daily. 30 tablet 11       Past Surgical History:   Procedure Laterality Date    ANKLE SURGERY      multipole surgeries    CLOSURE OF DEFECT OF MOHS PROCEDURE Right 02/09/2023    Procedure: CLOSURE, MOHS PROCEDURE DEFECT;  Surgeon: Uzair Brandon MD;  Location: [facility] OR [address] [place] [address];  Service: ENT;  Laterality: Right;    COLONOSCOPY N/A 12/18/2023    Procedure: COLONOSCOPY;  Surgeon: Leonardo Penny MD;  Location: [facility] ENDOSCOPY;  Service: Endoscopy;  Laterality: N/A;  Referral: RENÉE HODGE / PEG / [facility] -   12/11- pre call complete.  DBM  12.15 precall complete; pt has instr.; all questions answered; AP    CYSTOSCOPY N/A 04/05/2024    Procedure: CYSTOSCOPY;  Surgeon: Galileo Ramos MD;  Location: [facility] OR 1ST FLR;  Service: Urology;  Laterality: N/A;    EAR RECONSTRUCTION Right 10/8/2024    Procedure: RECONSTRUCTION, EAR;  Surgeon: Randee Brown MD;  Location: [facility] OR 2ND FLR;  Service: ENT;  Laterality: Right;    FACIAL RECONSTRUCTION SURGERY      HERNIA REPAIR      knee scope      bob,  multiple    LIVER TRANSPLANT      2010    PROSTATE BIOPSY N/A 04/05/2024    Procedure: BIOPSY, PROSTATE;  Surgeon: Galileo Ramos MD;  Location: [facility] OR 1ST FLR;  Service: Urology;  Laterality: N/A;  TransPERINEAL biopsy    REVISION OF FLAP GRAFT Right 03/03/2023    Procedure: REVISION, PROCEDURE INVOLVING FLAP GRAFT;  Surgeon: Uzair Brandon MD;  Location: [facility] OR 2ND FLR;  Service: ENT;  Laterality: Right;    REVISION OF FLAP GRAFT Right 08/01/2023    Procedure: REVISION, PROCEDURE INVOLVING FLAP GRAFT;  Surgeon: Consuelo Augustine MD;  Location: [facility] OR;  Service: ENT;  Laterality: Right;    SHOULDER OPEN ROTATOR CUFF REPAIR      left    SKIN FULL THICKNESS GRAFT Right 10/8/2024    Procedure: APPLICATION, GRAFT, SKIN, FULL-THICKNESS;  Surgeon: Randee Brown MD;  Location: [facility] OR 2ND FLR;  Service: ENT;  Laterality: Right;    TRANSRECTAL ULTRASOUND EXAMINATION N/A 04/05/2024    Procedure: ULTRASOUND, RECTAL APPROACH;  Surgeon: Galileo Ramos MD;  Location: [facility] OR 1ST FLR;  Service: Urology;  Laterality: N/A;         CBC:  Lab Results   Component Value Date    WBC 7.75 12/09/2024    RBC 4.80 12/09/2024    HGB 15.5 12/09/2024    HCT 46.0 12/09/2024     12/09/2024    MCV 96 12/09/2024    MCH 32.3 (H) 12/09/2024    MCHC 33.7 12/09/2024       CMP:   Lab Results   Component Value Date     12/09/2024    K 4.2 12/09/2024     12/09/2024    CO2 23 12/09/2024    BUN 14 12/09/2024    CREATININE 0.9 12/09/2024     (H) 12/09/2024    CALCIUM 9.3 12/09/2024    ALBUMIN 3.8 12/09/2024    PROT 7.0 12/09/2024    ALKPHOS 83 12/09/2024    ALT 32 12/09/2024    AST 26 12/09/2024    BILITOT 1.4 (H) 12/09/2024       INR:  Lab Results   Component Value Date    INR 1.0 12/30/2024         Diagnostic Studies:      EKG:   Results for orders placed or performed during the hospital encounter of 12/30/24   EKG 12-lead    Collection Time: 12/30/24  2:12 PM   Result Value Ref Range    QRS Duration 94 ms    OHS QTC  "Calculation 424 ms    Narrative    Test Reason : I10,    Vent. Rate :  58 BPM     Atrial Rate :  58 BPM     P-R Int : 178 ms          QRS Dur :  94 ms      QT Int : 432 ms       P-R-T Axes :  53   6  24 degrees    QTcB Int : 424 ms    Sinus bradycardia  Otherwise normal ECG  When compared with ECG of 02-May-2024 09:58,  No significant change was found  Confirmed by Imtiaz Moraes ([id number]) on 12/30/2024 4:13:25 PM    Referred By: WINTER PAZ           Confirmed By: Imtiza Moraes        2D Echo:  No results found for this or any previous visit.    Stress Test:   No results found for this or any previous visit.      Pre-op Vitals [01/28/25 0809]   BP Pulse Resp Temp SpO2   (!) 181/98 69 19 36.3 °C (97.3 °F) 97 %      Height Weight BMI (Calculated)     5' 7" 204 lb 31.9         Pre-op Vitals [01/28/25 0809]   BP Pulse Resp Temp SpO2   (!) 181/98 69 19 36.3 °C (97.3 °F) 97 %      Height Weight BMI (Calculated)     5' 7" 204 lb 31.9          Pre-op Assessment    I have reviewed the Patient Summary Reports.       I have reviewed the Medications.     Review of Systems  Anesthesia Hx:  No problems with previous Anesthesia   History of prior surgery of interest to airway management or planning: liver transplant. Previous anesthesia: General           Social:  Former Smoker, Social Alcohol Use       Hematology/Oncology:  Hematology Normal                       --  Cancer in past history:                 Oncology Comments: scc     EENT/Dental:  EENT/Dental Normal           Cardiovascular:  Exercise tolerance: good   Hypertension, well controlled   CAD                                    Hypertension         Pulmonary:  Pulmonary Normal                       Renal/:  Renal/ Normal                 Hepatic/GI:      Liver Disease,  S/p orthotopic liver transplant in 2010          Liver Disease        Musculoskeletal:  Arthritis               Neurological:   CVA                       CVA - Cerebrovasular Accident               "   Endocrine:  Endocrine Normal            Dermatological:  Skin Normal    Psych:  Psychiatric Normal                    Physical Exam  General: Well nourished    Airway:  Mallampati: III / II  Mouth Opening: Normal  TM Distance: Normal  Tongue: Normal  Neck ROM: Normal ROM    Dental:  Intact        Anesthesia Plan  Type of Anesthesia, risks & benefits discussed:    Anesthesia Type: Gen ETT, Spinal, CSE  Intra-op Monitoring Plan: Standard ASA Monitors  Post Op Pain Control Plan: multimodal analgesia, IV/PO Opioids PRN and peripheral nerve block  Induction:  IV  Airway Plan: Video, Post-Induction  Informed Consent: Informed consent signed with the Patient and all parties understand the risks and agree with anesthesia plan.  All questions answered. Patient consented to blood products? Yes  ASA Score: 3  Day of Surgery Review of History & Physical: H&P Update referred to the surgeon/provider.    Ready For Surgery From Anesthesia Perspective.     .

## 2025-01-28 NOTE — PLAN OF CARE
Problem: Occupational Therapy  Goal: Occupational Therapy Goal  Description: Goals to be met by: 1/29/2025     Patient will increase functional independence with ADLs by performing:    UE Dressing with Supervision.  LE Dressing with Supervision.  Grooming while standing at sink with Supervision.  Toileting from toilet with Supervision for hygiene and clothing management.   Toilet transfer to toilet with Supervision.    Outcome: Progressing

## 2025-01-28 NOTE — PT/OT/SLP EVAL
"Physical Therapy Evaluation and Treatment    Patient Name: Zander Kaur   MRN: [id number]  Recent Surgery: Procedure(s) (LRB):  ARTHROPLASTY, KNEE, TOTAL, YAKOV COMPUTER-ASSISTED NAVIGATION: LEFT (Left) Day of Surgery    Recommendations:     Discharge Recommendations: Low Intensity Therapy   Discharge Equipment Recommendations: bedside commode, shower chair, walker, rolling   Barriers to discharge: None    Assessment:     Zander Kaur is a 74 y.o. male admitted with a medical diagnosis of Primary osteoarthritis of left knee. He presents with the following impairments/functional limitations: weakness, impaired endurance, impaired self care skills, impaired functional mobility, gait instability, impaired balance, decreased lower extremity function, pain, decreased ROM, edema. Pt presents s/p L TKA with expected post-op deficits.  Pt did really well with PT today and was able to amb 70' with RW and CGA. He had no LOB and no dizziness during gait but did require several standing rest breaks due to fatigue. He will benefit from cont PT to maximize  functional recovery, strength and ROM.      Rehab Prognosis: Good; patient would benefit from acute PT services to address these deficits and reach maximum level of function.    Plan:     During this hospitalization, patient to be seen daily to address the above listed problems via gait training, therapeutic activities, therapeutic exercises, neuromuscular re-education    Plan of Care Expires: 02/27/25    Subjective     Chief Complaint: " I am wonderful"  " I can do more if you need me to, I was tired"  Patient Comments/Goals: to go home  Pain/Comfort:  Pain Rating 1:  ("30 out of 10,  but I'm, okay")  Location - Side 1: Left  Location 1: knee  Pain Addressed 1: Pre-medicate for activity, Reposition, Distraction, Cessation of Activity  Pain Rating Post-Intervention 1: 10/10    Social History:  Living Environment: Patient lives with their wife  in a single story home with number " of outside stair(s): 3 ASHLYN with B rails and then entry step and walk-in shower  Prior Level of Function: Prior to admission, patient ambulated community distances using straight cane  Equipment Used at Home: cane, straight  DME owned (not currently used): shower chair  Assistance Upon Discharge:  his wife    Objective:     Communicated with RN and OT prior to session. Patient found up in chair with cryotherapy, FCD, perineural catheter, peripheral IV upon PT entry to room.  Pt's wife present in room.    General Precautions: Standard, fall   Orthopedic Precautions: LLE weight bearing as tolerated   Braces: N/A    Respiratory Status: Room air    Exams:  RLE ROM: WNL  RLE Strength: WNL  LLE ROM: WFL except decrease knee flex/ext due to pain and post op bandages  LLE Strength:  at least 3/5  Cognitive: Patient is oriented to Person, Place, Time, Situation  Gross Motor Coordination: WFL  Postural Exam: Patient presented with the following abnormalities:    -       Rounded shoulders  -       Forward head  Sensation:    -       Intact    Functional Mobility:  Gait belt applied - Yes  Bed Mobility  Seated in chair at start of session and returned to chair  Transfers  Sit to Stand: contact guard assistance with rolling walker and with cues for hand placement and foot placement  Gait  Patient ambulated 70' with rolling walker and contact guard assistance. Patient required cues for heel strike, position in walker, sequencing, upright posture, and weight bearing status to increase independence and safety. Patient required cues ~ 25% of the time.  Pt amb with decrease ranjith and step length with several standing rest breaks due to fatigue  Chair follow for safety  No LOB and no dizziness  Balance  Sitting: GOOD: Maintains balance through MODERATE excursions of active trunk movement  Standing: FAIR: Needs CONTACT GUARD during gait    Therapeutic Activities and Exercises:  Patient educated on role of acute care PT and PT POC,  "safety while in hospital including calling nurse for mobility, and call light usage.  Educated about weightbearing as to L LE and provided cuing for adherence as appropriate during session.  Educated about importance of OOB mobility and remaining up in chair most of the day.  Pt ed on use of cryotherapy for edema and pain control, and on safety awareness with use of RW in the home and pt verbalized good understanding back to PT.   Pt ed on importance of elevating  LE  proper placement to keep knee extended and not flexed.  Pt and his caregiver verbalized good understanding back to PT.        AM-PAC 6 CLICK MOBILITY  Total Score:18    Patient left up in chair with all lines intact, call button in reach, RN notified, and spouse present.    GOALS:   Multidisciplinary Problems       Physical Therapy Goals          Problem: Physical Therapy    Goal Priority Disciplines Outcome Interventions   Physical Therapy Goal     PT, PT/OT Progressing    Description: Goals to be met by: 25     Patient will increase functional independence with mobility by performin. Supine to sit with Supervision  2. Sit to stand transfer with Supervision  3. Gait  x 150 feet with Stand-by Assistance using Rolling Walker.   4. Ascend/descend 4 stair with bilateral Handrails Contact Guard Assistance using No Assistive Device.   5. Ascend/descend 6" curb step with Contact Guard Assistance using rolling walker  6. Lower extremity TKA home  exercise program x 10 reps per handout, with supervision                         DME Justifications:   Zander's mobility limitation cannot be sufficiently resolved by the use of a cane. His functional mobility deficit can be sufficiently resolved with the use of a Rolling Walker. Patient's mobility limitation significantly impairs their ability to participate in one of more activities of daily living.  The use of a RW will significantly improve the patient's ability to participate in MRADLS and the " patient will use it on regular basis in the home.    History:     Past Medical History:   Diagnosis Date    Actinic keratosis     Allergy     seasonal    Arthritis     Basal cell carcinoma of right ala nasi 02/06/2023    Hypertension     Joint pain     Organ transplant     liver 2010    SCC (squamous cell carcinoma) 08/15/2022    Right cheek -SSW    SCC (squamous cell carcinoma) 03/27/2023    Right angle of mandible    SCC (squamous cell carcinoma) 03/27/2023    Right medial cheek    SCC (squamous cell carcinoma) 02/12/2024    anterior chin    SCC (squamous cell carcinoma) 06/10/2024    R ear helix-excised by JAM    SCC (squamous cell carcinoma) 06/10/2024    SCCIS-R post neck    SCC (squamous cell carcinoma), ear, 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    Squamous Cell Carcinoma 08/28/2013    right temple    Squamous Cell Carcinoma 09/04/2013    left forehead    Stroke        Past Surgical History:   Procedure Laterality Date    ANKLE SURGERY      multipole surgeries    CLOSURE OF DEFECT OF MOHS PROCEDURE Right 02/09/2023    Procedure: CLOSURE, MOHS PROCEDURE DEFECT;  Surgeon: Uzair Brandon MD;  Location: [facility] OR [address] [place] [address];  Service: ENT;  Laterality: Right;    COLONOSCOPY N/A 12/18/2023    Procedure: COLONOSCOPY;  Surgeon: Leonardo Penny MD;  Location: [facility] ENDOSCOPY;  Service: Endoscopy;  Laterality: N/A;  Referral: REÉNE HODGE / PEG / [facility] - LW  12/11- pre call complete.  DBM  12.15 precall complete; pt has instr.; all questions answered; AP    CYSTOSCOPY N/A 04/05/2024    Procedure: CYSTOSCOPY;  Surgeon: Galileo Ramos MD;  Location: [facility] OR 1ST [facility];  Service: Urology;  Laterality: N/A;    EAR RECONSTRUCTION Right 10/8/2024    Procedure: RECONSTRUCTION, EAR;  Surgeon: Randee Brown MD;  Location: [facility] OR 2ND [facility];  Service: ENT;  Laterality: Right;    FACIAL RECONSTRUCTION SURGERY      HERNIA REPAIR      knee scope      bob, multiple    LIVER TRANSPLANT      2010    PROSTATE BIOPSY N/A  04/05/2024    Procedure: BIOPSY, PROSTATE;  Surgeon: Galileo Ramos MD;  Location: [facility] OR 1ST FLR;  Service: Urology;  Laterality: N/A;  TransPERINEAL biopsy    REVISION OF FLAP GRAFT Right 03/03/2023    Procedure: REVISION, PROCEDURE INVOLVING FLAP GRAFT;  Surgeon: Uzair Brandon MD;  Location: [facility] OR 2ND FLR;  Service: ENT;  Laterality: Right;    REVISION OF FLAP GRAFT Right 08/01/2023    Procedure: REVISION, PROCEDURE INVOLVING FLAP GRAFT;  Surgeon: Consuelo Augustine MD;  Location: [facility] OR;  Service: ENT;  Laterality: Right;    SHOULDER OPEN ROTATOR CUFF REPAIR      left    SKIN FULL THICKNESS GRAFT Right 10/8/2024    Procedure: APPLICATION, GRAFT, SKIN, FULL-THICKNESS;  Surgeon: Randee Brown MD;  Location: [facility] OR 2ND FLR;  Service: ENT;  Laterality: Right;    TRANSRECTAL ULTRASOUND EXAMINATION N/A 04/05/2024    Procedure: ULTRASOUND, RECTAL APPROACH;  Surgeon: Galileo Ramos MD;  Location: [facility] OR 1ST FLR;  Service: Urology;  Laterality: N/A;       Time Tracking:     PT Received On: 01/28/25  PT Start Time: 1549  PT Stop Time: 1611  PT Total Time (min): 22 min     Billable Minutes: Evaluation 10 and Gait Training 12    1/28/2025

## 2025-01-28 NOTE — ASSESSMENT & PLAN NOTE
74 y.o. M sp L TKA with Dr. Lock    Doing well, no issues  Plan for PT/OT today and discharge home  Continue multimodal pain regimen  WBAT LLE, ROMAT  DVT ppx with SCDs at all times while inpatient, ASA 81 bid x 30d  Extended oral antibiotic prophylaxis with cefadroxil 500mg bid x 1 week  Follow up in clinic as scheduled FU 1/29, 2/10

## 2025-01-28 NOTE — NURSING
Pt arrived to unit via hospital bed from PACU.  Pt aaox4, vss, no s/s of distress noted.  Dressing to left knee cdi.  Accutherm in place.  CADD pump intact and infusing at 7cc / 3 hrs and 0.5 cont rate.  FCDs on.  Pt instructed to call for assistance when getting up and he verbalized understanding.  Call light placed within reach.  Spouse at bedside.

## 2025-01-28 NOTE — PLAN OF CARE
Problem: Pain Acute  Goal: Optimal Pain Control and Function  Intervention: Develop Pain Management Plan  Flowsheets (Taken 1/28/2025 1718)  Pain Management Interventions:   care clustered   cold applied   pain management plan reviewed with patient/caregiver     Problem: Pain Acute  Goal: Optimal Pain Control and Function  Intervention: Prevent or Manage Pain  Flowsheets (Taken 1/28/2025 1718)  Sensory Stimulation Regulation:   care clustered   lighting decreased   quiet environment promoted  Medication Review/Management: medications reviewed     Problem: Pain Acute  Goal: Optimal Pain Control and Function  Intervention: Optimize Psychosocial Wellbeing  Flowsheets (Taken 1/28/2025 1718)  Supportive Measures:   active listening utilized   positive reinforcement provided     Problem: Wound  Goal: Absence of Infection Signs and Symptoms  Intervention: Prevent or Manage Infection  Flowsheets (Taken 1/28/2025 1718)  Infection Management: aseptic technique maintained     Problem: Wound  Goal: Optimal Pain Control and Function  Intervention: Prevent or Manage Pain  Flowsheets (Taken 1/28/2025 1718)  Pain Management Interventions:   care clustered   cold applied   pain management plan reviewed with patient/caregiver     Problem: Infection  Goal: Absence of Infection Signs and Symptoms  Intervention: Prevent or Manage Infection  Flowsheets (Taken 1/28/2025 1718)  Infection Management: aseptic technique maintained     Problem: Knee Arthroplasty  Goal: Optimal Coping  Intervention: Support Psychosocial Response to Surgery and Mobility Changes  Flowsheets (Taken 1/28/2025 1718)  Supportive Measures:   active listening utilized   positive reinforcement provided     Problem: Knee Arthroplasty  Goal: Absence of Bleeding  Intervention: Monitor and Manage Bleeding  Flowsheets (Taken 1/28/2025 1718)  Bleeding Management: dressing monitored     Problem: Knee Arthroplasty  Goal: Absence of Infection Signs and Symptoms  Intervention:  Prevent or Manage Infection  Flowsheets (Taken 1/28/2025 1718)  Infection Management: aseptic technique maintained     Problem: Knee Arthroplasty  Goal: Optimal Pain Control and Function  Intervention: Prevent or Manage Pain  Flowsheets (Taken 1/28/2025 1718)  Pain Management Interventions:   care clustered   cold applied   pain management plan reviewed with patient/caregiver     Problem: Fall Injury Risk  Goal: Absence of Fall and Fall-Related Injury  Intervention: Identify and Manage Contributors  Flowsheets (Taken 1/28/2025 1718)  Medication Review/Management: medications reviewed     Problem: Fall Injury Risk  Goal: Absence of Fall and Fall-Related Injury  Intervention: Promote Injury-Free Environment  Flowsheets (Taken 1/28/2025 1718)  Safety Promotion/Fall Prevention:   assistive device/personal item within reach   Fall Risk reviewed with patient/family   instructed to call staff for mobility   lighting adjusted   medications reviewed   nonskid shoes/socks when out of bed   side rails raised x 2   Plan of care reviewed with pt, verbalized understanding. Medications and possible side effects reviewed and administered as ordered.  Purposeful rounding completed.  Safety precautions maintained.  Call light placed within reach.

## 2025-01-28 NOTE — SUBJECTIVE & OBJECTIVE
Principal Problem:Primary osteoarthritis of left knee    Principal Orthopedic Problem: sp L TKA 1/28/25    Interval History: Patient was seen and examined at bedside. NAEON. VSS. Pain well controlled on multimodal regimen. Tolerating PO. Voiding. Denies numbness, tingling or weakness in operative extremity. Denies lightheadedness, chest pain, shortness of breath. On board for PT/OT today      Review of patient's allergies indicates:   Allergen Reactions    Diphenhydramine hcl     Penicillins      Other reaction(s): Rash    Povidone-iodine      Other reaction(s): Itching  Other reaction(s): Itching       Current Facility-Administered Medications   Medication    0.9% NaCl infusion    0.9% NaCl infusion    acetaminophen tablet 1,000 mg    aspirin EC tablet 81 mg    bisacodyL suppository 10 mg    ceFAZolin 2 g    [START ON 1/29/2025] doxazosin tablet 8 mg    famotidine tablet 20 mg    fentaNYL 50 mcg/mL injection 100 mcg    fentaNYL 50 mcg/mL injection 25 mcg    gabapentin capsule 300 mg    LIDOcaine (PF) 10 mg/ml (1%) injection 10 mg    melatonin tablet 6 mg    methocarbamoL tablet 750 mg    metoprolol tartrate (LOPRESSOR) tablet 100 mg    midazolam injection 4 mg    morphine injection 2 mg    mupirocin 2 % ointment 1 g    naloxone 0.4 mg/mL injection 0.02 mg    NON FORMULARY MEDICATION    ondansetron injection 4 mg    oxyCODONE immediate release tablet 5 mg    oxyCODONE immediate release tablet Tab 10 mg    polyethylene glycol packet 17 g    prochlorperazine injection Soln 5 mg    rOPINIRole tablet 0.5 mg    ropivacaine 0.2% Perineural Pump infusion 500 ML    senna-docusate 8.6-50 mg per tablet 1 tablet    sodium chloride 0.9% flush 10 mL    tacrolimus capsule 1 mg    tranexamic acid (CYKLOKAPRON) 1,000 mg in 0.9% NaCl 100 mL IVPB (MB+)     Facility-Administered Medications Ordered in Other Encounters   Medication    dexAMETHasone injection    ePHEDrine sulfate    famotidine (PF) injection    ketamine in 0.9 % sod  "chloride 50 mg/5 mL (10 mg/mL) injection    LIDOcaine (cardiac) injection    propofol (DIPRIVAN) 10 mg/mL infusion    propofol (DIPRIVAN) 10 mg/mL infusion    sodium chloride 0.9% flush 10 mL    sodium chloride 0.9% infusion     Objective:     Vital Signs (Most Recent):  Temp: 97.3 °F (36.3 °C) (01/28/25 0809)  Pulse: (!) 56 (01/28/25 0950)  Resp: 19 (01/28/25 0950)  BP: (!) 153/97 (01/28/25 0950)  SpO2: 97 % (01/28/25 0950) Vital Signs (24h Range):  Temp:  [97.3 °F (36.3 °C)] 97.3 °F (36.3 °C)  Pulse:  [56-69] 56  Resp:  [10-19] 19  SpO2:  [90 %-97 %] 97 %  BP: (138-206)/() 153/97     Weight: 92.5 kg (204 lb)  Height: 5' 7" (170.2 cm)  Body mass index is 31.95 kg/m².    No intake or output data in the 24 hours ending 01/28/25 1113     Ortho/SPM Exam  General appearance: A&Ox3. NAD.   HEENT: Normocephalic, head atraumatic. Conjuntiva normal. EOMI. External appearance of nose, mouth, ears wnl.  Neck: Supple. Trachea midline.  Respiratory: Breathing unlabored on room air. Symmetric chest rise.   CV: Extremities warm and well perfused.  Psychatric: A&Ox3. Appropriate mood and affect.  Skin: Warm, dry, well perfused. No rash.    LLE  Bulky dressing and ice pack in place, bulky dressing removed  Surgical dressing inspected, it is clean/dry/intact  Appropriate post op swelling and TTP at knee, no large effusion  Motor intact with 5/5 strength hip flexion, knee extension, foot & ankle PF/DF  SILT throughout to baseline  DP pulse palpated, foot warm and well perfused    RLE  No abnormality on inspection  Non-tender to palpation throughout   Range of motion at hip, knee, ankle, toes intact  Motor intact with 5/5 strength hip flexion, knee extension, foot & ankle PF/DF  SILT throughout to baseline  DP pulse palpated, foot warm and well perfused      Significant Labs: None    Significant Imaging: I have reviewed and interpreted all pertinent imaging results/findings.  "

## 2025-01-28 NOTE — PT/OT/SLP EVAL
"Occupational Therapy   Evaluation    Name: Zander Kaur  MRN: [id number]  Admitting Diagnosis: Primary osteoarthritis of left knee  Recent Surgery: Procedure(s) (LRB):  ARTHROPLASTY, KNEE, TOTAL, YAKOV COMPUTER-ASSISTED NAVIGATION: LEFT (Left) Day of Surgery    Recommendations:     Discharge Recommendations: Low Intensity Therapy  Discharge Equipment Recommendations:  bedside commode  Barriers to discharge:  None    Assessment:     Zander Kaur is a 74 y.o. male with a medical diagnosis of Primary osteoarthritis of left knee.  He presents with the following performance deficits affecting function: weakness, impaired endurance, impaired self care skills, impaired functional mobility, gait instability, pain, impaired balance, decreased lower extremity function, decreased safety awareness. Pt was willing to participate, tolerated session well overall. He was able to perform dressing tasks eob with light assistance then ambulate to bathroom to perform toileting tasks before finishing session in chair.     Rehab Prognosis: Good; patient would benefit from acute skilled OT services to address these deficits and reach maximum level of function.       Plan:     Patient to be seen daily to address the above listed problems via self-care/home management, therapeutic activities, therapeutic exercises  Plan of Care Expires: 01/29/25  Plan of Care Reviewed with: patient, spouse    Subjective     Chief Complaint: L knee pain  Patient/Family Comments/goals: "I've had a lot surgeries."    Occupational Profile:  Living Environment: SSH, 3 ASHLYN c/ BHR to porch then threshold to front door; WIS, standard toilet; lives c/ wife  Previous level of function: Mod I c/ SPC, drives, works  Roles and Routines:    Equipment Used at Home: cane, straight, walker, rolling, shower chair  Assistance upon Discharge: wife    Pain/Comfort:  Pain Rating 1: 10/10  Location - Side 1: Left  Location - Orientation 1: generalized  Location 1: " -Handouts provided on smoking cessation   -Have carotid ultra sound done at the end of November [phone]  -Follow up here with Dr Tita Hernandez in January [phone] knee  Pain Addressed 1: Reposition, Distraction, Nurse notified  Pain Rating Post-Intervention 1:  (not rated)    Patients cultural, spiritual, Alevism conflicts given the current situation: no    Objective:     Communicated with: RN prior to session.  Patient found supine with cryotherapy, FCD, perineural catheter upon OT entry to room.    General Precautions: Standard, fall  Orthopedic Precautions: LLE weight bearing as tolerated  Braces: N/A  Respiratory Status: Room air    Occupational Performance:    Bed Mobility:    Patient completed Rolling/Turning to Left with  supervision  Patient completed Scooting/Bridging with supervision  Patient completed Supine to Sit with supervision    Functional Mobility/Transfers:  Patient completed Sit <> Stand Transfer with contact guard assistance  with  rolling walker   Patient completed Toilet Transfer Step Transfer technique with stand by assistance with  rolling walker  Chair t/f: close SBA c/ RW  Functional Mobility: from bed to bathroom to chair; CGA c/ RW; no LOB    Activities of Daily Living:  Upper Body Dressing: setup/SPV; pt donned shirt seated eob    Lower Body Dressing: contact guard assistance pt donned underwear seated eob  Toileting: setup/SPV; setup c/ urinal; pt void in seated position c/ BSC over toilet; SPV for safety      Cognitive/Visual Perceptual:  Cognitive/Psychosocial Skills:     -       Oriented to: Person, Place, Time, and Situation   -       Follows Commands/attention:Follows multistep  commands  -       Safety awareness/insight to disability: intact     Physical Exam:  Balance:    -       static sitting balance: SPV eob; dynamic standing balance: CGA  Upper Extremity Range of Motion:     -       Right Upper Extremity: WFL  -       Left Upper Extremity: WFL  Upper Extremity Strength:    -       Right Upper Extremity: WFL  -       Left Upper Extremity: WFL   Strength:    -       Right Upper Extremity: WFL  -       Left Upper Extremity:  WFL  Fine Motor Coordination:    -       Intact  Left hand, manipulation of objects and Right hand, manipulation of objects    AMPAC 6 Click ADL:  AMPAC Total Score: 21    Treatment & Education:  Pt edu on role of OT, POC, safety when performing self care tasks , benefit of performing OOB activity, and safety when performing functional transfers and mobility.  - White board updated  - Self care tasks completed-- as noted above      Patient left up in chair with all lines intact, call button in reach, RN notified, and wife present    GOALS:   Multidisciplinary Problems       Occupational Therapy Goals          Problem: Occupational Therapy    Goal Priority Disciplines Outcome Interventions   Occupational Therapy Goal     OT, PT/OT Progressing    Description: Goals to be met by: 1/29/2025     Patient will increase functional independence with ADLs by performing:    UE Dressing with Supervision.  LE Dressing with Supervision.  Grooming while standing at sink with Supervision.  Toileting from toilet with Supervision for hygiene and clothing management.   Toilet transfer to toilet with Supervision.                         DME Justifications:   Zander requires a commode for home use because he is confined to a single room.    History:     Past Medical History:   Diagnosis Date    Actinic keratosis     Allergy     seasonal    Arthritis     Basal cell carcinoma of right ala nasi 02/06/2023    Hypertension     Joint pain     Organ transplant     liver 2010    SCC (squamous cell carcinoma) 08/15/2022    Right cheek -SSW    SCC (squamous cell carcinoma) 03/27/2023    Right angle of mandible    SCC (squamous cell carcinoma) 03/27/2023    Right medial cheek    SCC (squamous cell carcinoma) 02/12/2024    anterior chin    SCC (squamous cell carcinoma) 06/10/2024    R ear helix-excised by JAM    SCC (squamous cell carcinoma) 06/10/2024    SCCIS-R post neck    SCC (squamous cell carcinoma), ear, 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;  Service: ENT;  Laterality: Right;    COLONOSCOPY N/A 12/18/2023    Procedure: COLONOSCOPY;  Surgeon: Leonardo Penny MD;  Location: [facility] ENDOSCOPY;  Service: Endoscopy;  Laterality: N/A;  Referral: RENÉE HODGE / PEG / Inst portal - LW  12/11- pre call complete.  DBM  12.15 precall complete; pt has instr.; all questions answered; AP    CYSTOSCOPY N/A 04/05/2024    Procedure: CYSTOSCOPY;  Surgeon: Galileo Ramos MD;  [facility];  Service: Urology;  Laterality: N/A;    EAR RECONSTRUCTION Right 10/8/2024    Procedure: RECONSTRUCTION, EAR;  Surgeon: Randee Brown MD;  [facility];  Service: ENT;  Laterality: Right;    FACIAL RECONSTRUCTION SURGERY      HERNIA REPAIR      knee scope      bob, multiple    LIVER TRANSPLANT      2010    PROSTATE BIOPSY N/A 04/05/2024    Procedure: BIOPSY, PROSTATE;  Surgeon: Galileo Ramos MD;  Location: [address];  Service: Urology;  Laterality: N/A;  TransPERINEAL biopsy    REVISION OF FLAP GRAFT Right 03/03/2023    Procedure: REVISION, PROCEDURE INVOLVING FLAP GRAFT;  Surgeon: Uzair Brandon MD;  [address];  Service: ENT;  Laterality: Right;    REVISION OF FLAP GRAFT Right 08/01/2023    Procedure: REVISION, PROCEDURE INVOLVING FLAP GRAFT;  Surgeon: Consuelo Augustine MD;  Location: [facility] OR;  Service: ENT;  Laterality: Right;    SHOULDER OPEN ROTATOR CUFF REPAIR      left    SKIN FULL THICKNESS GRAFT Right 10/8/2024    Procedure: APPLICATION, GRAFT, SKIN, FULL-THICKNESS;  Surgeon: Randee Brown MD;  Location: [facility] OR [address] [place] [address];  Service: ENT;   Laterality: Right;    TRANSRECTAL ULTRASOUND EXAMINATION N/A 04/05/2024    Procedure: ULTRASOUND, RECTAL APPROACH;  Surgeon: Galileo Ramos MD;  Location: [facility] OR [address] [place] [address];  Service: Urology;  Laterality: N/A;       Time Tracking:     OT Date of Treatment: 01/28/25  OT Start Time: 1508  OT Stop Time: 1545  OT Total Time (min): 37 min    Billable Minutes:Evaluation 8  Self Care/Home Management 29    1/28/2025

## 2025-01-28 NOTE — ANESTHESIA PROCEDURE NOTES
Spinal    Diagnosis: Left Knee OA  Patient location during procedure: OR  Start time: 1/28/2025 9:59 AM  Timeout: 1/28/2025 9:58 AM  End time: 1/28/2025 10:01 AM    Staffing  Authorizing Provider: Darrick Mata MD  Performing Provider: Darrick Mata MD    Staffing  Performed by: Darrick Mata MD  Authorized by: Darrick Mata MD    Preanesthetic Checklist  Completed: patient identified, IV checked, site marked, risks and benefits discussed, surgical consent, monitors and equipment checked, pre-op evaluation and timeout performed  Spinal Block  Patient position: sitting  Prep: ChloraPrep  Patient monitoring: heart rate, continuous pulse ox and frequent blood pressure checks  Approach: midline  Location: L3-4  Injection technique: single shot  CSF Fluid: clear free-flowing CSF  Needle  Needle type: pencil-tip   Needle gauge: 25 G  Needle length: 3.5 in  Additional Documentation: incremental injection, no paresthesia on injection and negative aspiration for heme  Needle localization: anatomical landmarks  Assessment  Ease of block: easy  Patient's tolerance of the procedure: comfortable throughout block  Medications:    Medications: mepivacaine (CARBOCAINE) injection 15 mg/mL (1.5%) - Other   3.2 mL - 1/28/2025 10:01:00 AM

## 2025-01-29 ENCOUNTER — CLINICAL SUPPORT (OUTPATIENT)
Dept: ORTHOPEDICS | Facility: CLINIC | Age: 75
End: 2025-01-29
Payer: MEDICARE

## 2025-01-29 VITALS
WEIGHT: 204 LBS | HEIGHT: 67 IN | SYSTOLIC BLOOD PRESSURE: 118 MMHG | BODY MASS INDEX: 32.02 KG/M2 | OXYGEN SATURATION: 93 % | DIASTOLIC BLOOD PRESSURE: 66 MMHG | RESPIRATION RATE: 18 BRPM | TEMPERATURE: 99 F | HEART RATE: 66 BPM

## 2025-01-29 DIAGNOSIS — M17.12 PRIMARY OSTEOARTHRITIS OF LEFT KNEE: Primary | ICD-10-CM

## 2025-01-29 LAB
ACID FAST MOD KINY STN SPEC: NORMAL
FINAL PATHOLOGIC DIAGNOSIS: NORMAL
GROSS: NORMAL
Lab: NORMAL
MYCOBACTERIUM SPEC QL CULT: NORMAL

## 2025-01-29 PROCEDURE — 99232 SBSQ HOSP IP/OBS MODERATE 35: CPT | Mod: ,,, | Performed by: ANESTHESIOLOGY

## 2025-01-29 PROCEDURE — 25000003 PHARM REV CODE 250: Performed by: STUDENT IN AN ORGANIZED HEALTH CARE EDUCATION/TRAINING PROGRAM

## 2025-01-29 PROCEDURE — 63600175 PHARM REV CODE 636 W HCPCS: Performed by: STUDENT IN AN ORGANIZED HEALTH CARE EDUCATION/TRAINING PROGRAM

## 2025-01-29 PROCEDURE — 97116 GAIT TRAINING THERAPY: CPT

## 2025-01-29 PROCEDURE — 97535 SELF CARE MNGMENT TRAINING: CPT

## 2025-01-29 PROCEDURE — 25000003 PHARM REV CODE 250: Performed by: ANESTHESIOLOGY

## 2025-01-29 PROCEDURE — 99900035 HC TECH TIME PER 15 MIN (STAT)

## 2025-01-29 PROCEDURE — 63600175 PHARM REV CODE 636 W HCPCS: Performed by: NURSE PRACTITIONER

## 2025-01-29 PROCEDURE — 25000003 PHARM REV CODE 250: Performed by: NURSE PRACTITIONER

## 2025-01-29 PROCEDURE — 97530 THERAPEUTIC ACTIVITIES: CPT

## 2025-01-29 PROCEDURE — 97110 THERAPEUTIC EXERCISES: CPT

## 2025-01-29 RX ORDER — CELECOXIB 200 MG/1
200 CAPSULE ORAL DAILY
Status: DISCONTINUED | OUTPATIENT
Start: 2025-01-29 | End: 2025-01-29 | Stop reason: HOSPADM

## 2025-01-29 RX ADMIN — GABAPENTIN 300 MG: 300 CAPSULE ORAL at 08:01

## 2025-01-29 RX ADMIN — ACETAMINOPHEN 1000 MG: 500 TABLET ORAL at 08:01

## 2025-01-29 RX ADMIN — Medication 400 ML: at 04:01

## 2025-01-29 RX ADMIN — MUPIROCIN 1 G: 20 OINTMENT TOPICAL at 08:01

## 2025-01-29 RX ADMIN — OXYCODONE HYDROCHLORIDE 10 MG: 10 TABLET ORAL at 02:01

## 2025-01-29 RX ADMIN — DOXAZOSIN 8 MG: 4 TABLET ORAL at 08:01

## 2025-01-29 RX ADMIN — ASPIRIN 81 MG: 81 TABLET, COATED ORAL at 08:01

## 2025-01-29 RX ADMIN — CEFAZOLIN 2 G: 2 INJECTION, POWDER, FOR SOLUTION INTRAMUSCULAR; INTRAVENOUS at 02:01

## 2025-01-29 RX ADMIN — TACROLIMUS 1 MG: 1 CAPSULE ORAL at 08:01

## 2025-01-29 RX ADMIN — ACETAMINOPHEN 1000 MG: 500 TABLET ORAL at 02:01

## 2025-01-29 RX ADMIN — POLYETHYLENE GLYCOL 3350 17 G: 17 POWDER, FOR SOLUTION ORAL at 08:01

## 2025-01-29 RX ADMIN — CELECOXIB 200 MG: 200 CAPSULE ORAL at 08:01

## 2025-01-29 RX ADMIN — OXYCODONE HYDROCHLORIDE 10 MG: 10 TABLET ORAL at 06:01

## 2025-01-29 RX ADMIN — SENNOSIDES AND DOCUSATE SODIUM 1 TABLET: 50; 8.6 TABLET ORAL at 08:01

## 2025-01-29 RX ADMIN — Medication 400 ML: at 12:01

## 2025-01-29 RX ADMIN — OXYCODONE HYDROCHLORIDE 10 MG: 10 TABLET ORAL at 10:01

## 2025-01-29 RX ADMIN — METHOCARBAMOL 750 MG: 750 TABLET ORAL at 08:01

## 2025-01-29 RX ADMIN — FAMOTIDINE 20 MG: 20 TABLET ORAL at 08:01

## 2025-01-29 RX ADMIN — METOPROLOL TARTRATE 100 MG: 25 TABLET, FILM COATED ORAL at 08:01

## 2025-01-29 NOTE — PLAN OF CARE
Problem: Pain Acute  Goal: Optimal Pain Control and Function  Outcome: Progressing  Intervention: Develop Pain Management Plan  Flowsheets (Taken 1/29/2025 0426)  Pain Management Interventions:   around-the-clock dosing utilized   care clustered   cold applied   medication offered   pain pump in use   pillow support provided   pain management plan reviewed with patient/caregiver   position adjusted   quiet environment facilitated   relaxation techniques promoted     Problem: Fall Injury Risk  Goal: Absence of Fall and Fall-Related Injury  Outcome: Progressing  Intervention: Identify and Manage Contributors  Flowsheets (Taken 1/29/2025 0426)  Medication Review/Management: medications reviewed  Intervention: Promote Injury-Free Environment  Flowsheets (Taken 1/29/2025 0426)  Safety Promotion/Fall Prevention:   assistive device/personal item within reach   Fall Risk reviewed with patient/family   Fall Risk signage in place   family expresses understanding of fall risk and prevention   family to remain at bedside   instructed to call staff for mobility   lighting adjusted   medications reviewed   muscle strengthening facilitated   nonskid shoes/socks when out of bed   patient expresses understanding of fall risk and prevention   side rails raised x 2     Problem: Comorbidity Management  Goal: Blood Pressure in Desired Range  Outcome: Progressing  Intervention: Maintain Blood Pressure Management  Flowsheets (Taken 1/29/2025 0426)  Medication Review/Management: medications reviewed    Plan of care reviewed with patient; verbalized understanding.   Medications reviewed and administered as ordered.  Rounding for safety and patient care per policy.   Safety precautions maintained. Patient working appropriately with PT/OT.  DME at bedside. Call light within reach, bed wheels locked, bed in lowest position, side rails ^x2, safety maintained. NADN, Will continue monitor.

## 2025-01-29 NOTE — PT/OT/SLP PROGRESS
"Occupational Therapy   Treatment and Discharge Note    Name: Zander Kaur  MRN: [id number]  Admitting Diagnosis:  Primary osteoarthritis of left knee  1 Day Post-Op    Recommendations:     Discharge Recommendations: Low Intensity Therapy  Discharge Equipment Recommendations:  bedside commode, shower chair, walker, rolling  Barriers to discharge:  None    Assessment:     Zander Kaur is a 74 y.o. male with a medical diagnosis of Primary osteoarthritis of left knee.  He presents with L TKA. Performance deficits affecting function are impaired self care skills, impaired functional mobility, orthopedic precautions. Pt was able to perform Sit <> Stand Transfer with modified independence with rolling walker.  Able to perform UB/LB dressing c modified independence  Educated pt on bathing, car transfers, and cryotherapy.       Rehab Prognosis:  Good; patient would benefit from acute skilled OT services to address these deficits and reach maximum level of function.       Plan:     Patient to be seen daily to address the above listed problems via self-care/home management, therapeutic activities, therapeutic exercises  Plan of Care Expires: 01/29/25  Plan of Care Reviewed with: patient, spouse    Subjective     Chief Complaint: L TKA  Patient/Family Comments/goals: "I'm ready."  Pain/Comfort:  Pain Rating 1: 0/10    Objective:     Communicated with: RN prior to session.  Patient found up in chair with cryotherapy, FCD, perineural catheter upon OT entry to room.    General Precautions: Standard, fall    Orthopedic Precautions:LLE weight bearing as tolerated  Braces: N/A  Respiratory Status: Room air     Occupational Performance:     Functional Mobility/Transfers:  Patient completed Sit <> Stand Transfer with modified independence  with  rolling walker       Activities of Daily Living:  Upper Body Dressing: modified independence to don shirt  Lower Body Dressing: modified independence to don shorts, socks, and shoes.      [facility] " 6 Click ADL: 24        Patient left up in chair with all lines intact, call button in reach, RN notified, and wife present    GOALS:   Multidisciplinary Problems       Occupational Therapy Goals          Problem: Occupational Therapy    Goal Priority Disciplines Outcome Interventions   Occupational Therapy Goal     OT, PT/OT Progressing    Description: Goals to be met by: 1/29/2025     Patient will increase functional independence with ADLs by performing:    UE Dressing with Supervision.  LE Dressing with Supervision.  Grooming while standing at sink with Supervision.  Toileting from toilet with Supervision for hygiene and clothing management.   Toilet transfer to toilet with Supervision.                         DME Justifications:  No DME recommended requiring DME justifications    Time Tracking:     OT Date of Treatment: 01/29/25  OT Start Time: 0847  OT Stop Time: 0912  OT Total Time (min): 25 min    Billable Minutes:Self Care/Home Management 13  Therapeutic Activity 12               1/29/2025

## 2025-01-29 NOTE — PLAN OF CARE
[place] [facility]  Discharge Final Note    Primary Care Provider: Shai Davison MD    Expected Discharge Date: 1/29/2025    Final Discharge Note (most recent)       Final Note - 01/29/25 1253          Final Note    Assessment Type Final Discharge Note     Anticipated Discharge Disposition Home or Self Care     What phone number can be called within the next 1-3 days to see how you are doing after discharge? --   [phone]                    Important Message from Medicare               Future Appointments   Date Time Provider Department Center   1/29/2025  4:20 PM TELEMED NURSE, [facility] ORTHO [facility] ORTHO [address]   1/30/2025 11:00 AM Pina Perez, PT [facility] [place] 2 fl   2/3/2025 11:00 AM Pina Perez, PT [facility] [place] 2 fl   2/5/2025 11:00 AM Pina Perez, PT [facility] [place] 2 fl   2/7/2025 11:00 AM Srini Ricks, PTA [facility] [place] 2 fl   2/10/2025 11:20 AM Octavio Astorga PA-C [facility] ORTHO [address]   2/10/2025  1:30 PM Pina Perez, PT [facility] [place] 2 fl   2/12/2025 11:00 AM Srini Ricks, PTA [facility] [place] 2 fl   2/14/2025 11:00 AM Pina Perez, PT [facility] [place] 2 fl   2/17/2025 11:00 AM Srini Ricks, PTA [facility] [place] 2 fl   2/19/2025 11:00 AM Srini Ricks, PTA [facility] [place] 2 fl   2/21/2025 11:00 AM Pina Perez, PT [facility] [place] 2 fl   2/24/2025 11:00 AM Srini Ricks, PTA [facility] [place] 2 fl   2/26/2025 11:00 AM Srini Ricks, PTA [facility] [place] 2 fl   2/28/2025 11:00 AM Pina Perez, PT [facility] [place] 2 fl   3/3/2025 11:00 AM Srini Ricks, PTA [facility] [place] 2 fl   3/5/2025 11:00 AM Srini Ricks, PTA [facility] [place] 2 fl   3/10/2025 11:20 AM Octavio Astorga PA-C [facility]   3/10/2025  1:30 PM Pina Perez, PT [facility] [place] 2 fl   3/12/2025 11:00 AM Pina Perez, PT [facility] [place] 2 fl    3/31/2025  1:00 PM Lima Campos MD [facility] DERM [address]   4/21/2025 11:15 AM Carson Lock MD [facility] ORTHO [address]   6/10/2025  8:00 AM LAB, APPOINTMENT [facility] LAB VNP [facility]     Patient discharged home to care of family on 1/29/25.    Dulce Albarado RNCM  Case Management  [facility]  [phone]

## 2025-01-29 NOTE — PT/OT/SLP PROGRESS
Physical Therapy Treatment and Discharge    Patient Name:  Zander Kaur   MRN:  [id number]    Recommendations:     Discharge Recommendations: Low Intensity Therapy  Discharge Equipment Recommendations: bedside commode, walker, rolling  Barriers to discharge: None    Assessment:     Zander Kaur is a 74 y.o. male admitted with a medical diagnosis of Primary osteoarthritis of left knee.  He presents with the following impairments/functional limitations: weakness, impaired endurance, impaired self care skills, impaired functional mobility, gait instability, impaired balance, decreased lower extremity function, pain, decreased ROM, edema. Patient tolerated PT session well. Patient ambulated 150ft with RW and supervision. No LOB or SOB noted. Patient ascended/descended 4 steps with bilateral handrails and contact guard assistance. Patient performed L LE therex x10 reps. Patient ready to discharge home from PT standpoint.      Rehab Prognosis: Good; patient would benefit from acute skilled PT services to address these deficits and reach maximum level of function.    Recent Surgery: Procedure(s) (LRB):  ARTHROPLASTY, KNEE, TOTAL, YAKOV COMPUTER-ASSISTED NAVIGATION: LEFT (Left) 1 Day Post-Op    Plan:     During this hospitalization, patient to be seen daily to address the identified rehab impairments via gait training, therapeutic activities, therapeutic exercises, neuromuscular re-education and progress toward the following goals:    Plan of Care Expires:  02/27/25    Subjective     Chief Complaint: Left knee pain.   Patient/Family Comments/goals: To get back to work.  Pain/Comfort:  Pain Rating 1:  (did not rate)  Location - Side 1: Left  Location 1: knee  Pain Addressed 1: Reposition, Distraction      Objective:     Communicated with RN prior to session. Patient found up in chair with cryotherapy, perineural catheter upon PT entry to room. Wife present in room.    General Precautions: Standard, fall  Orthopedic  Precautions: LLE weight bearing as tolerated  Braces: N/A  Respiratory Status: Room air     Functional Mobility:  Mat Mobility:     Supine to Sit: supervision  Sit to Supine: supervision  Transfers:      Sit to Stand:  supervision with rolling walker x1 from bedside chair and x1 from mat   Gait: Patient ambulated 150ft with Rolling Walker and supervision using 3-point gait. Patient demonstrated decreased ranjith and decreased step length during gait due to pain, decreased ROM, and decreased strength.  Stairs:  Patient ascended/descended 4 steps with bilateral handrails and contact guard assistance.     AM-PAC 6 CLICK MOBILITY  Turning over in bed (including adjusting bedclothes, sheets and blankets)?: 4  Sitting down on and standing up from a chair with arms (e.g., wheelchair, bedside commode, etc.): 4  Moving from lying on back to sitting on the side of the bed?: 4  Moving to and from a bed to a chair (including a wheelchair)?: 4  Need to walk in hospital room?: 4  Climbing 3-5 steps with a railing?: 3  Basic Mobility Total Score: 23     Treatment & Education:  Patient educated in and performed L LE exercises x10 reps for ankle pumps, quad set, glute set, SAQ over bolster, heel slides, hip abd/add, SLR, and LAQ.     Patient educated in:  -PT role and POC  -safety with transfers including hand placement  -gait sequencing and RW management  -OOB activity to maximize recovery including ambulating at home to prevent DVT   -truck transfer  -curb or stair training  -HEP for therex at home with handout provided   -elevating leg for swelling with picture provided per wife request    Patient left up in chair with all lines intact, call button in reach, RN notified, and wife present.    GOALS:   Multidisciplinary Problems       Physical Therapy Goals          Problem: Physical Therapy    Goal Priority Disciplines Outcome Interventions   Physical Therapy Goal     PT, PT/OT Progressing    Description: Goals to be met by:  "25     Patient will increase functional independence with mobility by performin. Supine to sit with Supervision  2. Sit to stand transfer with Supervision  3. Gait  x 150 feet with Stand-by Assistance using Rolling Walker.   4. Ascend/descend 4 stair with bilateral Handrails Contact Guard Assistance using No Assistive Device.   5. Ascend/descend 6" curb step with Contact Guard Assistance using rolling walker  6. Lower extremity TKA home  exercise program x 10 reps per handout, with supervision                         Time Tracking:     PT Received On: 25  PT Start Time: 924     PT Stop Time: 1003  PT Total Time (min): 39 min     Billable Minutes: Gait Training 15 and Therapeutic Exercise 24    Treatment Type: Treatment  PT/PTA: PT     Number of PTA visits since last PT visit: 0     2025  "

## 2025-01-29 NOTE — NURSING
Infublock Pump teaching done with pt and pt's spouse. Instructions given on how to remove catheter on day 5, Sunday Feb 2 at noon.  Verbalized understanding.  Pt was also made aware of toxicity side effects:  numbness or tingling around mouth, metallic taste in mouth, ringing in ears, sob, etc and instructed to clamp tubing and notify Anesthesia.  Pt made aware to call Anesthesia for any questions or complications with Nimbus Pump, phone numbers located on brochure and sticker on catheter site.  Extra gauze and tegaderm given to pt upon discharge.

## 2025-01-29 NOTE — PLAN OF CARE
Pt discharged from unit.  Pt aaox4, vss, no s/s of distress noted.  Dressing to left knee remains cdi. IV removed w/ catheter intact, no redness or swelling noted to area.  Discharge instructions given to pt and placed in d/c folder, pt verbalized understanding.  Home meds and f/u appts reviewed as well. Eqmt and meds delivered to bs prior to discharge. Blue Bracelet applied to pt's wrist and instructions given to call # on bracelet w/ any surgery related issues.   Pt left unit via w/c and was accompanied to front of hospital to meet ride. All personal belongings sent with pt.

## 2025-01-29 NOTE — PLAN OF CARE
[place] [facility])  Discharge Assessment    Primary Care Provider: Shai Davison MD     Discharge Assessment (most recent)       BRIEF DISCHARGE ASSESSMENT - 01/29/25 0800          Discharge Planning    Assessment Type Discharge Planning Brief Assessment     Resource/Environmental Concerns none     Support Systems Spouse/significant other     Equipment Currently Used at Home cane, straight;shower chair     Current Living Arrangements home     Patient/Family Anticipates Transition to home with family                         Living Environment: Patient lives with their wife  in a single story home with number of outside stair(s): 3 ASHLYN with B rails and then entry step and walk-in shower  Prior Level of Function: Prior to admission, patient ambulated community distances using straight cane  Equipment Used at Home: cane, straight  DME owned (not currently used): shower chair  Assistance Upon Discharge:  his wife      Dulce Albarado RNNINI  Case Management  [facility]  [phone]

## 2025-01-29 NOTE — PROGRESS NOTES
I called the patient today regarding his  surgery with Dr. Lock. The patient had a left TKA on 1/29/25.     Pain Scale: 5 / 10    Any issues with Fever: No.    Any issues with medications (specifically DVT prophylaxis): No.    Pain medication: no;  Celebrex : no  Protonix : no  Resume home meds : Yes    Any issues with:   Bowel movements: no  Passing emmett: no  Urination: no    Completing at home exercises: Yes    Any concerns regarding their dressing/bandage:  No.    Patient confirmed first OP-PT appointment:  Yes on  1/30/25 at 1100.     Any other concerns: No.    Blue Bracelet : yes    The patient was informed that if they have any urgent issues with their bandage, medications or any other health concerns regarding their surgery to call the 24/7 Orthopedic Post-op Hot Line at [phone]. The patient was reminded that if they have any chest pain or shortness of breath to call 911 or go to the ER.    The patient verbalized understanding and does not have any other questions

## 2025-01-29 NOTE — PLAN OF CARE
01/29/25 0925   JONES Message   Date JONES was signed 01/29/25   Time JONES was signed 0925     Jones form provided by [place] staff and signed on the above date and time.    Dulce ERNST  Case Management  [facility]  [phone]

## 2025-01-29 NOTE — ANESTHESIA POSTPROCEDURE EVALUATION
Anesthesia Post Evaluation    Patient: Zander Kaur    Procedure(s) Performed: Procedure(s) (LRB):  ARTHROPLASTY, KNEE, TOTAL, YAKOV COMPUTER-ASSISTED NAVIGATION: LEFT (Left)    Final Anesthesia Type: spinal      Patient location during evaluation: PACU  Patient participation: Yes- Able to Participate  Level of consciousness: awake and alert and oriented  Post-procedure vital signs: reviewed and stable  Pain management: adequate  Airway patency: patent  NICOLA mitigation strategies: Multimodal analgesia  PONV status at discharge: No PONV  Anesthetic complications: no      Cardiovascular status: blood pressure returned to baseline and hemodynamically stable  Respiratory status: unassisted, spontaneous ventilation and room air  Hydration status: euvolemic  Follow-up not needed.              Vitals Value Taken Time   /66 01/29/25 1015   Temp 37 °C (98.6 °F) 01/29/25 1015   Pulse 66 01/29/25 1015   Resp 18 01/29/25 1015   SpO2 93 % 01/29/25 1015         Event Time   Out of Recovery 14:47:23         Pain/Jose Score: Pain Rating Prior to Med Admin: 10 (1/29/2025 10:04 AM)  Pain Rating Post Med Admin: 7 (1/29/2025 11:04 AM)  Jose Score: 10 (1/28/2025  1:17 PM)

## 2025-01-29 NOTE — PROGRESS NOTES
Acute Pain Service and Perioperative Surgical Home Progress Note    HPI  Zander Kaur is a 74 y.o., male, with HTN, prediabetes, hx of stroke, chronic pain, s/p liver transplant, and bradycardia POD#1 s/p left TKA. No acute events overnight.     Interval history      Surgery:  Procedure(s) (LRB):  ARTHROPLASTY, KNEE, TOTAL, YAKOV COMPUTER-ASSISTED NAVIGATION: LEFT (Left)    Post Op Day #: 1    Catheter type: Perineural Adductor Canal    Infusion type: Ropivacaine 0.2%, with a 7cc automatic bolus every 3 hours, combined with a 5 cc patient controlled bolus available [id number]    Problem List:    Active Hospital Problems    Diagnosis  POA    *Primary osteoarthritis of left knee [M17.12]  Yes    Essential hypertension [I10]  Yes    S/P liver transplant [Z94.4]  Not Applicable     Liver transplant 6/15/2010        Resolved Hospital Problems   No resolved problems to display.       Subjective:       General appearance of alert, oriented, no complaints   Pain with rest: 4    Numbers   Pain with movement: 6    Numbers   Side Effects    1. Pruritis No    2. Nausea No    3. Motor Blockade No, 0=Ability to raise lower extremities off bed    4. Sedation No, 1=awake and alert    Schedule Medications:    acetaminophen  1,000 mg Oral Q6H    aspirin  81 mg Oral BID    celecoxib  200 mg Oral Daily    doxazosin  8 mg Oral Daily    electrolytes-dextrose  400 mL Oral Q4H    famotidine  20 mg Oral BID    gabapentin  300 mg Oral TID    methocarbamoL  750 mg Oral TID    metoprolol tartrate  100 mg Oral BID    mupirocin  1 g Nasal BID    polyethylene glycol  17 g Oral Daily    rOPINIRole  0.5 mg Oral QHS    senna-docusate 8.6-50 mg  1 tablet Oral BID    tacrolimus  1 mg Oral BID        Continuous Infusions:   ropivacaine 0.2% Perineural Pump infusion 500 ML   Perineural Continuous   New Bag at 01/28/25 1232        PRN Medications:    Current Facility-Administered Medications:     bisacodyL, 10 mg, Rectal, Q12H PRN    morphine, 2 mg,  "Intravenous, Q3H PRN    naloxone, 0.02 mg, Intravenous, PRN    ondansetron, 4 mg, Intravenous, Q8H PRN    oxyCODONE, 5 mg, Oral, Q3H PRN    oxyCODONE, 10 mg, Oral, Q3H PRN    prochlorperazine, 5 mg, Intravenous, Q6H PRN       Antibiotics:  Antibiotics (From admission, onward)      Start     Stop Route Frequency Ordered    01/28/25 1115  mupirocin 2 % ointment 1 g         02/02/25 0859 Nasl 2 times daily 01/28/25 1112               Objective:     Catheter site clean, dry, intact          Vital Signs (Most Recent):  Temp: 98.8 °F (37.1 °C) (01/29/25 0340)  Pulse: 72 (01/29/25 0340)  Resp: 16 (01/29/25 0340)  BP: 125/67 (01/29/25 0340)  SpO2: (!) 94 % (01/29/25 0343) Vital Signs Range (Last 24H):  Temp:  [97 °F (36.1 °C)-98.8 °F (37.1 °C)]   Pulse:  [53-72]   Resp:  [10-39]   BP: (124-206)/()   SpO2:  [90 %-98 %]          I & O (Last 24H):  Intake/Output Summary (Last 24 hours) at 1/29/2025 0556  Last data filed at 1/29/2025 0315  Gross per 24 hour   Intake 3210 ml   Output 1075 ml   Net 2135 ml       Physical Exam:    GA: Alert, comfortable, no acute distress.   Pulmonary: Clear to auscultation. Normal RR.    Cardiac: regular rate and rhythm.      Laboratory: reviewed in chart  CBC: No results for input(s): "WBC", "RBC", "HGB", "HCT", "PLT", "MCV", "MCH", "MCHC" in the last 72 hours.    BMP: No results for input(s): "NA", "K", "CO2", "CL", "BUN", "CREATININE", "GLU", "MG", "PHOS", "CALCIUM" in the last 72 hours.    No results for input(s): "PT", "INR", "PROTIME", "APTT" in the last 72 hours.          Assessment:    Zander Kaur is a 74 y.o., male, with HTN, prediabetes, hx of stroke, chronic pain, s/p liver transplant, and bradycardia POD#1 s/p left TKA. No acute events overnight.      Pain control adequate    Plan:     1) Pain: Adductor canal perineural catheter in place and infusing. Dressing in tact.  Multimodal pain regimen ordered which includes acetaminophen, celecoxib, gabapentin, and prn oxycodone.  " Will continue to monitor. Plan to discharge with Perineural Pain Pump.   2) HTN- well controlled- continue home medication at discharge   3) FEN/GI: Tolerating regular diet.     4) Dispo: Pt working well with PT/OT. Case management and SW following along for setting up home health and physical therapy. Plan to discharge home this am.          Evaluator LUCÍA Londono

## 2025-01-29 NOTE — DISCHARGE SUMMARY
[place] [facility])  Discharge Note  Short Stay    Procedure(s) (LRB):  ARTHROPLASTY, KNEE, TOTAL, YAKOV COMPUTER-ASSISTED NAVIGATION: LEFT (Left)      OUTCOME: Patient tolerated treatment/procedure well without complication and is now ready for discharge.    DISPOSITION: Home or Self Care    FINAL DIAGNOSIS:  Primary osteoarthritis of left knee    FOLLOWUP: In clinic    DISCHARGE INSTRUCTIONS:    Discharge Procedure Orders   Activity as tolerated     Sponge bath only until clinic visit     Keep surgical extremity elevated     Lifting restrictions   Order Comments: No strenuous exercise or lifting of > 10 lbs     Weight bearing as tolerated     No driving, operating heavy equipment or signing legal documents while taking pain medication     Leave dressing on - Keep it clean, dry, and intact until clinic visit   Order Comments: Do not remove surgical dressing for 2 weeks post-op. This will be done only by MD at initial post-op visit. If dressing is completely saturated, replace with identical dressing - silver-impregnated hydrocolloid dressing.     Do not get dressings wet. Do not shower.     If dressing continues to be saturated or there are signs of infection, please call Ortho Clinic [phone] for further instructions and to make appt to be seen.     Call MD for:  temperature >100.4     Call MD for:  persistent nausea and vomiting     Call MD for:  severe uncontrolled pain     Call MD for:  difficulty breathing, headache or visual disturbances     Call MD for:  redness, tenderness, or signs of infection (pain, swelling, redness, odor or green/yellow discharge around incision site)     Call MD for:  hives     Call MD for:  persistent dizziness or light-headedness     Call MD for:  extreme fatigue        TIME SPENT ON DISCHARGE: 45 minutes

## 2025-01-29 NOTE — PLAN OF CARE
Problem: Pain Acute  Goal: Optimal Pain Control and Function  Intervention: Prevent or Manage Pain  Flowsheets (Taken 1/29/2025 0843)  Sensory Stimulation Regulation:   care clustered   lighting decreased   quiet environment promoted  Medication Review/Management: medications reviewed     Problem: Pain Acute  Goal: Optimal Pain Control and Function  Intervention: Optimize Psychosocial Wellbeing  Flowsheets (Taken 1/29/2025 0843)  Supportive Measures:   active listening utilized   positive reinforcement provided     Problem: Wound  Goal: Absence of Infection Signs and Symptoms  Intervention: Prevent or Manage Infection  Flowsheets (Taken 1/29/2025 0843)  Infection Management: aseptic technique maintained     Problem: Wound  Goal: Optimal Pain Control and Function  Intervention: Prevent or Manage Pain  Flowsheets (Taken 1/29/2025 0843)  Pain Management Interventions:   care clustered   cold applied   pain management plan reviewed with patient/caregiver   pain pump in use     Problem: Infection  Goal: Absence of Infection Signs and Symptoms  Intervention: Prevent or Manage Infection  Flowsheets (Taken 1/29/2025 0843)  Infection Management: aseptic technique maintained     Problem: Knee Arthroplasty  Goal: Optimal Coping  Intervention: Support Psychosocial Response to Surgery and Mobility Changes  Flowsheets (Taken 1/29/2025 0843)  Supportive Measures:   active listening utilized   positive reinforcement provided     Problem: Knee Arthroplasty  Goal: Absence of Bleeding  Intervention: Monitor and Manage Bleeding  Flowsheets (Taken 1/29/2025 0843)  Bleeding Management: dressing monitored     Problem: Knee Arthroplasty  Goal: Optimal Pain Control and Function  Intervention: Prevent or Manage Pain  Flowsheets (Taken 1/29/2025 0843)  Pain Management Interventions:   care clustered   cold applied   pain management plan reviewed with patient/caregiver   pain pump in use     Problem: Fall Injury Risk  Goal: Absence of Fall  and Fall-Related Injury  Intervention: Promote Injury-Free Environment  Flowsheets (Taken 1/29/2025 9135)  Safety Promotion/Fall Prevention:   assistive device/personal item within reach   Fall Risk reviewed with patient/family   lighting adjusted   medications reviewed   instructed to call staff for mobility   nonskid shoes/socks when out of bed   side rails raised x 2     Plan of care reviewed with pt, verbalized understanding. Medications and possible side effects reviewed and administered as ordered.  Purposeful rounding completed.  Safety precautions maintained.  Call light placed within reach.  Preparing for discharge.

## 2025-01-29 NOTE — PROGRESS NOTES
[facility])  Orthopedics  Progress Note    Patient Name: Zander Kaur  MRN: [id number]  Admission Date: 1/28/2025  Hospital Length of Stay: 1 days  Attending Provider: Carson Lock MD  Primary Care Provider: Shai Davison MD  Follow-up For: Procedure(s) (LRB):  ARTHROPLASTY, KNEE, TOTAL, YAKOV COMPUTER-ASSISTED NAVIGATION: LEFT (Left)    Post-Operative Day: 1 Day Post-Op  Subjective:     Principal Problem:Primary osteoarthritis of left knee    Principal Orthopedic Problem: sp L TKA 1/28/25    Interval History: Patient was seen and examined at bedside. NAEON. VSS. Pain well controlled on multimodal regimen. Tolerating PO. Voiding. Denies numbness, tingling or weakness in operative extremity. Denies lightheadedness, chest pain, shortness of breath. On board for PT/OT today      Review of patient's allergies indicates:   Allergen Reactions    Diphenhydramine hcl     Penicillins      Other reaction(s): Rash    Povidone-iodine      Other reaction(s): Itching  Other reaction(s): Itching       Current Facility-Administered Medications   Medication    0.9% NaCl infusion    0.9% NaCl infusion    acetaminophen tablet 1,000 mg    aspirin EC tablet 81 mg    bisacodyL suppository 10 mg    ceFAZolin 2 g    [START ON 1/29/2025] doxazosin tablet 8 mg    famotidine tablet 20 mg    fentaNYL 50 mcg/mL injection 100 mcg    fentaNYL 50 mcg/mL injection 25 mcg    gabapentin capsule 300 mg    LIDOcaine (PF) 10 mg/ml (1%) injection 10 mg    melatonin tablet 6 mg    methocarbamoL tablet 750 mg    metoprolol tartrate (LOPRESSOR) tablet 100 mg    midazolam injection 4 mg    morphine injection 2 mg    mupirocin 2 % ointment 1 g    naloxone 0.4 mg/mL injection 0.02 mg    NON FORMULARY MEDICATION    ondansetron injection 4 mg    oxyCODONE immediate release tablet 5 mg    oxyCODONE immediate release tablet Tab 10 mg    polyethylene glycol packet 17 g    prochlorperazine injection Soln 5 mg    rOPINIRole tablet 0.5 mg  "   ropivacaine 0.2% Perineural Pump infusion 500 ML    senna-docusate 8.6-50 mg per tablet 1 tablet    sodium chloride 0.9% flush 10 mL    tacrolimus capsule 1 mg    tranexamic acid (CYKLOKAPRON) 1,000 mg in 0.9% NaCl 100 mL IVPB (MB+)     Facility-Administered Medications Ordered in Other Encounters   Medication    dexAMETHasone injection    ePHEDrine sulfate    famotidine (PF) injection    ketamine in 0.9 % sod chloride 50 mg/5 mL (10 mg/mL) injection    LIDOcaine (cardiac) injection    propofol (DIPRIVAN) 10 mg/mL infusion    propofol (DIPRIVAN) 10 mg/mL infusion    sodium chloride 0.9% flush 10 mL    sodium chloride 0.9% infusion     Objective:     Vital Signs (Most Recent):  Temp: 97.3 °F (36.3 °C) (01/28/25 0809)  Pulse: (!) 56 (01/28/25 0950)  Resp: 19 (01/28/25 0950)  BP: (!) 153/97 (01/28/25 0950)  SpO2: 97 % (01/28/25 0950) Vital Signs (24h Range):  Temp:  [97.3 °F (36.3 °C)] 97.3 °F (36.3 °C)  Pulse:  [56-69] 56  Resp:  [10-19] 19  SpO2:  [90 %-97 %] 97 %  BP: (138-206)/() 153/97     Weight: 92.5 kg (204 lb)  Height: 5' 7" (170.2 cm)  Body mass index is 31.95 kg/m².    No intake or output data in the 24 hours ending 01/28/25 1113     Ortho/SPM Exam  General appearance: A&Ox3. NAD.   HEENT: Normocephalic, head atraumatic. Conjuntiva normal. EOMI. External appearance of nose, mouth, ears wnl.  Neck: Supple. Trachea midline.  Respiratory: Breathing unlabored on room air. Symmetric chest rise.   CV: Extremities warm and well perfused.  Psychatric: A&Ox3. Appropriate mood and affect.  Skin: Warm, dry, well perfused. No rash.    LLE  Bulky dressing and ice pack in place, bulky dressing removed  Surgical dressing inspected, it is clean/dry/intact  Appropriate post op swelling and TTP at knee, no large effusion  Motor intact with 5/5 strength hip flexion, knee extension, foot & ankle PF/DF  SILT throughout to baseline  DP pulse palpated, foot warm and well perfused    RLE  No abnormality on " inspection  Non-tender to palpation throughout   Range of motion at hip, knee, ankle, toes intact  Motor intact with 5/5 strength hip flexion, knee extension, foot & ankle PF/DF  SILT throughout to baseline  DP pulse palpated, foot warm and well perfused      Significant Labs: None    Significant Imaging: I have reviewed and interpreted all pertinent imaging results/findings.  Assessment/Plan:     * Primary osteoarthritis of left knee  74 y.o. M sp L TKA with Dr. Lock    Doing well, no issues  Plan for PT/OT today and discharge home  Continue multimodal pain regimen  WBAT LLE, ROMAT  DVT ppx with SCDs at all times while inpatient, ASA 81 bid x 30d  Extended oral antibiotic prophylaxis with cefadroxil 500mg bid x 1 week  Follow up in clinic as scheduled FU 1/29, 2/10        S/P liver transplant  Continue home meds    Essential hypertension  Home meds          Eileen Arrieta MD  Orthopedics  [place] [facility] ([facility])

## 2025-01-30 ENCOUNTER — TELEPHONE (OUTPATIENT)
Dept: ORTHOPEDICS | Facility: CLINIC | Age: 75
End: 2025-01-30
Payer: MEDICARE

## 2025-01-30 DIAGNOSIS — Z96.652 S/P TOTAL KNEE REPLACEMENT, LEFT: Primary | ICD-10-CM

## 2025-01-30 NOTE — TELEPHONE ENCOUNTER
Called patient regarding his inability to go to outpatient PT today.  I was message by his physical therapist that the patient would not be able to go to his appointment due to increased urination and uncontrolled pain.    Today, patient endorses increased urination, which she has not had an issue with in the past.  Patient was instructed that the increased urination is most likely attributed to the fluids received at the surgery center.  Patient also has had increased pain for which she is only taking 1 oxycodone tablet every 6 hours.  Patient was instructed to begin taking 2 tablets of oxycodone every 4-6 hours as needed for pain.    Patient plans to begin outpatient PT tomorrow due to his current increased urination and uncontrolled pain.

## 2025-01-31 ENCOUNTER — CLINICAL SUPPORT (OUTPATIENT)
Dept: REHABILITATION | Facility: HOSPITAL | Age: 75
End: 2025-01-31
Payer: MEDICARE

## 2025-01-31 ENCOUNTER — EXTERNAL CHRONIC CARE MANAGEMENT (OUTPATIENT)
Dept: PRIMARY CARE CLINIC | Facility: CLINIC | Age: 75
End: 2025-01-31
Payer: MEDICARE

## 2025-01-31 DIAGNOSIS — Z96.652 STATUS POST LEFT KNEE REPLACEMENT: Primary | ICD-10-CM

## 2025-01-31 PROCEDURE — 99490 CHRNC CARE MGMT STAFF 1ST 20: CPT | Mod: S$PBB,,, | Performed by: INTERNAL MEDICINE

## 2025-01-31 PROCEDURE — 97112 NEUROMUSCULAR REEDUCATION: CPT

## 2025-01-31 PROCEDURE — 97161 PT EVAL LOW COMPLEX 20 MIN: CPT

## 2025-01-31 PROCEDURE — 99490 CHRNC CARE MGMT STAFF 1ST 20: CPT | Mod: PBBFAC | Performed by: INTERNAL MEDICINE

## 2025-01-31 NOTE — PROGRESS NOTES
Outpatient Rehab    Physical Therapy Evaluation    Patient Name: Zander Kaur  MRN: [id number]  YOB: 1950  Today's Date: 1/31/2025    Therapy Diagnosis:   Encounter Diagnosis   Name Primary?    Status post left knee replacement Yes     Physician: Jessie Merchant NP    Physician Orders: Eval and Treat  Medical Diagnosis: Chronic pain of left knee [M25.562, G89.29]     Visit # / Visits Authorized:  1 / 1   Date of Evaluation:  1/31/2025   Insurance Authorization Period: 1/9/2025 to 12/31/2025  Plan of Care Certification:  1/31/2025 to 3/14/2025      Time In: 1242   Time Out: 1335  Total Time: 53 minutes  Total Billable Time: 53 minutes         Subjective   History of Present Illness  Zander is a 74 y.o. male who reports to physical therapy with a chief concern of Pain in left knee. According to the patient's chart, Zander has a past medical history of Actinic keratosis, Allergy, Arthritis, Basal cell carcinoma of right ala nasi, Hypertension, Joint pain, Organ transplant, SCC (squamous cell carcinoma), SCC (squamous cell carcinoma), SCC (squamous cell carcinoma), SCC (squamous cell carcinoma), SCC (squamous cell carcinoma), SCC (squamous cell carcinoma), SCC (squamous cell carcinoma), ear, right, Squamous Cell Carcinoma, Squamous Cell Carcinoma, and Stroke. Zander has a past surgical history that includes Liver transplant; knee scope; Hernia repair; Shoulder open rotator cuff repair; Facial reconstruction surgery; Ankle surgery; Closure of defect of Mohs procedure (Right, 02/09/2023); Revision of flap graft (Right, 03/03/2023); Revision of flap graft (Right, 08/01/2023); Colonoscopy (N/A, 12/18/2023); Prostate biopsy (N/A, 04/05/2024); Transrectal ultrasound examination (N/A, 04/05/2024); Cystoscopy (N/A, 04/05/2024); Ear reconstruction (Right, 10/8/2024); Full thickness skin graft (Right, 10/8/2024); and Total knee replacement using computer navigation (Left, 1/28/2025).    The patient reports a  medical diagnosis of Chronic pain of left knee (M25.562, G89.29).            History of Present Condition/Illness: Zander has had knee pain for several years. Ambulates with a single point cane normally. He has history of lymphedema and liver transplant.     Activities of Daily Living  Social history was obtained from Family.               Previously independent with activities of daily living? No           Ambulating with single point cane - modified independence with activities of daily living. Wife provides assistance as needed.         Pain     Patient reports a current pain level of 10/10. Pain at best is reported as 5/10. Pain at worst is reported as 10/10.   Clinical Progression (since onset): Stable  Pain Qualities: Aching, Sharp, Tenderness  Pain-Relieving Factors: Ice, Medications - prescription  Pain-Aggravating Factors: Bending, Standing         Treatment History  Treatments  Previously Received Treatments: Yes  Previous Treatments: Physical therapy  Currently Receiving Treatments: No      Past Medical History/Physical Systems Review:   Zander Kaur  has a past medical history of Actinic keratosis, Allergy, Arthritis, Basal cell carcinoma of right ala nasi, Hypertension, Joint pain, Organ transplant, SCC (squamous cell carcinoma), SCC (squamous cell carcinoma), SCC (squamous cell carcinoma), SCC (squamous cell carcinoma), SCC (squamous cell carcinoma), SCC (squamous cell carcinoma), SCC (squamous cell carcinoma), ear, right, Squamous Cell Carcinoma, Squamous Cell Carcinoma, and Stroke.    Zander Kaur  has a past surgical history that includes Liver transplant; knee scope; Hernia repair; Shoulder open rotator cuff repair; Facial reconstruction surgery; Ankle surgery; Closure of defect of Mohs procedure (Right, 02/09/2023); Revision of flap graft (Right, 03/03/2023); Revision of flap graft (Right, 08/01/2023); Colonoscopy (N/A, 12/18/2023); Prostate biopsy (N/A, 04/05/2024); Transrectal ultrasound  examination (N/A, 04/05/2024); Cystoscopy (N/A, 04/05/2024); Ear reconstruction (Right, 10/8/2024); Full thickness skin graft (Right, 10/8/2024); and Total knee replacement using computer navigation (Left, 1/28/2025).    Zander has a current medication list which includes the following prescription(s): acetaminophen, aspirin, atorvastatin, cefadroxil, celecoxib, doxazosin, fluorouracil, gabapentin, methocarbamol, methocarbamol, metoprolol tartrate, multivit with minerals/lutein, oxycodone, oxycodone-acetaminophen, oxycodone-acetaminophen, pantoprazole, prograf, ropinirole, senna-docusate 8.6-50 mg, solifenacin, and vitamin d, and the following Facility-Administered Medications: sodium chloride 0.9%.    Review of patient's allergies indicates:   Allergen Reactions    Diphenhydramine hcl     Penicillins      Other reaction(s): Rash    Povidone-iodine      Other reaction(s): Itching  Other reaction(s): Itching        Objective   Knee Observations  Left Knee Observations  Present: Edema             Knee Swelling  Location of Measurement Right  (cm) Left  (cm)   20 cm Above Joint Line   53   10 cm Vastus Medialis Oblique       At Joint Line   46   15 cm Below Joint Line   42.5   Increased swelling in left foot and ankle - no AJAY hose donned initially.          Knee Range of Motion   Left Knee   Active (deg) Passive (deg) Pain   Flexion   98 Yes   Extension 1   Yes       1-98 AAROM with green strap              Knee Strength   Right Strength Right Pain Left Strength Left  Pain   Flexion (S2)           Prone Flexion           Extension (L3)             N/T secondary to post op day 3. Will test in future visits.            Ambulation Assistance Required  Surface With  Assistive Device Without Assistive Device Details   Level Supervision        Uneven Unable       Curb Unable         Stairs Assistance Required   Assistance Level Upper Extremity Support Pattern   Ascending Unable       Descending Unable            Gait  Analysis  Base of Support: Normal  Gait Pattern: Antalgic  Walking Speed: Decreased    Right Side Walking Observations  Increased: Stance Time and Step Length  Decreased: Swing Time       Left Side Walking Observations  Increased: Swing Time  Decreased: Stance Time and Step Length  Left Foot Contact Pattern: Flat foot  Gait Analysis Details  Able to ambulate with heel-toe mechanics with moderate cues.          Intake Outcome Measure for FOTO Survey    Therapist reviewed FOTO scores for Zander Kaur on 1/31/2025.   FOTO report - see Media section or FOTO account episode details.     Will obtain intake score upon next visit.     Treatment:  Balance/Neuromuscular Re-Education  Balance/Neuromuscular Re-Education Activity 1: heel prop: 3 minutes; heel prop + quad sets: 5 minutes, 5 second hold; quad set + towel: 5 minutes, 5 second hold  Balance/Neuromuscular Re-Education Activity 2: heel slides for gait mechanics: 3 minutes, 5 second hold  Balance/Neuromuscular Re-Education Activity 3: education on home exercise program, prognosis, plan of care, transfers, ambulation and gait mechanics, swelling management, total joint line  Balance/Neuromuscular Re-Education Activity 4: Ambulation with emphasis on heel strike and quad activation: 50 feet with rolling walker (moderate cues)  Ice pack applied to patient's left knee with left lower extremity elevated on wedges: 10 minutes    Patient's spiritual, cultural, and educational needs considered and patient agreeable to plan of care and goals.     Assessment & Plan   Assessment  Zander presents with a condition of Low complexity.   Presentation of Symptoms: Changing  Will Comorbidities Impact Care: Yes  Lymphedema, liver transplant, urinary incontinence.    Functional Limitations: Activity tolerance, Bed mobility, Completing self-care activities, Increased risk of fall  Impairments: Activity intolerance, Abnormal gait, Impaired physical strength    Patient Goal for Therapy  (PT): to return to activities of daily living and ambulation with decreased pain and highest level of independence.  Prognosis: Good  Assessment Details: Zander is 3 days status post left total knee arthroplasty. He presents to physical therapy with decreased left knee range of motion, decreased independence with ambulation, decreased strength, hypomobility in left patellofemoral joint, increased swelling in left knee and ankle, and bruising on left lower extremity as expected post op. He presented in wheelchair for long distance ambulation, but he was able to ambulate 50 ft with rolling walker and supervision. He tolerated today's session well and demonstrated good range of motion at this point post operatively. PT instructed patient to ambulate with rolling walker to PT clinic for next visit. Patient may be limited due to urinary incontinence.      Plan  From a physical therapy perspective, the patient would benefit from: Skilled Rehab Services    Planned therapy interventions include: Therapeutic exercise, Manual therapy, Neuromuscular re-education, Therapeutic activities, Aquatic therapy, and Gait training.    Planned modalities to include: Thermotherapy (hot pack), Cryotherapy (cold pack), and Electrical stimulation - passive/unattended.        Visit Frequency: 3 times Per Week for 6 Weeks.       This plan was discussed with Patient and Family.   Discussion participants: Agreed Upon Plan of Care  Plan details: 3x/week for 4 weeks; 2x/week for 2 weeks.          Goals:   Active       Ambulation/movement       Patient will walk 300 ft to ambulate in community and around household ambulation.  (Progressing)       Start:  01/31/25    Expected End:  02/14/25               Changing body position       Patient will demonstrate moving sit to stand 10x in 30 seconds to demonstrate improvement in functional mobility.  (Progressing)       Start:  01/31/25    Expected End:  02/14/25               Functional outcome        Patient will show a significant change in FOTO patient-reported outcome tool to demonstrate subjective improvement (Progressing)       Start:  01/31/25    Expected End:  03/14/25               Lifting & carrying objects       Patient will lift 5 lbs to perform household tasks.  (Progressing)       Start:  01/31/25    Expected End:  02/14/25               Pain       Patient will report a 2 point reduction in pain while performing heel prop to perform knee extension in gait.  (Progressing)       Start:  01/31/25    Expected End:  02/21/25               Range of Motion       Patient will achieve left knee ROM of  0-120 degrees to perform functional mobility. (Progressing)       Start:  01/31/25    Expected End:  02/21/25

## 2025-02-01 LAB — BACTERIA SPEC AEROBE CULT: NO GROWTH

## 2025-02-03 ENCOUNTER — CLINICAL SUPPORT (OUTPATIENT)
Dept: REHABILITATION | Facility: HOSPITAL | Age: 75
End: 2025-02-03
Payer: MEDICARE

## 2025-02-03 ENCOUNTER — TELEPHONE (OUTPATIENT)
Dept: ORTHOPEDICS | Facility: CLINIC | Age: 75
End: 2025-02-03
Payer: MEDICARE

## 2025-02-03 DIAGNOSIS — Z96.659 STATUS POST KNEE REPLACEMENT, UNSPECIFIED LATERALITY: ICD-10-CM

## 2025-02-03 DIAGNOSIS — Z96.652 S/P TOTAL KNEE REPLACEMENT, LEFT: Primary | ICD-10-CM

## 2025-02-03 DIAGNOSIS — Z96.652 STATUS POST LEFT KNEE REPLACEMENT: Primary | ICD-10-CM

## 2025-02-03 LAB
BACTERIA SPEC ANAEROBE CULT: NORMAL
FUNGUS SPEC CULT: NORMAL

## 2025-02-03 PROCEDURE — 97112 NEUROMUSCULAR REEDUCATION: CPT | Mod: CQ

## 2025-02-03 PROCEDURE — 97014 ELECTRIC STIMULATION THERAPY: CPT | Mod: CQ

## 2025-02-03 RX ORDER — PREGABALIN 75 MG/1
75 CAPSULE ORAL 2 TIMES DAILY
Qty: 60 CAPSULE | Refills: 1 | Status: SHIPPED | OUTPATIENT
Start: 2025-02-03 | End: 2025-08-04

## 2025-02-03 RX ORDER — OXYCODONE HYDROCHLORIDE 5 MG/1
TABLET ORAL
Qty: 50 TABLET | Refills: 0 | OUTPATIENT
Start: 2025-02-03

## 2025-02-03 RX ORDER — HYDROMORPHONE HYDROCHLORIDE 2 MG/1
TABLET ORAL
Qty: 40 TABLET | Refills: 0 | Status: SHIPPED | OUTPATIENT
Start: 2025-02-03 | End: 2025-02-07 | Stop reason: ALTCHOICE

## 2025-02-03 NOTE — PROGRESS NOTES
Pt's wife, Marbella, called regarding the patient not getting relief with the oxycodone that he's taking. He has been taking percocet for years for chronic pain. I added lyrica for him and changed his pain medication to dilaudid to see if that helps better with his pain. He has PT at 11 today, so I sent the medication to the [facility].

## 2025-02-03 NOTE — TELEPHONE ENCOUNTER
Pt's [facility] on Oxy. Also asking to go up the dose from 1 every 2 pt is currently taking 2 every 4. Pt has had Orthopedic issues for 30 years. Pain is not being controled. Pt also has PT at 11.

## 2025-02-03 NOTE — PROGRESS NOTES
Outpatient Rehab    Physical Therapy Visit    Patient Name: Zander Kaur  MRN: [id number]  YOB: 1950  Today's Date: 2/3/2025    Therapy Diagnosis:   Encounter Diagnosis   Name Primary?    Status post left knee replacement Yes     Physician: Zander Mejia MD*    Physician Orders: Eval and Treat  Medical Diagnosis: Chronic pain of left knee [M25.562, G89.29]     Visit # / Visits Authorized: 8 / 20   Date of Evaluation:  1/31/2025   Insurance Authorization Period: 1/9/2025 to 12/31/2025  Plan of Care Certification:  1/31/2025 to 3/14/2025      Time In: 1100   Time Out: 1200  Total Time: 60 minutes  Total Billable Time: 30 minutes    DOS 1/28/2025  Patient is 0 week, 6 days s/p Left TKA as of 2/3/2025.    Subjective   Patient reports he is in a lot of pain and only slept for 2 hours last night. They have a refill to  downstairs after PT. Per family member present: they changed the pain medication to something new to help. He tried to wear the stockings provided to him after surgery, but felt they were too tight. Reports ordering some ladies panty hose that he felt helped when he wore two of them. He elevates maybe 40% of the time and notes improvement in swelling with elevation..  Family / care giver present for this visit:   Pain reported as 10. L knee    Objective   Knee Range of Motion   Left Knee   Active (deg) Passive (deg) Pain   Flexion   87 (AAROM with heel slides)     Extension           Treatment:  Balance/Neuromuscular Re-Education  Balance/Neuromuscular Re-Education Activity 1: Patient education: HEP compliance, swelling management, ROM expectations post TKA, importance of extension ROM for proper gait cycle  Balance/Neuromuscular Re-Education Activity 2: Ethiopian NMES 10 sec on/30 sec off including: quad sets with towel roll x 15 minutes, SAQs with medium bolster x 10 minutes  Balance/Neuromuscular Re-Education Activity 3: Heel slides to improve tissue extensibility and ROM: 10  second hold, 10 reps    Patient's spiritual, cultural, and educational needs considered and patient agreeable to plan of care and goals.     Assessment & Plan   Assessment: Zander is 6 days s/p L TKA. He presents in wheelchair secondary to reports of feeling exhausted and in pain. There is visible swelling present in Left LE with pitting edema along lower leg. Consulted with Kristie Walter, Lymphedema therapist, who discussed use of EdemaWear on his knee and ankle/foot to help reduce swelling. Educated on importance of swelling management and elevation. Initiated use of NMES for quadriceps strengthening and motor control. Only able to complete 10 minutes of SAQs secondary to noted discomfort. Patient declined cold pack with elevation post session.  Evaluation/Treatment Tolerance: Patient limited by fatigue, Patient limited by pain    Plan: Will continue per POC towards treatment goals. PT/PTA met face to face to discuss patient's treatment plan and progress towards established goals. Patient will be seen by physical therapist every sixth visit and minimally once per month.    Goals:   Active       Ambulation/movement       Patient will walk 300 ft to ambulate in community and around household ambulation.  (Progressing)       Start:  01/31/25    Expected End:  02/14/25               Changing body position       Patient will demonstrate moving sit to stand 10x in 30 seconds to demonstrate improvement in functional mobility.  (Progressing)       Start:  01/31/25    Expected End:  02/14/25               Functional outcome       Patient will show a significant change in FOTO patient-reported outcome tool to demonstrate subjective improvement (Progressing)       Start:  01/31/25    Expected End:  03/14/25               Lifting & carrying objects       Patient will lift 5 lbs to perform household tasks.  (Progressing)       Start:  01/31/25    Expected End:  02/14/25               Pain       Patient will report a 2 point  reduction in pain while performing heel prop to perform knee extension in gait.  (Progressing)       Start:  01/31/25    Expected End:  02/21/25               Range of Motion       Patient will achieve left knee ROM of  0-120 degrees to perform functional mobility. (Progressing)       Start:  01/31/25    Expected End:  02/21/25

## 2025-02-05 ENCOUNTER — TELEPHONE (OUTPATIENT)
Dept: ORTHOPEDICS | Facility: CLINIC | Age: 75
End: 2025-02-05
Payer: MEDICARE

## 2025-02-05 ENCOUNTER — CLINICAL SUPPORT (OUTPATIENT)
Dept: REHABILITATION | Facility: HOSPITAL | Age: 75
End: 2025-02-05
Payer: MEDICARE

## 2025-02-05 DIAGNOSIS — Z96.652 STATUS POST LEFT KNEE REPLACEMENT: Primary | ICD-10-CM

## 2025-02-05 PROCEDURE — 97112 NEUROMUSCULAR REEDUCATION: CPT

## 2025-02-05 NOTE — TELEPHONE ENCOUNTER
Patient and wife contacted joint hotline reporting weakness in left hand and leg during pt session today, currently resolving. Upon investigation of medication regiment, patient was prescribed lyrics 2 days ago and reports he is also taking gabapentin (not on his current home medication list), patient advised to stop taking gabapentin and only take his lyrics prescription. Also advise to contact the [facility] if the same issue occurs during his home exercise session tomorrow. Verbalized understanding and no additional questions at this time

## 2025-02-05 NOTE — PROGRESS NOTES
Outpatient Rehab    Physical Therapy Visit    Patient Name: Zander Kaur  MRN: [id number]  YOB: 1950  Today's Date: 2/5/2025    Therapy Diagnosis: No diagnosis found.  Physician: Zander Mejia MD*    Physician Orders: Eval and Treat  Medical Diagnosis: Chronic pain of left knee [M25.562, G89.29]     Visit # / Visits Authorized: 9 / 20   Date of Evaluation: 1/31/2025   Insurance Authorization Period: 6/4/2024 to 6/4/2025  Plan of Care Certification:  1/31/2025 to 3/14/2025      Time In:     Time Out:    Total Time:   minutes  Total Billable Time: *** minutes    DOS 1/28/2025  Patient is 1 week, 1 days s/p Left TKA as of 2/5/2025.    Subjective             Objective            Treatment:  {PT Treatments:29963}    Patient's spiritual, cultural, and educational needs considered and patient agreeable to plan of care and goals.     Assessment & Plan   Assessment:             Plan:      Goals:   Active       Ambulation/movement       Patient will walk 300 ft to ambulate in community and around household ambulation.  (Progressing)       Start:  01/31/25    Expected End:  02/14/25               Changing body position       Patient will demonstrate moving sit to stand 10x in 30 seconds to demonstrate improvement in functional mobility.  (Progressing)       Start:  01/31/25    Expected End:  02/14/25               Functional outcome       Patient will show a significant change in FOTO patient-reported outcome tool to demonstrate subjective improvement (Progressing)       Start:  01/31/25    Expected End:  03/14/25               Lifting & carrying objects       Patient will lift 5 lbs to perform household tasks.  (Progressing)       Start:  01/31/25    Expected End:  02/14/25               Pain       Patient will report a 2 point reduction in pain while performing heel prop to perform knee extension in gait.  (Progressing)       Start:  01/31/25    Expected End:  02/21/25               Range of Motion        Patient will achieve left knee ROM of  0-120 degrees to perform functional mobility. (Progressing)       Start:  01/31/25    Expected End:  02/21/25                Joyce Warren, PTA

## 2025-02-06 NOTE — PROGRESS NOTES
"  Outpatient Rehab    Physical Therapy Visit    Patient Name: Zander Kaur  MRN: [id number]  YOB: 1950  Today's Date: 2/6/2025    Therapy Diagnosis:   Encounter Diagnosis   Name Primary?    Status post left knee replacement Yes     Physician: Zander Mejia MD*    Physician Orders: Eval and Treat  Medical Diagnosis:  Lymphedema, not elsewhere classified [I89.0], Unilateral primary osteoarthritis, left knee [M17.12]    Visit # / Visits Authorized:  9 / 20   Date of Evaluation:  1/31/2025  Insurance Authorization Period: 6/4/2024 to 6/4/2025  Plan of Care Certification:  1/31/2025 to 3/14/2025      Time In: 1115   Time Out: 1228  Total Time: 73 minutes  Total Billable Time: 65 minutes         Subjective   Zander is present with wife (Marbella) in wheelchair. He reports that he "does not really feel like himself." Reports increased knee pain since previous visit. His wife endorses that they have iced and elevated his leg more frequently. Patient and wife reported change in medication to dilaudid..  Family / care giver present for this visit:   Pain reported as 9/10. L knee    Objective       Knee Range of Motion   Left Knee   Active (deg) Passive (deg) Pain   Flexion   84 Yes   Extension 0   Yes                   Treatment:  Balance/Neuromuscular Re-Education  Balance/Neuromuscular Re-Education Activity 1: Patient education: HEP compliance, swelling management, ROM expectations post TKA, importance of extension ROM for proper gait cycle  Balance/Neuromuscular Re-Education Activity 2: [place]yan NMES 10 sec on/30 sec off including: quad sets with towel roll x 10 minutes, SAQs with medium bolster x 10 minutes  Balance/Neuromuscular Re-Education Activity 3: Heel slides to improve tissue extensibility and ROM: 10 second hold, 10 reps  Balance/Neuromuscular Re-Education Activity 4: Ambulation with emphasis on heel strike and quad activation: 50 feet with rolling walker (moderate cues)    Modalities  Cryotherapy " (Minutes\Location): 8 minutes on left knee with BLE elevated on bolsters    Patient's spiritual, cultural, and educational needs considered and patient agreeable to plan of care and goals.     Assessment & Plan   Assessment: Zander is 1 week, 2 days status post left total knee arthroplasty. He ambulates with rolling walker for household distances, uses wheelchair to enter PT clinic. He presents with increased lethargy and pain in left knee today. Lymphedema compression garments are donned and have improved left knee swelling. Continues to demonstrate poor tolerance to full knee extension and increased knee flexion. When patient prepared to leave clinic and trasnferred supine to sit, he reported some dizziness and left hand weakness. PT returned patient to supine and elevated bilateral lower extremities with cold pack on left knee. Patient had poor tolerance to leg elevation and ice and removed wedges to decrease leg pain. PT/PTA provided education about dilaudid and lyrica combination contributing to some of his symptoms and encouraged [facility]. PTA pushed patient in wheelchair to car to ensure safe transfer into car. Plan to reassess symptoms upon next visit.  Evaluation/Treatment Tolerance: Treatment limited secondary to agitation        Plan: Follow up with patient about pain medication management.    Goals:   Active       Ambulation/movement       Patient will walk 300 ft to ambulate in community and around household ambulation.  (Progressing)       Start:  01/31/25    Expected End:  02/14/25               Changing body position       Patient will demonstrate moving sit to stand 10x in 30 seconds to demonstrate improvement in functional mobility.  (Progressing)       Start:  01/31/25    Expected End:  02/14/25               Functional outcome       Patient will show a significant change in FOTO patient-reported outcome tool to demonstrate subjective improvement (Progressing)       Start:   01/31/25    Expected End:  03/14/25               Lifting & carrying objects       Patient will lift 5 lbs to perform household tasks.  (Progressing)       Start:  01/31/25    Expected End:  02/14/25               Pain       Patient will report a 2 point reduction in pain while performing heel prop to perform knee extension in gait.  (Progressing)       Start:  01/31/25    Expected End:  02/21/25               Range of Motion       Patient will achieve left knee ROM of  0-120 degrees to perform functional mobility. (Progressing)       Start:  01/31/25    Expected End:  02/21/25                Pina Perez, PT, DPT

## 2025-02-07 ENCOUNTER — CLINICAL SUPPORT (OUTPATIENT)
Dept: REHABILITATION | Facility: HOSPITAL | Age: 75
End: 2025-02-07
Payer: MEDICARE

## 2025-02-07 DIAGNOSIS — Z96.659 STATUS POST KNEE REPLACEMENT, UNSPECIFIED LATERALITY: Primary | ICD-10-CM

## 2025-02-07 DIAGNOSIS — Z96.652 STATUS POST LEFT KNEE REPLACEMENT: Primary | ICD-10-CM

## 2025-02-07 PROCEDURE — 97112 NEUROMUSCULAR REEDUCATION: CPT | Mod: CQ

## 2025-02-07 PROCEDURE — 97014 ELECTRIC STIMULATION THERAPY: CPT | Mod: CQ

## 2025-02-07 RX ORDER — OXYCODONE HYDROCHLORIDE 5 MG/1
TABLET ORAL
Qty: 40 TABLET | Refills: 0 | Status: SHIPPED | OUTPATIENT
Start: 2025-02-07 | End: 2025-02-10 | Stop reason: SDUPTHER

## 2025-02-07 NOTE — PROGRESS NOTES
Outpatient Rehab    Physical Therapy Visit    Patient Name: Zander Kaur  MRN: [id number]  YOB: 1950  Today's Date: 2/10/2025    Therapy Diagnosis:   Encounter Diagnosis   Name Primary?    Status post left knee replacement Yes     Physician: Zander Mejia MD*    Physician Orders: Eval and Treat  Medical Diagnosis:  Lymphedema, not elsewhere classified [I89.0], Unilateral primary osteoarthritis, left knee [M17.12]    Visit # / Visits Authorized: 10 / 20   Date of Evaluation:  1/31/2025  Insurance Authorization Period: 6/4/2024 to 6/4/2025  Plan of Care Certification:  1/31/2025 to 3/14/2025      Time In: 1100   Time Out: 1205  Total Time: 65 minutes  Total Billable Time: 65 minutes    Subjective   Patient reports that he fell after last visit because of the weakness in his Left leg and arm. Patient and wife notes that it was a controlled fall and he did not land on his knee or twist it. They have asked to be put back on the original pain medication. Wife states that they have asked to stop taking the Lyrica because of the spasm he was experiencing and are requesting to go back on Gabapentin. His swelling is improving and he continues to wear the Edema Wear given to him..  Pain reported as 9/10. Left knee    Objective   Knee Range of Motion   Left Knee   Active (deg) Passive (deg) Pain   Flexion 90 (taken in seated position (requested by patient))       Extension           Treatment:  Balance/Neuromuscular Re-Education  Balance/Neuromuscular Re-Education Activity 1: Patient education: HEP compliance, swelling management, ROM expectations post TKA, importance of extension ROM for proper gait cycle  Balance/Neuromuscular Re-Education Activity 2: Scottish NMES 10 sec on/30 sec off including: quad sets with towel roll x 15 minutes, SAQs with medium bolster x 10 minutes  Balance/Neuromuscular Re-Education Activity 5: Nustep for gait mechanics; 3 minutes    Patient's spiritual, cultural, and educational  needs considered and patient agreeable to plan of care and goals.     Assessment & Plan   Assessment: Zander is 1 week, 3 days s/p Left TKA. He presents in wheel chair secondary to continued Left upper and lower body weakness; RW and SPC present. Continued use of NMES for quadriceps strengthening and motor control with fair tolerance. He notes increased pain throughout session. Patient is tender to posterior calf, however there is visible bruising present associated with post op status. Wife contacted MD in clinic and told them about the calf discomfort, he will follow up with the patient on Monday and was told to monitor symptoms. Addition of Nustep today to assist with gait mechanics, patient visibly falling asleep on machine and consistent change in pace as cued. 90 degrees measured in Nustep chair per patient request stating he did not want to walk back to the table. Encouraged patient to walk around at home with his walker for safety.    Plan: Will continue per POC towards treatment goals. PT/PTA met face to face to discuss patient's treatment plan and progress towards established goals. Patient will be seen by physical therapist every sixth visit and minimally once per month.    Goals:   Active       Ambulation/movement       Patient will walk 300 ft to ambulate in community and around household ambulation.  (Progressing)       Start:  01/31/25    Expected End:  02/14/25               Changing body position       Patient will demonstrate moving sit to stand 10x in 30 seconds to demonstrate improvement in functional mobility.  (Progressing)       Start:  01/31/25    Expected End:  02/14/25               Functional outcome       Patient will show a significant change in FOTO patient-reported outcome tool to demonstrate subjective improvement (Progressing)       Start:  01/31/25    Expected End:  03/14/25               Lifting & carrying objects       Patient will lift 5 lbs to perform household tasks.   (Progressing)       Start:  01/31/25    Expected End:  02/14/25               Pain       Patient will report a 2 point reduction in pain while performing heel prop to perform knee extension in gait.  (Progressing)       Start:  01/31/25    Expected End:  02/21/25               Range of Motion       Patient will achieve left knee ROM of  0-120 degrees to perform functional mobility. (Progressing)       Start:  01/31/25    Expected End:  02/21/25                Joyce Warren, PTA

## 2025-02-10 ENCOUNTER — OFFICE VISIT (OUTPATIENT)
Dept: ORTHOPEDICS | Facility: CLINIC | Age: 75
End: 2025-02-10
Payer: MEDICARE

## 2025-02-10 ENCOUNTER — CLINICAL SUPPORT (OUTPATIENT)
Dept: REHABILITATION | Facility: HOSPITAL | Age: 75
End: 2025-02-10
Payer: MEDICARE

## 2025-02-10 DIAGNOSIS — Z96.652 STATUS POST LEFT KNEE REPLACEMENT: Primary | ICD-10-CM

## 2025-02-10 PROCEDURE — 99999 PR PBB SHADOW E&M-EST. PATIENT-LVL III: CPT | Mod: PBBFAC,,,

## 2025-02-10 PROCEDURE — 99024 POSTOP FOLLOW-UP VISIT: CPT | Mod: POP,,,

## 2025-02-10 PROCEDURE — 97112 NEUROMUSCULAR REEDUCATION: CPT | Mod: CQ

## 2025-02-10 PROCEDURE — 97530 THERAPEUTIC ACTIVITIES: CPT | Mod: CQ

## 2025-02-10 PROCEDURE — 99213 OFFICE O/P EST LOW 20 MIN: CPT | Mod: PBBFAC

## 2025-02-10 RX ORDER — OXYCODONE HYDROCHLORIDE 5 MG/1
TABLET ORAL
Qty: 40 TABLET | Refills: 0 | Status: SHIPPED | OUTPATIENT
Start: 2025-02-10

## 2025-02-10 RX ORDER — METHOCARBAMOL 750 MG/1
750 TABLET, FILM COATED ORAL 3 TIMES DAILY
Qty: 40 TABLET | Refills: 0 | Status: SHIPPED | OUTPATIENT
Start: 2025-02-10

## 2025-02-10 NOTE — PROGRESS NOTES
Zander Kaur presents for initial post-operative visit following a left total knee arthroplasty performed by Dr. Lock on 1/28/2025.     Exam:   There were no vitals taken for this visit.   Ambulating well with assistive device.  Incision is clean and dry without drainage or erythema.   ROM:  3-95    Initial post-operative radiographs reviewed today revealing a well fixed and aligned prosthesis.    A/P:  2 weeks s/p left total knee arthroplasty    - The patient was advised to keep the incision clean and dry for the next 24 hours after which he may wash the area with antibacterial soap in the shower. Will not submerge until the incision is completely healed.   - Outpatient PT ongoing:  [facility]  - Continue aspirin for 1 month post op  - Pain medication:  Refilled along with methocarbamol  - Reviewed antibiotic prophylaxis   - Follow up in 4 weeks with myself. Pt will call clinic with problems/concerns.

## 2025-02-10 NOTE — PROGRESS NOTES
Outpatient Rehab    Physical Therapy Visit    Patient Name: Zander Kaur  MRN: [id number]  YOB: 1950  Today's Date: 2/10/2025    Therapy Diagnosis:   Encounter Diagnosis   Name Primary?    Status post left knee replacement Yes     Physician: Zander Mejia MD*    Physician Orders: Eval and Treat  Medical Diagnosis:  Lymphedema, not elsewhere classified [I89.0], Unilateral primary osteoarthritis, left knee [M17.12]    Visit # / Visits Authorized: 11 / 20   Date of Evaluation:  1/31/2025  Insurance Authorization Period: 6/4/2024 to 6/4/2025  Plan of Care Certification:  1/31/2025 to 3/14/2025      Time In: 1336   Time Out: 1432  Total Time: 56 minutes  Total Billable Time: 54 minutes    Subjective   Patient reports seeing the MD for his 2 week follow up today. He was told he could use the cane if he felt safe. Reports continuing to have increased pain in his knee.  Family / care giver present for this visit:   Pain reported as 9/10.      Objective  Objective Measures updated at progress report unless specified.     Treatment:  Balance/Neuromuscular Re-Education  Balance/Neuromuscular Re-Education Activity 1: Patient education: HEP compliance, swelling management, ROM expectations post TKA, importance of extension ROM for proper gait cycle  Balance/Neuromuscular Re-Education Activity 2: Mexican NMES 10 sec on/30 sec off including: quad sets with towel roll x 10 minutes, SAQs with medium bolster x 10 minutes (use of strap), LAQs at EOM x 10 minutes  Balance/Neuromuscular Re-Education Activity 4: Ambulation with emphasis on heel strike and quad activation: 50 feet with single point cane (moderate cues)  Balance/Neuromuscular Re-Education Activity 5: Nustep for gait mechanics; 5 minutes, Level 2    Patient's spiritual, cultural, and educational needs considered and patient agreeable to plan of care and goals.     Assessment & Plan   Assessment: Zander is 1 week, 6 days s/p Left TKA. He presents in   with single point cane present. Addition of LAQs with NMES with good tolerance noting adequate muscle response throughout. Patient able to progress with cane ambulation in clinic and therapist Supervision. He appears more alert in session today; per wife patient is more alert after change in medication. Added step ups and stair navigation to improve functional tolerance to ADL's.    Plan: Will continue per POC towards treatment goals. PT/PTA met face to face to discuss patient's treatment plan and progress towards established goals. Patient will be seen by physical therapist every sixth visit and minimally once per month.    Goals:   Active       Ambulation/movement       Patient will walk 300 ft to ambulate in community and around household ambulation.  (Progressing)       Start:  01/31/25    Expected End:  02/14/25               Changing body position       Patient will demonstrate moving sit to stand 10x in 30 seconds to demonstrate improvement in functional mobility.  (Progressing)       Start:  01/31/25    Expected End:  02/14/25               Functional outcome       Patient will show a significant change in FOTO patient-reported outcome tool to demonstrate subjective improvement (Progressing)       Start:  01/31/25    Expected End:  03/14/25               Lifting & carrying objects       Patient will lift 5 lbs to perform household tasks.  (Progressing)       Start:  01/31/25    Expected End:  02/14/25               Pain       Patient will report a 2 point reduction in pain while performing heel prop to perform knee extension in gait.  (Progressing)       Start:  01/31/25    Expected End:  02/21/25               Range of Motion       Patient will achieve left knee ROM of  0-120 degrees to perform functional mobility. (Progressing)       Start:  01/31/25    Expected End:  02/21/25                Joyce Warren PTA

## 2025-02-12 ENCOUNTER — CLINICAL SUPPORT (OUTPATIENT)
Dept: REHABILITATION | Facility: HOSPITAL | Age: 75
End: 2025-02-12
Payer: MEDICARE

## 2025-02-12 DIAGNOSIS — Z96.652 STATUS POST LEFT KNEE REPLACEMENT: Primary | ICD-10-CM

## 2025-02-12 PROCEDURE — 97112 NEUROMUSCULAR REEDUCATION: CPT | Mod: CQ

## 2025-02-12 PROCEDURE — 97530 THERAPEUTIC ACTIVITIES: CPT | Mod: CQ

## 2025-02-12 NOTE — PROGRESS NOTES
"  Outpatient Rehab    Physical Therapy Visit    Patient Name: Zander Kaur  MRN: [id number]  YOB: 1950  Today's Date: 2/12/2025    Therapy Diagnosis:   Encounter Diagnosis   Name Primary?    Status post left knee replacement Yes     Physician: Zander Mejia MD*    Physician Orders: Eval and Treat  Medical Diagnosis:  Lymphedema, not elsewhere classified [I89.0], Unilateral primary osteoarthritis, left knee [M17.12]    Visit # / Visits Authorized: 12 / 20   Date of Evaluation:  1/31/2025  Insurance Authorization Period: 6/4/2024 to 6/4/2025  Plan of Care Certification:  1/31/2025 to 3/14/2025      Time In: 0907   Time Out: 1000  Total Time: 53 minutes  Total Billable Time: 53 minutes    Subjective   Patient reports that he is using a combination of Voltaren gel, ice, and his pain medication to help with pain relief. He states that he continues to have "ups and downs" and today his pain is up.  Family / care giver present for this visit:   Pain reported as 9/10. Left knee    Objective   Knee Range of Motion   Left Knee   Active (deg) Passive (deg) Pain   Flexion 107       Extension           Treatment:  Balance/Neuromuscular Re-Education  Balance/Neuromuscular Re-Education Activity 1: Patient education: HEP compliance, swelling management, ROM expectations post TKA, importance of extension ROM for proper gait cycle  Balance/Neuromuscular Re-Education Activity 2: Congolese NMES 10 sec on/30 sec off including: quad sets with towel roll x 10 minutes, SAQs with medium bolster x 10 minutes, LAQs at EOM x 10 minutes  Balance/Neuromuscular Re-Education Activity 4: Ambulation with emphasis on heel strike and quad activation: 50 feet with single point cane (moderate cues)  Balance/Neuromuscular Re-Education Activity 5: Nustep for gait mechanics; 5 minutes, Level 2    Therapeutic Activity  Therapeutic Activity 1: Stair navigation; 5 laps  Therapeutic Activity 2: Sit to stands from elevated surface; 3 x 10 " reps  Therapeutic Activity 3: Patient education: DOMS, load progression    Assessment & Plan   Assessment: Zander is 2 weeks, 1 day s/p Left TKA. Presents in wheelchair secondary to decreased CV endurance. Able to ambulate in clinic with single point cane. There is visible ER of Left LE with knee flexion in stance phase. Overall good tolerance to therapeutic interventions noting adequate muscle response throughout. Measured 107 degrees AROM Flexion post session.  Evaluation/Treatment Tolerance: Patient tolerated treatment well    Patient will continue to benefit from skilled outpatient physical therapy to address the deficits listed in the problem list box on initial evaluation, provide pt/family education and to maximize pt's level of independence in the home and community environment.     Patient's spiritual, cultural, and educational needs considered and patient agreeable to plan of care and goals.           Plan: Will continue per POC towards treatment goals. PT/PTA met face to face to discuss patient's treatment plan and progress towards established goals. Patient will be seen by physical therapist every sixth visit and minimally once per month.    Goals:   Active       Ambulation/movement       Patient will walk 300 ft to ambulate in community and around household ambulation.  (Progressing)       Start:  01/31/25    Expected End:  02/14/25               Changing body position       Patient will demonstrate moving sit to stand 10x in 30 seconds to demonstrate improvement in functional mobility.  (Progressing)       Start:  01/31/25    Expected End:  02/14/25               Functional outcome       Patient will show a significant change in FOTO patient-reported outcome tool to demonstrate subjective improvement (Progressing)       Start:  01/31/25    Expected End:  03/14/25               Lifting & carrying objects       Patient will lift 5 lbs to perform household tasks.  (Progressing)       Start:  01/31/25     Expected End:  02/14/25               Pain       Patient will report a 2 point reduction in pain while performing heel prop to perform knee extension in gait.  (Progressing)       Start:  01/31/25    Expected End:  02/21/25               Range of Motion       Patient will achieve left knee ROM of  0-120 degrees to perform functional mobility. (Progressing)       Start:  01/31/25    Expected End:  02/21/25                Joyce Warren, PTA

## 2025-02-14 ENCOUNTER — CLINICAL SUPPORT (OUTPATIENT)
Dept: REHABILITATION | Facility: HOSPITAL | Age: 75
End: 2025-02-14
Payer: MEDICARE

## 2025-02-14 DIAGNOSIS — Z96.652 STATUS POST LEFT KNEE REPLACEMENT: Primary | ICD-10-CM

## 2025-02-14 PROCEDURE — 97112 NEUROMUSCULAR REEDUCATION: CPT | Mod: KX

## 2025-02-14 PROCEDURE — 97014 ELECTRIC STIMULATION THERAPY: CPT | Mod: KX

## 2025-02-17 ENCOUNTER — CLINICAL SUPPORT (OUTPATIENT)
Dept: REHABILITATION | Facility: HOSPITAL | Age: 75
End: 2025-02-17
Payer: MEDICARE

## 2025-02-17 DIAGNOSIS — Z96.652 STATUS POST LEFT KNEE REPLACEMENT: ICD-10-CM

## 2025-02-17 DIAGNOSIS — Z96.652 STATUS POST LEFT KNEE REPLACEMENT: Primary | ICD-10-CM

## 2025-02-17 PROCEDURE — 97014 ELECTRIC STIMULATION THERAPY: CPT | Mod: KX,CQ

## 2025-02-17 PROCEDURE — 97112 NEUROMUSCULAR REEDUCATION: CPT | Mod: KX,CQ

## 2025-02-17 RX ORDER — OXYCODONE HYDROCHLORIDE 5 MG/1
TABLET ORAL
Qty: 40 TABLET | Refills: 0 | Status: SHIPPED | OUTPATIENT
Start: 2025-02-17 | End: 2025-02-17

## 2025-02-17 RX ORDER — OXYCODONE HYDROCHLORIDE 5 MG/1
TABLET ORAL
Qty: 40 TABLET | Refills: 0 | Status: SHIPPED | OUTPATIENT
Start: 2025-02-17

## 2025-02-17 NOTE — PROGRESS NOTES
"  Outpatient Rehab    Physical Therapy Visit    Patient Name: Zander Kaur  MRN: [id number]  YOB: 1950  Today's Date: 2/17/2025    Therapy Diagnosis:   Encounter Diagnosis   Name Primary?    Status post left knee replacement Yes     Physician: Zander Mejia MD*    Physician Orders: Eval and Treat  Medical Diagnosis:  Lymphedema, not elsewhere classified [I89.0], Unilateral primary osteoarthritis, left knee [M17.12]     Visit # / Visits Authorized: 13 / 20   Date of Evaluation:  1/31/2025  Insurance Authorization Period: 6/4/2024 to 6/4/2025  Plan of Care Certification:  1/31/2025 to 3/14/2025      Time In:     Time Out:    Total Time:   minutes  Total Billable Time: 24 minutes         Subjective   Patient reports feeling "pranay.".         Objective       Knee Range of Motion   Left Knee   Active (deg) Passive (deg) Pain   Flexion         Extension 0                   Treatment:       Balance/Neuromuscular Re-Education  Balance/Neuromuscular Re-Education Activity 2: Bel[place]ian NMES 10 sec on/30 sec off including: quad sets with towel under knee x 8 minutes, SAQs with 1/2 foam x 8 minutes, SAQs with medium bolster x 8 minutes  Balance/Neuromuscular Re-Education Activity 3: Heel slides to improve tissue extensibility and ROM: 10 second hold, 10 reps  Balance/Neuromuscular Re-Education Activity 5: Nustep for gait mechanics; 10 minutes, Level 2         Assessment & Plan   Assessment: Zander is 2 weeks, 3 days s/p L TKA. He presents in wheelchair and reports difficulty with long distance ambulation. PT provided education on walking to clinic from [facility] to improve gait. He tolerated today's session and continues to require moderate amounts of cueing to perform exercises well. His wife is present and reports he has had difficulty with endurance. Will continue to progress as tolerated.  Evaluation/Treatment Tolerance: Patient limited by fatigue    Patient will continue to benefit from skilled outpatient " physical therapy to address the deficits listed in the problem list box on initial evaluation, provide pt/family education and to maximize pt's level of independence in the home and community environment.     Patient's spiritual, cultural, and educational needs considered and patient agreeable to plan of care and goals.         Plan:  Continue to progress as tolerated toward patient's goals.    Goals:   Active       Ambulation/movement       Patient will walk 300 ft to ambulate in community and around household ambulation.  (Progressing)       Start:  01/31/25    Expected End:  02/14/25               Changing body position       Patient will demonstrate moving sit to stand 10x in 30 seconds to demonstrate improvement in functional mobility.  (Progressing)       Start:  01/31/25    Expected End:  02/14/25               Functional outcome       Patient will show a significant change in FOTO patient-reported outcome tool to demonstrate subjective improvement (Progressing)       Start:  01/31/25    Expected End:  03/14/25               Lifting & carrying objects       Patient will lift 5 lbs to perform household tasks.  (Progressing)       Start:  01/31/25    Expected End:  02/14/25               Pain       Patient will report a 2 point reduction in pain while performing heel prop to perform knee extension in gait.  (Progressing)       Start:  01/31/25    Expected End:  02/21/25               Range of Motion       Patient will achieve left knee ROM of  0-120 degrees to perform functional mobility. (Progressing)       Start:  01/31/25    Expected End:  02/21/25                Pina Perez, PT, DPT

## 2025-02-17 NOTE — PROGRESS NOTES
"    Outpatient Rehab    Physical Therapy Visit    Patient Name: Zander Kaur  MRN: [id number]  YOB: 1950  Today's Date: 2/17/2025    Therapy Diagnosis:   Encounter Diagnosis   Name Primary?    Status post left knee replacement Yes     Physician: Zander Mejia MD*    Physician Orders: Eval and Treat  Medical Diagnosis:  Lymphedema, not elsewhere classified [I89.0], Unilateral primary osteoarthritis, left knee [M17.12]    Visit # / Visits Authorized: 14 / 20   Date of Evaluation:  1/31/2025  Insurance Authorization Period: 6/4/2024 to 6/4/2025  Plan of Care Certification:  1/31/2025 to 3/14/2025      Time In: 1106   Time Out: 1201  Total Time: 55 minutes  Total Billable Time: 30 minutes    Subjective   Patient reports that his knee "hurts like hell". States that he feels by now he should be further along. He does note improvement in his pain since surgery. He reports sleeping a lot at home and finds it hard to stay awake. He is not moving around very much and reports "I get up, eat a little something, and go right back to bed." He presents to clinic in a wheelchair.    Left knee    Objective   Knee Range of Motion   Left Knee   Active (deg) Passive (deg) Pain   Flexion   113 (AAROM)     Extension           Treatment:  Balance/Neuromuscular Re-Education  Balance/Neuromuscular Re-Education Activity 1: Patient education: HEP compliance, swelling management, ROM expectations post TKA, importance of extension ROM for proper gait cycle  Balance/Neuromuscular Re-Education Activity 2: Barbadian NMES 10 sec on/30 sec off including: SAQs with 1/2 foam x 10 minutes, LAQs at EOM x 10 minutes  Balance/Neuromuscular Re-Education Activity 4: Ambulation with emphasis on heel strike and quad activation: 50 feet with single point cane (moderate cues)  Balance/Neuromuscular Re-Education Activity 5: Nustep for gait mechanics; 5 minutes, Level 2    Therapeutic Activity  Therapeutic Activity 1: Step ups onto 4-inch step; " 3 x 10 reps  Therapeutic Activity 2: Sit to stands from elevated surface; 3 x 10 reps  Therapeutic Activity 3: Patient education: DOMS, load progression    Assessment & Plan   Assessment: Zander is 2 weeks, 6 days s/p Left TKA. Presents in wheelchair secondary to decreased endurance levels. He ambulates in clinic with and without single point cane. Significant fatigue with closed chain interventions requriing rest breaks between sets. Measured 113 degrees AAROM Flexion post session. Encouraged patient to increase walking time throughout the day and daily activities to help improve CV endurance, suggested use of timers/alarms; he verbalized understanding.  Evaluation/Treatment Tolerance: Patient tolerated treatment well    Patient will continue to benefit from skilled outpatient physical therapy to address the deficits listed in the problem list box on initial evaluation, provide pt/family education and to maximize pt's level of independence in the home and community environment.     Patient's spiritual, cultural, and educational needs considered and patient agreeable to plan of care and goals.           Plan: Will continue per POC towards treatment goals. PT/PTA met face to face to discuss patient's treatment plan and progress towards established goals. Patient will be seen by physical therapist every sixth visit and minimally once per month.    Goals:   Active       Ambulation/movement       Patient will walk 300 ft to ambulate in community and around household ambulation.  (Progressing)       Start:  01/31/25    Expected End:  02/14/25               Changing body position       Patient will demonstrate moving sit to stand 10x in 30 seconds to demonstrate improvement in functional mobility.  (Progressing)       Start:  01/31/25    Expected End:  02/14/25               Functional outcome       Patient will show a significant change in FOTO patient-reported outcome tool to demonstrate subjective improvement  (Progressing)       Start:  01/31/25    Expected End:  03/14/25               Lifting & carrying objects       Patient will lift 5 lbs to perform household tasks.  (Progressing)       Start:  01/31/25    Expected End:  02/14/25               Pain       Patient will report a 2 point reduction in pain while performing heel prop to perform knee extension in gait.  (Progressing)       Start:  01/31/25    Expected End:  02/21/25               Range of Motion       Patient will achieve left knee ROM of  0-120 degrees to perform functional mobility. (Progressing)       Start:  01/31/25    Expected End:  02/21/25                Joyce Warren PTA

## 2025-02-18 ENCOUNTER — CLINICAL SUPPORT (OUTPATIENT)
Dept: REHABILITATION | Facility: HOSPITAL | Age: 75
End: 2025-02-18
Payer: MEDICARE

## 2025-02-18 DIAGNOSIS — Z96.652 STATUS POST LEFT KNEE REPLACEMENT: Primary | ICD-10-CM

## 2025-02-18 PROCEDURE — 97112 NEUROMUSCULAR REEDUCATION: CPT | Mod: KX

## 2025-02-18 PROCEDURE — 97530 THERAPEUTIC ACTIVITIES: CPT | Mod: KX

## 2025-02-18 PROCEDURE — 97014 ELECTRIC STIMULATION THERAPY: CPT | Mod: KX

## 2025-02-20 NOTE — PROGRESS NOTES
Outpatient Rehab    Physical Therapy Visit    Patient Name: Zander Kaur  MRN: [id number]  YOB: 1950  Today's Date: 2/20/2025    Therapy Diagnosis:   Encounter Diagnosis   Name Primary?    Status post left knee replacement Yes     Physician: Zander Mejia MD*    Physician Orders: Eval and Treat  Medical Diagnosis:  Lymphedema, not elsewhere classified [I89.0], Unilateral primary osteoarthritis, left knee [M17.12]     Visit # / Visits Authorized: 15 / 20   Date of Evaluation:  1/31/2025  Insurance Authorization Period: 6/4/2024 to 6/4/2025  Plan of Care Certification:  1/31/2025 to 3/14/2025      Time In:     Time Out:    Total Time:   minutes  Total Billable Time: 55 minutes       Subjective   Patient reports feeling fine today. Wife reports that he is improving..  Family / care giver present for this visit:          Objective       Knee Range of Motion   Left Knee   Active (deg) Passive (deg) Pain   Flexion   115 Yes   Extension 0                   Treatment:  Balance/Neuromuscular Re-Education  Balance/Neuromuscular Re-Education Activity 2: Malian NMES 10 sec on/30 sec off including: quad sets with towel under knee x 5 minutes, SAQs with 1/2 foam x 8 minutes, SAQs with medium bolster x 8 minutes, LAQs at EOM x 8 minutes  Balance/Neuromuscular Re-Education Activity 5: Nustep for gait mechanics; 10 minutes, Level 2    Therapeutic Activity  Therapeutic Activity 1: Step ups onto 4-inch step; 3 x 10 reps  Therapeutic Activity 2: Sit to stands from standard chair; 2 x 10 reps    Assessment & Plan   Assessment: Zander is 2 weeks, 4 days s/p L TKA. He ambulated to PT clinic with SPC and decreased gait speed. He tolerated today's session well with emphasis on functional movements in stairs and sit to stands. Able to achieve 0-115 degrees active assisted range of motion. Will continue to progress as tolerated.  Evaluation/Treatment Tolerance: Patient tolerated treatment well    Patient will continue  to benefit from skilled outpatient physical therapy to address the deficits listed in the problem list box on initial evaluation, provide pt/family education and to maximize pt's level of independence in the home and community environment.     Patient's spiritual, cultural, and educational needs considered and patient agreeable to plan of care and goals.           Plan: Will continue per POC towards treatment goals. PT/PTA met face to face to discuss patient's treatment plan and progress towards established goals. Patient will be seen by physical therapist every sixth visit and minimally once per month.    Goals:   Active       Ambulation/movement       Patient will walk 300 ft to ambulate in community and around household ambulation.  (Progressing)       Start:  01/31/25    Expected End:  02/26/25               Changing body position       Patient will demonstrate moving sit to stand 10x in 30 seconds to demonstrate improvement in functional mobility.  (Progressing)       Start:  01/31/25    Expected End:  02/26/25               Functional outcome       Patient will show a significant change in FOTO patient-reported outcome tool to demonstrate subjective improvement (Progressing)       Start:  01/31/25    Expected End:  03/14/25               Lifting & carrying objects       Patient will lift 5 lbs to perform household tasks.  (Progressing)       Start:  01/31/25    Expected End:  02/26/25               Pain       Patient will report a 2 point reduction in pain while performing heel prop to perform knee extension in gait.  (Progressing)       Start:  01/31/25    Expected End:  02/21/25               Range of Motion       Patient will achieve left knee ROM of  0-120 degrees to perform functional mobility. (Progressing)       Start:  01/31/25    Expected End:  02/21/25                Pina Perez, PT, DPT

## 2025-02-21 ENCOUNTER — DOCUMENTATION ONLY (OUTPATIENT)
Dept: REHABILITATION | Facility: HOSPITAL | Age: 75
End: 2025-02-21
Payer: MEDICARE

## 2025-02-21 NOTE — PROGRESS NOTES
"Physical Therapy: No show/Cancellation of Visit  Date: 02/21/2025    Patient cancelled today's PT appointment. 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 She reports he fell asleep in his clothes and got up to use the bathroom with a "slippery sock" on. Patient fell on his knee. Wife states the knee has been stiff and "bruised". She denies increased swelling and states that he can walk and bear weight with use of a cane. Encouraged wife to contact [employer] [facility] and to continue use of ice and elevation for swelling and pain. Patient's next scheduled appointment is Monday, 2/24/2025 at 11AM.     Patient is 3 weeks, 3 days s/p Left TKA by Dr. Lock.    Therapist: Joyce Warren PTA        "

## 2025-02-24 ENCOUNTER — CLINICAL SUPPORT (OUTPATIENT)
Dept: REHABILITATION | Facility: HOSPITAL | Age: 75
End: 2025-02-24
Payer: MEDICARE

## 2025-02-24 DIAGNOSIS — Z96.652 STATUS POST LEFT KNEE REPLACEMENT: Primary | ICD-10-CM

## 2025-02-24 PROCEDURE — 97112 NEUROMUSCULAR REEDUCATION: CPT | Mod: KX

## 2025-02-25 ENCOUNTER — CLINICAL SUPPORT (OUTPATIENT)
Dept: REHABILITATION | Facility: HOSPITAL | Age: 75
End: 2025-02-25
Payer: MEDICARE

## 2025-02-25 DIAGNOSIS — Z96.652 STATUS POST LEFT KNEE REPLACEMENT: Primary | ICD-10-CM

## 2025-02-25 PROCEDURE — 97112 NEUROMUSCULAR REEDUCATION: CPT | Mod: KX

## 2025-02-25 NOTE — PROGRESS NOTES
Outpatient Rehab    Physical Therapy Visit    Patient Name: Zander Kaur  MRN: [id number]  YOB: 1950  Encounter Date: 2/24/2025    Therapy Diagnosis:   Encounter Diagnosis   Name Primary?    Status post left knee replacement Yes     Physician: Zander Mejia MD*    Physician Orders: Eval and Treat  Medical Diagnosis:  Lymphedema, not elsewhere classified [I89.0], Unilateral primary osteoarthritis, left knee [M17.12]     Visit # / Visits Authorized: 14 / 20   Date of Evaluation:  1/31/2025  Insurance Authorization Period: 6/4/2024 to 6/4/2025  Plan of Care Certification:  1/31/2025 to 3/14/2025      Time In: 1101   Time Out: 1154  Total Time: 53 minutes  Total Billable Time: 53 minutes       Subjective   Patient presents in wheelchair with wife present. Patient had a fall about 5 days ago after getting up in the night to go to the bathroom. He had half a sock on and fell on his surgical knee. MD office has been notified. Patient reports he has been in pain since and that bruising is resolving..  Family / care giver present for this visit:   Pain reported as 8/10. Left knee    Objective       Knee Range of Motion   Left Knee   Active (deg) Passive (deg) Pain   Flexion 115       Extension 0                   Treatment:  Balance/Neuromuscular Re-Education  Balance/Neuromuscular Re-Education Activity 2: Macedonian NMES 10 sec on/30 sec off including: quad sets with towel under knee x 10 minutes, SAQs with 1/2 foam x 10 minutes, SAQs with medium bolster x 10 minutes, LAQs at EOM x 10 minutes  Balance/Neuromuscular Re-Education Activity 4: education on acitivty modification, returning to work modification, using rolling walker for ambulation at night, and serious signs of injury    Assessment & Plan   Assessment: Zander is 3 weeks, 6 days s/p L TKA. He ambulated to PT clinic with SPC and decreased gait speed. Patient and wife were concerned about integrity of left knee joint after fall last week. PT  educated patient on noting any signs of drainage or being unable to bear weight as serious signs of injury. He ambulates with antalgic gait and used wheelchair for long distances. PT also educated patient on returning to work at a reasonable pace. His wife reported that he worked an 8-hour day on Thursday, and he was exhausted after that. Patient able to achieve 0-115 AROM and good quad activation. Will progress as tolerated.       Patient will continue to benefit from skilled outpatient physical therapy to address the deficits listed in the problem list box on initial evaluation, provide pt/family education and to maximize pt's level of independence in the home and community environment.     Patient's spiritual, cultural, and educational needs considered and patient agreeable to plan of care and goals.           Plan: Will continue per POC towards treatment goals. PT/PTA met face to face to discuss patient's treatment plan and progress towards established goals. Patient will be seen by physical therapist every sixth visit and minimally once per month.    Goals:   Active       Ambulation/movement       Patient will walk 300 ft to ambulate in community and around household ambulation.  (Progressing)       Start:  01/31/25    Expected End:  02/26/25               Changing body position       Patient will demonstrate moving sit to stand 10x in 30 seconds to demonstrate improvement in functional mobility.  (Progressing)       Start:  01/31/25    Expected End:  02/26/25               Functional outcome       Patient will show a significant change in FOTO patient-reported outcome tool to demonstrate subjective improvement (Progressing)       Start:  01/31/25    Expected End:  03/14/25               Lifting & carrying objects       Patient will lift 5 lbs to perform household tasks.  (Progressing)       Start:  01/31/25    Expected End:  02/26/25               Pain       Patient will report a 2 point reduction in pain  while performing heel prop to perform knee extension in gait.  (Progressing)       Start:  01/31/25    Expected End:  02/21/25               Range of Motion       Patient will achieve left knee ROM of  0-120 degrees to perform functional mobility. (Progressing)       Start:  01/31/25    Expected End:  02/21/25                Pina Perez PT, DPT

## 2025-02-26 NOTE — PROGRESS NOTES
Outpatient Rehab    Physical Therapy Progress Note    Patient Name: Zander Kaur  MRN: [id number]  YOB: 1950  Encounter Date: 2/25/2025    Therapy Diagnosis:   Encounter Diagnosis   Name Primary?    Status post left knee replacement Yes     Physician: Zander Mejia MD*    Physician Orders: Eval and Treat  Medical Diagnosis:  Lymphedema, not elsewhere classified [I89.0], Unilateral primary osteoarthritis, left knee [M17.12]     Visit # / Visits Authorized: 14 / 20   Date of Evaluation:  1/31/2025  Insurance Authorization Period: 6/4/2024 to 6/4/2025  Plan of Care Certification:  1/31/2025 to 3/14/2025      Time In: 1713   Time Out: 1805  Total Time: 52 minutes  Total Billable Time: 52 minutes    FOTO: administer next session.         Subjective   Patient reports that he has mostly slept today due to a poor night's sleep..  Family / care giver present for this visit:   Pain reported as 8/10. Left knee    Objective       Knee Range of Motion   Left Knee   Active (deg) Passive (deg) Pain   Flexion 119       Extension 0                       Fall Risk  Functional mobility test results suggest the patient is: At Risk for Falls  Timed Up & Go (TUG)  Time: 10 seconds     An older adult who takes >=12 seconds to complete the TUG is at risk for falling.       Sit to Stand Testing      The patient completed 9 repetitions of a sit to stand transfer in 30 seconds.               Treatment:  Balance/Neuromuscular Re-Education  Balance/Neuromuscular Re-Education Activity 2: Stateless NMES 10 sec on/30 sec off including: quad sets with towel under knee x 8 minutes, SAQs with 1/2 foam x 8 minutes, SAQs with medium bolster x 8 minutes, LAQs at EOM x 8 minutes  Balance/Neuromuscular Re-Education Activity 6: Reassessment    Assessment & Plan   Assessment: Zander is 4 weeks, 0 days s/p L TKA. He presents to PT clinic in wheelchair and is ambulating with single point cane in clinic. He was able to ambulate to [employer]  Pt arrived to [facility] for sickle cell pain crisis. Pt received PO dilaudid x3 without incident. PO potassium sent to pharmacy. Pt discharged in stable condition with ride home and ambulated off unit independently.   with single point cane at end of session. Upon reassessment, patient is progressing well toward goals. He continues to demonstrate decreased left quad strength in termainl knee extension and in gait. Will progress as tolerated. Consider reimplementing functional strength movements.  Evaluation/Treatment Tolerance: Patient tolerated treatment well    Patient will continue to benefit from skilled outpatient physical therapy to address the deficits listed in the problem list box on initial evaluation, provide pt/family education and to maximize pt's level of independence in the home and community environment.     Patient's spiritual, cultural, and educational needs considered and patient agreeable to plan of care and goals.           Plan: Will continue per POC towards treatment goals. PT/PTA met face to face to discuss patient's treatment plan and progress towards established goals. Patient will be seen by physical therapist every sixth visit and minimally once per month.    Goals:   Active       Ambulation/movement       Patient will walk 300 ft to ambulate in community and around household ambulation.  (Progressing)       Start:  01/31/25    Expected End:  02/26/25               Changing body position       Patient will demonstrate moving sit to stand 10x in 30 seconds to demonstrate improvement in functional mobility.  (Progressing)       Start:  01/31/25    Expected End:  02/26/25               Functional outcome       Patient will show a significant change in FOTO patient-reported outcome tool to demonstrate subjective improvement (Progressing)       Start:  01/31/25    Expected End:  03/14/25               Lifting & carrying objects       Patient will lift 5 lbs to perform household tasks.  (Progressing)       Start:  01/31/25    Expected End:  02/26/25               Pain       Patient will report a 2 point reduction in pain while performing heel prop to perform knee extension in gait.  (Progressing)        Start:  01/31/25    Expected End:  03/07/25               Range of Motion       Patient will achieve left knee ROM of  0-120 degrees to perform functional mobility. (Progressing)       Start:  01/31/25    Expected End:  03/07/25                Pina Perez PT, DPT

## 2025-02-28 ENCOUNTER — CLINICAL SUPPORT (OUTPATIENT)
Dept: REHABILITATION | Facility: HOSPITAL | Age: 75
End: 2025-02-28
Payer: MEDICARE

## 2025-02-28 ENCOUNTER — EXTERNAL CHRONIC CARE MANAGEMENT (OUTPATIENT)
Dept: PRIMARY CARE CLINIC | Facility: CLINIC | Age: 75
End: 2025-02-28
Payer: MEDICARE

## 2025-02-28 DIAGNOSIS — Z96.652 STATUS POST LEFT KNEE REPLACEMENT: ICD-10-CM

## 2025-02-28 DIAGNOSIS — Z96.652 STATUS POST LEFT KNEE REPLACEMENT: Primary | ICD-10-CM

## 2025-02-28 PROCEDURE — 99490 CHRNC CARE MGMT STAFF 1ST 20: CPT | Mod: S$PBB,,, | Performed by: INTERNAL MEDICINE

## 2025-02-28 PROCEDURE — 97530 THERAPEUTIC ACTIVITIES: CPT | Mod: KX

## 2025-02-28 PROCEDURE — 97112 NEUROMUSCULAR REEDUCATION: CPT | Mod: KX

## 2025-02-28 PROCEDURE — 99490 CHRNC CARE MGMT STAFF 1ST 20: CPT | Mod: PBBFAC | Performed by: INTERNAL MEDICINE

## 2025-02-28 RX ORDER — OXYCODONE HYDROCHLORIDE 5 MG/1
TABLET ORAL
Qty: 50 TABLET | Refills: 0 | Status: SHIPPED | OUTPATIENT
Start: 2025-02-28

## 2025-02-28 NOTE — PROGRESS NOTES
Outpatient Rehab    Physical Therapy Visit    Patient Name: Zander Kaur  MRN: [id number]  YOB: 1950  Encounter Date: 2/28/2025    Therapy Diagnosis:   Encounter Diagnosis   Name Primary?    Status post left knee replacement Yes     Physician: Zander Mejia MD*    Physician Orders: Eval and Treat  Medical Diagnosis:  Lymphedema, not elsewhere classified [I89.0], Unilateral primary osteoarthritis, left knee [M17.12]     Visit # / Visits Authorized: 18 / 20   Date of Evaluation:  1/31/2025  Insurance Authorization Period: 6/4/2024 to 6/4/2025  Plan of Care Certification:  1/31/2025 to 3/14/2025      Time In: 1050   Time Out: 1152  Total Time: 62 minutes  Total Billable Time: 42 minutes    FOTO:  Intake Score:  %  Survey Score 1:  See media section.   Survey Score 2:  %         Subjective   Patient reports having pain in the middle of his knee cap..  Family / care giver present for this visit:   Pain reported as 8/10. Left knee    Objective            Treatment:  Balance/Neuromuscular Re-Education  Balance/Neuromuscular Re-Education Activity 1: Patient education: HEP compliance, swelling management, ROM expectations post TKA, importance of extension ROM for proper gait cycle  Balance/Neuromuscular Re-Education Activity 4: SAQs (2#), straight leg raises, LAQs with green strap: 3 x 10 reps, 5 second hold each  Balance/Neuromuscular Re-Education Activity 5: Nustep for gait mechanics; 10 minutes, Level 2    Therapeutic Activity  Therapeutic Activity 2: Sit to stands from standard chair; 3 x 10 reps  Therapeutic Activity 3: Lateral step overs (small keya): 2 x 10 reps    Assessment & Plan   Assessment: Zander is 4 weeks, 3 days s/p L TKA. He presents to PT clinic in wheelchair and is ambulating with single point cane in clinic. He demonstrates good quad activation in L knee but still has extensor lag with straight leg raise. He tolerated today's session and was most limited by fatigue. PT continued  to provide education on end range of knee extension importance for gait and functional mobility. Will progress as tolerated.  Evaluation/Treatment Tolerance: Patient limited by fatigue    Patient will continue to benefit from skilled outpatient physical therapy to address the deficits listed in the problem list box on initial evaluation, provide pt/family education and to maximize pt's level of independence in the home and community environment.     Patient's spiritual, cultural, and educational needs considered and patient agreeable to plan of care and goals.           Plan: Will continue per POC towards treatment goals. PT/PTA met face to face to discuss patient's treatment plan and progress towards established goals. Patient will be seen by physical therapist every sixth visit and minimally once per month.    Goals:   Active       Ambulation/movement       Patient will walk 300 ft to ambulate in community and around household ambulation.  (Progressing)       Start:  01/31/25    Expected End:  02/26/25               Changing body position       Patient will demonstrate moving sit to stand 10x in 30 seconds to demonstrate improvement in functional mobility.  (Progressing)       Start:  01/31/25    Expected End:  02/26/25               Functional outcome       Patient will show a significant change in FOTO patient-reported outcome tool to demonstrate subjective improvement (Progressing)       Start:  01/31/25    Expected End:  03/14/25               Lifting & carrying objects       Patient will lift 5 lbs to perform household tasks.  (Progressing)       Start:  01/31/25    Expected End:  02/26/25               Pain       Patient will report a 2 point reduction in pain while performing heel prop to perform knee extension in gait.  (Progressing)       Start:  01/31/25    Expected End:  03/07/25               Range of Motion       Patient will achieve left knee ROM of  0-120 degrees to perform functional mobility.  (Progressing)       Start:  01/31/25    Expected End:  03/07/25                Pina Perez PT, DPT

## 2025-03-03 ENCOUNTER — CLINICAL SUPPORT (OUTPATIENT)
Dept: REHABILITATION | Facility: HOSPITAL | Age: 75
End: 2025-03-03
Payer: MEDICARE

## 2025-03-03 DIAGNOSIS — Z96.652 STATUS POST LEFT KNEE REPLACEMENT: Primary | ICD-10-CM

## 2025-03-03 PROCEDURE — 97112 NEUROMUSCULAR REEDUCATION: CPT | Mod: KX

## 2025-03-03 NOTE — PROGRESS NOTES
Outpatient Rehab    Physical Therapy Visit    Patient Name: Zander Kaur  MRN: [id number]  YOB: 1950  Encounter Date: 3/3/2025    Therapy Diagnosis:   Encounter Diagnosis   Name Primary?    Status post left knee replacement Yes     Physician: Zander Mejia MD*    Physician Orders: Eval and Treat  Medical Diagnosis:  Lymphedema, not elsewhere classified [I89.0], Unilateral primary osteoarthritis, left knee [M17.12]     Visit # / Visits Authorized: 14 / 20   Date of Evaluation:  1/31/2025  Insurance Authorization Period: 6/4/2024 to 6/4/2025  Plan of Care Certification:  1/31/2025 to 3/14/2025      Time In: 1340   Time Out: 1430  Total Time: 50 minutes  Total Billable Time: 50 minutes    FOTO:  See media section.      Subjective   Patient reports feeling winded when lying down on mat and having some congestion. His wife reports that he did not have a good night's sleep..  Family / care giver present for this visit:          Objective            Treatment:  Balance/Neuromuscular Re-Education  Balance/Neuromuscular Re-Education Activity 4: SAQs (2#), straight leg raises, LAQs  (4#): 3 x 10 reps, 5 second hold each  Balance/Neuromuscular Re-Education Activity 5: Nustep for gait mechanics; 8 minutes, Level 2    Assessment & Plan   Assessment: Zander is 4 weeks, 6 days s/p L TKA. He presents to PT clinic in wheelchair and is ambulating with single point cane in clinic. Patient appears fatigued and lethargic. When performing stair navigation, patient reported feeling dizzy. Patient returned to chair and symptoms resolved. Patient also reported feeling winded when in long sitting or supine positions. PT provided instruction to contact MD if symptoms worsen. Will progress as tolerated.       Patient will continue to benefit from skilled outpatient physical therapy to address the deficits listed in the problem list box on initial evaluation, provide pt/family education and to maximize pt's level of  independence in the home and community environment.     Patient's spiritual, cultural, and educational needs considered and patient agreeable to plan of care and goals.           Plan: Will continue per POC towards treatment goals. PT/PTA met face to face to discuss patient's treatment plan and progress towards established goals. Patient will be seen by physical therapist every sixth visit and minimally once per month.    Goals:   Active       Ambulation/movement       Patient will walk 300 ft to ambulate in community and around household ambulation.  (Progressing)       Start:  01/31/25    Expected End:  02/26/25               Changing body position       Patient will demonstrate moving sit to stand 10x in 30 seconds to demonstrate improvement in functional mobility.  (Progressing)       Start:  01/31/25    Expected End:  02/26/25               Functional outcome       Patient will show a significant change in FOTO patient-reported outcome tool to demonstrate subjective improvement (Progressing)       Start:  01/31/25    Expected End:  03/14/25               Lifting & carrying objects       Patient will lift 5 lbs to perform household tasks.  (Progressing)       Start:  01/31/25    Expected End:  02/26/25               Pain       Patient will report a 2 point reduction in pain while performing heel prop to perform knee extension in gait.  (Progressing)       Start:  01/31/25    Expected End:  03/07/25               Range of Motion       Patient will achieve left knee ROM of  0-120 degrees to perform functional mobility. (Progressing)       Start:  01/31/25    Expected End:  03/07/25                Pina Perez, PT, DPT

## 2025-03-05 ENCOUNTER — CLINICAL SUPPORT (OUTPATIENT)
Dept: REHABILITATION | Facility: HOSPITAL | Age: 75
End: 2025-03-05
Payer: MEDICARE

## 2025-03-05 DIAGNOSIS — Z96.652 STATUS POST LEFT KNEE REPLACEMENT: Primary | ICD-10-CM

## 2025-03-05 PROCEDURE — 97112 NEUROMUSCULAR REEDUCATION: CPT | Mod: KX

## 2025-03-05 NOTE — PROGRESS NOTES
Outpatient Rehab    Physical Therapy Visit    Patient Name: Zander Kaur  MRN: [id number]  YOB: 1950  Encounter Date: 3/5/2025    Therapy Diagnosis:   Encounter Diagnosis   Name Primary?    Status post left knee replacement Yes     Physician: Zander Mejia MD*    Physician Orders: Eval and Treat  Medical Diagnosis:  Lymphedema, not elsewhere classified [I89.0], Unilateral primary osteoarthritis, left knee [M17.12]     Visit # / Visits Authorized: 20 / 30  Date of Evaluation:  1/31/2025  Insurance Authorization Period: 6/4/2024 to 6/4/2025  Plan of Care Certification:  1/31/2025 to 3/14/2025      Time In: 1529   Time Out: 1622  Total Time: 53 minutes  Total Billable Time: 25 minutes    FOTO: See media section.        Subjective   Patient's wife reports that after taking decongestion medicine, patient felt much better after last session. Patient continues to report some dizziness..  Family / care giver present for this visit:     Left knee    Objective            Treatment:  Balance/Neuromuscular Re-Education  Balance/Neuromuscular Re-Education Activity 4: SAQs medium bolster (3#), SAQs half foam, LAQs (3#): 3 x 10 reps, 5 second hold each  Balance/Neuromuscular Re-Education Activity 5: Nustep for gait mechanics; 10 minutes, Level 2  Balance/Neuromuscular Re-Education Activity 6: Heel raises: 3 x 10 reps, 5 second hold  Balance/Neuromuscular Re-Education Activity 7: Seated hip flexion: 3 x 10 reps, 5 second hold    Therapeutic Activity  Therapeutic Activity 2: Sit to stands from standard chair; 3 x 10 reps    Assessment & Plan   Assessment: Zander is 5 weeks, 1 days s/p L TKA. He presents to PT clinic ambulating with single point cane in clinic. When performing stair navigation, patient again reported feeling dizzy. Patient returned to chair and symptoms resolved. Session limited to seated exercises and increased rest breaks. Demonstrates good quad activation but has trouble with terminal knee  extension. Will progress as tolerated.       Patient will continue to benefit from skilled outpatient physical therapy to address the deficits listed in the problem list box on initial evaluation, provide pt/family education and to maximize pt's level of independence in the home and community environment.     Patient's spiritual, cultural, and educational needs considered and patient agreeable to plan of care and goals.           Plan: Will continue per POC towards treatment goals. PT/PTA met face to face to discuss patient's treatment plan and progress towards established goals. Patient will be seen by physical therapist every sixth visit and minimally once per month.    Goals:   Active       Ambulation/movement       Patient will walk 300 ft to ambulate in community and around household ambulation.  (Progressing)       Start:  01/31/25    Expected End:  02/26/25               Changing body position       Patient will demonstrate moving sit to stand 10x in 30 seconds to demonstrate improvement in functional mobility.  (Progressing)       Start:  01/31/25    Expected End:  02/26/25               Functional outcome       Patient will show a significant change in FOTO patient-reported outcome tool to demonstrate subjective improvement (Progressing)       Start:  01/31/25    Expected End:  03/14/25               Lifting & carrying objects       Patient will lift 5 lbs to perform household tasks.  (Progressing)       Start:  01/31/25    Expected End:  02/26/25               Pain       Patient will report a 2 point reduction in pain while performing heel prop to perform knee extension in gait.  (Progressing)       Start:  01/31/25    Expected End:  03/07/25               Range of Motion       Patient will achieve left knee ROM of  0-120 degrees to perform functional mobility. (Progressing)       Start:  01/31/25    Expected End:  03/07/25                Pina Perez, PT, DPT

## 2025-03-06 DIAGNOSIS — Z96.652 STATUS POST LEFT KNEE REPLACEMENT: Primary | ICD-10-CM

## 2025-03-10 ENCOUNTER — HOSPITAL ENCOUNTER (OUTPATIENT)
Dept: RADIOLOGY | Facility: HOSPITAL | Age: 75
Discharge: HOME | End: 2025-03-10
Payer: MEDICARE

## 2025-03-10 ENCOUNTER — OFFICE VISIT (OUTPATIENT)
Dept: ORTHOPEDICS | Facility: CLINIC | Age: 75
End: 2025-03-10
Payer: MEDICARE

## 2025-03-10 ENCOUNTER — HOSPITAL ENCOUNTER (OUTPATIENT)
Facility: HOSPITAL | Age: 75
Discharge: HOME | End: 2025-03-11
Attending: EMERGENCY MEDICINE | Admitting: EMERGENCY MEDICINE
Payer: MEDICARE

## 2025-03-10 VITALS — HEART RATE: 55 BPM | DIASTOLIC BLOOD PRESSURE: 74 MMHG | SYSTOLIC BLOOD PRESSURE: 76 MMHG

## 2025-03-10 DIAGNOSIS — R55 SYNCOPE: ICD-10-CM

## 2025-03-10 DIAGNOSIS — Z96.652 STATUS POST LEFT KNEE REPLACEMENT: ICD-10-CM

## 2025-03-10 DIAGNOSIS — R07.9 CHEST PAIN: ICD-10-CM

## 2025-03-10 DIAGNOSIS — Z13.6 SCREENING FOR CARDIOVASCULAR CONDITION: ICD-10-CM

## 2025-03-10 DIAGNOSIS — I95.9 HYPOTENSION, UNSPECIFIED HYPOTENSION TYPE: Primary | ICD-10-CM

## 2025-03-10 DIAGNOSIS — R60.0 EDEMA OF BOTH LOWER EXTREMITIES: ICD-10-CM

## 2025-03-10 DIAGNOSIS — R00.1 BRADYCARDIA: ICD-10-CM

## 2025-03-10 DIAGNOSIS — Z96.652 STATUS POST LEFT KNEE REPLACEMENT: Primary | ICD-10-CM

## 2025-03-10 DIAGNOSIS — M79.89 LEFT LEG SWELLING: ICD-10-CM

## 2025-03-10 DIAGNOSIS — Z96.659 STATUS POST KNEE REPLACEMENT, UNSPECIFIED LATERALITY: ICD-10-CM

## 2025-03-10 DIAGNOSIS — E86.1 HYPOTENSION DUE TO HYPOVOLEMIA: ICD-10-CM

## 2025-03-10 PROBLEM — D84.9 IMMUNOSUPPRESSED STATUS: Chronic | Status: ACTIVE | Noted: 2018-07-09

## 2025-03-10 PROBLEM — F11.20 UNCOMPLICATED OPIOID DEPENDENCE: Chronic | Status: ACTIVE | Noted: 2024-09-23

## 2025-03-10 PROBLEM — I89.0 LYMPHEDEMA OF BOTH LOWER EXTREMITIES: Chronic | Status: ACTIVE | Noted: 2024-05-02

## 2025-03-10 PROBLEM — D64.9 NORMOCYTIC ANEMIA: Status: ACTIVE | Noted: 2025-03-10

## 2025-03-10 LAB
ALBUMIN SERPL BCP-MCNC: 3.8 G/DL (ref 3.5–5.2)
ALP SERPL-CCNC: 86 U/L (ref 40–150)
ALT SERPL W/O P-5'-P-CCNC: 16 U/L (ref 10–44)
ANION GAP SERPL CALC-SCNC: 9 MMOL/L (ref 8–16)
AST SERPL-CCNC: 24 U/L (ref 10–40)
BASOPHILS # BLD AUTO: 0.07 K/UL (ref 0–0.2)
BASOPHILS NFR BLD: 1.1 % (ref 0–1.9)
BILIRUB SERPL-MCNC: 0.7 MG/DL (ref 0.1–1)
BILIRUB UR QL STRIP: NEGATIVE
BIPAP: 0
BIPAP: 0
BNP SERPL-MCNC: 189 PG/ML (ref 0–99)
BUN SERPL-MCNC: 15 MG/DL (ref 8–23)
CALCIUM SERPL-MCNC: 9.3 MG/DL (ref 8.7–10.5)
CHLORIDE SERPL-SCNC: 105 MMOL/L (ref 95–110)
CLARITY UR REFRACT.AUTO: CLEAR
CO2 SERPL-SCNC: 25 MMOL/L (ref 23–29)
COLOR UR AUTO: YELLOW
CORRECTED TEMPERATURE (PCO2): 48.3 MMHG
CORRECTED TEMPERATURE (PH): 7.36
CORRECTED TEMPERATURE (PO2): 27.6 MMHG
CREAT SERPL-MCNC: 1 MG/DL (ref 0.5–1.4)
DIFFERENTIAL METHOD BLD: ABNORMAL
EOSINOPHIL # BLD AUTO: 0.5 K/UL (ref 0–0.5)
EOSINOPHIL NFR BLD: 8.1 % (ref 0–8)
ERYTHROCYTE [DISTWIDTH] IN BLOOD BY AUTOMATED COUNT: 13.6 % (ref 11.5–14.5)
EST. GFR  (NO RACE VARIABLE): >60 ML/MIN/1.73 M^2
FIO2: 21 %
FIO2: 21 %
GLUCOSE SERPL-MCNC: 131 MG/DL (ref 70–110)
GLUCOSE UR QL STRIP: NEGATIVE
HCT VFR BLD AUTO: 44.4 % (ref 40–54)
HCV AB SERPL QL IA: NORMAL
HGB BLD-MCNC: 13.8 G/DL (ref 14–18)
HGB UR QL STRIP: NEGATIVE
HIV 1+2 AB+HIV1 P24 AG SERPL QL IA: NORMAL
IMM GRANULOCYTES # BLD AUTO: 0.01 K/UL (ref 0–0.04)
IMM GRANULOCYTES NFR BLD AUTO: 0.2 % (ref 0–0.5)
INFLUENZA A, MOLECULAR: NEGATIVE
INFLUENZA B, MOLECULAR: NEGATIVE
KETONES UR QL STRIP: NEGATIVE
LDH SERPL L TO P-CCNC: 1.8 MMOL/L
LEUKOCYTE ESTERASE UR QL STRIP: NEGATIVE
LYMPHOCYTES # BLD AUTO: 2.3 K/UL (ref 1–4.8)
LYMPHOCYTES NFR BLD: 37.9 % (ref 18–48)
MCH RBC QN AUTO: 30.9 PG (ref 27–31)
MCHC RBC AUTO-ENTMCNC: 31.1 G/DL (ref 32–36)
MCV RBC AUTO: 99 FL (ref 82–98)
MONOCYTES # BLD AUTO: 0.4 K/UL (ref 0.3–1)
MONOCYTES NFR BLD: 6.8 % (ref 4–15)
NEUTROPHILS # BLD AUTO: 2.8 K/UL (ref 1.8–7.7)
NEUTROPHILS NFR BLD: 45.9 % (ref 38–73)
NITRITE UR QL STRIP: NEGATIVE
NRBC BLD-RTO: 0 /100 WBC
OHS QRS DURATION: 94 MS
OHS QTC CALCULATION: 410 MS
PCO2 BLDA: 48.3 MMHG
PH SMN: 7.36 [PH]
PH UR STRIP: 6 [PH] (ref 5–8)
PLATELET # BLD AUTO: 199 K/UL (ref 150–450)
PMV BLD AUTO: 9.7 FL (ref 9.2–12.9)
PO2 BLDA: 27.6 MMHG
POC BASE DEFICIT: 1.3 MMOL/L
POC HCO3: 24.4 MMOL/L
POC PERFORMED BY: NORMAL
POC PERFORMED BY: NORMAL
POC TEMPERATURE: 37 C
POC TEMPERATURE: 37 C
POTASSIUM SERPL-SCNC: 3.8 MMOL/L (ref 3.5–5.1)
PROT SERPL-MCNC: 7.3 G/DL (ref 6–8.4)
PROT UR QL STRIP: NEGATIVE
RBC # BLD AUTO: 4.47 M/UL (ref 4.6–6.2)
SARS-COV-2 RDRP RESP QL NAA+PROBE: NEGATIVE
SODIUM SERPL-SCNC: 139 MMOL/L (ref 136–145)
SP GR UR STRIP: >1.03 (ref 1–1.03)
SPECIMEN SOURCE: NORMAL
TROPONIN I SERPL DL<=0.01 NG/ML-MCNC: 5 NG/L (ref 0–35)
URN SPEC COLLECT METH UR: ABNORMAL
WBC # BLD AUTO: 6.18 K/UL (ref 3.9–12.7)

## 2025-03-10 PROCEDURE — 25000003 PHARM REV CODE 250: Performed by: STUDENT IN AN ORGANIZED HEALTH CARE EDUCATION/TRAINING PROGRAM

## 2025-03-10 PROCEDURE — 85025 COMPLETE CBC W/AUTO DIFF WBC: CPT | Performed by: EMERGENCY MEDICINE

## 2025-03-10 PROCEDURE — 99024 POSTOP FOLLOW-UP VISIT: CPT | Mod: POP,,,

## 2025-03-10 PROCEDURE — 96365 THER/PROPH/DIAG IV INF INIT: CPT

## 2025-03-10 PROCEDURE — 87389 HIV-1 AG W/HIV-1&-2 AB AG IA: CPT | Performed by: PHYSICIAN ASSISTANT

## 2025-03-10 PROCEDURE — G0378 HOSPITAL OBSERVATION PER HR: HCPCS

## 2025-03-10 PROCEDURE — 84484 ASSAY OF TROPONIN QUANT: CPT | Performed by: EMERGENCY MEDICINE

## 2025-03-10 PROCEDURE — 99285 EMERGENCY DEPT VISIT HI MDM: CPT | Mod: 25,27

## 2025-03-10 PROCEDURE — 99213 OFFICE O/P EST LOW 20 MIN: CPT | Mod: PBBFAC

## 2025-03-10 PROCEDURE — 25000003 PHARM REV CODE 250: Performed by: EMERGENCY MEDICINE

## 2025-03-10 PROCEDURE — 96366 THER/PROPH/DIAG IV INF ADDON: CPT

## 2025-03-10 PROCEDURE — 86803 HEPATITIS C AB TEST: CPT | Performed by: PHYSICIAN ASSISTANT

## 2025-03-10 PROCEDURE — 96361 HYDRATE IV INFUSION ADD-ON: CPT

## 2025-03-10 PROCEDURE — 87040 BLOOD CULTURE FOR BACTERIA: CPT | Performed by: EMERGENCY MEDICINE

## 2025-03-10 PROCEDURE — 93005 ELECTROCARDIOGRAM TRACING: CPT

## 2025-03-10 PROCEDURE — 99999 PR PBB SHADOW E&M-EST. PATIENT-LVL III: CPT | Mod: PBBFAC,,,

## 2025-03-10 PROCEDURE — 80053 COMPREHEN METABOLIC PANEL: CPT | Performed by: EMERGENCY MEDICINE

## 2025-03-10 PROCEDURE — 80197 ASSAY OF TACROLIMUS: CPT | Performed by: STUDENT IN AN ORGANIZED HEALTH CARE EDUCATION/TRAINING PROGRAM

## 2025-03-10 PROCEDURE — 87635 SARS-COV-2 COVID-19 AMP PRB: CPT | Performed by: EMERGENCY MEDICINE

## 2025-03-10 PROCEDURE — 87502 INFLUENZA DNA AMP PROBE: CPT | Performed by: EMERGENCY MEDICINE

## 2025-03-10 PROCEDURE — 25500020 PHARM REV CODE 255: Performed by: EMERGENCY MEDICINE

## 2025-03-10 PROCEDURE — 81003 URINALYSIS AUTO W/O SCOPE: CPT | Performed by: EMERGENCY MEDICINE

## 2025-03-10 PROCEDURE — 93010 ELECTROCARDIOGRAM REPORT: CPT | Mod: ,,, | Performed by: STUDENT IN AN ORGANIZED HEALTH CARE EDUCATION/TRAINING PROGRAM

## 2025-03-10 PROCEDURE — 96367 TX/PROPH/DG ADDL SEQ IV INF: CPT

## 2025-03-10 PROCEDURE — 96375 TX/PRO/DX INJ NEW DRUG ADDON: CPT

## 2025-03-10 PROCEDURE — 83880 ASSAY OF NATRIURETIC PEPTIDE: CPT | Performed by: EMERGENCY MEDICINE

## 2025-03-10 PROCEDURE — 63600175 PHARM REV CODE 636 W HCPCS: Performed by: EMERGENCY MEDICINE

## 2025-03-10 PROCEDURE — 63600175 PHARM REV CODE 636 W HCPCS: Performed by: STUDENT IN AN ORGANIZED HEALTH CARE EDUCATION/TRAINING PROGRAM

## 2025-03-10 RX ORDER — GABAPENTIN 300 MG/1
300 CAPSULE ORAL 3 TIMES DAILY
Status: DISCONTINUED | OUTPATIENT
Start: 2025-03-10 | End: 2025-03-11 | Stop reason: HOSPADM

## 2025-03-10 RX ORDER — ACETAMINOPHEN 325 MG/1
650 TABLET ORAL EVERY 4 HOURS PRN
Status: DISCONTINUED | OUTPATIENT
Start: 2025-03-10 | End: 2025-03-11 | Stop reason: HOSPADM

## 2025-03-10 RX ORDER — ATROPINE SULFATE 0.1 MG/ML
INJECTION INTRAVENOUS
Status: DISPENSED
Start: 2025-03-10 | End: 2025-03-11

## 2025-03-10 RX ORDER — AZTREONAM 2 G/1
2000 INJECTION, POWDER, LYOPHILIZED, FOR SOLUTION INTRAMUSCULAR; INTRAVENOUS
Status: DISCONTINUED | OUTPATIENT
Start: 2025-03-10 | End: 2025-03-10

## 2025-03-10 RX ORDER — OXYMETAZOLINE HCL 0.05 %
2 SPRAY, NON-AEROSOL (ML) NASAL 2 TIMES DAILY PRN
Status: DISCONTINUED | OUTPATIENT
Start: 2025-03-10 | End: 2025-03-11 | Stop reason: HOSPADM

## 2025-03-10 RX ORDER — OXYCODONE HYDROCHLORIDE 5 MG/1
5 TABLET ORAL EVERY 6 HOURS PRN
Refills: 0 | Status: DISCONTINUED | OUTPATIENT
Start: 2025-03-10 | End: 2025-03-11 | Stop reason: HOSPADM

## 2025-03-10 RX ORDER — VANCOMYCIN 2 GRAM/500 ML IN 0.9 % SODIUM CHLORIDE INTRAVENOUS
25 ONCE
Status: COMPLETED | OUTPATIENT
Start: 2025-03-10 | End: 2025-03-10

## 2025-03-10 RX ORDER — SODIUM CHLORIDE 0.9 % (FLUSH) 0.9 %
10 SYRINGE (ML) INJECTION
Status: CANCELLED | OUTPATIENT
Start: 2025-03-10

## 2025-03-10 RX ORDER — ALUMINUM HYDROXIDE, MAGNESIUM HYDROXIDE, AND SIMETHICONE 1200; 120; 1200 MG/30ML; MG/30ML; MG/30ML
30 SUSPENSION ORAL 4 TIMES DAILY PRN
Status: DISCONTINUED | OUTPATIENT
Start: 2025-03-10 | End: 2025-03-11 | Stop reason: HOSPADM

## 2025-03-10 RX ORDER — GLUCAGON 1 MG
1 KIT INJECTION
Status: DISCONTINUED | OUTPATIENT
Start: 2025-03-10 | End: 2025-03-11 | Stop reason: HOSPADM

## 2025-03-10 RX ORDER — ONDANSETRON 8 MG/1
8 TABLET, ORALLY DISINTEGRATING ORAL EVERY 8 HOURS PRN
Status: DISCONTINUED | OUTPATIENT
Start: 2025-03-10 | End: 2025-03-11 | Stop reason: HOSPADM

## 2025-03-10 RX ORDER — NALOXONE HCL 0.4 MG/ML
0.02 VIAL (ML) INJECTION
Status: DISCONTINUED | OUTPATIENT
Start: 2025-03-10 | End: 2025-03-11 | Stop reason: HOSPADM

## 2025-03-10 RX ORDER — SIMETHICONE 80 MG
1 TABLET,CHEWABLE ORAL 4 TIMES DAILY PRN
Status: DISCONTINUED | OUTPATIENT
Start: 2025-03-10 | End: 2025-03-11 | Stop reason: HOSPADM

## 2025-03-10 RX ORDER — MORPHINE SULFATE 2 MG/ML
6 INJECTION, SOLUTION INTRAMUSCULAR; INTRAVENOUS
Status: COMPLETED | OUTPATIENT
Start: 2025-03-10 | End: 2025-03-10

## 2025-03-10 RX ORDER — TACROLIMUS 1 MG/1
1 CAPSULE ORAL 2 TIMES DAILY
Status: DISCONTINUED | OUTPATIENT
Start: 2025-03-10 | End: 2025-03-11 | Stop reason: HOSPADM

## 2025-03-10 RX ORDER — IBUPROFEN 200 MG
16 TABLET ORAL
Status: DISCONTINUED | OUTPATIENT
Start: 2025-03-10 | End: 2025-03-11 | Stop reason: HOSPADM

## 2025-03-10 RX ORDER — TALC
6 POWDER (GRAM) TOPICAL NIGHTLY PRN
Status: DISCONTINUED | OUTPATIENT
Start: 2025-03-10 | End: 2025-03-11 | Stop reason: HOSPADM

## 2025-03-10 RX ORDER — IBUPROFEN 200 MG
24 TABLET ORAL
Status: DISCONTINUED | OUTPATIENT
Start: 2025-03-10 | End: 2025-03-11 | Stop reason: HOSPADM

## 2025-03-10 RX ORDER — ATORVASTATIN CALCIUM 40 MG/1
40 TABLET, FILM COATED ORAL DAILY
Status: DISCONTINUED | OUTPATIENT
Start: 2025-03-11 | End: 2025-03-11 | Stop reason: HOSPADM

## 2025-03-10 RX ORDER — ASPIRIN 81 MG/1
81 TABLET ORAL DAILY
Status: DISCONTINUED | OUTPATIENT
Start: 2025-03-11 | End: 2025-03-11 | Stop reason: HOSPADM

## 2025-03-10 RX ORDER — POLYETHYLENE GLYCOL 3350 17 G/17G
17 POWDER, FOR SOLUTION ORAL DAILY PRN
Status: DISCONTINUED | OUTPATIENT
Start: 2025-03-10 | End: 2025-03-11 | Stop reason: HOSPADM

## 2025-03-10 RX ORDER — ONDANSETRON HYDROCHLORIDE 2 MG/ML
4 INJECTION, SOLUTION INTRAVENOUS
Status: COMPLETED | OUTPATIENT
Start: 2025-03-10 | End: 2025-03-10

## 2025-03-10 RX ORDER — METRONIDAZOLE 500 MG/100ML
500 INJECTION, SOLUTION INTRAVENOUS
Status: DISCONTINUED | OUTPATIENT
Start: 2025-03-10 | End: 2025-03-10

## 2025-03-10 RX ORDER — SODIUM CHLORIDE 0.9 % (FLUSH) 0.9 %
1-10 SYRINGE (ML) INJECTION EVERY 12 HOURS PRN
Status: DISCONTINUED | OUTPATIENT
Start: 2025-03-10 | End: 2025-03-11 | Stop reason: HOSPADM

## 2025-03-10 RX ORDER — ENOXAPARIN SODIUM 100 MG/ML
40 INJECTION SUBCUTANEOUS EVERY 24 HOURS
Status: DISCONTINUED | OUTPATIENT
Start: 2025-03-11 | End: 2025-03-11 | Stop reason: HOSPADM

## 2025-03-10 RX ORDER — TALC
6 POWDER (GRAM) TOPICAL NIGHTLY PRN
Status: CANCELLED | OUTPATIENT
Start: 2025-03-10

## 2025-03-10 RX ORDER — CELECOXIB 200 MG/1
200 CAPSULE ORAL DAILY
Status: DISCONTINUED | OUTPATIENT
Start: 2025-03-11 | End: 2025-03-11 | Stop reason: HOSPADM

## 2025-03-10 RX ORDER — CHOLECALCIFEROL (VITAMIN D3) 25 MCG
1000 TABLET ORAL DAILY
Status: DISCONTINUED | OUTPATIENT
Start: 2025-03-11 | End: 2025-03-11 | Stop reason: HOSPADM

## 2025-03-10 RX ADMIN — METRONIDAZOLE 500 MG: 500 INJECTION, SOLUTION INTRAVENOUS at 02:03

## 2025-03-10 RX ADMIN — VANCOMYCIN HYDROCHLORIDE 2000 MG: 5 INJECTION, POWDER, LYOPHILIZED, FOR SOLUTION INTRAVENOUS at 04:03

## 2025-03-10 RX ADMIN — AZTREONAM 2000 MG: 2 INJECTION, POWDER, LYOPHILIZED, FOR SOLUTION INTRAMUSCULAR; INTRAVENOUS at 02:03

## 2025-03-10 RX ADMIN — OXYCODONE 5 MG: 5 TABLET ORAL at 08:03

## 2025-03-10 RX ADMIN — ONDANSETRON 4 MG: 2 INJECTION INTRAMUSCULAR; INTRAVENOUS at 12:03

## 2025-03-10 RX ADMIN — SODIUM CHLORIDE, POTASSIUM CHLORIDE, SODIUM LACTATE AND CALCIUM CHLORIDE 500 ML: 600; 310; 30; 20 INJECTION, SOLUTION INTRAVENOUS at 12:03

## 2025-03-10 RX ADMIN — MORPHINE SULFATE 6 MG: 2 INJECTION, SOLUTION INTRAMUSCULAR; INTRAVENOUS at 04:03

## 2025-03-10 RX ADMIN — TACROLIMUS 1 MG: 1 CAPSULE ORAL at 07:03

## 2025-03-10 RX ADMIN — GABAPENTIN 300 MG: 300 CAPSULE ORAL at 08:03

## 2025-03-10 RX ADMIN — IOHEXOL 75 ML: 350 INJECTION, SOLUTION INTRAVENOUS at 03:03

## 2025-03-10 NOTE — FIRST PROVIDER EVALUATION
Medical screening examination initiated.  I have conducted a focused provider triage encounter, findings are as follows:    Brief history of present illness:  75 yo s/p L TKA 6 weeks ago. SOB and hypotension in ortho clinic    There were no vitals filed for this visit.    Pertinent physical exam:  pale, bradycardia to 59 bpm. SpO2 94%    Brief workup plan:  sepsis order set. Defer CT PE decision to ED team    Preliminary workup initiated; this workup will be continued and followed by the physician or advanced practice provider that is assigned to the patient when roomed.

## 2025-03-10 NOTE — ASSESSMENT & PLAN NOTE
Due to history of liver transplant, and on Prograf. Although at risk for infection, his initial hypotension is more likely secondary to volume depletion as resolved with only 500mL IVF. No other signs of sepsis/infection on workup. Will monitor blood cultures.

## 2025-03-10 NOTE — PROVIDER PROGRESS NOTES - EMERGENCY DEPT.
Encounter Date: 3/10/2025    ED Physician Progress Notes        Physician Note:   I received this patient in sign out from the previous team.  I have discussed the patient's history and presentation in the ED with Dr. Jimenez  The patient is a 74 y.o. male who presented to the ED with Post-op Problem (Knee replacement 6 weeks ago, sent from clinic hypotension, cp and sob)    Significant history and PE findings: 74M with recent knee replacement 6wk ago, patient presented from clinic with hypotension, bradycardia, paleness / hypoxia in triage. Now on room air. Patient reports feeling lightheadedness at that time. L knee without erythema/swelling or pain.  Patient without obvious source of infection, no indwelling lines or catheters.  Significant diagnostic results: labs wnl  Pending studies and/or consultations: CT PE pending, dopplers  Disposition:  Will continue to monitor, update patient on results of testing and determine appropriate additional treatment for the duration of stay in ED.  Pertinent lab and imaging findings are below.  Sp Miller, DO 2:16 PM 3/10/2025      2:35 PM  Seems like most likely symptomatic bradycardia based on history/physical exam and medication review, no evidence of metabolism interaction with medications that is concerning. Renal function normal. No fever.  Prior physician covered with broad spectrum antibiotics given unclear etiology of hypotension initially and recent surgery.    Recommend admission pending CTPE study, may need downgrade on his 100mg bid metoprolol.    5:16 PM  CTA without acute findings of pulmonary embolism, blood pressure remained stable, heart rate remains in 40s, patient asymptomatic at rest  No additional IV fluids needed or necessary  Admit to hospital, telemetry  Doppler of left lower extremity pending

## 2025-03-10 NOTE — ASSESSMENT & PLAN NOTE
Presents with pre-syncopal symptoms, along with hypotension and bradycardia. Bradycardia appears chronic per prior EKGs. Hypotension has resolved with only 500mL IVF, raising suspicion for hypovolemic hypotension, likely augmented by chronic bradycardia.   -obtain orthostatic VS, and will give further IVF if positive. Encourage PO hydration.  -hold antihypertensives and metoprolol for now  -No other signs of focal infection or sepsis, and no lactic acidosis; initially given Vanc + Aztreonam + Flagyl due to immunosuppressed status, however will hold further antibiotics for now and monitor blood cultures/clincally.    -CTA without signs of PE or R heart strain, so low suspicion for VTE

## 2025-03-10 NOTE — PHARMACY MED REC
"          Admission Medication History     The home medication history was taken by Jennifer Hutchinson.    You may go to "Admission" then "Reconcile Home Medications" tabs to review and/or act upon these items.     The home medication list has been updated by the Pharmacy department.   Please read ALL comments highlighted in yellow.   Please address this information as you see fit.    Feel free to contact us if you have any questions or require assistance.      The medications listed below were removed from the home medication list. Please reorder if appropriate:  Patient reports no longer taking the following medication(s):  DURICEF   EFUDEX   LYRICA   SENNA    Medications listed below were obtained from: Patient/family and Analytic software- [employer]  Current Outpatient Medications on File Prior to Encounter   Medication Sig    acetaminophen (TYLENOL) 650 MG TbSR Take 1 tablet (650 mg total) by mouth every 8 (eight) hours.      aspirin (ECOTRIN) 81 MG EC tablet Take 1 tablet (81 mg total) by mouth 2 (two) times a day.      atorvastatin (LIPITOR) 40 MG tablet Take 1 tablet (40 mg total) by mouth once daily.      celecoxib (CELEBREX) 200 MG capsule Take 1 capsule (200 mg total) by mouth once daily.      doxazosin (CARDURA) 8 MG Tab Take 1 tablet by mouth once daily.       gabapentin (NEURONTIN) 300 MG capsule Take 300 mg by mouth 3 (three) times daily.      methocarbamoL (ROBAXIN) 750 MG Tab Take 1 tablet (750 mg total) by mouth 3 (three) times daily.       metoprolol tartrate (LOPRESSOR) 100 MG tablet Take 1 tablet by mouth twice daily.      multivit with minerals/lutein (MULTIVITAMIN 50 PLUS ORAL) Take 1 tablet by mouth once daily.      oxyCODONE (ROXICODONE) 5 MG immediate release tablet Take 1-2 tablets every 4-6 hours as needed for pain      pantoprazole (PROTONIX) 40 MG tablet Take 1 tablet (40 mg total) by mouth once daily.      PROGRAF 1 mg Cap Take 1 capsule (1 mg total) by mouth every 12 (twelve) hours.      " vitamin D 1000 units Tab Take 1,000 Units by mouth once daily.           Jennifer Hutchinson  [id number]        .

## 2025-03-10 NOTE — HPI
Zander Kaur is a 74 y.o. male with history of remote liver transplant, HTN, HLD, chronic pain who presents with complaints of lightheadedness and hypotension.     He reports that he has done fairly well after his recent knee surgery, however his activity has significantly decreased due to this.  He has been participating with therapy, but does note some dyspnea with exertion since his procedure.  This morning he awoke and had some mild increase in his shortness for breath, however was still able to do his normal activities.  He had a few appointments here today, and after he got here, he felt some generalized weakness and as if he was going to pass out, therefore he was brought to the ED. he never lost consciousness, and never fell to the ground.  This has never happened to him before.    In the ED, he was initially hypotensive to 80s/50s with bradycardia to high 40s-50s, which resolved with only 500mL IVF. No atropine given. He was afebrile, and other workup reassuring. Vanc, Aztreonam, and Flagyl given due to immunosuppressed status. Hospital Medicine consulted for hypotension.     He currently feels around his baseline.  He denies any recent fever, nausea, vomiting, diarrhea, chest pain, new skin lesions, worsening pain in his knee, or other significant symptoms.

## 2025-03-10 NOTE — ASSESSMENT & PLAN NOTE
Appears to have chronic bradycardia noted on prior EKGs, with baseline heart rate around 40s to 50s. Had bradycardia near baseline on presentation as well. Suspect that this augmented his initial hypotension. Denies CP, no signs of acute ischemia on EKG, and Trop is WNL. Will hold home Metoprolol for now and monitor on telemetry.

## 2025-03-10 NOTE — ED PROVIDER NOTES
History     Chief Complaint   Patient presents with    Post-op Problem     Knee replacement 6 weeks ago, sent from clinic hypotension, cp and sob     HPI  73 Y/O immunocompromised M S/P liver transplant 15 years ago S/P L TKA 6 weeks ago presents from orthopedic clinic secondary to low blood pressure and lightheadedness. He denies any headache, visual changes, numbness or weakness to upper lower extremities and assures no active chest/back/abdominal pain.  Wife, at bedside, assisting with history, does report that he has been complaining of shortness of breath on exertion and decreased exercise tolerance over the last 1-2 weeks that resolves at rest.  No reported chest pain on exertion.  No syncopal episodes.  Wife agrees of his patient's pale complexion, but patient denies any black or bloody stool and they both assure no history of anemia that may have warranted PRBC transfusion in the past.  No fever or chills, changes in his p.o. intake and he assures his pain is actually been much improved in the last week.  He has been undergoing physical therapy given his recent knee    Review of patient's allergies indicates:   Allergen Reactions    Diphenhydramine hcl     Penicillins      Other reaction(s): Rash    Povidone-iodine      Other reaction(s): Itching  Other reaction(s): Itching     Past Medical History:   Diagnosis Date    Actinic keratosis     Allergy     seasonal    Arthritis     Basal cell carcinoma of right ala nasi 02/06/2023    Hypertension     Joint pain     Organ transplant     liver 2010    SCC (squamous cell carcinoma) 08/15/2022    Right cheek -SSW    SCC (squamous cell carcinoma) 03/27/2023    Right angle of mandible    SCC (squamous cell carcinoma) 03/27/2023    Right medial cheek    SCC (squamous cell carcinoma) 02/12/2024    anterior chin    SCC (squamous cell carcinoma) 06/10/2024    R ear helix-excised by CAIT    SCC (squamous cell carcinoma) 06/10/2024    SCCIS-R post neck    SCC  (squamous cell carcinoma), ear, 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    Squamous Cell Carcinoma 08/28/2013    right temple    Squamous Cell Carcinoma 09/04/2013    left forehead    Stroke      Past Surgical History:   Procedure Laterality Date    ANKLE SURGERY      multipole surgeries    CLOSURE OF DEFECT OF MOHS PROCEDURE Right 02/09/2023    Procedure: CLOSURE, MOHS PROCEDURE DEFECT;  Surgeon: Uzair Brandon MD;  [facility];  Service: ENT;  Laterality: Right;    COLONOSCOPY N/A 12/18/2023    Procedure: COLONOSCOPY;  Surgeon: Leonardo Penny MD;  Location: [facility] ENDOSCOPY;  Service: Endoscopy;  Laterality: N/A;  Referral: RENÉE HODGE / PEG / [facility] portal - LW  12/11- pre call complete.  DBM  12.15 precall complete; pt has instr.; all questions answered; AP    CYSTOSCOPY N/A 04/05/2024    Procedure: CYSTOSCOPY;  Surgeon: Galileo Ramos MD;  [facility];  Service: Urology;  Laterality: N/A;    EAR RECONSTRUCTION Right 10/8/2024    Procedure: RECONSTRUCTION, EAR;  Surgeon: Randee Brown MD;  [facility];  Service: ENT;  Laterality: Right;    FACIAL RECONSTRUCTION SURGERY      HERNIA REPAIR      knee scope      bob, multiple    LIVER TRANSPLANT      2010    PROSTATE BIOPSY N/A 04/05/2024    Procedure: BIOPSY, PROSTATE;  Surgeon: Galileo Ramos MD;  [facility];  Service: Urology;  Laterality: N/A;  TransPERINEAL biopsy    REVISION OF FLAP GRAFT Right 03/03/2023    Procedure: REVISION, PROCEDURE INVOLVING FLAP GRAFT;  Surgeon: Uzair Brandon MD;  [facility];  Service: ENT;  Laterality: Right;    REVISION OF FLAP GRAFT Right 08/01/2023    Procedure: REVISION, PROCEDURE INVOLVING FLAP GRAFT;  Surgeon: Consuelo Augustine MD;  Location: [facility] OR;  Service: ENT;  Laterality: Right;    SHOULDER OPEN ROTATOR CUFF REPAIR      left    SKIN FULL THICKNESS GRAFT Right 10/8/2024    Procedure: APPLICATION, GRAFT, SKIN, FULL-THICKNESS;  Surgeon:  "Randee Brown MD;  Location: [facility] OR 2ND FLR;  Service: ENT;  Laterality: Right;    TOTAL KNEE REPLACEMENT USING COMPUTER NAVIGATION Left 1/28/2025    Procedure: ARTHROPLASTY, KNEE, TOTAL, YAKOV COMPUTER-ASSISTED NAVIGATION: LEFT;  Surgeon: Carson Lock MD;  Location: [facility];  Service: Orthopedics;  Laterality: Left;    TRANSRECTAL ULTRASOUND EXAMINATION N/A 04/05/2024    Procedure: ULTRASOUND, RECTAL APPROACH;  Surgeon: Galileo Ramos MD;  Location: [facility] OR 1ST FLR;  Service: Urology;  Laterality: N/A;     Family History   Problem Relation Name Age of Onset    Hearing loss Mother VIVI LANDRUM     Thyroid disease Sister      No Known Problems Son      No Known Problems Son      Melanoma Neg Hx      Psoriasis Neg Hx      Eczema Neg Hx      Lupus Neg Hx       Social History[1]  Review of Systems    Physical Exam     Initial Vitals [03/10/25 1153]   BP Pulse Resp Temp SpO2   (!) 83/52 (!) 54 (!) 28 97.7 °F (36.5 °C) 97 %      MAP       --         Vitals:    03/11/25 0848 03/11/25 0849 03/11/25 0851 03/11/25 1149   BP: 128/77  105/68 (!) 142/79   BP Location:    Right arm   Patient Position: Sitting  Standing Lying   Pulse: 86  107 64   Resp:  18  20   Temp:    98.5 °F (36.9 °C)   TempSrc:    Oral   SpO2:    (!) 91%   Weight:    90 kg (198 lb 6.6 oz)   Height:    5' 7" (1.702 m)       Physical Exam  GENERAL:  Pale and dry integument; Well-Nourished; hypotensive and bradycardic.  Moderate distress secondary to shock and lightheadedness.  HEENT: AT/NC; anicteric; speaking full sentences with no drooling.  NECK: Supple, FROM, no accessory muscle use.  HEART:  Hypotensive; bradycardic rate with regular rhythm; no M/G/T.  LUNGS: No Tachypnea for appreciated work of breathing, No Retractions, and CTA B/L with no wheezing, rales or rhonchi, but diminished left basilar region breath sounds.  ABDOMEN: Soft, ND, NTTP.  No rigidity or involuntary guarding.  NEG Leiva's, Rovsing's, Psoas', Obturator's and McBurney's " Signs.  BACK: Atraumatic, No CVA tenderness B/L.  EXTREMITIES: FROM. + non erythematous, but edematous left lower extremity with no appreciated hyperemia over left knee and distal lower extremity.  Incision over left knee with no erythema, drainage and good scarring.  No tenderness to palpation.  SKIN:  Pale.  Warm, Dry, No Skin Tears or Rashes. No Jaundice.  VASCULAR: 2+ pulses Prox+Dist & Symm with no delay.  NEUROLOGIC: AAOx3, answered questions appropriately with no focal deficit.    ED Course   Procedures  Labs Reviewed   CBC W/ AUTO DIFFERENTIAL - Abnormal       Result Value    WBC 6.18      RBC 4.47 (*)     Hemoglobin 13.8 (*)     Hematocrit 44.4      MCV 99 (*)     MCH 30.9      MCHC 31.1 (*)     RDW 13.6      Platelets 199      MPV 9.7      Immature Granulocytes 0.2      Gran # (ANC) 2.8      Immature Grans (Abs) 0.01      Lymph # 2.3      Mono # 0.4      Eos # 0.5      Baso # 0.07      nRBC 0      Gran % 45.9      Lymph % 37.9      Mono % 6.8      Eosinophil % 8.1 (*)     Basophil % 1.1      Differential Method Automated      Narrative:     Release to patient->Immediate   COMPREHENSIVE METABOLIC PANEL - Abnormal    Sodium 139      Potassium 3.8      Chloride 105      CO2 25      Glucose 131 (*)     BUN 15      Creatinine 1.0      Calcium 9.3      Total Protein 7.3      Albumin 3.8      Total Bilirubin 0.7      Alkaline Phosphatase 86      AST 24      ALT 16      eGFR >60.0      Anion Gap 9      Narrative:     Release to patient->Immediate   URINALYSIS, REFLEX TO URINE CULTURE - Abnormal    Specimen UA Urine, Clean Catch      Color, UA Yellow      Appearance, UA Clear      pH, UA 6.0      Specific Gravity, UA >1.030 (*)     Protein, UA Negative      Glucose, UA Negative      Ketones, UA Negative      Bilirubin (UA) Negative      Occult Blood UA Negative      Nitrite, UA Negative      Leukocytes, UA Negative      Narrative:     Specimen Source->Urine   B-TYPE NATRIURETIC PEPTIDE - Abnormal     (*)      Narrative:     Release to patient->Immediate   INFLUENZA A & B BY MOLECULAR    Influenza A, Molecular Negative      Influenza B, Molecular Negative      Flu A & B Source Nasal swab     HEPATITIS C ANTIBODY    Hepatitis C Ab Non-reactive      Narrative:     Release to patient->Immediate   HIV 1 / 2 ANTIBODY    HIV 1/2 Ag/Ab Non-reactive      Narrative:     Release to patient->Immediate   TROPONIN I HIGH SENSITIVITY    Troponin I High Sensitivity 5      Narrative:     Release to patient->Immediate   SARS-COV-2 RNA AMPLIFICATION, QUAL    SARS-CoV-2 RNA, Amplification, Qual Negative          ECG Results              EKG 12-lead (Final result)        Collection Time Result Time QRS Duration OHS QTC Calculation    03/10/25 12:20:59 03/10/25 21:39:44 94 410                     Final result by Interface, Lab In [facility] (03/10/25 21:39:48)                   Narrative:    Test Reason : Z13.6,    Vent. Rate :  48 BPM     Atrial Rate :  48 BPM     P-R Int : 190 ms          QRS Dur :  94 ms      QT Int : 460 ms       P-R-T Axes :  50  -4  38 degrees    QTcB Int : 410 ms    Sinus bradycardia  Otherwise normal ECG  When compared with ECG of 30-Dec-2024 14:12,  No significant change was found  Confirmed by Jonah Agarwal ([id number]) on 3/10/2025 9:39:41 PM    Referred By: AAAREFERRAL SELF           Confirmed By: Jonah Agarwal                                  Imaging Results              US Lower Extremity Veins Left (Final result)  Result time 03/10/25 21:11:19      Final result by Tyrese Richards MD (03/10/25 21:11:19)                   Impression:      No evidence of deep venous thrombosis in the left lower extremity.      Electronically signed by: Tyrese Richards MD  Date:    03/10/2025  Time:    21:11               Narrative:    EXAMINATION:  US LOWER EXTREMITY VEINS LEFT    CLINICAL HISTORY:  Other specified soft tissue disorders    TECHNIQUE:  Duplex and color flow Doppler evaluation and graded compression of the left  lower extremity veins was performed.    COMPARISON:  None    FINDINGS:  Left thigh veins: The common femoral, femoral, popliteal, upper greater saphenous, and deep femoral veins are patent and free of thrombus. The veins are normally compressible and have normal phasic flow and augmentation response.    Left calf veins: The visualized calf veins are patent.    Contralateral CFV: The contralateral (right) common femoral vein is patent and free of thrombus.    Miscellaneous: Few prominent but not pathologically enlarged otherwise normal appearing lymph nodes at the left groin measuring up to 0.8 cm in short axis, nonspecific.                                       CTA Chest Non-Coronary (PE Studies) (Final result)  Result time 03/10/25 16:43:16      Final result by Tyrese Richards MD (03/10/25 16:43:16)                   Impression:      1. Technically limited study as above.  No acute cardiopulmonary process.  Specifically, no convincing evidence of pulmonary thromboembolism through the segmental levels or pulmonary infarction.  2. Coronary and systemic atherosclerosis.  3. Suspected asymmetric right-sided gynecomastia.  Clinical correlation advised.  4. Other incidental/nonemergent findings in the body of the report.      Electronically signed by: Tyrese Richards MD  Date:    03/10/2025  Time:    16:43               Narrative:    EXAMINATION:  CTA CHEST NON CORONARY (PE STUDIES)    CLINICAL HISTORY:  Pulmonary embolism (PE) suspected, high prob;    TECHNIQUE:  Low dose axial images, sagittal and coronal reformations with axial MIPS were obtained from the thoracic inlet to the lung bases following the IV administration of 75 mL of Omnipaque 350 per PE protocol.    COMPARISON:  Chest radiograph earlier same day, CTA abdomen 10/19/2010    FINDINGS:  Beam hardening with streak artifact from overlying monitoring leads and contrast bolus in the SVC as well as respiratory motion somewhat limits evaluation.    Base of Neck: No  significant abnormality.    Thoracic soft tissues: Asymmetric suspected right-sided gynecomastia.    Aorta: 3 vessel left-sided arch.  Minimal to mild scattered calcific atherosclerosis of the thoracic aorta.  The aorta is tortuous/ectatic without definite aneurysm or dissection.    Heart: Normal size. No effusion.  Multi-vessel coronary arterial calcifications noted.  No cardiac thrombus seen.    Pulmonary vasculature: Pulmonary trunk is within normal limits.  Pulmonary arteries distribute normally.  There are four pulmonary veins.  No saddle embolus.  No convincing evidence of pulmonary thromboembolism through the segmental levels.    Danielle/Mediastinum: No pathologic milton enlargement.    Airways: Patent.    Lungs/Pleura: Similar chronic nonspecific elevation of the left hemidiaphragm.  Scattered calcified pleural plaques at the left lower chest, likely sequela of prior asbestos exposure.  Mild dependent atelectasis.  Few scattered linear opacities throughout the lungs consistent with minimal platelike scarring versus atelectasis.  No consolidation, pleural effusion or pneumothorax.  No concerning pulmonary nodule definitively seen allowing for respiratory motion artifact.    Esophagus: Unremarkable.    Upper Abdomen: No abnormality of the partially imaged upper abdomen.    Bones: No acute fracture.  Age-related degenerative change most prominent at the midthoracic spine.  No suspicious lytic or sclerotic lesions.                                       X-Ray Chest AP Portable (Final result)  Result time 03/10/25 12:45:26      Final result by Miles Antoine MD (03/10/25 12:45:26)                   Impression:      See above      Electronically signed by: Miles Antoine MD  Date:    03/10/2025  Time:    12:45               Narrative:    EXAMINATION:  XR CHEST AP PORTABLE    CLINICAL HISTORY:  Sepsis;    TECHNIQUE:  Single frontal view of the chest was performed.    COMPARISON:  No 03/04/2021 ne    FINDINGS:  Heart size  normal.  The lungs are clear.  No pleural effusion                                       Medications   lactated ringers bolus 500 mL (0 mLs Intravenous Hold 3/10/25 1245)   atropine 0.1 mg/mL injection (has no administration in time range)   aspirin EC tablet 81 mg (81 mg Oral Given 3/11/25 0848)   atorvastatin tablet 40 mg (40 mg Oral Given 3/11/25 0849)   celecoxib capsule 200 mg (200 mg Oral Given 3/11/25 0848)   gabapentin capsule 300 mg (300 mg Oral Given 3/11/25 0849)   tacrolimus capsule 1 mg (1 mg Oral Given 3/11/25 0848)   vitamin D 1000 units tablet 1,000 Units (1,000 Units Oral Given 3/11/25 0849)   sodium chloride 0.9% flush 1-10 mL (has no administration in time range)   melatonin tablet 6 mg (has no administration in time range)   ondansetron disintegrating tablet 8 mg (has no administration in time range)   polyethylene glycol packet 17 g (has no administration in time range)   simethicone chewable tablet 80 mg (has no administration in time range)   aluminum-magnesium hydroxide-simethicone 200-200-20 mg/5 mL suspension 30 mL (has no administration in time range)   acetaminophen tablet 650 mg (has no administration in time range)   naloxone 0.4 mg/mL injection 0.02 mg (has no administration in time range)   glucose chewable tablet 16 g (has no administration in time range)   glucose chewable tablet 24 g (has no administration in time range)   glucagon (human recombinant) injection 1 mg (has no administration in time range)   enoxaparin injection 40 mg (has no administration in time range)   oxyCODONE immediate release tablet 5 mg (5 mg Oral Given 3/11/25 0849)   oxymetazoline 0.05 % nasal spray 2 spray (has no administration in time range)   lactated ringers bolus 500 mL (500 mLs Intravenous New Bag 3/11/25 1034)   lactated ringers bolus 500 mL (0 mLs Intravenous Stopped 3/10/25 1319)   ondansetron injection 4 mg (4 mg Intravenous Given 3/10/25 1232)   vancomycin 2 g in 0.9% sodium chloride 500 mL  IVPB (0 mg Intravenous Stopped 3/10/25 1940)   iohexoL (OMNIPAQUE 350) injection 75 mL (75 mLs Intravenous Given 3/10/25 1539)   morphine injection 6 mg (6 mg Intravenous Given 3/10/25 1601)     Medical Decision Making  Hypotensive and bradycardic patient with subjective lightheadedness warranting aggressive IV fluid resuscitation, atropine at bedside, and possible reversal of AV blocker agent, which we are holding off given patient's refusal as a possible side effect of emesis.  Patient has several weeks post left knee replacement with improving swelling and pain.  Integument with minimal to no suspicion of septic joint or cellulitis.  Nonetheless, once blood pressure normalized, will perform DVT study to assure that swelling is consistent with postsurgical state and not thrombosis.  Patient has chronic nasal congestion, rhinorrhea and non worsening/persistent dry cough.  No appreciated hypoxia, tachypnea or work of breathing.  Slight left lower lobe diminished breath sounds warranting chest x-ray to evaluate for atypical pneumonia.  Abdomen benign.  No history of heart failure reported.  His integument is pale in appearance, which wife reports slightly more than usual, but they both assure no black or bloody stool, history of anemia or reported history of needing blood transfusions in the past.    ____________________  Andrew Jimenez MD, FAA  Emergency Medicine Staff  12:27 PM 3/10/2025      Amount and/or Complexity of Data Reviewed  Labs: ordered.  Radiology: ordered.    Risk  Prescription drug management.              Attending Attestation:         Attending Critical Care:   Critical Care Times:   Direct Patient Care (initial evaluation, reassessments, and time considering the case)................................................................25 minutes.   Ordering, Reviewing, and Interpreting Diagnostic  Studies...............................................................................................................5 minutes.   Documentation..................................................................................................................................................................................5 minutes.   ==============================================================  Total Critical Care Time - exclusive of procedural time: 35 minutes.  ==============================================================  Critical care was necessary to treat or prevent imminent or life-threatening deterioration of the following conditions: nontraumatic shock and cardiac arrhythmia (Undifferentiated Atraumatic Shock).   Critical care was time spent personally by me on the following activities: obtaining history from patient or relative, examination of patient, review of old charts, ordering lab, x-rays, and/or EKG, development of treatment plan with patient or relative, ordering and performing treatments and interventions, evaluation of patient's response to treatment, interpretation of cardiac measurements and re-evaluation of patient's conition.   Critical Care Condition: critical                                  Clinical Impression:  Final diagnoses:  [Z13.6] Screening for cardiovascular condition  [M79.89] Left leg swelling  [R00.1] Bradycardia  [I95.9] Hypotension, unspecified hypotension type (Primary)          ED Disposition Condition    Observation Stable                    [1]   Social History  Tobacco Use    Smoking status: Former     Current packs/day: 0.00     Types: Cigarettes     Quit date:      Years since quittin.2    Smokeless tobacco: Never    Tobacco comments:     SMOKED FOR 1 YEAR, [employer] MADE HIM STOP   Substance Use Topics    Alcohol use: Not Currently     Alcohol/week: 2.0 standard drinks of alcohol     Types: 2 Glasses of wine per week    Drug use: Never        Barbara,  Dennis ONEAL MD  03/11/25 0012

## 2025-03-10 NOTE — ED TRIAGE NOTES
Pt arrives via POV with reports of recent knee surgery. Pt was sent over from physician for low BP. Pt states feeling sob and winded upon exertion. Pt reports dizziness, blurred vision, and generalized weakness. Pt denies chest pain

## 2025-03-10 NOTE — ASSESSMENT & PLAN NOTE
Chronic, and currently  hypotension . Latest blood pressure and vitals reviewed-   While in the hospital, will manage blood pressure as follows; Adjust home antihypertensive regimen as follows- hold home metoprolol given initial hypotension and bradycardia.    Will utilize p.r.n. blood pressure medication only if patient's blood pressure greater than 220/110 and he develops symptoms such as worsening chest pain or shortness of breath.

## 2025-03-10 NOTE — PROGRESS NOTES
HPI:  Zander Kaur presents for 6-week post-operative visit following a left total knee arthroplasty performed by Dr. Lock on 1/28/2025. Patient endorsed shortness of breath on exertion and weak during visit.  He noted exertional shortness of breath since having surgery on 01/28/2025 He denied any episodes of fever, chills, nausea, vomiting, syncope, lightheadedness, or symptoms regarding the left knee.  Patient notes that he has not eaten anything today and has only drank in a small amount of ice tea.      Exam:   Blood pressure (!) 76/74, pulse (!) 55.   Patient appears pale  Radial pulse 2 +    Imaging:  Radiographs not taken today due to patient's symptoms.    A/P:  6 weeks s/p left total knee arthroplasty  Hypotension with associated weakness and SOB on exertion    - Although patient's hypotension may be due to decreased hydration and nutrition, patient transferred to the ED for further workup.  - Follow up in clinic following next week for official 6 week postop visit.

## 2025-03-10 NOTE — ASSESSMENT & PLAN NOTE
PDMP reviewed. Will hold opiates as able for now given initial hypotension, and resume as needed.

## 2025-03-10 NOTE — ASSESSMENT & PLAN NOTE
Very mild, and near baseline without indication for transfusion. Not likely contributing to presentation. Monitor.

## 2025-03-11 VITALS
TEMPERATURE: 98 F | RESPIRATION RATE: 20 BRPM | BODY MASS INDEX: 31.15 KG/M2 | WEIGHT: 198.44 LBS | HEART RATE: 75 BPM | OXYGEN SATURATION: 94 % | SYSTOLIC BLOOD PRESSURE: 158 MMHG | HEIGHT: 67 IN | DIASTOLIC BLOOD PRESSURE: 84 MMHG

## 2025-03-11 DIAGNOSIS — Z96.652 STATUS POST LEFT KNEE REPLACEMENT: ICD-10-CM

## 2025-03-11 LAB
ASCENDING AORTA: 3.45 CM
AV AREA BY CONTINUOUS VTI: 3.2 CM2
AV INDEX (PROSTH): 0.78
AV LVOT MEAN GRADIENT: 3 MMHG
AV LVOT PEAK GRADIENT: 5 MMHG
AV MEAN GRADIENT: 4 MMHG
AV PEAK GRADIENT: 9 MMHG
AV VALVE AREA BY VELOCITY RATIO: 3 CM²
AV VALVE AREA: 3.3 CM2
AV VELOCITY RATIO: 0.73
BSA FOR ECHO PROCEDURE: 2.06 M2
CV ECHO LV RWT: 0.46 CM
DOP CALC AO PEAK VEL: 1.5 M/S
DOP CALC AO VTI: 26.3 CM
DOP CALC LVOT AREA: 4.2 CM2
DOP CALC LVOT DIAMETER: 2.3 CM
DOP CALC LVOT PEAK VEL: 1.1 M/S
DOP CALC LVOT STROKE VOLUME: 85.5 CM3
DOP CALCLVOT PEAK VEL VTI: 20.6 CM
E WAVE DECELERATION TIME: 298 MS
E/A RATIO: 0.62
E/E' RATIO: 7 M/S
ECHO EF ESTIMATED: 59 %
ECHO LV POSTERIOR WALL: 0.9 CM (ref 0.6–1.1)
FRACTIONAL SHORTENING: 30.8 % (ref 28–44)
INTERVENTRICULAR SEPTUM: 1 CM (ref 0.6–1.1)
LA MAJOR: 5.5 CM
LA MINOR: 5.9 CM
LA WIDTH: 3.9 CM
LEFT ATRIUM SIZE: 4.3 CM
LEFT ATRIUM VOLUME INDEX MOD: 38 ML/M2
LEFT ATRIUM VOLUME INDEX: 40 ML/M2
LEFT ATRIUM VOLUME MOD: 77 ML
LEFT ATRIUM VOLUME: 81 CM3
LEFT INTERNAL DIMENSION IN SYSTOLE: 2.7 CM (ref 2.1–4)
LEFT VENTRICLE DIASTOLIC VOLUME INDEX: 32.18 ML/M2
LEFT VENTRICLE DIASTOLIC VOLUME: 65 ML
LEFT VENTRICLE MASS INDEX: 56.2 G/M2
LEFT VENTRICLE SYSTOLIC VOLUME INDEX: 13.4 ML/M2
LEFT VENTRICLE SYSTOLIC VOLUME: 27 ML
LEFT VENTRICULAR INTERNAL DIMENSION IN DIASTOLE: 3.9 CM (ref 3.5–6)
LEFT VENTRICULAR MASS: 113.6 G
LV LATERAL E/E' RATIO: 7
LV SEPTAL E/E' RATIO: 6.1
MV A" WAVE DURATION": 87.54 MS
MV PEAK A VEL: 0.79 M/S
MV PEAK E VEL: 0.49 M/S
OHS CV RV/LV RATIO: 0.74 CM
PISA TR MAX VEL: 2.5 M/S
PULM VEIN A" WAVE DURATION": 87.54 MS
PULM VEIN S/D RATIO: 1.33
PULMONIC VEIN PEAK A VELOCITY: 0.3 M/S
PV PEAK D VEL: 0.27 M/S
PV PEAK S VEL: 0.36 M/S
RA MAJOR: 4.6 CM
RA PRESSURE ESTIMATED: 3 MMHG
RA WIDTH: 3.59 CM
RIGHT VENTRICLE DIASTOLIC BASEL DIMENSION: 2.9 CM
RV TB RVSP: 6 MMHG
SINUS: 3.45 CM
STJ: 3.03 CM
TACROLIMUS BLD-MCNC: 4.2 NG/ML (ref 5–15)
TDI LATERAL: 0.07 M/S
TDI SEPTAL: 0.08 M/S
TDI: 0.08 M/S
TRICUSPID ANNULAR PLANE SYSTOLIC EXCURSION: 1.45 CM
TV PEAK GRADIENT: 24 MMHG
TV REST PULMONARY ARTERY PRESSURE: 28 MMHG
Z-SCORE OF LEFT VENTRICULAR DIMENSION IN END DIASTOLE: -4.23
Z-SCORE OF LEFT VENTRICULAR DIMENSION IN END SYSTOLE: -2.38

## 2025-03-11 PROCEDURE — 25000003 PHARM REV CODE 250: Performed by: STUDENT IN AN ORGANIZED HEALTH CARE EDUCATION/TRAINING PROGRAM

## 2025-03-11 PROCEDURE — 63600175 PHARM REV CODE 636 W HCPCS: Performed by: STUDENT IN AN ORGANIZED HEALTH CARE EDUCATION/TRAINING PROGRAM

## 2025-03-11 PROCEDURE — G0378 HOSPITAL OBSERVATION PER HR: HCPCS

## 2025-03-11 PROCEDURE — 96361 HYDRATE IV INFUSION ADD-ON: CPT

## 2025-03-11 RX ORDER — PANTOPRAZOLE SODIUM 40 MG/1
40 TABLET, DELAYED RELEASE ORAL DAILY
Qty: 30 TABLET | Refills: 0 | Status: SHIPPED | OUTPATIENT
Start: 2025-03-11 | End: 2025-04-10

## 2025-03-11 RX ORDER — ASPIRIN 81 MG/1
81 TABLET ORAL 2 TIMES DAILY
Qty: 28 TABLET | Refills: 0 | Status: SHIPPED | OUTPATIENT
Start: 2025-03-11 | End: 2025-03-25

## 2025-03-11 RX ADMIN — GABAPENTIN 300 MG: 300 CAPSULE ORAL at 03:03

## 2025-03-11 RX ADMIN — OXYCODONE 5 MG: 5 TABLET ORAL at 08:03

## 2025-03-11 RX ADMIN — GABAPENTIN 300 MG: 300 CAPSULE ORAL at 08:03

## 2025-03-11 RX ADMIN — CHOLECALCIFEROL TAB 25 MCG (1000 UNIT) 1000 UNITS: 25 TAB at 08:03

## 2025-03-11 RX ADMIN — ATORVASTATIN CALCIUM 40 MG: 40 TABLET, FILM COATED ORAL at 08:03

## 2025-03-11 RX ADMIN — ASPIRIN 81 MG: 81 TABLET, COATED ORAL at 08:03

## 2025-03-11 RX ADMIN — OXYCODONE 5 MG: 5 TABLET ORAL at 02:03

## 2025-03-11 RX ADMIN — OXYCODONE 5 MG: 5 TABLET ORAL at 03:03

## 2025-03-11 RX ADMIN — SODIUM CHLORIDE, POTASSIUM CHLORIDE, SODIUM LACTATE AND CALCIUM CHLORIDE 500 ML: 600; 310; 30; 20 INJECTION, SOLUTION INTRAVENOUS at 10:03

## 2025-03-11 RX ADMIN — TACROLIMUS 1 MG: 1 CAPSULE ORAL at 08:03

## 2025-03-11 RX ADMIN — CELECOXIB 200 MG: 200 CAPSULE ORAL at 08:03

## 2025-03-11 NOTE — SUBJECTIVE & OBJECTIVE
Past Medical History:   Diagnosis Date    Actinic keratosis     Allergy     seasonal    Arthritis     Basal cell carcinoma of right ala nasi 02/06/2023    Hypertension     Joint pain     Organ transplant     liver 2010    SCC (squamous cell carcinoma) 08/15/2022    Right cheek -SSW    SCC (squamous cell carcinoma) 03/27/2023    Right angle of mandible    SCC (squamous cell carcinoma) 03/27/2023    Right medial cheek    SCC (squamous cell carcinoma) 02/12/2024    anterior chin    SCC (squamous cell carcinoma) 06/10/2024    R ear helix-excised by JAM    SCC (squamous cell carcinoma) 06/10/2024    SCCIS-R post neck    SCC (squamous cell carcinoma), ear, 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    Squamous Cell Carcinoma 08/28/2013    right temple    Squamous Cell Carcinoma 09/04/2013    left forehead    Stroke        Past Surgical History:   Procedure Laterality Date    ANKLE SURGERY      multipole surgeries    CLOSURE OF DEFECT OF MOHS PROCEDURE Right 02/09/2023    Procedure: CLOSURE, MOHS PROCEDURE DEFECT;  Surgeon: Uzair Brandon MD;  Location: [facility] OR 2ND FLR;  Service: ENT;  Laterality: Right;    COLONOSCOPY N/A 12/18/2023    Procedure: COLONOSCOPY;  Surgeon: Leonardo Penny MD;  Location: [facility] ENDOSCOPY;  Service: Endoscopy;  Laterality: N/A;  Referral: RENÉE HODGE / PEG / [facility] portal - LW  12/11- pre call complete.  DBM  12.15 precall complete; pt has instr.; all questions answered; AP    CYSTOSCOPY N/A 04/05/2024    Procedure: CYSTOSCOPY;  Surgeon: Galileo Ramos MD;  Location: [facility] OR 1ST FLR;  Service: Urology;  Laterality: N/A;    EAR RECONSTRUCTION Right 10/8/2024    Procedure: RECONSTRUCTION, EAR;  Surgeon: Randee Brown MD;  Location: [facility] OR 2ND FLR;  Service: ENT;  Laterality: Right;    FACIAL RECONSTRUCTION SURGERY      HERNIA REPAIR      knee scope      bob, multiple    LIVER TRANSPLANT      2010    PROSTATE BIOPSY N/A 04/05/2024    Procedure: BIOPSY, PROSTATE;  Surgeon: Galileo Ramos  MD;  Location: [facility] OR 1ST FLR;  Service: Urology;  Laterality: N/A;  TransPERINEAL biopsy    REVISION OF FLAP GRAFT Right 03/03/2023    Procedure: REVISION, PROCEDURE INVOLVING FLAP GRAFT;  Surgeon: Uzair Brandon MD;  Location: [facility] OR 2ND FLR;  Service: ENT;  Laterality: Right;    REVISION OF FLAP GRAFT Right 08/01/2023    Procedure: REVISION, PROCEDURE INVOLVING FLAP GRAFT;  Surgeon: Consuelo Augustine MD;  Location: [facility] OR;  Service: ENT;  Laterality: Right;    SHOULDER OPEN ROTATOR CUFF REPAIR      left    SKIN FULL THICKNESS GRAFT Right 10/8/2024    Procedure: APPLICATION, GRAFT, SKIN, FULL-THICKNESS;  Surgeon: Randee Brown MD;  Location: [facility] OR 2ND FLR;  Service: ENT;  Laterality: Right;    TOTAL KNEE REPLACEMENT USING COMPUTER NAVIGATION Left 1/28/2025    Procedure: ARTHROPLASTY, KNEE, TOTAL, YAKOV COMPUTER-ASSISTED NAVIGATION: LEFT;  Surgeon: Carson Lock MD;  Location: [facility];  Service: Orthopedics;  Laterality: Left;    TRANSRECTAL ULTRASOUND EXAMINATION N/A 04/05/2024    Procedure: ULTRASOUND, RECTAL APPROACH;  Surgeon: Galileo Ramos MD;  Location: [facility] OR 1ST FLR;  Service: Urology;  Laterality: N/A;       Review of patient's allergies indicates:   Allergen Reactions    Diphenhydramine hcl     Penicillins      Other reaction(s): Rash    Povidone-iodine      Other reaction(s): Itching  Other reaction(s): Itching       Current Facility-Administered Medications on File Prior to Encounter   Medication    sodium chloride 0.9% flush 10 mL     Current Outpatient Medications on File Prior to Encounter   Medication Sig    acetaminophen (TYLENOL) 650 MG TbSR Take 1 tablet (650 mg total) by mouth every 8 (eight) hours.    aspirin (ECOTRIN) 81 MG EC tablet Take 1 tablet (81 mg total) by mouth 2 (two) times a day.    atorvastatin (LIPITOR) 40 MG tablet Take 1 tablet (40 mg total) by mouth once daily.    celecoxib (CELEBREX) 200 MG capsule Take 1 capsule (200 mg total) by mouth once daily.    doxazosin  (CARDURA) 8 MG Tab Take 1 tablet by mouth once daily. Please schedule an appointment with primary care provider for further refills.    gabapentin (NEURONTIN) 300 MG capsule Take 300 mg by mouth 3 (three) times daily.    methocarbamoL (ROBAXIN) 750 MG Tab Take 1 tablet (750 mg total) by mouth 3 (three) times daily. Reports taking 1/2 pill 6 times per day    metoprolol tartrate (LOPRESSOR) 100 MG tablet Take 1 tablet by mouth twice daily.    multivit with minerals/lutein (MULTIVITAMIN 50 PLUS ORAL) Take 1 tablet by mouth once daily.    oxyCODONE (ROXICODONE) 5 MG immediate release tablet Take 1-2 tablets every 4-6 hours as needed for pain    pantoprazole (PROTONIX) 40 MG tablet Take 1 tablet (40 mg total) by mouth once daily.    PROGRAF 1 mg Cap Take 1 capsule (1 mg total) by mouth every 12 (twelve) hours.    vitamin D 1000 units Tab Take 1,000 Units by mouth once daily.    [DISCONTINUED] cefadroxil (DURICEF) 500 MG Cap Take 1 capsule (500 mg total) by mouth every 12 (twelve) hours.    [DISCONTINUED] fluorouraciL (EFUDEX) 5 % cream Compound fluorouracil 5% + calcipotriene 0.005% cream. Apply a pea-sized amount to entire scalp BID x 7 days.    [DISCONTINUED] pregabalin (LYRICA) 75 MG capsule Take 1 capsule (75 mg total) by mouth 2 (two) times daily.    [DISCONTINUED] senna-docusate 8.6-50 mg (SENNA WITH DOCUSATE SODIUM) 8.6-50 mg per tablet Take 1 tablet by mouth once daily.     Family History       Problem Relation (Age of Onset)    Hearing loss Mother    No Known Problems Son, Son    Thyroid disease Sister          Tobacco Use    Smoking status: Former     Current packs/day: 0.00     Types: Cigarettes     Quit date:      Years since quittin.2    Smokeless tobacco: Never    Tobacco comments:     SMOKED FOR 1 YEAR, [employer] MADE HIM STOP   Substance and Sexual Activity    Alcohol use: Not Currently     Alcohol/week: 2.0 standard drinks of alcohol     Types: 2 Glasses of wine per week    Drug use:  Never    Sexual activity: Yes     Partners: Female     Comment: wife had tubal ligation     Review of Systems   Constitutional:         All pertinent other ROS noted in HPI   All other systems reviewed and are negative.    Objective:     Vital Signs (Most Recent):  Temp: 98 °F (36.7 °C) (03/10/25 2130)  Pulse: 62 (03/10/25 2314)  Resp: 16 (03/10/25 2130)  BP: 127/69 (03/10/25 2130)  SpO2: 96 % (03/10/25 2130) Vital Signs (24h Range):  Temp:  [97.6 °F (36.4 °C)-98.4 °F (36.9 °C)] 98 °F (36.7 °C)  Pulse:  [47-64] 62  Resp:  [12-28] 16  SpO2:  [94 %-98 %] 96 %  BP: ()/(52-85) 127/69     Weight: 88.4 kg (194 lb 14.2 oz)  Body mass index is 30.52 kg/m².     Physical Exam  Vitals and nursing note reviewed.   Constitutional:       General: He is not in acute distress.     Appearance: He is well-developed. He is not ill-appearing or diaphoretic.   HENT:      Head: Normocephalic and atraumatic.      Mouth/Throat:      Mouth: Mucous membranes are moist.   Eyes:      General: No scleral icterus.     Conjunctiva/sclera: Conjunctivae normal.   Neck:      Vascular: No JVD.   Cardiovascular:      Rate and Rhythm: Normal rate and regular rhythm.   Pulmonary:      Effort: Pulmonary effort is normal. No respiratory distress.   Musculoskeletal:      Right lower leg: Edema present.      Left lower leg: Edema present.      Comments: Mild right lower extremity edema, increased edema in the left lower extremity distal to the site of L TKA.  No significant effusion or erythema to surgical site.   Skin:     Coloration: Skin is not jaundiced or pale.   Neurological:      Mental Status: He is alert and oriented to person, place, and time.      Motor: No weakness or abnormal muscle tone.   Psychiatric:         Mood and Affect: Mood normal.         Behavior: Behavior normal.                Significant Labs: All pertinent labs within the past 24 hours have been reviewed.  CBC:   Recent Labs   Lab 03/10/25  1226   WBC 6.18   HGB 13.8*   HCT  44.4        CMP:   Recent Labs   Lab 03/10/25  1226      K 3.8      CO2 25   *   BUN 15   CREATININE 1.0   CALCIUM 9.3   PROT 7.3   ALBUMIN 3.8   BILITOT 0.7   ALKPHOS 86   AST 24   ALT 16   ANIONGAP 9       Significant Imaging: I have reviewed all pertinent imaging results/findings within the past 24 hours.

## 2025-03-11 NOTE — PLAN OF CARE
Problem: Adult Inpatient Plan of Care  Goal: Plan of Care Review  Outcome: Progressing  Goal: Patient-Specific Goal (Individualized)  Outcome: Progressing  Goal: Absence of Hospital-Acquired Illness or Injury  Outcome: Progressing  Goal: Optimal Comfort and Wellbeing  Outcome: Progressing  Goal: Readiness for Transition of Care  Outcome: Progressing     Problem: Fall Injury Risk  Goal: Absence of Fall and Fall-Related Injury  Outcome: Progressing     Problem: Comorbidity Management  Goal: Blood Pressure in Desired Range  Outcome: Progressing     Problem: Fluid Volume Deficit  Goal: Fluid Balance  Outcome: Progressing     Problem: Dysrhythmia  Goal: Normalized Cardiac Rhythm  Outcome: Progressing     Problem: Anemia  Goal: Anemia Symptom Improvement  Outcome: Progressing     Problem: Pain Chronic (Persistent)  Goal: Optimal Pain Control and Function  Outcome: Progressing     Problem: Wound  Goal: Optimal Coping  Outcome: Progressing  Goal: Optimal Functional Ability  Outcome: Progressing  Goal: Absence of Infection Signs and Symptoms  Outcome: Progressing  Goal: Improved Oral Intake  Outcome: Progressing  Goal: Optimal Pain Control and Function  Outcome: Progressing  Goal: Skin Health and Integrity  Outcome: Progressing  Goal: Optimal Wound Healing  Outcome: Progressing

## 2025-03-11 NOTE — NURSING
Discharge instructions and education given to pt and family. Verbalized understanding. Removal of IV. No redness, swelling or drainage noted. Telemetry removed.  Pt escort requested/family at bedside.

## 2025-03-11 NOTE — PLAN OF CARE
Problem: Adult Inpatient Plan of Care  Goal: Plan of Care Review  Outcome: Met  Goal: Patient-Specific Goal (Individualized)  Outcome: Met  Goal: Absence of Hospital-Acquired Illness or Injury  Outcome: Met  Goal: Optimal Comfort and Wellbeing  Outcome: Met  Goal: Readiness for Transition of Care  Outcome: Met     Problem: Fall Injury Risk  Goal: Absence of Fall and Fall-Related Injury  Outcome: Met     Problem: Comorbidity Management  Goal: Blood Pressure in Desired Range  Outcome: Met     Problem: Fluid Volume Deficit  Goal: Fluid Balance  Outcome: Met     Problem: Dysrhythmia  Goal: Normalized Cardiac Rhythm  Outcome: Met     Problem: Anemia  Goal: Anemia Symptom Improvement  Outcome: Met     Problem: Pain Chronic (Persistent)  Goal: Optimal Pain Control and Function  Outcome: Met     Problem: Wound  Goal: Optimal Coping  Outcome: Met  Goal: Optimal Functional Ability  Outcome: Met  Goal: Absence of Infection Signs and Symptoms  Outcome: Met  Goal: Improved Oral Intake  Outcome: Met  Goal: Optimal Pain Control and Function  Outcome: Met  Goal: Skin Health and Integrity  Outcome: Met  Goal: Optimal Wound Healing  Outcome: Met

## 2025-03-11 NOTE — DISCHARGE INSTRUCTIONS
Please return to the ED if you develop low blood pressure, low heart rate, feel dizziness, headaches, confusion.     Acute care at home will follow to resume blood pressure meds as tolerated. You will receive a phone call.     Please follow up outpatient with cardiology. Apt is requested.

## 2025-03-11 NOTE — H&P
[facility] - Observation [address] [place] [address] Medicine  History & Physical    Patient Name: Zander Kaur  MRN: [id number]  Patient Class: OP- Observation  Admission Date: 3/10/2025  Attending Physician: Ana Gonzalez MD   Primary Care Provider: Shai Davison MD         Patient information was obtained from patient and past medical records.     Subjective:     Principal Problem:Hypotension due to hypovolemia    Chief Complaint:   Chief Complaint   Patient presents with    Post-op Problem     Knee replacement 6 weeks ago, sent from clinic hypotension, cp and sob        HPI: Zander Kaur is a 74 y.o. male with history of remote liver transplant, HTN, HLD, chronic pain who presents with complaints of lightheadedness and hypotension.     He reports that he has done fairly well after his recent knee surgery, however his activity has significantly decreased due to this.  He has been participating with therapy, but does note some dyspnea with exertion since his procedure.  This morning he awoke and had some mild increase in his shortness for breath, however was still able to do his normal activities.  He had a few appointments here today, and after he got here, he felt some generalized weakness and as if he was going to pass out, therefore he was brought to the ED. he never lost consciousness, and never fell to the ground.  This has never happened to him before.    In the ED, he was initially hypotensive to 80s/50s with bradycardia to high 40s-50s, which resolved with only 500mL IVF. No atropine given. He was afebrile, and other workup reassuring. Vanc, Aztreonam, and Flagyl given due to immunosuppressed status. Hospital Medicine consulted for hypotension.     He currently feels around his baseline.  He denies any recent fever, nausea, vomiting, diarrhea, chest pain, new skin lesions, worsening pain in his knee, or other significant symptoms.     Past Medical History:   Diagnosis Date    Actinic keratosis     Allergy      seasonal    Arthritis     Basal cell carcinoma of right ala nasi 02/06/2023    Hypertension     Joint pain     Organ transplant     liver 2010    SCC (squamous cell carcinoma) 08/15/2022    Right cheek -SSW    SCC (squamous cell carcinoma) 03/27/2023    Right angle of mandible    SCC (squamous cell carcinoma) 03/27/2023    Right medial cheek    SCC (squamous cell carcinoma) 02/12/2024    anterior chin    SCC (squamous cell carcinoma) 06/10/2024    R ear helix-excised by JAM    SCC (squamous cell carcinoma) 06/10/2024    SCCIS-R post neck    SCC (squamous cell carcinoma), ear, 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    Squamous Cell Carcinoma 08/28/2013    right temple    Squamous Cell Carcinoma 09/04/2013    left forehead    Stroke        Past Surgical History:   Procedure Laterality Date    ANKLE SURGERY      multipole surgeries    CLOSURE OF DEFECT OF MOHS PROCEDURE Right 02/09/2023    Procedure: CLOSURE, MOHS PROCEDURE DEFECT;  Surgeon: Uzair Brandon MD;  Location: [facility] OR [address] [place] [address];  Service: ENT;  Laterality: Right;    COLONOSCOPY N/A 12/18/2023    Procedure: COLONOSCOPY;  Surgeon: Leonardo Penny MD;  Location: [facility] ENDOSCOPY;  Service: Endoscopy;  Laterality: N/A;  Referral: RENÉE HODGE / PEG / [facility] portal - LW  12/11- pre call complete.  DBM  12.15 precall complete; pt has instr.; all questions answered; AP    CYSTOSCOPY N/A 04/05/2024    Procedure: CYSTOSCOPY;  Surgeon: Galileo Ramos MD;  [facility];  Service: Urology;  Laterality: N/A;    EAR RECONSTRUCTION Right 10/8/2024    Procedure: RECONSTRUCTION, EAR;  Surgeon: Randee Brown MD;  [facility];  Service: ENT;  Laterality: Right;    FACIAL RECONSTRUCTION SURGERY      HERNIA REPAIR      knee scope      bob, multiple    LIVER TRANSPLANT      2010    PROSTATE BIOPSY N/A 04/05/2024    Procedure: BIOPSY, PROSTATE;  Surgeon: Galileo Ramos MD;  Location: [facility] OR [address] [place] [address];  Service: Urology;  Laterality: N/A;   TransPERINEAL biopsy    REVISION OF FLAP GRAFT Right 03/03/2023    Procedure: REVISION, PROCEDURE INVOLVING FLAP GRAFT;  Surgeon: Uzair Brandon MD;  Location: [facility] OR 2ND FLR;  Service: ENT;  Laterality: Right;    REVISION OF FLAP GRAFT Right 08/01/2023    Procedure: REVISION, PROCEDURE INVOLVING FLAP GRAFT;  Surgeon: Consuelo Augustine MD;  Location: [facility] OR;  Service: ENT;  Laterality: Right;    SHOULDER OPEN ROTATOR CUFF REPAIR      left    SKIN FULL THICKNESS GRAFT Right 10/8/2024    Procedure: APPLICATION, GRAFT, SKIN, FULL-THICKNESS;  Surgeon: Randee Brown MD;  Location: [facility] OR 2ND FLR;  Service: ENT;  Laterality: Right;    TOTAL KNEE REPLACEMENT USING COMPUTER NAVIGATION Left 1/28/2025    Procedure: ARTHROPLASTY, KNEE, TOTAL, YAKOV COMPUTER-ASSISTED NAVIGATION: LEFT;  Surgeon: Carson Lock MD;  Location: [facility] OR;  Service: Orthopedics;  Laterality: Left;    TRANSRECTAL ULTRASOUND EXAMINATION N/A 04/05/2024    Procedure: ULTRASOUND, RECTAL APPROACH;  Surgeon: Galileo Ramos MD;  Location: [facility] OR 1ST FLR;  Service: Urology;  Laterality: N/A;       Review of patient's allergies indicates:   Allergen Reactions    Diphenhydramine hcl     Penicillins      Other reaction(s): Rash    Povidone-iodine      Other reaction(s): Itching  Other reaction(s): Itching       Current Facility-Administered Medications on File Prior to Encounter   Medication    sodium chloride 0.9% flush 10 mL     Current Outpatient Medications on File Prior to Encounter   Medication Sig    acetaminophen (TYLENOL) 650 MG TbSR Take 1 tablet (650 mg total) by mouth every 8 (eight) hours.    aspirin (ECOTRIN) 81 MG EC tablet Take 1 tablet (81 mg total) by mouth 2 (two) times a day.    atorvastatin (LIPITOR) 40 MG tablet Take 1 tablet (40 mg total) by mouth once daily.    celecoxib (CELEBREX) 200 MG capsule Take 1 capsule (200 mg total) by mouth once daily.    doxazosin (CARDURA) 8 MG Tab Take 1 tablet by mouth once daily. Please schedule  an appointment with primary care provider for further refills.    gabapentin (NEURONTIN) 300 MG capsule Take 300 mg by mouth 3 (three) times daily.    methocarbamoL (ROBAXIN) 750 MG Tab Take 1 tablet (750 mg total) by mouth 3 (three) times daily. Reports taking 1/2 pill 6 times per day    metoprolol tartrate (LOPRESSOR) 100 MG tablet Take 1 tablet by mouth twice daily.    multivit with minerals/lutein (MULTIVITAMIN 50 PLUS ORAL) Take 1 tablet by mouth once daily.    oxyCODONE (ROXICODONE) 5 MG immediate release tablet Take 1-2 tablets every 4-6 hours as needed for pain    pantoprazole (PROTONIX) 40 MG tablet Take 1 tablet (40 mg total) by mouth once daily.    PROGRAF 1 mg Cap Take 1 capsule (1 mg total) by mouth every 12 (twelve) hours.    vitamin D 1000 units Tab Take 1,000 Units by mouth once daily.    [DISCONTINUED] cefadroxil (DURICEF) 500 MG Cap Take 1 capsule (500 mg total) by mouth every 12 (twelve) hours.    [DISCONTINUED] fluorouraciL (EFUDEX) 5 % cream Compound fluorouracil 5% + calcipotriene 0.005% cream. Apply a pea-sized amount to entire scalp BID x 7 days.    [DISCONTINUED] pregabalin (LYRICA) 75 MG capsule Take 1 capsule (75 mg total) by mouth 2 (two) times daily.    [DISCONTINUED] senna-docusate 8.6-50 mg (SENNA WITH DOCUSATE SODIUM) 8.6-50 mg per tablet Take 1 tablet by mouth once daily.     Family History       Problem Relation (Age of Onset)    Hearing loss Mother    No Known Problems Son, Son    Thyroid disease Sister          Tobacco Use    Smoking status: Former     Current packs/day: 0.00     Types: Cigarettes     Quit date:      Years since quittin.2    Smokeless tobacco: Never    Tobacco comments:     SMOKED FOR 1 YEAR, [employer] MADE HIM STOP   Substance and Sexual Activity    Alcohol use: Not Currently     Alcohol/week: 2.0 standard drinks of alcohol     Types: 2 Glasses of wine per week    Drug use: Never    Sexual activity: Yes     Partners: Female     Comment: wife had  tubal ligation     Review of Systems   Constitutional:         All pertinent other ROS noted in HPI   All other systems reviewed and are negative.    Objective:     Vital Signs (Most Recent):  Temp: 98 °F (36.7 °C) (03/10/25 2130)  Pulse: 62 (03/10/25 2314)  Resp: 16 (03/10/25 2130)  BP: 127/69 (03/10/25 2130)  SpO2: 96 % (03/10/25 2130) Vital Signs (24h Range):  Temp:  [97.6 °F (36.4 °C)-98.4 °F (36.9 °C)] 98 °F (36.7 °C)  Pulse:  [47-64] 62  Resp:  [12-28] 16  SpO2:  [94 %-98 %] 96 %  BP: ()/(52-85) 127/69     Weight: 88.4 kg (194 lb 14.2 oz)  Body mass index is 30.52 kg/m².     Physical Exam  Vitals and nursing note reviewed.   Constitutional:       General: He is not in acute distress.     Appearance: He is well-developed. He is not ill-appearing or diaphoretic.   HENT:      Head: Normocephalic and atraumatic.      Mouth/Throat:      Mouth: Mucous membranes are moist.   Eyes:      General: No scleral icterus.     Conjunctiva/sclera: Conjunctivae normal.   Neck:      Vascular: No JVD.   Cardiovascular:      Rate and Rhythm: Normal rate and regular rhythm.   Pulmonary:      Effort: Pulmonary effort is normal. No respiratory distress.   Musculoskeletal:      Right lower leg: Edema present.      Left lower leg: Edema present.      Comments: Mild right lower extremity edema, increased edema in the left lower extremity distal to the site of L TKA.  No significant effusion or erythema to surgical site.   Skin:     Coloration: Skin is not jaundiced or pale.   Neurological:      Mental Status: He is alert and oriented to person, place, and time.      Motor: No weakness or abnormal muscle tone.   Psychiatric:         Mood and Affect: Mood normal.         Behavior: Behavior normal.                Significant Labs: All pertinent labs within the past 24 hours have been reviewed.  CBC:   Recent Labs   Lab 03/10/25  1226   WBC 6.18   HGB 13.8*   HCT 44.4        CMP:   Recent Labs   Lab 03/10/25  1226      K  3.8      CO2 25   *   BUN 15   CREATININE 1.0   CALCIUM 9.3   PROT 7.3   ALBUMIN 3.8   BILITOT 0.7   ALKPHOS 86   AST 24   ALT 16   ANIONGAP 9       Significant Imaging: I have reviewed all pertinent imaging results/findings within the past 24 hours.  Assessment/Plan:     * Hypotension due to hypovolemia  Presents with pre-syncopal symptoms, along with hypotension and bradycardia. Bradycardia appears chronic per prior EKGs. Hypotension has resolved with only 500mL IVF, raising suspicion for hypovolemic hypotension, likely augmented by chronic bradycardia.   -obtain orthostatic VS, and will give further IVF if positive. Encourage PO hydration.  -hold antihypertensives and metoprolol for now  -No other signs of focal infection or sepsis, and no lactic acidosis; initially given Vanc + Aztreonam + Flagyl due to immunosuppressed status, however will hold further antibiotics for now and monitor blood cultures/clincally.    -CTA without signs of PE or R heart strain, so low suspicion for VTE      Bradycardia  Appears to have chronic bradycardia noted on prior EKGs, with baseline heart rate around 40s to 50s. Had bradycardia near baseline on presentation as well. Suspect that this augmented his initial hypotension. Denies CP, no signs of acute ischemia on EKG, and Trop is WNL. Will hold home Metoprolol for now and monitor on telemetry.       Normocytic anemia  Very mild, and near baseline without indication for transfusion. Not likely contributing to presentation. Monitor.     Status post left knee replacement  No acute issues, and progressing well with therapy. Continue to encourage mobility.       Uncomplicated opioid dependence  PDMP reviewed. Will hold opiates as able for now given initial hypotension, and resume as needed.       Lymphedema of both lower extremities  Likely contributing to leg swelling. DVT study ordered; will follow.       Immunosuppressed status  Due to history of liver transplant, and on  Prograf. Although at risk for infection, his initial hypotension is more likely secondary to volume depletion as resolved with only 500mL IVF. No other signs of sepsis/infection on workup. Will monitor blood cultures.       S/P liver transplant  Remote, without current signs of dysfunction. Will continue home Prograf. Obtain level.       Essential hypertension  Chronic, and currently  hypotension . Latest blood pressure and vitals reviewed-   While in the hospital, will manage blood pressure as follows; Adjust home antihypertensive regimen as follows- hold home metoprolol given initial hypotension and bradycardia.    Will utilize p.r.n. blood pressure medication only if patient's blood pressure greater than 220/110 and he develops symptoms such as worsening chest pain or shortness of breath.      VTE Risk Mitigation (From admission, onward)           Ordered     enoxaparin injection 40 mg  Every 24 hours         03/10/25 1816     IP VTE HIGH RISK PATIENT  Once         03/10/25 1816     Place sequential compression device  Until discontinued         03/10/25 1816                       On 03/10/2025, patient should be placed in hospital observation services under my care.             Amado Loredo MD  Department of Hospital Medicine  [facility] - Observation 11H

## 2025-03-11 NOTE — PLAN OF CARE
[facility] - Observation 11H  Discharge Assessment    Primary Care Provider: Shai Davison MD     Discharge Assessment (most recent)       BRIEF DISCHARGE ASSESSMENT - 03/11/25 1215          Discharge Planning    Assessment Type Discharge Planning Brief Assessment     Resource/Environmental Concerns none     Support Systems Spouse/significant other     Equipment Currently Used at Home cane, straight;shower chair;grab bar     Current Living Arrangements home     Patient/Family Anticipates Transition to home     Patient/Family Anticipated Services at Transition none     DME Needed Upon Discharge  none     Discharge Plan A Home     Discharge Plan B Home                   Pt is a 74 y.o. male admitted with hypotension 2/2 hypovolemia and has a PMH of HTN and HLD. He lives in a single story house. He is independent with his ADLs and iADls. He will have transportation home. [facility] given to patient and/or family with understanding verbalized.   name and number and estimated discharge date written on white board in patient's room with request to call for any questions or concerns.  Will continue to follow for needs.  Discharge Plan A and Plan B have been determined by review of patient's clinical status, future medical and therapeutic needs, and coverage/benefits for post-acute care in coordination with multidisciplinary team members.  Miles Wiggins RN,BSN

## 2025-03-12 ENCOUNTER — PATIENT OUTREACH (OUTPATIENT)
Dept: ADMINISTRATIVE | Facility: CLINIC | Age: 75
End: 2025-03-12
Payer: MEDICARE

## 2025-03-12 DIAGNOSIS — Z96.652 STATUS POST LEFT KNEE REPLACEMENT: ICD-10-CM

## 2025-03-12 RX ORDER — OXYCODONE HYDROCHLORIDE 5 MG/1
TABLET ORAL
Qty: 40 TABLET | Refills: 0 | Status: SHIPPED | OUTPATIENT
Start: 2025-03-12

## 2025-03-12 NOTE — PLAN OF CARE
[facility] - Observation 11H  Discharge Final Note    Primary Care Provider: Shai Davison MD    Expected Discharge Date: 3/11/2025    Final Discharge Note (most recent)       Final Note - 03/12/25 0841          Final Note    Assessment Type Final Discharge Note     Anticipated Discharge Disposition Home or Self Care     Hospital Resources/Appts/Education Provided Provided patient/caregiver with written discharge plan information;Provided education on problems/symptoms using teachback;Appointments scheduled and added to AVS        Post-Acute Status    Discharge Delays None known at this time                   Future Appointments   Date Time Provider Department Center   3/12/2025  3:30 PM Pina Perez, PT [facility] OP RHB2 [place] 2 fl   3/31/2025  1:00 PM Lima Campos MD [facility] DERM [facility]   4/21/2025 11:15 AM Carson Lock MD [facility] ORTHO [facility]   6/10/2025  8:00 AM LAB, APPOINTMENT [facility] LAB VNP [facility]   Case management attempted to schedule Cardiology Clinic referral. There  was no appointments available in the time frame suggested and a clinic rep will be contacting the patient.    Pt discharged home with no services.     Miles Wiggins, RN,BSN        Important Message from Medicare

## 2025-03-12 NOTE — PROGRESS NOTES
C3 nurse attempted to contact Zander Kaur  for a TCC post hospital discharge follow up call. No answer. Left voicemail with callback information. The patient does not have a scheduled HOSFU appointment. Message sent to PCP staff for assistance with scheduling visit with patient.

## 2025-03-12 NOTE — TELEPHONE ENCOUNTER
Spoke to pt's wife, Marbella, and scheduled [facility] visit for March 17 at 9:40am   Ear Star Wedge Flap Text: The defect edges were debeveled with a #15 blade scalpel.  Given the location of the defect and the proximity to free margins (helical rim) an ear star wedge flap was deemed most appropriate.  Using a sterile surgical marker, the appropriate flap was drawn incorporating the defect and placing the expected incisions between the helical rim and antihelix where possible.  The area thus outlined was incised through and through with a #15 scalpel blade.

## 2025-03-13 ENCOUNTER — DOCUMENTATION ONLY (OUTPATIENT)
Dept: REHABILITATION | Facility: HOSPITAL | Age: 75
End: 2025-03-13
Payer: MEDICARE

## 2025-03-13 NOTE — PROGRESS NOTES
Phone Call 3/11/2025 at 18:05:  PT called patient's wife to discuss PT plan after ED visit. Patient's wife reported home health will be monitoring blood pressure for next several days. PT will postpone any further PT treatment until clearance from ortho and home health. PT will follow up with ortho MD office.     Cassie Perez, PT, DPT

## 2025-03-13 NOTE — HOSPITAL COURSE
Patient admitted after hypotension and bradycardia at orthopedic surgery office. Vitals improved after administration of IV fluid bolus. Found to be orthostatics positive. Additional IV fluid bolus was administered. CTPE negative for pulmonary embolism. Lower extremity duplex negative. Echocardiogram reviewed - no significant findings. Home metoprolol and BP were held - HR remained in 70-80s and BP improved. Patient is hemodynamically stable and asking to be discharged home. Acute care at home will follow to resume BP medications as needed. Return precautions given.

## 2025-03-13 NOTE — DISCHARGE SUMMARY
[facility] - Observation [address] [place] [address] Medicine  Discharge Summary      Patient Name: Zander Kaur  MRN: [id number]  CLEMENCIA: [id number]  Patient Class: OP- Observation  Admission Date: 3/10/2025  Hospital Length of Stay: 0 days  Discharge Date and Time: 3/11/2025  6:14 PM  Attending Physician: Veronique att. providers found   Discharging Provider: Ana Gonzalez MD  Primary Care Provider: Shai Davison MD  Hospital Medicine Team: [facility]  Ana Gonzalez MD  Primary Care Team: [facility]    HPI:   Zander Kaur is a 74 y.o. male with history of remote liver transplant, HTN, HLD, chronic pain who presents with complaints of lightheadedness and hypotension.     He reports that he has done fairly well after his recent knee surgery, however his activity has significantly decreased due to this.  He has been participating with therapy, but does note some dyspnea with exertion since his procedure.  This morning he awoke and had some mild increase in his shortness for breath, however was still able to do his normal activities.  He had a few appointments here today, and after he got here, he felt some generalized weakness and as if he was going to pass out, therefore he was brought to the ED. he never lost consciousness, and never fell to the ground.  This has never happened to him before.    In the ED, he was initially hypotensive to 80s/50s with bradycardia to high 40s-50s, which resolved with only 500mL IVF. No atropine given. He was afebrile, and other workup reassuring. Vanc, Aztreonam, and Flagyl given due to immunosuppressed status. Hospital Medicine consulted for hypotension.     He currently feels around his baseline.  He denies any recent fever, nausea, vomiting, diarrhea, chest pain, new skin lesions, worsening pain in his knee, or other significant symptoms.     * No surgery found *      Hospital Course:   Patient admitted after hypotension and bradycardia at orthopedic surgery office. Vitals improved after  administration of IV fluid bolus. Found to be orthostatics positive. Additional IV fluid bolus was administered. CTPE negative for pulmonary embolism. Lower extremity duplex negative. Echocardiogram reviewed - no significant findings. Home metoprolol and BP were held - HR remained in 70-80s and BP improved. Patient is hemodynamically stable and asking to be discharged home. Acute care at home will follow to resume BP medications as needed. Return precautions given.      Goals of Care Treatment Preferences:  Code Status: Full Code         Consults:     Assessment & Plan  Hypotension due to hypovolemia  Presents with pre-syncopal symptoms, along with hypotension and bradycardia. Bradycardia appears chronic per prior EKGs. Hypotension has resolved with only 500mL IVF, raising suspicion for hypovolemic hypotension, likely augmented by chronic bradycardia.   -obtain orthostatic VS, and will give further IVF if positive. Encourage PO hydration.  -hold antihypertensives and metoprolol for now  -No other signs of focal infection or sepsis, and no lactic acidosis; initially given Vanc + Aztreonam + Flagyl due to immunosuppressed status, however will hold further antibiotics for now and monitor blood cultures/clincally.    -CTA without signs of PE or R heart strain, so low suspicion for VTE    Essential hypertension  Chronic, and currently  hypotension . Latest blood pressure and vitals reviewed-   While in the hospital, will manage blood pressure as follows; Adjust home antihypertensive regimen as follows- hold home metoprolol given initial hypotension and bradycardia.    Will utilize p.r.n. blood pressure medication only if patient's blood pressure greater than 220/110 and he develops symptoms such as worsening chest pain or shortness of breath.  S/P liver transplant  Remote, without current signs of dysfunction. Will continue home Prograf. Obtain level.     Immunosuppressed status  Due to history of liver transplant, and  on Prograf. Although at risk for infection, his initial hypotension is more likely secondary to volume depletion as resolved with only 500mL IVF. No other signs of sepsis/infection on workup. Will monitor blood cultures.     Lymphedema of both lower extremities  Likely contributing to leg swelling. DVT study ordered; will follow.     Uncomplicated opioid dependence  PDMP reviewed. Will hold opiates as able for now given initial hypotension, and resume as needed.     Status post left knee replacement  No acute issues, and progressing well with therapy. Continue to encourage mobility.     Normocytic anemia  Very mild, and near baseline without indication for transfusion. Not likely contributing to presentation. Monitor.   Bradycardia  Appears to have chronic bradycardia noted on prior EKGs, with baseline heart rate around 40s to 50s. Had bradycardia near baseline on presentation as well. Suspect that this augmented his initial hypotension. Denies CP, no signs of acute ischemia on EKG, and Trop is WNL. Will hold home Metoprolol for now and monitor on telemetry.     Final Active Diagnoses:    Diagnosis Date Noted POA    PRINCIPAL PROBLEM:  Hypotension due to hypovolemia [E86.1] 03/10/2025 Yes    Normocytic anemia [D64.9] 03/10/2025 Yes    Bradycardia [R00.1] 03/10/2025 Yes     Chronic    Status post left knee replacement [Z96.652] 01/31/2025 Not Applicable     Chronic    Uncomplicated opioid dependence [F11.20] 09/23/2024 Yes     Chronic    Lymphedema of both lower extremities [I89.0] 05/02/2024 Yes     Chronic    Immunosuppressed status [D84.9] 07/09/2018 Yes     Chronic    Essential hypertension [I10] 06/19/2013 Yes     Chronic    S/P liver transplant [Z94.4] 06/19/2013 Not Applicable     Chronic      Problems Resolved During this Admission:       Discharged Condition: stable    Disposition: Home or Self Care    Follow Up:    Patient Instructions:      Ambulatory referral/consult to Cardiology   Standing Status:  Future   Referral Priority: Routine Referral Type: Consultation   Referral Reason: Specialty Services Required   Requested Specialty: Cardiology   Number of Visits Requested: 1     Ambulatory referral/consult to Acute Care at Home   Standing Status: Future   Referral Priority: Routine Referral Type: Consultation   Referred to Provider: TK PROVIDER Requested Specialty: Internal Medicine   Number of Visits Requested: 1       Significant Diagnostic Studies: N/A    Pending Diagnostic Studies:       None           Medications:  Reconciled Home Medications:      Medication List        PAUSE taking these medications      doxazosin 8 MG Tab  Wait to take this until your doctor or other care provider tells you to start again.  Until okay with ACA[facility]  Commonly known as: CARDURA  Take 1 tablet by mouth once daily. Please schedule an appointment with primary care provider for further refills.     metoprolol tartrate 100 MG tablet  Wait to take this until your doctor or other care provider tells you to start again.  Until okay with ACAH  Commonly known as: LOPRESSOR  Take 1 tablet by mouth twice daily.            CONTINUE taking these medications      acetaminophen 650 MG Tbsr  Commonly known as: TYLENOL  Take 1 tablet (650 mg total) by mouth every 8 (eight) hours.     aspirin 81 MG EC tablet  Commonly known as: ECOTRIN  Take 1 tablet (81 mg total) by mouth 2 (two) times a day. for 14 days     atorvastatin 40 MG tablet  Commonly known as: LIPITOR  Take 1 tablet (40 mg total) by mouth once daily.     celecoxib 200 MG capsule  Commonly known as: CeleBREX  Take 1 capsule (200 mg total) by mouth once daily.     gabapentin 300 MG capsule  Commonly known as: NEURONTIN  Take 300 mg by mouth 3 (three) times daily.     methocarbamoL 750 MG Tab  Commonly known as: ROBAXIN  Take 1 tablet (750 mg total) by mouth 3 (three) times daily. Reports taking 1/2 pill 6 times per day     MULTIVITAMIN 50 PLUS ORAL  Take 1 tablet by mouth once  daily.     pantoprazole 40 MG tablet  Commonly known as: PROTONIX  Take 1 tablet (40 mg total) by mouth once daily.     PROGRAF 1 mg Cap  Generic drug: tacrolimus  Take 1 capsule (1 mg total) by mouth every 12 (twelve) hours.     vitamin D 1000 units Tab  Commonly known as: VITAMIN D3  Take 1,000 Units by mouth once daily.              Indwelling Lines/Drains at time of discharge:   Lines/Drains/Airways       None                   Time spent on the discharge of patient: 45 minutes         Ana Gonzalez MD  Department of Hospital Medicine  [facility] - Observation 11H

## 2025-03-13 NOTE — PROGRESS NOTES
2nd Attempt made to reach patient for TCC call. The patient's spouse, Marbella is unable to conduct the call @ this time. The patient requested a callback.

## 2025-03-13 NOTE — ASSESSMENT & PLAN NOTE
Chronic, and currently hypotension. Latest blood pressure and vitals reviewed-   While in the hospital, will manage blood pressure as follows; Adjust home antihypertensive regimen as follows- hold home metoprolol given initial hypotension and bradycardia.    Will utilize p.r.n. blood pressure medication only if patient's blood pressure greater than 220/110 and he develops symptoms such as worsening chest pain or shortness of breath.

## 2025-03-14 NOTE — PROGRESS NOTES
C3 nurse spoke with Zander Kaur's spouse, Marbella for a TCC post hospital discharge follow up call. The patient has a scheduled HOSFU appointment (see below).

## 2025-03-15 LAB
BACTERIA BLD CULT: NORMAL
BACTERIA BLD CULT: NORMAL

## 2025-03-17 ENCOUNTER — PATIENT MESSAGE (OUTPATIENT)
Dept: INTERNAL MEDICINE | Facility: CLINIC | Age: 75
End: 2025-03-17

## 2025-03-17 ENCOUNTER — OFFICE VISIT (OUTPATIENT)
Dept: INTERNAL MEDICINE | Facility: CLINIC | Age: 75
End: 2025-03-17
Payer: MEDICARE

## 2025-03-17 ENCOUNTER — LAB VISIT (OUTPATIENT)
Dept: LAB | Facility: HOSPITAL | Age: 75
End: 2025-03-17
Attending: INTERNAL MEDICINE
Payer: MEDICARE

## 2025-03-17 VITALS
SYSTOLIC BLOOD PRESSURE: 134 MMHG | DIASTOLIC BLOOD PRESSURE: 82 MMHG | HEART RATE: 60 BPM | HEIGHT: 67 IN | BODY MASS INDEX: 31.24 KG/M2 | OXYGEN SATURATION: 98 % | WEIGHT: 199.06 LBS

## 2025-03-17 DIAGNOSIS — K74.69 OTHER CIRRHOSIS OF LIVER: ICD-10-CM

## 2025-03-17 DIAGNOSIS — I95.9 HYPOTENSION, UNSPECIFIED HYPOTENSION TYPE: Primary | ICD-10-CM

## 2025-03-17 DIAGNOSIS — C61 PROSTATE CANCER: ICD-10-CM

## 2025-03-17 DIAGNOSIS — I70.0 AORTIC ATHEROSCLEROSIS: ICD-10-CM

## 2025-03-17 PROBLEM — D69.6 THROMBOCYTOPENIA: Status: RESOLVED | Noted: 2022-02-14 | Resolved: 2025-03-17

## 2025-03-17 PROBLEM — C41.1 MALIGNANT NEOPLASM OF MANDIBLE: Status: RESOLVED | Noted: 2024-09-23 | Resolved: 2025-03-17

## 2025-03-17 LAB — COMPLEXED PSA SERPL-MCNC: 8.9 NG/ML (ref 0–4)

## 2025-03-17 PROCEDURE — 99999 PR PBB SHADOW E&M-EST. PATIENT-LVL IV: CPT | Mod: PBBFAC,,, | Performed by: INTERNAL MEDICINE

## 2025-03-17 PROCEDURE — 84153 ASSAY OF PSA TOTAL: CPT | Performed by: INTERNAL MEDICINE

## 2025-03-17 PROCEDURE — 36415 COLL VENOUS BLD VENIPUNCTURE: CPT | Performed by: INTERNAL MEDICINE

## 2025-03-17 PROCEDURE — 99214 OFFICE O/P EST MOD 30 MIN: CPT | Mod: PBBFAC | Performed by: INTERNAL MEDICINE

## 2025-03-17 RX ORDER — ATORVASTATIN CALCIUM 40 MG/1
40 TABLET, FILM COATED ORAL
Qty: 90 TABLET | Refills: 3 | Status: SHIPPED | OUTPATIENT
Start: 2025-03-17

## 2025-03-17 NOTE — PROGRESS NOTES
Discharge Summary:  Zander Kaur is a 74 y.o. male with history of remote liver transplant, HTN, HLD, chronic pain who presents with complaints of lightheadedness and hypotension.      He reports that he has done fairly well after his recent knee surgery, however his activity has significantly decreased due to this.  He has been participating with therapy, but does note some dyspnea with exertion since his procedure.  This morning he awoke and had some mild increase in his shortness for breath, however was still able to do his normal activities.  He had a few appointments here today, and after he got here, he felt some generalized weakness and as if he was going to pass out, therefore he was brought to the ED. he never lost consciousness, and never fell to the ground.  This has never happened to him before.     In the ED, he was initially hypotensive to 80s/50s with bradycardia to high 40s-50s, which resolved with only 500mL IVF. No atropine given. He was afebrile, and other workup reassuring. Vanc, Aztreonam, and Flagyl given due to immunosuppressed status. Hospital Medicine consulted for hypotension.      He currently feels around his baseline.  He denies any recent fever, nausea, vomiting, diarrhea, chest pain, new skin lesions, worsening pain in his knee, or other significant symptoms.      * No surgery found *       Hospital Course:   Patient admitted after hypotension and bradycardia at orthopedic surgery office. Vitals improved after administration of IV fluid bolus. Found to be orthostatics positive. Additional IV fluid bolus was administered. CTPE negative for pulmonary embolism. Lower extremity duplex negative. Echocardiogram reviewed - no significant findings. Home metoprolol and BP were held - HR remained in 70-80s and BP improved. Patient is hemodynamically stable and asking to be discharged home. Acute care at home will follow to resume BP medications as needed. Return precautions given.         History of Present Illness    CHIEF COMPLAINT:  Zander presents for follow-up after a recent ER visit due to low blood pressure issues following knee replacement surgery.    HPI:  Zander underwent knee replacement surgery on January 28th, approximately 8 weeks ago. Following the surgery, he had problems with weakness, fatigue, and shortness of breath. During a scheduled follow-up visit for a knee X-ray and orthopedic evaluation, his blood pressure dropped significantly, causing difficulty getting up from the chair onto the exam table. As a result, he was taken directly to the ER.    In the ER, extensive tests were conducted, including checks for pulmonary embolism and blood clots, all of which came back negative. Zander received intravenous fluids (1-2 bags), which helped raise his blood pressure to a normal level. The medical team determined that dehydration was likely the cause of the low blood pressure episode.    For the first 4 weeks post-surgery, he was largely inactive, only attending physical therapy sessions. When he began to stand and move around more, he felt dizzy. In the week following the ER visit, his blood pressure has been monitored at home. Readings have typically been around 120/80, with one instance of 138/90 after 3 hours of movement around the house.    Zander reports increased mobility around the house in the last couple of days. He still has mild shortness of breath, which is attributed to his limited activity in the weeks following surgery. He is currently off his previous blood pressure medications, metoprolol and doxazosin.     He had a prostate biopsy about 9-10 months ago, which showed some abnormal results. The urologist had recommended an annual follow-up, but it appears this may have been missed or not scheduled.    Zander denies any blood clots or pulmonary embolism.    MEDICATIONS:  Zander previously took Metoprolol and Doxazosin for blood pressure management. Both  medications have been discontinued.    MEDICAL HISTORY:  Zander has a history of hypertension and was on blood pressure medications (Metoprolol and Doxazosin) for at least 8 years. He was diagnosed with low to intermediate risk prostate cancer approximately 9-10 months ago via biopsy. Zander also has a liver condition.    SURGICAL HISTORY:  Zander underwent knee replacement surgery on January 28th, approximately 8 weeks prior to this visit.    TEST RESULTS:  After surgery, the patient's blood count was slightly low. Blood chemistry possibly indicated dehydration post-surgery. A PSA test is scheduled for April 2024. A prostate biopsy is scheduled for April 2024. In a previous biopsy, 2 out of 12 samples were positive for prostate cancer, indicating low to intermediate risk.    IMAGING:  Zander underwent imaging in the ER for pulmonary embolism and blood clots, both yielding negative re  sults.    ROS:  General: +fatigue, +weakness  Cardiovascular: +lower extremity edema, +orthostatic symptoms, +near-syncope  Respiratory: +shortness of breath, +exertional dyspnea  Neurological: +dizziness             Review of Systems    Past Medical History:   Diagnosis Date    Actinic keratosis     Allergy     seasonal    Arthritis     Basal cell carcinoma of right ala nasi 02/06/2023    Hypertension     Joint pain     Organ transplant     liver 2010    SCC (squamous cell carcinoma) 08/15/2022    Right cheek -SSW    SCC (squamous cell carcinoma) 03/27/2023    Right angle of mandible    SCC (squamous cell carcinoma) 03/27/2023    Right medial cheek    SCC (squamous cell carcinoma) 02/12/2024    anterior chin    SCC (squamous cell carcinoma) 06/10/2024    R ear helix-excised by JAM    SCC (squamous cell carcinoma) 06/10/2024    SCCIS-R post neck    SCC (squamous cell carcinoma), ear, 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    Squamous Cell Carcinoma 08/28/2013    right temple    Squamous Cell Carcinoma 09/04/2013    left forehead     Stroke      Past Surgical History:   Procedure Laterality Date    ANKLE SURGERY      multipole surgeries    CLOSURE OF DEFECT OF MOHS PROCEDURE Right 02/09/2023    Procedure: CLOSURE, MOHS PROCEDURE DEFECT;  Surgeon: Uzair Brandon MD;  Location: [facility] OR 2ND FLR;  Service: ENT;  Laterality: Right;    COLONOSCOPY N/A 12/18/2023    Procedure: COLONOSCOPY;  Surgeon: Leonardo Penny MD;  Location: [facility] ENDOSCOPY;  Service: Endoscopy;  Laterality: N/A;  Referral: RENÉE HODGE / PEG / [facility] portal - LW  12/11- pre call complete.  DBM  12.15 precall complete; pt has instr.; all questions answered; AP    CYSTOSCOPY N/A 04/05/2024    Procedure: CYSTOSCOPY;  Surgeon: Galileo Ramos MD;  Location: [facility] OR 1ST FLR;  Service: Urology;  Laterality: N/A;    EAR RECONSTRUCTION Right 10/8/2024    Procedure: RECONSTRUCTION, EAR;  Surgeon: Randee Brown MD;  [facility];  Service: ENT;  Laterality: Right;    FACIAL RECONSTRUCTION SURGERY      HERNIA REPAIR      knee scope      bob, multiple    LIVER TRANSPLANT      2010    PROSTATE BIOPSY N/A 04/05/2024    Procedure: BIOPSY, PROSTATE;  Surgeon: Galileo Ramos MD;  [facility];  Service: Urology;  Laterality: N/A;  TransPERINEAL biopsy    REVISION OF FLAP GRAFT Right 03/03/2023    Procedure: REVISION, PROCEDURE INVOLVING FLAP GRAFT;  Surgeon: Uzair Brandon MD;  [facility];  Service: ENT;  Laterality: Right;    REVISION OF FLAP GRAFT Right 08/01/2023    Procedure: REVISION, PROCEDURE INVOLVING FLAP GRAFT;  Surgeon: Consuelo Augustine MD;  Location: [facility] OR;  Service: ENT;  Laterality: Right;    SHOULDER OPEN ROTATOR CUFF REPAIR      left    SKIN FULL THICKNESS GRAFT Right 10/8/2024    Procedure: APPLICATION, GRAFT, SKIN, FULL-THICKNESS;  Surgeon: Randee Brown MD;  Location: [facility] OR 2ND FLR;  Service: ENT;  Laterality: Right;    TOTAL KNEE REPLACEMENT USING COMPUTER NAVIGATION Left 1/28/2025    Procedure: ARTHROPLASTY, KNEE,  TOTAL, YAKOV COMPUTER-ASSISTED NAVIGATION: LEFT;  Surgeon: Carson Lock MD;  Location: [facility] OR;  Service: Orthopedics;  Laterality: Left;    TRANSRECTAL ULTRASOUND EXAMINATION N/A 04/05/2024    Procedure: ULTRASOUND, RECTAL APPROACH;  Surgeon: Galileo Ramos MD;  Location: [facility] OR [address] [place] [address];  Service: Urology;  Laterality: N/A;      Problem List[1]       Objective:      Physical Exam    Extremities: Ankles were swollen.       Physical Exam  Cardiovascular:      Rate and Rhythm: Normal rate and regular rhythm.   Pulmonary:      Effort: Pulmonary effort is normal. No respiratory distress.   Musculoskeletal:      Right lower leg: Edema present.      Left lower leg: Edema present.           Assessment:       Problem List Items Addressed This Visit          Cardiac/Vascular    Aortic atherosclerosis       GI    Hepatic cirrhosis     Other Visit Diagnoses         Hypotension, unspecified hypotension type    -  Primary      Prostate cancer        Relevant Orders    Prostate Specific Antigen, Diagnostic            Plan:       Zander was seen today for hospital follow up.    Diagnoses and all orders for this visit:    Hypotension, unspecified hypotension type    Other cirrhosis of liver  Comments:  Treated with transplant. Monitored with liver labs, prograf treatment.    Aortic atherosclerosis  Comments:  Treated with statin, Assess vital, manage BP.    Prostate cancer  -     Prostate Specific Antigen, Diagnostic; Future       Needs Urology Follow up. Keep Cardiology Follow up.     As this patient's PCP, I am actively managing and/or treating their chronic medical conditions including Cirrhosis, Prostate Cancer, HTN and have been for at least 1 year. This includes, but is not limited to, medication management, coordination of care, documentation review from their specialists and labs/imaging review where pertinent.        Portions of this note may have been created with [employer] voice recognition software.  "Occasional "wrong-word" or "sound-a-like" substitutions may have occurred due to the inherent limitations of voice recognition software. Please, read the note carefully and recognize, using context, where substitutions have occurred.         [1]   Patient Active Problem List  Diagnosis    AK (actinic keratosis)    Essential hypertension    S/P liver transplant    Hepatic cirrhosis    History of skin cancer    Joint injury    Screen for colon cancer    Arthritis    Immunosuppressed status    Hypertension associated with transplantation    Aortic atherosclerosis    Mohs defect    Class 1 obesity due to excess calories with serious comorbidity and body mass index (BMI) of 30.0 to 30.9 in adult    Elevated PSA    Preoperative cardiovascular examination    History of stroke    Edema of both lower extremities    Lymphedema of both lower extremities    Varicose veins of bilateral lower extremities with other complications    Venous stasis dermatitis of both lower extremities    Uncomplicated opioid dependence    Primary osteoarthritis of left knee    Snoring    Prediabetes    Left knee pain    Status post left knee replacement    Hypotension due to hypovolemia    Normocytic anemia    Bradycardia     "

## 2025-03-20 ENCOUNTER — OFFICE VISIT (OUTPATIENT)
Dept: CARDIOLOGY | Facility: CLINIC | Age: 75
End: 2025-03-20
Payer: MEDICARE

## 2025-03-20 VITALS
BODY MASS INDEX: 30.34 KG/M2 | DIASTOLIC BLOOD PRESSURE: 106 MMHG | SYSTOLIC BLOOD PRESSURE: 185 MMHG | HEIGHT: 67 IN | WEIGHT: 193.31 LBS | HEART RATE: 83 BPM

## 2025-03-20 DIAGNOSIS — I70.0 AORTIC ATHEROSCLEROSIS: ICD-10-CM

## 2025-03-20 DIAGNOSIS — I83.893 VARICOSE VEINS OF BILATERAL LOWER EXTREMITIES WITH OTHER COMPLICATIONS: ICD-10-CM

## 2025-03-20 DIAGNOSIS — Z94.9 HYPERTENSION ASSOCIATED WITH TRANSPLANTATION: ICD-10-CM

## 2025-03-20 DIAGNOSIS — E86.1 HYPOTENSION DUE TO HYPOVOLEMIA: ICD-10-CM

## 2025-03-20 DIAGNOSIS — I89.0 LYMPHEDEMA OF BOTH LOWER EXTREMITIES: Chronic | ICD-10-CM

## 2025-03-20 DIAGNOSIS — I10 ESSENTIAL HYPERTENSION: Chronic | ICD-10-CM

## 2025-03-20 DIAGNOSIS — I15.8 HYPERTENSION ASSOCIATED WITH TRANSPLANTATION: ICD-10-CM

## 2025-03-20 DIAGNOSIS — I87.2 VENOUS STASIS DERMATITIS OF BOTH LOWER EXTREMITIES: ICD-10-CM

## 2025-03-20 DIAGNOSIS — R00.1 BRADYCARDIA: ICD-10-CM

## 2025-03-20 DIAGNOSIS — R60.0 EDEMA OF BOTH LOWER EXTREMITIES: Primary | ICD-10-CM

## 2025-03-20 PROCEDURE — 99999 PR PBB SHADOW E&M-EST. PATIENT-LVL IV: CPT | Mod: PBBFAC,,, | Performed by: INTERNAL MEDICINE

## 2025-03-20 PROCEDURE — 99214 OFFICE O/P EST MOD 30 MIN: CPT | Mod: PBBFAC | Performed by: INTERNAL MEDICINE

## 2025-03-20 NOTE — PATIENT INSTRUCTIONS
Assessment/Plan:  Zander Kaur . is a 74 y.o. male with HTN, s/p liver transplant (2010), obesity, history of stroke, who presents for a follow up appointment.    BLE Edema- Due to BLE lymphedema and venous insufficiency.  Remains significantly improved and stable. Recommend wearing graduated compression hose.  Limit sodium intake to 2000 mg daily.  Limit volume intake to 1.5 L daily.  Elevate legs when resting.    2. HTN- Pt to keep log of blood pressure/heart rate and restart metoprolol tartrate 100 mg bid and doxazosin 8 mg daily individually as blood pressure allows.     3. Obesity- Encourage diet, exercise, and weight loss.  Refer to Bariatric Medicine for assistance with weight loss.    4. Aortic atherosclerosis/history of stroke- LDL 40 on 9/16/2024. Continue atorvastatin 40 mg daily.     Follow up in 6 months with lipids prior

## 2025-03-20 NOTE — PROGRESS NOTES
[facility]      Chief Complaint   Patient presents with    History of stroke       Patient ID: Zander Kaur Sr. is a 74 y.o. male with HTN, s/p liver transplant (2010), obesity, who presents for a follow up appointment.  Pertinent history/events are as follows:     -Pt kindly referred by Dr. Lock for evaluation of leg swelling.     -At our initial clinic visit on 5/2/2024, Mr. Kaur reports leg swelling for 20 years.  He has no claudication or tissue loss.  Plan:  BLE Edema- Due to BLE lymphedema and possible venous insufficiency.  Start bumex 0.5 mg daily.  Check cmp today and in 1 week.  Refer to lymphedema clinic.  Recommend wearing graduated compression hose.  Limit sodium intake to 2000 mg daily.  Limit volume intake to 1.5 L daily.  Elevate legs when resting.  HTN- Continue current medications.  Obesity- Encourage diet, exercise, and weight loss.  Refer to Bariatric Medicine for assistance with weight loss.  Preoperative Cardiac Risk Stratification- Mr. Kaur has no chest pain, no heart failure symptoms and no arrhythmia.  He can perform greater than 4 METS with no difficulty.  EKG today shows  Aortic atherosclerosis/history of stroke- Start atorvastatin 40 mg daily.    -At follow-up clinic visit on 9/23/2024, Mr. Kaur reports significant improvement in BLE edema.  He has completed lymphedema clinic therapy.  ANDREA Study on 5/13/2024 revealed normal resting ABIs bilaterally. BLE Venous Reflux Study on 5/13/2024 demonstrated right GSV reflux and no DVT.   Plan:  BLE Edema- Due to BLE lymphedema and venous insufficiency.  Now significantly improved and stable. Recommend wearing graduated compression hose.  Limit sodium intake to 2000 mg daily.  Limit volume intake to 1.5 L daily.  Elevate legs when resting.  HTN- Continue current medications.  Obesity- Encourage diet, exercise, and weight loss.  Refer to Bariatric Medicine for assistance with weight loss.  Preoperative Cardiac Risk  "Stratification Prior to Left TKA- Mr. Kaur has no chest pain, no heart failure symptoms and no arrhythmia.  He can perform greater than 4 METS with no difficulty.  EKG on 5/2/2024 shows sinus bradycardia.  He is at intermediate risk for a perioperative major adverse cardiac event during this intermediate risk procedure.  Revised Cardiac Risk Index of 6%. No further testing indicated. Proceed with surgery.   Aortic atherosclerosis/history of stroke- Start atorvastatin 40 mg daily.     -On 3/10/2025, pt was admitted with hypotension and bradycardia (details below as per discharge summary):   "Hospital Course:   Patient admitted after hypotension and bradycardia at orthopedic surgery office. Vitals improved after administration of IV fluid bolus. Found to be orthostatics positive. Additional IV fluid bolus was administered. CTPE negative for pulmonary embolism. Lower extremity duplex negative. Echocardiogram reviewed - no significant findings. Home metoprolol and BP were held - HR remained in 70-80s and BP improved. Patient is hemodynamically stable and asking to be discharged home. Acute care at home will follow to resume BP medications as needed. Return precautions given."      HPI:  Mr. Kaur reports feeling well since hospital discharge. He has no chest pain or SOB. Main issue is chronic right ankle pain due to injury from 30 years ago.  He is followed by outside pain clinic for this. Has been off metoprolol tartrate 100 mg bid and doxazosin 8 mg daily since hospital discharge. Blood pressure today is 185/106.     Past Medical History:   Diagnosis Date    Actinic keratosis     Allergy     seasonal    Arthritis     Basal cell carcinoma of right ala nasi 02/06/2023    Hypertension     Joint pain     Organ transplant     liver 2010    SCC (squamous cell carcinoma) 08/15/2022    Right cheek -SSW    SCC (squamous cell carcinoma) 03/27/2023    Right angle of mandible    SCC (squamous cell carcinoma) 03/27/2023    Right " medial cheek    SCC (squamous cell carcinoma) 02/12/2024    anterior chin    SCC (squamous cell carcinoma) 06/10/2024    R ear helix-excised by JAM    SCC (squamous cell carcinoma) 06/10/2024    SCCIS-R post neck    SCC (squamous cell carcinoma), ear, 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    Squamous Cell Carcinoma 08/28/2013    right temple    Squamous Cell Carcinoma 09/04/2013    left forehead    Stroke      Past Surgical History:   Procedure Laterality Date    ANKLE SURGERY      multipole surgeries    CLOSURE OF DEFECT OF MOHS PROCEDURE Right 02/09/2023    Procedure: CLOSURE, MOHS PROCEDURE DEFECT;  Surgeon: Uzair Brandon MD;  [facility];  Service: ENT;  Laterality: Right;    COLONOSCOPY N/A 12/18/2023    Procedure: COLONOSCOPY;  Surgeon: Leonardo Penny MD;  [facility];  Service: Endoscopy;  Laterality: N/A;  Referral: ERNÉE HODGE / PEG / Inst portal - LW  12/11- pre call complete.  DBM  12.15 precall complete; pt has instr.; all questions answered; AP    CYSTOSCOPY N/A 04/05/2024    Procedure: CYSTOSCOPY;  Surgeon: Galileo Ramos MD;  [facility];  Service: Urology;  Laterality: N/A;    EAR RECONSTRUCTION Right 10/8/2024    Procedure: RECONSTRUCTION, EAR;  Surgeon: Randee Brown MD;  [facility];  Service: ENT;  Laterality: Right;    FACIAL RECONSTRUCTION SURGERY      HERNIA REPAIR      knee scope      bob, multiple    LIVER TRANSPLANT      2010    PROSTATE BIOPSY N/A 04/05/2024    Procedure: BIOPSY, PROSTATE;  Surgeon: Galileo Ramos MD;  [facility];  Service: Urology;  Laterality: N/A;  TransPERINEAL biopsy    REVISION OF FLAP GRAFT Right 03/03/2023    Procedure: REVISION, PROCEDURE INVOLVING FLAP GRAFT;  Surgeon: Uzair Brandon MD;  [facility];  Service: ENT;  Laterality: Right;    REVISION OF FLAP GRAFT Right 08/01/2023    Procedure: REVISION, PROCEDURE INVOLVING FLAP GRAFT;  Surgeon: Consuelo Augustine,  MD;  Location: [facility] OR;  Service: ENT;  Laterality: Right;    SHOULDER OPEN ROTATOR CUFF REPAIR      left    SKIN FULL THICKNESS GRAFT Right 10/8/2024    Procedure: APPLICATION, GRAFT, SKIN, FULL-THICKNESS;  Surgeon: Randee Brown MD;  Location: [facility] OR 2ND FLR;  Service: ENT;  Laterality: Right;    TOTAL KNEE REPLACEMENT USING COMPUTER NAVIGATION Left 2025    Procedure: ARTHROPLASTY, KNEE, TOTAL, YAKOV COMPUTER-ASSISTED NAVIGATION: LEFT;  Surgeon: Carson Lock MD;  Location: [facility] OR;  Service: Orthopedics;  Laterality: Left;    TRANSRECTAL ULTRASOUND EXAMINATION N/A 2024    Procedure: ULTRASOUND, RECTAL APPROACH;  Surgeon: Galileo Ramos MD;  Location: [facility] OR 1ST FLR;  Service: Urology;  Laterality: N/A;     Social History     Socioeconomic History    Marital status:    Occupational History    Occupation: Retired   Tobacco Use    Smoking status: Former     Current packs/day: 0.00     Types: Cigarettes     Quit date: 1969     Years since quittin.2    Smokeless tobacco: Never    Tobacco comments:     SMOKED FOR 1 YEAR, [facility] [employer] MADE HIM STOP   Substance and Sexual Activity    Alcohol use: Not Currently     Alcohol/week: 2.0 standard drinks of alcohol     Types: 2 Glasses of wine per week    Drug use: Never    Sexual activity: Yes     Partners: Female     Comment: wife had tubal ligation     Social Drivers of Health     Financial Resource Strain: Patient Declined (2025)    Overall Financial Resource Strain (CARDIA)     Difficulty of Paying Living Expenses: Patient declined   Food Insecurity: Patient Declined (2025)    Hunger Vital Sign     Worried About Running Out of Food in the Last Year: Patient declined     Ran Out of Food in the Last Year: Patient declined   Transportation Needs: Patient Declined (2025)    TRANSPORTATION NEEDS     Transportation : Patient declined   Physical Activity: Inactive (2024)    Exercise Vital Sign     Days of Exercise per Week:  0 days     Minutes of Exercise per Session: 0 min   Stress: Patient Declined (1/28/2025)    [employer] - Occupational Stress Questionnaire     Feeling of Stress : Patient declined   Housing Stability: Patient Declined (1/28/2025)    Housing Stability Vital Sign     Unable to Pay for Housing in the Last Year: Patient declined     Homeless in the Last Year: Patient declined     Family History   Problem Relation Name Age of Onset    Hearing loss Mother VIVI LANDRUM     Thyroid disease Sister      No Known Problems Son      No Known Problems Son      Melanoma Neg Hx      Psoriasis Neg Hx      Eczema Neg Hx      Lupus Neg Hx         Review of patient's allergies indicates:   Allergen Reactions    Diphenhydramine hcl     Iodine Itching    Penicillins      Other reaction(s): Rash    Povidone-iodine      Other reaction(s): Itching  Other reaction(s): Itching       Medication List with Changes/Refills   Current Medications    ACETAMINOPHEN (TYLENOL) 650 MG TBSR    Take 1 tablet (650 mg total) by mouth every 8 (eight) hours.    ASPIRIN (ECOTRIN) 81 MG EC TABLET    Take 1 tablet (81 mg total) by mouth 2 (two) times a day. for 14 days    ATORVASTATIN (LIPITOR) 40 MG TABLET    Take 1 tablet by mouth once daily.    CELECOXIB (CELEBREX) 200 MG CAPSULE    Take 1 capsule (200 mg total) by mouth once daily.    DOXAZOSIN (CARDURA) 8 MG TAB    Take 1 tablet by mouth once daily. Please schedule an appointment with primary care provider for further refills.    GABAPENTIN (NEURONTIN) 300 MG CAPSULE    Take 300 mg by mouth 3 (three) times daily.    METHOCARBAMOL (ROBAXIN) 750 MG TAB    Take 1 tablet (750 mg total) by mouth 3 (three) times daily. Reports taking 1/2 pill 6 times per day    METOPROLOL TARTRATE (LOPRESSOR) 100 MG TABLET    Take 1 tablet by mouth twice daily.    MULTIVIT WITH MINERALS/LUTEIN (MULTIVITAMIN 50 PLUS ORAL)    Take 1 tablet by mouth once daily.    OXYCODONE (ROXICODONE) 5 MG IMMEDIATE  "RELEASE TABLET    Take 1-2 tablets every 4-6 hours as needed for pain    PANTOPRAZOLE (PROTONIX) 40 MG TABLET    Take 1 tablet (40 mg total) by mouth once daily.    PROGRAF 1 MG CAP    Take 1 capsule (1 mg total) by mouth every 12 (twelve) hours.    VITAMIN D 1000 UNITS TAB    Take 1,000 Units by mouth once daily.       Review of Systems  Constitution: Denies chills, fever, and sweats.  HENT: Denies headaches or blurry vision.  Cardiovascular: Denies chest pain or irregular heart beat.  Respiratory: Denies cough or shortness of breath.  Gastrointestinal: Denies abdominal pain, nausea, or vomiting.  Musculoskeletal: Positive for leg swelling (improved).  Neurological: Denies dizziness or focal weakness.  Psychiatric/Behavioral: Normal mental status.  Hematologic/Lymphatic: Denies bleeding problem or easy bruising/bleeding.  Skin: Denies rash or suspicious lesions    Physical Examination  BP (!) 185/106 (BP Location: Left arm, Patient Position: Sitting)   Pulse 83   Ht 5' 7" (1.702 m)   Wt 87.7 kg (193 lb 5.5 oz)   BMI 30.28 kg/m²     Constitutional: No acute distress, conversant  HEENT: Sclera anicteric, Pupils equal, round and reactive to light, extraocular motions intact, Oropharynx clear  Neck: No JVD, no carotid bruits  Cardiovascular: regular rate and rhythm, no murmur, rubs or gallops, normal S1/S2  Pulmonary: Clear to auscultation bilaterally  Abdominal: Abdomen soft, nontender, nondistended, positive bowel sounds  Extremities: BLE's with 1+ pitting edema, venous stasis dermatitis, and changes consistent with lymphedema   Pulses:  Carotid pulses are 2+ on the right side, and 2+ on the left side.  Radial pulses are 2+ on the right side, and 2+ on the left side.   Femoral pulses are 2+ on the right side, and 2+ on the left side.  Popliteal pulses are 2+ on the right side, and 2+ on the left side.   Dorsalis pedis pulses are 2+ on the right side, and 2+ on the left side.   Posterior tibial pulses are 2+ on " the right side, and 2+ on the left side.    Skin: No ecchymosis, erythema, or ulcers  Psych: Alert and oriented x 3, appropriate affect  Neuro: CNII-XII intact, no focal deficits    Labs:  Most Recent Data  CBC:   Lab Results   Component Value Date    WBC 6.18 03/10/2025    HGB 13.8 (L) 03/10/2025    HCT 44.4 03/10/2025     03/10/2025    MCV 99 (H) 03/10/2025    RDW 13.6 03/10/2025     BMP:   Lab Results   Component Value Date     03/10/2025    K 3.8 03/10/2025     03/10/2025    CO2 25 03/10/2025    BUN 15 03/10/2025    CREATININE 1.0 03/10/2025     (H) 03/10/2025    CALCIUM 9.3 03/10/2025    MG 2.2 06/29/2016    PHOS 2.8 11/26/2010     LFTS;   Lab Results   Component Value Date    PROT 7.3 03/10/2025    ALBUMIN 3.8 03/10/2025    BILITOT 0.7 03/10/2025    AST 24 03/10/2025    ALKPHOS 86 03/10/2025    ALT 16 03/10/2025    GGT 19 10/23/2012     COAGS:   Lab Results   Component Value Date    INR 1.0 12/30/2024     FLP:   Lab Results   Component Value Date    CHOL 89 (L) 09/16/2024    HDL 36 (L) 09/16/2024    LDLCALC 39.8 (L) 09/16/2024    TRIG 66 09/16/2024    CHOLHDL 40.4 09/16/2024     CARDIAC:   Lab Results   Component Value Date     (H) 03/10/2025       Imaging:    EKG 5/2/2024:  Sinus bradycardia   Otherwise normal ECG    ANDREA Study 5/13/2024:    Normal resting ABIs bilaterally.    PVR waveforms are moderately dampened at the low thigh level bilaterally, and ankle level bilaterally.    PVR waveforms are mildly dampened at the calf level bilaterally.    BLE Venous Reflux Study 5/13/2024:    There is no evidence of a right lower extremity DVT.    The right greater saphenous vein has reflux.    There is no evidence of a left lower extremity DVT.    Assessment/Plan:  Zander Kaur Sr. is a 74 y.o. male with HTN, s/p liver transplant (2010), obesity, history of stroke, who presents for a follow up appointment.    BLE Edema- Due to BLE lymphedema and venous insufficiency.  Remains  significantly improved and stable. Recommend wearing graduated compression hose.  Limit sodium intake to 2000 mg daily.  Limit volume intake to 1.5 L daily.  Elevate legs when resting.    2. HTN- Pt to keep log of blood pressure/heart rate and restart metoprolol tartrate 100 mg bid and doxazosin 8 mg daily individually as blood pressure allows.     3. Obesity- Encourage diet, exercise, and weight loss.  Refer to Bariatric Medicine for assistance with weight loss.    4. Aortic atherosclerosis/history of stroke- LDL 40 on 9/16/2024. Continue atorvastatin 40 mg daily.     Follow up in 6 months with lipids prior    Total duration of face to face visit time 21 minutes.  Total time spent counseling greater than fifty percent of total visit time.  Counseling included discussion regarding imaging findings, diagnosis, possibilities, treatment options, risks and benefits.  The patient had many questions regarding the options and long-term effects.    Zander Mejia MD, PhD  Interventional Cardiology

## 2025-03-31 ENCOUNTER — EXTERNAL CHRONIC CARE MANAGEMENT (OUTPATIENT)
Dept: PRIMARY CARE CLINIC | Facility: CLINIC | Age: 75
End: 2025-03-31
Payer: MEDICARE

## 2025-03-31 ENCOUNTER — OFFICE VISIT (OUTPATIENT)
Dept: DERMATOLOGY | Facility: CLINIC | Age: 75
End: 2025-03-31
Payer: MEDICARE

## 2025-03-31 DIAGNOSIS — L57.0 AK (ACTINIC KERATOSIS): Primary | ICD-10-CM

## 2025-03-31 DIAGNOSIS — L08.0 PYODERMA: ICD-10-CM

## 2025-03-31 DIAGNOSIS — Z85.828 HISTORY OF SKIN CANCER: ICD-10-CM

## 2025-03-31 PROCEDURE — 99213 OFFICE O/P EST LOW 20 MIN: CPT | Mod: 25,S$PBB,, | Performed by: DERMATOLOGY

## 2025-03-31 PROCEDURE — 99439 CHRNC CARE MGMT STAF EA ADDL: CPT | Mod: PBBFAC | Performed by: INTERNAL MEDICINE

## 2025-03-31 PROCEDURE — 99213 OFFICE O/P EST LOW 20 MIN: CPT | Mod: PBBFAC | Performed by: DERMATOLOGY

## 2025-03-31 PROCEDURE — 99439 CHRNC CARE MGMT STAF EA ADDL: CPT | Mod: S$PBB,,, | Performed by: INTERNAL MEDICINE

## 2025-03-31 PROCEDURE — 17003 DESTRUCT PREMALG LES 2-14: CPT | Mod: S$PBB,,, | Performed by: DERMATOLOGY

## 2025-03-31 PROCEDURE — 99490 CHRNC CARE MGMT STAFF 1ST 20: CPT | Mod: S$PBB,,, | Performed by: INTERNAL MEDICINE

## 2025-03-31 PROCEDURE — 17003 DESTRUCT PREMALG LES 2-14: CPT | Mod: PBBFAC | Performed by: DERMATOLOGY

## 2025-03-31 PROCEDURE — 17000 DESTRUCT PREMALG LESION: CPT | Mod: S$PBB,,, | Performed by: DERMATOLOGY

## 2025-03-31 PROCEDURE — 17000 DESTRUCT PREMALG LESION: CPT | Mod: PBBFAC | Performed by: DERMATOLOGY

## 2025-03-31 PROCEDURE — 99490 CHRNC CARE MGMT STAFF 1ST 20: CPT | Mod: PBBFAC | Performed by: INTERNAL MEDICINE

## 2025-03-31 PROCEDURE — 99999 PR PBB SHADOW E&M-EST. PATIENT-LVL III: CPT | Mod: PBBFAC,,, | Performed by: DERMATOLOGY

## 2025-03-31 RX ORDER — DOXYCYCLINE 100 MG/1
CAPSULE ORAL
Qty: 14 CAPSULE | Refills: 0 | Status: SHIPPED | OUTPATIENT
Start: 2025-03-31

## 2025-03-31 RX ORDER — MUPIROCIN 20 MG/G
OINTMENT TOPICAL
Qty: 30 G | Refills: 1 | Status: SHIPPED | OUTPATIENT
Start: 2025-03-31

## 2025-03-31 NOTE — PROGRESS NOTES
Subjective:      Patient ID:  Zander Kaur is a 74 y.o. male who presents for   Chief Complaint   Patient presents with    Follow-up     History of Present Illness: The patient presents for Efudex/ Dovonex follow up.    The patient was last seen on: 12/9/2024 for cryosurgery to actinic keratoses and treatment of ak's on scalp with efudex/dovonex bid x 1 week    This is a high risk patient here to check for the development of new lesions.      Other skin complaints: none      Review of Systems   Skin:  Positive for daily sunscreen use, activity-related sunscreen use and wears hat (always). Negative for recent sunburn.   Hematologic/Lymphatic: Bruises/bleeds easily.       Objective:   Physical Exam   Constitutional: He appears well-developed and well-nourished. No distress.   Neurological: He is alert and oriented to person, place, and time. He is not disoriented.   Psychiatric: He has a normal mood and affect.   Skin:   Areas Examined (abnormalities noted in diagram):   Scalp / Hair Palpated and Inspected  Head / Face Inspection Performed  Neck Inspection Performed            Diagram Legend     Erythematous scaling macule/papule c/w actinic keratosis       Vascular papule c/w angioma      Pigmented verrucoid papule/plaque c/w seborrheic keratosis      Yellow umbilicated papule c/w sebaceous hyperplasia      Irregularly shaped tan macule c/w lentigo     1-2 mm smooth white papules consistent with Milia      Movable subcutaneous cyst with punctum c/w epidermal inclusion cyst      Subcutaneous movable cyst c/w pilar cyst      Firm pink to brown papule c/w dermatofibroma      Pedunculated fleshy papule(s) c/w skin tag(s)      Evenly pigmented macule c/w junctional nevus     Mildly variegated pigmented, slightly irregular-bordered macule c/w mildly atypical nevus      Flesh colored to evenly pigmented papule c/w intradermal nevus       Pink pearly papule/plaque c/w basal cell carcinoma      Erythematous  hyperkeratotic cursted plaque c/w SCC      Surgical scar with no sign of skin cancer recurrence      Open and closed comedones      Inflammatory papules and pustules      Verrucoid papule consistent consistent with wart     Erythematous eczematous patches and plaques     Dystrophic onycholytic nail with subungual debris c/w onychomycosis     Umbilicated papule    Erythematous-base heme-crusted tan verrucoid plaque consistent with inflamed seborrheic keratosis     Erythematous Silvery Scaling Plaque c/w Psoriasis     See annotation      Assessment / Plan:          Pyoderma - scalp  -     doxycycline (VIBRAMYCIN) 100 MG Cap; Take 1 po bid with food and not within 1 hour prior to lying down  Dispense: 14 capsule; Refill: 0  -     mupirocin (BACTROBAN) 2 % ointment; AAA open areas on scalp bid  Dispense: 30 g; Refill: 1    History of skin cancer  Area(s) of previous NMSC evaluated with no signs of recurrence.  Face, scalp and neck examination. No lesions suspicious for malignancy noted.    Recommend daily sun protection/avoidance and use of at least SPF 30, broad spectrum sunscreen (OTC drug).         AK (actinic keratosis)  Today's Plan:      Cryosurgery Procedure Note    Verbal consent from the patient is obtained including, but not limited to, risk of hypopigmentation/hyperpigmentation, scar, recurrence of lesion. The patient is aware of the precancerous quality and need for treatment of these lesions. Liquid nitrogen cryosurgery is applied to the 6 actinic keratoses, as detailed in the physical exam, to produce a freeze injury. The patient is aware that blisters may form and is instructed on wound care with gentle cleansing and use of vaseline ointment to keep moist until healed. The patient is supplied a handout on cryosurgery and is instructed to call if lesions do not completely resolve.    Cont wear hat always      No follow-ups on file.

## 2025-03-31 NOTE — PATIENT INSTRUCTIONS
CRYOSURGERY      Your doctor has used a method called cryosurgery to treat your skin condition. Cryosurgery refers to the use of very cold substances to treat a variety of skin conditions such as warts, pre-skin cancers, molluscum contagiosum, sun spots, and several benign growths. The substance we use in cryosurgery is liquid nitrogen and is so cold (-195 degrees Celsius) that is burns when administered.     Following treatment in the office, the skin may immediately burn and become red. You may find the area around the lesion is affected as well. It is sometimes necessary to treat not only the lesion, but a small area of the surrounding normal skin to achieve a good response.     A blister, and even a blood filled blister, may form after treatment.   This is a normal response. If the blister is painful, it is acceptable to sterilize a needle and with rubbing alcohol and gently pop the blister. It is important that you gently wash the area with soap and warm water as the blister fluid may contain wart virus if a wart was treated. Do no remove the roof of the blister.     The area treated can take anywhere from 1-3 weeks to heal. Healing time depends on the kind skin lesion treated, the location, and how aggressively the lesion was treated. It is recommended that the areas treated are covered with Vaseline or bacitracin ointment and a band-aid. If a band-aid is not practical, just ointment applied several times per day will do. Keeping these areas moist will speed the healing time.    Treatment with liquid nitrogen can leave a scar. In dark skin, it may be a light or dark scar, in light skin it may be a white or pink scar. These will generally fade with time, but may never go away completely.     If you have any concerns after your treatment, please feel free to call the office.       [id number] [facility] [place] [address]/ [phone] FAX/ [email or web address]         Take doxycyline 100mg  - 1  whole tablet 2x/day with food and not within 1 hour prior to lying down     Discussed benefits and risks of doxycyline therapy including but not limited to GI discomfort, esophageal irritation/ulceration, and increased sun sensitivity. Patient was counseled to take medicine with meals and at least 1 hour before lying down.

## 2025-04-02 ENCOUNTER — PATIENT MESSAGE (OUTPATIENT)
Dept: ORTHOPEDICS | Facility: CLINIC | Age: 75
End: 2025-04-02
Payer: MEDICARE

## 2025-04-25 DIAGNOSIS — Z96.652 STATUS POST LEFT KNEE REPLACEMENT: Primary | ICD-10-CM

## 2025-04-26 ENCOUNTER — NURSE TRIAGE (OUTPATIENT)
Dept: ADMINISTRATIVE | Facility: CLINIC | Age: 75
End: 2025-04-26
Payer: MEDICARE

## 2025-04-26 NOTE — TELEPHONE ENCOUNTER
Patient c/o an elevated BP of 174/100. Denies any symptoms. Advised per protocol to be seen within 2-3 days. Patient verbalizes understanding. Advised the patient to call back with any further questions or if symptoms worsen.      Reason for Disposition   Systolic BP  >= 160 OR Diastolic >= 100    Additional Information   Negative: Difficult to awaken or acting confused (e.g., disoriented, slurred speech)   Negative: SEVERE difficulty breathing (e.g., struggling for each breath, speaks in single words)   Negative: [1] Weakness of the face, arm or leg on one side of the body AND [2] new-onset   Negative: [1] Numbness (i.e., loss of sensation) of the face, arm or leg on one side of the body AND [2] new-onset   Negative: [1] Chest pain lasts > 5 minutes AND [2] history of heart disease (i.e., heart attack, bypass surgery, angina, angioplasty, CHF)   Negative: [1] Chest pain AND [2] took nitroglycerin AND [3] pain was not relieved   Negative: Sounds like a life-threatening emergency to the triager   Negative: [1] Systolic BP  >= 160 OR Diastolic >= 100 AND [2] cardiac (e.g., breathing difficulty, chest pain) or neurologic symptoms (e.g., new-onset blurred or double vision, unsteady gait)   Negative: [1] Systolic BP  >= 200 OR Diastolic >= 120 AND [2] having NO cardiac or neurologic symptoms   Negative: [1] Systolic BP  >= 180 OR Diastolic >= 110 AND [2] missed most recent dose of blood pressure medication   Negative: Systolic BP  >= 180 OR Diastolic >= 110   Negative: Ran out of BP medications    Protocols used: [facility]

## 2025-04-28 ENCOUNTER — OFFICE VISIT (OUTPATIENT)
Dept: ORTHOPEDICS | Facility: CLINIC | Age: 75
End: 2025-04-28
Payer: MEDICARE

## 2025-04-28 ENCOUNTER — TELEPHONE (OUTPATIENT)
Dept: INTERNAL MEDICINE | Facility: CLINIC | Age: 75
End: 2025-04-28
Payer: MEDICARE

## 2025-04-28 ENCOUNTER — HOSPITAL ENCOUNTER (OUTPATIENT)
Dept: RADIOLOGY | Facility: HOSPITAL | Age: 75
Discharge: HOME | End: 2025-04-28
Payer: MEDICARE

## 2025-04-28 VITALS — BODY MASS INDEX: 30.36 KG/M2 | WEIGHT: 193.44 LBS | HEIGHT: 67 IN

## 2025-04-28 DIAGNOSIS — Z94.4 LIVER REPLACED BY TRANSPLANT: ICD-10-CM

## 2025-04-28 DIAGNOSIS — Z96.652 STATUS POST LEFT KNEE REPLACEMENT: Primary | ICD-10-CM

## 2025-04-28 DIAGNOSIS — Z96.652 STATUS POST LEFT KNEE REPLACEMENT: ICD-10-CM

## 2025-04-28 PROCEDURE — 73562 X-RAY EXAM OF KNEE 3: CPT | Mod: 26,LT,, | Performed by: RADIOLOGY

## 2025-04-28 PROCEDURE — 99024 POSTOP FOLLOW-UP VISIT: CPT | Mod: POP,,, | Performed by: ORTHOPAEDIC SURGERY

## 2025-04-28 PROCEDURE — 99213 OFFICE O/P EST LOW 20 MIN: CPT | Mod: PBBFAC,25 | Performed by: ORTHOPAEDIC SURGERY

## 2025-04-28 PROCEDURE — 73560 X-RAY EXAM OF KNEE 1 OR 2: CPT | Mod: 26,59,RT, | Performed by: RADIOLOGY

## 2025-04-28 PROCEDURE — 99999 PR PBB SHADOW E&M-EST. PATIENT-LVL III: CPT | Mod: PBBFAC,,, | Performed by: ORTHOPAEDIC SURGERY

## 2025-04-28 PROCEDURE — 73560 X-RAY EXAM OF KNEE 1 OR 2: CPT | Mod: TC,RT

## 2025-04-28 NOTE — PROGRESS NOTES
Zander Kaur is in for 3 month follow up for a  left TKA.  He is doing  well.  No pain in the knee.  He has resumed activities of daily living. He has been active  Exam demonstrates  A well developed male in no distress.  Alert and oriented.  Mood and affect are appropriate.    Knee incision is well healed.  ROM is 0-125.  The patella tracks well and there is no instability. The extremity is neurovascularly intact.    Xrays demonstrate a well fixed and positioned prosthesis.         No data to display                     No data to display                     No data to display                    12/30/2024    11:20 AM   Knee Society and Function Score   FINDINGS - KNEE SOCIETY SCORE 43   FINDINGS - KNEE SOCIETY FUNCTION SCORE 45            No data to display                Imp:Doing well    F/u in 3 months with xrays

## 2025-04-28 NOTE — TELEPHONE ENCOUNTER
----- Message from Maribell sent at 4/28/2025 12:19 PM CDT -----  Contact: Marbella @ [phone]  .[email or web address] Patient is calling for Medical Advice regarding:patient is having high blood pressure, pressure has been controled until he had knee replacement at end of Jan. Patient needs advice on RX due to change in medications How long has patient had these symptoms: Pharmacy name and phone#: [id number] - [place], [place] - [address] [facility] [id number] [facility] [facility] [address]: [phone] Fax: [phone] wants a call back or thru [facility]:call Comments:Please advise patient replies from provider may take up to 48 hours.

## 2025-04-28 NOTE — TELEPHONE ENCOUNTER
Spoke to patient wife and states that patient BP is high---174/100  Patient had knee surgery about 8 weeks ago and BP was dropping--BP meds ( Metoprolol and Doxazosin) was stopped for now because of the low BP  Patient is currently back on Metoprolol 100mg twice a day and is not taking Doxazosin  Wife is wanting to know if patient should start the Doxazosin

## 2025-04-29 NOTE — TELEPHONE ENCOUNTER
Spoke to pt's wife. Informed to resume Doxazosin and to take his BP for the next 2 week sending the results to PCP via portal. Pt's wife understood.

## 2025-04-30 ENCOUNTER — EXTERNAL CHRONIC CARE MANAGEMENT (OUTPATIENT)
Dept: PRIMARY CARE CLINIC | Facility: CLINIC | Age: 75
End: 2025-04-30
Payer: MEDICARE

## 2025-04-30 PROCEDURE — 99490 CHRNC CARE MGMT STAFF 1ST 20: CPT | Mod: PBBFAC | Performed by: INTERNAL MEDICINE

## 2025-05-19 DIAGNOSIS — Z94.4 LIVER REPLACED BY TRANSPLANT: ICD-10-CM

## 2025-05-20 RX ORDER — TACROLIMUS 1 MG/1
1 CAPSULE, GELATIN COATED ORAL EVERY 12 HOURS
Qty: 180 CAPSULE | Refills: 3 | Status: SHIPPED | OUTPATIENT
Start: 2025-05-20 | End: 2026-05-20

## 2025-05-22 RX ORDER — ATORVASTATIN CALCIUM 40 MG/1
40 TABLET, FILM COATED ORAL NIGHTLY
Qty: 90 TABLET | Refills: 3 | Status: SHIPPED | OUTPATIENT
Start: 2025-05-22

## 2025-05-31 ENCOUNTER — EXTERNAL CHRONIC CARE MANAGEMENT (OUTPATIENT)
Dept: PRIMARY CARE CLINIC | Facility: CLINIC | Age: 75
End: 2025-05-31
Payer: MEDICARE

## 2025-05-31 PROCEDURE — 99490 CHRNC CARE MGMT STAFF 1ST 20: CPT | Mod: PBBFAC | Performed by: INTERNAL MEDICINE

## 2025-06-03 RX ORDER — METOPROLOL TARTRATE 100 MG/1
100 TABLET ORAL 2 TIMES DAILY
Qty: 180 TABLET | Refills: 3 | Status: SHIPPED | OUTPATIENT
Start: 2025-06-03

## 2025-06-08 RX ORDER — DOXAZOSIN 8 MG/1
8 TABLET ORAL
Qty: 60 TABLET | OUTPATIENT
Start: 2025-06-08

## 2025-06-08 NOTE — TELEPHONE ENCOUNTER
No care due was identified.  [facility] Embedded Care Due Messages. Reference number: [id number].   6/08/2025 6:40:25 AM CDT

## 2025-06-09 ENCOUNTER — LAB VISIT (OUTPATIENT)
Dept: LAB | Facility: HOSPITAL | Age: 75
End: 2025-06-09
Attending: INTERNAL MEDICINE
Payer: MEDICARE

## 2025-06-09 DIAGNOSIS — Z94.4 S/P LIVER TRANSPLANT: ICD-10-CM

## 2025-06-09 LAB
ABSOLUTE EOSINOPHIL (OHS): 0.43 K/UL
ABSOLUTE MONOCYTE (OHS): 0.46 K/UL (ref 0.3–1)
ABSOLUTE NEUTROPHIL COUNT (OHS): 2.38 K/UL (ref 1.8–7.7)
ALBUMIN SERPL BCP-MCNC: 4.1 G/DL (ref 3.5–5.2)
ALP SERPL-CCNC: 88 UNIT/L (ref 40–150)
ALT SERPL W/O P-5'-P-CCNC: 36 UNIT/L (ref 10–44)
ANION GAP (OHS): 13 MMOL/L (ref 8–16)
AST SERPL-CCNC: 32 UNIT/L (ref 11–45)
BASOPHILS # BLD AUTO: 0.05 K/UL
BASOPHILS NFR BLD AUTO: 0.8 %
BILIRUB SERPL-MCNC: 1.3 MG/DL (ref 0.1–1)
BUN SERPL-MCNC: 13 MG/DL (ref 8–23)
CALCIUM SERPL-MCNC: 9.2 MG/DL (ref 8.7–10.5)
CHLORIDE SERPL-SCNC: 107 MMOL/L (ref 95–110)
CO2 SERPL-SCNC: 23 MMOL/L (ref 23–29)
CREAT SERPL-MCNC: 0.9 MG/DL (ref 0.5–1.4)
ERYTHROCYTE [DISTWIDTH] IN BLOOD BY AUTOMATED COUNT: 14 % (ref 11.5–14.5)
GFR SERPLBLD CREATININE-BSD FMLA CKD-EPI: >60 ML/MIN/1.73/M2
GLUCOSE SERPL-MCNC: 126 MG/DL (ref 70–110)
HCT VFR BLD AUTO: 47.4 % (ref 40–54)
HGB BLD-MCNC: 15.3 GM/DL (ref 14–18)
IMM GRANULOCYTES # BLD AUTO: 0.01 K/UL (ref 0–0.04)
IMM GRANULOCYTES NFR BLD AUTO: 0.2 % (ref 0–0.5)
LYMPHOCYTES # BLD AUTO: 2.89 K/UL (ref 1–4.8)
MCH RBC QN AUTO: 31.2 PG (ref 27–31)
MCHC RBC AUTO-ENTMCNC: 32.3 G/DL (ref 32–36)
MCV RBC AUTO: 97 FL (ref 82–98)
NUCLEATED RBC (/100WBC) (OHS): 0 /100 WBC
PLATELET # BLD AUTO: 163 K/UL (ref 150–450)
PMV BLD AUTO: 9.7 FL (ref 9.2–12.9)
POTASSIUM SERPL-SCNC: 4.5 MMOL/L (ref 3.5–5.1)
PROT SERPL-MCNC: 7.3 GM/DL (ref 6–8.4)
RBC # BLD AUTO: 4.9 M/UL (ref 4.6–6.2)
RELATIVE EOSINOPHIL (OHS): 6.9 %
RELATIVE LYMPHOCYTE (OHS): 46.5 % (ref 18–48)
RELATIVE MONOCYTE (OHS): 7.4 % (ref 4–15)
RELATIVE NEUTROPHIL (OHS): 38.2 % (ref 38–73)
SODIUM SERPL-SCNC: 143 MMOL/L (ref 136–145)
TACROLIMUS BLD-MCNC: 5.4 NG/ML (ref 5–15)
WBC # BLD AUTO: 6.22 K/UL (ref 3.9–12.7)

## 2025-06-09 PROCEDURE — 84450 TRANSFERASE (AST) (SGOT): CPT

## 2025-06-09 PROCEDURE — 80197 ASSAY OF TACROLIMUS: CPT

## 2025-06-09 PROCEDURE — 85025 COMPLETE CBC W/AUTO DIFF WBC: CPT

## 2025-06-09 PROCEDURE — 36415 COLL VENOUS BLD VENIPUNCTURE: CPT

## 2025-06-09 NOTE — TELEPHONE ENCOUNTER
Refill Decision Note   Zander Kaur  is requesting a refill authorization.  Brief Assessment and Rationale for Refill:  Quick Discontinue     Medication Therapy Plan:  D/C 5/19/25:Patient no longer taking      Comments:     Note composed:7:37 PM 06/08/2025

## 2025-06-13 ENCOUNTER — RESULTS FOLLOW-UP (OUTPATIENT)
Dept: TRANSPLANT | Facility: CLINIC | Age: 75
End: 2025-06-13
Payer: MEDICARE

## 2025-06-13 NOTE — TELEPHONE ENCOUNTER
Pt notified via portal of stable labs and that no medication changes are needed. Repeat labs due 12/9/25 per protocol.   ----- Message from Carson Oswald MD sent at 6/13/2025  8:29 AM CDT -----  Results reviewed    ----- Message -----  From: Lab, Background User  Sent: 6/9/2025  11:45 AM CDT  To: Carson Oswald MD

## 2025-06-13 NOTE — LETTER
June 13, 2025    Zander Kaur  [place] [address]          Dear Zander Kaur:  MRN: [id number]    This is a follow up to your recent labs, your lab results were stable.  There are no medicine changes.  Please have your labs drawn again on 12/9/25.      If you cannot have your labs drawn on the scheduled date, it is your responsibility to call the transplant department to reschedule.  Please call [phone] and ask to speak to [facility]  for all scheduling requests.     Sincerely,    Oksana    Your Liver Transplant Coordinator    [facility]  [address] [place] [id number] [phone]

## 2025-06-30 ENCOUNTER — EXTERNAL CHRONIC CARE MANAGEMENT (OUTPATIENT)
Dept: PRIMARY CARE CLINIC | Facility: CLINIC | Age: 75
End: 2025-06-30
Payer: MEDICARE

## 2025-06-30 PROCEDURE — 99490 CHRNC CARE MGMT STAFF 1ST 20: CPT | Mod: PBBFAC | Performed by: INTERNAL MEDICINE

## 2025-06-30 PROCEDURE — 99490 CHRNC CARE MGMT STAFF 1ST 20: CPT | Mod: S$PBB,,, | Performed by: INTERNAL MEDICINE

## 2025-07-03 ENCOUNTER — TELEPHONE (OUTPATIENT)
Dept: INTERNAL MEDICINE | Facility: CLINIC | Age: 75
End: 2025-07-03
Payer: MEDICARE

## 2025-07-03 DIAGNOSIS — I10 ESSENTIAL HYPERTENSION: Primary | ICD-10-CM

## 2025-07-03 RX ORDER — DOXAZOSIN 8 MG/1
8 TABLET ORAL DAILY
Qty: 90 TABLET | Refills: 3 | Status: SHIPPED | OUTPATIENT
Start: 2025-07-03 | End: 2026-07-03

## 2025-07-03 NOTE — TELEPHONE ENCOUNTER
Spoke with pt's wife to inform her about the refill and to send BP readings thru pt portal for the next 2 weeks

## 2025-07-03 NOTE — TELEPHONE ENCOUNTER
Doxazosin refilled  Pt should call us/send via portal with BP readings in 2 weeks after restarting med  If pt unable to do so, recommend clinic appt with PCP/Care team for BP check

## 2025-07-03 NOTE — TELEPHONE ENCOUNTER
Copied from CRM #[id number]. Topic: Medications - Medication Refill  >> Jul 3, 2025  1:29 PM Jennifer wrote:  Pts spouse is calling in regards to the pts doxazosin (CARDURA) 8 MG Tab. Pt was told to stop it then restart it back up. Pt needs a refill if he has to restart the medication. Please advise. Thank you.      Requesting an RX refill or new RX.    Is this a refill or new RX: new    RX name and strength (copy/paste from chart):  doxazosin (CARDURA) 8 MG Tab    Is this a 30 day or 90 day RX:     Pharmacy name and phone # (copy/paste from chart):    [facility] [address]  [facility] [address]  Phone: [phone] Fax: [phone]    Who called and call back number:  Patient

## 2025-07-07 ENCOUNTER — OFFICE VISIT (OUTPATIENT)
Dept: DERMATOLOGY | Facility: CLINIC | Age: 75
End: 2025-07-07
Payer: MEDICARE

## 2025-07-07 DIAGNOSIS — D48.5 NEOPLASM OF UNCERTAIN BEHAVIOR OF SKIN: Primary | ICD-10-CM

## 2025-07-07 DIAGNOSIS — L57.0 AK (ACTINIC KERATOSIS): ICD-10-CM

## 2025-07-07 PROCEDURE — 99214 OFFICE O/P EST MOD 30 MIN: CPT | Mod: 25,S$PBB,, | Performed by: DERMATOLOGY

## 2025-07-07 PROCEDURE — 17000 DESTRUCT PREMALG LESION: CPT | Mod: 59,PBBFAC | Performed by: DERMATOLOGY

## 2025-07-07 PROCEDURE — 17000 DESTRUCT PREMALG LESION: CPT | Mod: S$PBB,XS,, | Performed by: DERMATOLOGY

## 2025-07-07 PROCEDURE — 11102 TANGNTL BX SKIN SINGLE LES: CPT | Mod: PBBFAC | Performed by: DERMATOLOGY

## 2025-07-07 PROCEDURE — 99999 PR PBB SHADOW E&M-EST. PATIENT-LVL III: CPT | Mod: PBBFAC,,, | Performed by: DERMATOLOGY

## 2025-07-07 PROCEDURE — 17003 DESTRUCT PREMALG LES 2-14: CPT | Mod: S$PBB,,, | Performed by: DERMATOLOGY

## 2025-07-07 PROCEDURE — 17003 DESTRUCT PREMALG LES 2-14: CPT | Mod: 59,PBBFAC | Performed by: DERMATOLOGY

## 2025-07-07 PROCEDURE — 88305 TISSUE EXAM BY PATHOLOGIST: CPT | Mod: TC | Performed by: DERMATOLOGY

## 2025-07-07 PROCEDURE — 11102 TANGNTL BX SKIN SINGLE LES: CPT | Mod: S$PBB,,, | Performed by: DERMATOLOGY

## 2025-07-07 PROCEDURE — 99213 OFFICE O/P EST LOW 20 MIN: CPT | Mod: PBBFAC,25 | Performed by: DERMATOLOGY

## 2025-07-07 NOTE — PATIENT INSTRUCTIONS
Shave Biopsy Wound Care    Your doctor has performed a shave biopsy today.  A band aid and vaseline ointment has been placed over the site.  This should remain in place for NO LONGER THAN 48 hours.  It is fine to remove the bandaid after 24 hours, if the area is no longer bleeding. It is recommended that you keep the area dry (do not wet)) for the first 24 hours.  After 24 hours, wash the area with warm soap and water and apply Vaseline jelly.  Many patients prefer to use Neosporin or Bacitracin ointment.  This is acceptable; however, know that you can develop an allergy to this medication even if you have used it safely for years.  It is important to keep the area moist.  Letting it dry out and get air slows healing time, and will worsen the scar.        If you notice increasing redness, tenderness, pain, or yellow drainage at the biopsy site, please notify your doctor.  These are signs of an infection.    If your biopsy site is bleeding, apply firm pressure for 15 minutes straight.  Repeat for another 15 minutes, if it is still bleeding.   If the surgical site continues to bleed, then please contact your doctor.      For [facility] users:   You will receive your biopsy results in [facility] as soon as they are available. Please be assured that your physician/provider will review your results and will then determine what further treatment, evaluation, or planning is required. You should be contacted by your physician's/provider's office within 5 business days of receiving your results; If not, please reach out to directly. This is one more way [facility] is putting you first.     [facility] [place] [address]/ [phone] FAX/ [email or web address]         CRYOSURGERY      Your doctor has used a method called cryosurgery to treat your skin condition. Cryosurgery refers to the use of very cold substances to treat a variety of skin conditions such as warts, pre-skin cancers, molluscum  contagiosum, sun spots, and several benign growths. The substance we use in cryosurgery is liquid nitrogen and is so cold (-195 degrees Celsius) that is burns when administered.     Following treatment in the office, the skin may immediately burn and become red. You may find the area around the lesion is affected as well. It is sometimes necessary to treat not only the lesion, but a small area of the surrounding normal skin to achieve a good response.     A blister, and even a blood filled blister, may form after treatment.   This is a normal response. If the blister is painful, it is acceptable to sterilize a needle and with rubbing alcohol and gently pop the blister. It is important that you gently wash the area with soap and warm water as the blister fluid may contain wart virus if a wart was treated. Do no remove the roof of the blister.     The area treated can take anywhere from 1-3 weeks to heal. Healing time depends on the kind skin lesion treated, the location, and how aggressively the lesion was treated. It is recommended that the areas treated are covered with Vaseline or bacitracin ointment and a band-aid. If a band-aid is not practical, just ointment applied several times per day will do. Keeping these areas moist will speed the healing time.    Treatment with liquid nitrogen can leave a scar. In dark skin, it may be a light or dark scar, in light skin it may be a white or pink scar. These will generally fade with time, but may never go away completely.     If you have any concerns after your treatment, please feel free to call the office.       [facility] [place] [address]/ [phone] FAX/ [email or web address]       Field Treatment for Actinic Keratoses (precancerous lesions)    5-Fluoruracil + Dovonex (calcipotriene) - This is a topical chemotherapy for your skin. This cream should never be applied without discussing with you dermatologist.    This treatment gets your  immune system involved in fighting precancers (actinic keratoses), even those we can't yet see.     HOW TO APPLY: Apply the combination cream to the affected area right and left post auricular/ear neck 2x/day x 14 days. Apply a fingertip length amount to the entire area. Wash your hands carefully after applying the creams and wipe glasses, BIPAP/CPAP machines, or anything else that comes in frequent contact with the creams.    WHAT TO EXPECT: Your skin will likely become red, crusted, sore, and tender during the treatment usually starting around day 3 and continuing for about 1 week after last application.     HOW TO HEAL FAST: To speed healing, wash with a gentle wash and apply Vaseline jelly especially to crusted open areas.    WE CAN HELP: Please contact us for any questions or problem shooting the application of these creams: [phone] or [email or web address]    GREAT NEWS: Newer studies suggest that the combination of these creams not only decrease the sun damage spots and precancers (actinic keratoses) but also decrease your risk of getting skin cancer the year following application.     IT WILL BE WORTH IT!!!

## 2025-07-07 NOTE — PROGRESS NOTES
Subjective:      Patient ID:  Zander Kaur is a 74 y.o. 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     This is a high risk patient here to check for the development of new lesions.     Other skin complaints: no new areas of concerns          Review of Systems   Skin:  Positive for daily sunscreen use, activity-related sunscreen use and wears hat (always). Negative for recent sunburn.   Hematologic/Lymphatic: Bruises/bleeds easily (on asa).       Objective:   Physical Exam   Constitutional: He appears well-developed and well-nourished. No distress.   Neurological: He is alert and oriented to person, place, and time. He is not disoriented.   Psychiatric: He has a normal mood and affect.   Skin:   Areas Examined (abnormalities noted in diagram):   Scalp / Hair Palpated and Inspected  Head / Face Inspection Performed  Neck Inspection Performed            Diagram Legend     Erythematous scaling macule/papule c/w actinic keratosis       Vascular papule c/w angioma      Pigmented verrucoid papule/plaque c/w seborrheic keratosis      Yellow umbilicated papule c/w sebaceous hyperplasia      Irregularly shaped tan macule c/w lentigo     1-2 mm smooth white papules consistent with Milia      Movable subcutaneous cyst with punctum c/w epidermal inclusion cyst      Subcutaneous movable cyst c/w pilar cyst      Firm pink to brown papule c/w dermatofibroma      Pedunculated fleshy papule(s) c/w skin tag(s)      Evenly pigmented macule c/w junctional nevus     Mildly variegated pigmented, slightly irregular-bordered macule c/w  mildly atypical nevus      Flesh colored to evenly pigmented papule c/w intradermal nevus       Pink pearly papule/plaque c/w basal cell carcinoma      Erythematous hyperkeratotic cursted plaque c/w SCC      Surgical scar with no sign of skin cancer recurrence      Open and closed comedones      Inflammatory papules and pustules      Verrucoid papule consistent consistent with wart     Erythematous eczematous patches and plaques     Dystrophic onycholytic nail with subungual debris c/w onychomycosis     Umbilicated papule    Erythematous-base heme-crusted tan verrucoid plaque consistent with inflamed seborrheic keratosis     Erythematous Silvery Scaling Plaque c/w Psoriasis     See annotation            Assessment / Plan:      Pathology Orders:       Normal Orders This Visit    Specimen to Pathology, Dermatology     Questions:    Procedure Type: Dermatology and skin neoplasms    Number of Specimens: 1    ------------------------: -------------------------    Spec 1 Procedure: Shave Biopsy    Spec 1 Clinical Impression: r/o bcc    Spec 1 Source: nose    Clinical Information: see EPIC    Clinical History: see EPIC    Specimen Source: Skin    Release to patient: Immediate    Send normal result to authorizing provider's In Basket if patient is active on MyChart: Yes          Neoplasm of uncertain behavior of skin  -     Specimen to Pathology, Dermatology    Shave biopsy procedure note:    Shave biopsy performed after verbal consent including risk of infection, scar, recurrence, need for additional treatment of site. Area prepped with alcohol, anesthetized with approximately 1.0cc of 1% lidocaine with epinephrine. Lesional tissue shaved with razor blade. Hemostasis achieved with application of aluminum chloride followed by hyfrecation. No complications. Dressing applied. Wound care explained.    If biopsy positive for malignancy, refer to Dr. Gil for Mohs surgery consultation.      AK (actinic keratosis)    Cryosurgery  Procedure Note    Verbal consent from the patient is obtained including, but not limited to, risk of hypopigmentation/hyperpigmentation, scar, recurrence of lesion. The patient is aware of the precancerous quality and need for treatment of these lesions. Liquid nitrogen cryosurgery is applied to the 2 actinic keratoses, as detailed in the physical exam, to produce a freeze injury. The patient is aware that blisters may form and is instructed on wound care with gentle cleansing and use of vaseline ointment to keep moist until healed. The patient is supplied a handout on cryosurgery and is instructed to call if lesions do not completely resolve.    And    Efudex/dovonex bid to AA right and left post auricular neck x 14 days - Rx at home               No follow-ups on file.

## 2025-07-09 LAB
ESTROGEN SERPL-MCNC: NORMAL PG/ML
INSULIN SERPL-ACNC: NORMAL U[IU]/ML
LAB AP CLINICAL INFORMATION: NORMAL
LAB AP GROSS DESCRIPTION: NORMAL
LAB AP REPORT FOOTNOTES: NORMAL
T3RU NFR SERPL: NORMAL %

## 2025-07-18 ENCOUNTER — PATIENT MESSAGE (OUTPATIENT)
Dept: RESEARCH | Facility: HOSPITAL | Age: 75
End: 2025-07-18
Payer: MEDICARE

## 2025-07-23 DIAGNOSIS — Z96.652 STATUS POST LEFT KNEE REPLACEMENT: Primary | ICD-10-CM

## 2025-07-28 ENCOUNTER — OFFICE VISIT (OUTPATIENT)
Dept: ORTHOPEDICS | Facility: CLINIC | Age: 75
End: 2025-07-28
Payer: MEDICARE

## 2025-07-28 ENCOUNTER — HOSPITAL ENCOUNTER (OUTPATIENT)
Dept: RADIOLOGY | Facility: HOSPITAL | Age: 75
Discharge: HOME | End: 2025-07-28
Attending: ORTHOPAEDIC SURGERY
Payer: MEDICARE

## 2025-07-28 VITALS — WEIGHT: 198.5 LBS | HEIGHT: 67 IN | BODY MASS INDEX: 31.16 KG/M2

## 2025-07-28 DIAGNOSIS — Z96.652 STATUS POST LEFT KNEE REPLACEMENT: ICD-10-CM

## 2025-07-28 DIAGNOSIS — Z96.652 STATUS POST LEFT KNEE REPLACEMENT: Primary | ICD-10-CM

## 2025-07-28 PROCEDURE — 73560 X-RAY EXAM OF KNEE 1 OR 2: CPT | Mod: TC,RT

## 2025-07-28 PROCEDURE — 73560 X-RAY EXAM OF KNEE 1 OR 2: CPT | Mod: 26,RT,, | Performed by: RADIOLOGY

## 2025-07-28 PROCEDURE — 99999 PR PBB SHADOW E&M-EST. PATIENT-LVL III: CPT | Mod: PBBFAC,,, | Performed by: ORTHOPAEDIC SURGERY

## 2025-07-28 PROCEDURE — 73562 X-RAY EXAM OF KNEE 3: CPT | Mod: 26,LT,, | Performed by: RADIOLOGY

## 2025-07-28 PROCEDURE — 99213 OFFICE O/P EST LOW 20 MIN: CPT | Mod: PBBFAC,25 | Performed by: ORTHOPAEDIC SURGERY

## 2025-07-28 PROCEDURE — 99213 OFFICE O/P EST LOW 20 MIN: CPT | Mod: S$PBB,,, | Performed by: ORTHOPAEDIC SURGERY

## 2025-07-28 NOTE — PROGRESS NOTES
"Subjective:      Patient ID: Zander Kaur is a 74 y.o. male.    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   HENT:  Negative for hearing loss.    Eyes:  Negative for blurred vision and double vision.   Cardiovascular:  Negative for chest pain, claudication and leg swelling.   Respiratory:  Negative for shortness of breath.    Endocrine: Negative for polydipsia, polyphagia and polyuria.   Hematologic/Lymphatic: Negative for adenopathy and bleeding problem. Does not bruise/bleed easily.   Skin:  Negative for poor wound healing.   Gastrointestinal:  Negative for diarrhea and heartburn.   Genitourinary:  Negative for bladder incontinence.   Neurological:  Negative for focal weakness, headaches, numbness, paresthesias and sensory change.   Psychiatric/Behavioral:  The patient is not nervous/anxious.    Allergic/Immunologic: Negative for persistent infections.         Objective:      Body mass index is 31.09 kg/m².  Vitals:    07/28/25 1127   Weight: 90.1 kg (198 lb 8.4 oz)   Height: 5' 7" (1.702 m)           General    Constitutional: He is oriented to person, place, and time. He appears " well-developed and well-nourished.   HENT:   Head: Normocephalic and atraumatic.   Eyes: EOM are normal.   Cardiovascular:  Normal rate.            Pulmonary/Chest: Effort normal.   Neurological: He is alert and oriented to person, place, and time.   Psychiatric: He has a normal mood and affect. His behavior is normal.     General Musculoskeletal Exam   Gait: normal       Right Knee Exam     Inspection   Erythema: absent  Scars: present  Swelling: absent  Effusion: absent  Deformity: present (mild varus)  Bruising: absent    Tenderness   The patient is experiencing no tenderness.     Range of Motion   Extension:  0   Flexion:  120     Tests   Ligament Examination   Lachman: normal (-1 to 2mm)   MCL - Valgus: normal (0 to 2mm)  LCL - Varus: normal  Patella   Passive Patellar Tilt: neutral    Other   Sensation: normal    Left Knee Exam     Inspection   Erythema: absent  Scars: present  Swelling: absent  Effusion: absent  Deformity: absent  Bruising: absent    Tenderness   The patient is experiencing no tenderness.     Range of Motion   Extension:  0   Flexion:  120     Tests   Stability   Lachman: normal (-1 to 2mm)   MCL - Valgus: normal (0 to 2mm)  LCL - Varus: normal (0 to 2mm)  Patella   Passive Patellar Tilt: neutral    Other   Sensation: normal    Muscle Strength   Right Lower Extremity   Hip Abduction: 5/5   Quadriceps:  5/5   Hamstrin/5   Left Lower Extremity   Hip Abduction: 5/5   Quadriceps:  5/5   Hamstrin/5     Reflexes     Left Side  Quadriceps:  2+    Right Side   Quadriceps:  2+    Vascular Exam     Right Pulses  Dorsalis Pedis:      2+          Left Pulses  Dorsalis Pedis:      2+          Edema  Right Lower Leg: absent  Left Lower Leg: absent      Obtained today and reviewed by me  IMAGING:  - XR Left Knee: Demonstrate a very well fixed and positioned left total knee arthroplasty  - XR Right Knee: Show Kellgren-Ramiro Grade 4 arthritic change               Assessment:       Encounter  Diagnosis   Name Primary?    Status post left knee replacement Yes          Plan:       Zander was seen today for pain.    Diagnoses and all orders for this visit:    Status post left knee replacement      Assessment & Plan            Progressing well.  I will see him back in 6 months with new radiographs.  He had questions about medication manage for his chronic pain medication.  We will send him to 1 of our colleagues in Physical Medicine and Rehabilitation to help with this.          This note was generated with the assistance of ambient listening technology. Verbal consent was obtained by the patient and accompanying visitor(s) for the recording of patient appointment to facilitate this note. I attest to having reviewed and edited the generated note for accuracy, though some syntax or spelling errors may persist. Please contact the author of this note for any clarification.

## 2025-07-30 ENCOUNTER — TELEPHONE (OUTPATIENT)
Dept: PODIATRY | Facility: CLINIC | Age: 75
End: 2025-07-30
Payer: MEDICARE

## 2025-07-30 DIAGNOSIS — I10 ESSENTIAL HYPERTENSION: ICD-10-CM

## 2025-07-30 RX ORDER — DOXAZOSIN 8 MG/1
TABLET ORAL
Qty: 30 TABLET | Refills: 3 | Status: SHIPPED | OUTPATIENT
Start: 2025-07-30

## 2025-07-30 NOTE — TELEPHONE ENCOUNTER
Refill Routing Note   Medication(s) are not appropriate for processing by [facility] for the following reason(s):        No active prescription written by provider    [facility] action(s):  Defer   Requires labs : Yes lipid panel            Appointments  past 12m or future 3m with PCP    Date Provider   Last Visit   3/17/2025 Shai Davison MD   Next Visit   Visit date not found Shai Davison MD   ED visits in past 90 days: 0        Note composed:12:18 PM 07/30/2025

## 2025-07-30 NOTE — TELEPHONE ENCOUNTER
Care Due:                  Date            Visit Type   Department     Provider  --------------------------------------------------------------------------------                                [facility] INTERNAL  Last Visit: 03-      FOLLOW UP    MEDICINE       Shai Davison  Next Visit: None Scheduled  None         None Found                                                            Last  Test          Frequency    Reason                     Performed    Due Date  --------------------------------------------------------------------------------    Lipid Panel.  12 months..  atorvastatin.............  09- 09-    [facility] Embedded Care Due Messages. Reference number: [id number].   7/30/2025 11:54:07 AM CDT

## 2025-07-30 NOTE — TELEPHONE ENCOUNTER
Spoke with patient in reference to scheduling appointment for ankle pain. Patient states he would like to call office back to reschedule once he finish with his knee injury.

## 2025-07-31 ENCOUNTER — EXTERNAL CHRONIC CARE MANAGEMENT (OUTPATIENT)
Dept: PRIMARY CARE CLINIC | Facility: CLINIC | Age: 75
End: 2025-07-31
Payer: MEDICARE

## 2025-07-31 PROCEDURE — 99490 CHRNC CARE MGMT STAFF 1ST 20: CPT | Mod: PBBFAC | Performed by: INTERNAL MEDICINE

## 2025-08-25 DIAGNOSIS — Z00.00 ENCOUNTER FOR MEDICARE ANNUAL WELLNESS EXAM: ICD-10-CM

## (undated) DEVICE — DRESSING AQUACEL FOAM 4X4IN

## (undated) DEVICE — CORD BIPOLAR 12 FOOT

## (undated) DEVICE — GOWN SURGICAL X-LARGE

## (undated) DEVICE — APPLICATOR NS COTTON-TIP 3IN

## (undated) DEVICE — DRAPE HALF SURGICAL 40X58IN

## (undated) DEVICE — NDL HYPO REG 25G X 1 1/2

## (undated) DEVICE — HOOD T7 W/ PEEL AWAY LENS

## (undated) DEVICE — SUT VICRYL PLUS 0 CT1 18IN

## (undated) DEVICE — DRAPE EENT SPLIT STERILE

## (undated) DEVICE — TRAY MINOR GEN SURG OMC

## (undated) DEVICE — GAUZE SPONGE PEANUT STRL

## (undated) DEVICE — ELECTRODE REM PLYHSV RETURN 9

## (undated) DEVICE — SUT VICRYL PLUS 3-0 SH 18IN

## (undated) DEVICE — SUT 4-0 VICRYL / SH

## (undated) DEVICE — ADHESIVE DERMABOND ADVANCED

## (undated) DEVICE — KIT DRAPE RIO ONE PIECE W/POCK

## (undated) DEVICE — SUT 4/0 18IN PDS II CLR MO

## (undated) DEVICE — COVER LIGHT HANDLE 80/CA

## (undated) DEVICE — MARKER FN REG DUAL UTIL RULER

## (undated) DEVICE — COVER CAMERA OPERATING ROOM

## (undated) DEVICE — PAD DERMAPROX SM 8X11X.5IN

## (undated) DEVICE — SOL NACL IRR 3000ML

## (undated) DEVICE — DRAPE INSTR MAGNETIC 10X16IN

## (undated) DEVICE — CONTAINER SPECIMEN STRL 4OZ

## (undated) DEVICE — TRAY SKIN SCRUB WET PREMIUM

## (undated) DEVICE — BLADE SURG CARBON STEEL SZ11

## (undated) DEVICE — ELECTRODE BLADE INSULATED 1 IN

## (undated) DEVICE — BANDAGE MATRIX HK LOOP 6IN 5YD

## (undated) DEVICE — COVER TRANSDUCER LATEX N/STERI

## (undated) DEVICE — BANDAGE MATRIX HK LOOP 4IN 5YD

## (undated) DEVICE — PAD N ADH STRL 2X3IN

## (undated) DEVICE — DRAPE T EXTRM SURG 121X128X90

## (undated) DEVICE — DRAPE INCISE IOBAN 2 23X33IN

## (undated) DEVICE — SUT VICRYL 4-0 RB1 27IN UD

## (undated) DEVICE — TAPE SURG DURAPORE 2 X10YD

## (undated) DEVICE — BLADE SAGITTAL 18 X 1.27 X 90M

## (undated) DEVICE — FORCEP STRAIGHT DISP

## (undated) DEVICE — SUT MCRYL PLUS 4-0 PS2 27IN

## (undated) DEVICE — JELLY KY LUBRICATING 5G PACKET

## (undated) DEVICE — SUT VICRYL PLUS 2-0 CT1 18

## (undated) DEVICE — GLOVE SENSICARE PI GRN 7

## (undated) DEVICE — KIT VIZADISC KNEE TRACKING

## (undated) DEVICE — DRAPE STERI INSTRUMENT 1018

## (undated) DEVICE — SUT PROLENE 5-0 PS-3 18 IN

## (undated) DEVICE — GOWN ECLIPSE POLY REINF 3XL

## (undated) DEVICE — SKINMARKER & RULER REGULAR X-F

## (undated) DEVICE — KIT IRR SUCTION HND PIECE

## (undated) DEVICE — SUT STRATAFIX 3-0 30CM

## (undated) DEVICE — WRAP KNEE ACCU THERM GEL PACK

## (undated) DEVICE — GOWN ECLIPSE REINF LV4 TWL 2XL

## (undated) DEVICE — SOL NACL IRR 1000ML BTL

## (undated) DEVICE — KIT CHECKPOINT MAKO

## (undated) DEVICE — DENTAL ROLL 1 1/2 X 3/8

## (undated) DEVICE — SET CEMENT (SCULP)

## (undated) DEVICE — SUT SILK 6-0 BLK BR P-1 P-1

## (undated) DEVICE — TOWEL OR DISP STRL BLUE 4/PK

## (undated) DEVICE — SUT VICRYL 3-0 27 SH

## (undated) DEVICE — UNDERGLOVES BIOGEL PI SIZE 8.5

## (undated) DEVICE — NDL BIOPSY 18G 20CM DISP

## (undated) DEVICE — PAD CAST SPECIALIST STRL 6

## (undated) DEVICE — PAD CAST SPECIALIST STRL 4

## (undated) DEVICE — Device

## (undated) DEVICE — MIXER BONE CEMENT

## (undated) DEVICE — SUT PROLENE 4-0 RB-1 BL MO

## (undated) DEVICE — WRAP PROTECTIVE LEG POS STRL

## (undated) DEVICE — SET CYSTO IRRIGATION UNIV SPIK

## (undated) DEVICE — SPONGE GAUZE 16PLY 4X4

## (undated) DEVICE — HEMOSTAT SURGICEL 2X14IN

## (undated) DEVICE — BONE PINS (4MM X 110MM)
Type: IMPLANTABLE DEVICE | Site: KNEE | Status: NON-FUNCTIONAL
Removed: 2025-01-28

## (undated) DEVICE — GUIDE BIOPSY BIPLANAR 18G

## (undated) DEVICE — SUT ETHILON 3-0 PS2 18 BLK

## (undated) DEVICE — SUT 5/0 18IN COATED VICRYL

## (undated) DEVICE — BLADE SURG #15 CARBON STEEL

## (undated) DEVICE — TOURNIQUET SB QC DP 34X4IN

## (undated) DEVICE — DRESSING AQUACEL AG ADV 3.5X12

## (undated) DEVICE — GLOVE SENSICARE PI MICRO 6.5

## (undated) DEVICE — BLADE MAKO STANDARD

## (undated) DEVICE — SPLINT NASAL AIRWAY SEPTAL SIL

## (undated) DEVICE — DRAPE THREE-QTR REINF 53X77IN

## (undated) DEVICE — BONE PINS (4MM X 140MM)
Type: IMPLANTABLE DEVICE | Site: KNEE | Status: NON-FUNCTIONAL
Removed: 2025-01-28

## (undated) DEVICE — STAPLER SKIN PROXIMATE WIDE

## (undated) DEVICE — SOL BETADINE 5%

## (undated) DEVICE — GLOVE BIOGEL SKINSENSE PI 8.0

## (undated) DEVICE — CONTAINER FORMALIN PREFILLED

## (undated) DEVICE — STAPLER SKIN ROTATING HEAD

## (undated) DEVICE — SYR SALINE FLSH PRFL STRL 10ML

## (undated) DEVICE — DRESSING XEROFORM 1X8IN

## (undated) DEVICE — DRAPE STERI U-SHAPED 47X51IN

## (undated) DEVICE — SUT VICRYL+ 1 CT1 18IN

## (undated) DEVICE — NDL STRAIGHT 4CM LEIBINGER

## (undated) DEVICE — KIT TOTAL KNEE TKOFG OMC